# Patient Record
Sex: MALE | Race: WHITE | Employment: OTHER | URBAN - METROPOLITAN AREA
[De-identification: names, ages, dates, MRNs, and addresses within clinical notes are randomized per-mention and may not be internally consistent; named-entity substitution may affect disease eponyms.]

---

## 2021-12-24 ENCOUNTER — APPOINTMENT (OUTPATIENT)
Dept: CT IMAGING | Facility: CLINIC | Age: 71
DRG: 064 | End: 2021-12-24
Attending: PHYSICIAN ASSISTANT
Payer: COMMERCIAL

## 2021-12-24 ENCOUNTER — APPOINTMENT (OUTPATIENT)
Dept: CT IMAGING | Facility: CLINIC | Age: 71
DRG: 064 | End: 2021-12-24
Attending: EMERGENCY MEDICINE
Payer: COMMERCIAL

## 2021-12-24 ENCOUNTER — HOSPITAL ENCOUNTER (INPATIENT)
Facility: CLINIC | Age: 71
LOS: 24 days | Discharge: NURSING, REHAB OR LONG-TERM CARE | DRG: 064 | End: 2022-01-17
Attending: EMERGENCY MEDICINE | Admitting: HOSPITALIST
Payer: COMMERCIAL

## 2021-12-24 DIAGNOSIS — I61.9 RIGHT-SIDED NONTRAUMATIC INTRACEREBRAL HEMORRHAGE, UNSPECIFIED CEREBRAL LOCATION (H): ICD-10-CM

## 2021-12-24 LAB
ANION GAP SERPL CALCULATED.3IONS-SCNC: 3 MMOL/L (ref 3–14)
APTT PPP: 26 SECONDS (ref 22–38)
ATRIAL RATE - MUSE: 106 BPM
BASOPHILS # BLD AUTO: 0 10E3/UL (ref 0–0.2)
BASOPHILS NFR BLD AUTO: 0 %
BUN SERPL-MCNC: 14 MG/DL (ref 7–30)
CALCIUM SERPL-MCNC: 8.5 MG/DL (ref 8.5–10.1)
CHLORIDE BLD-SCNC: 109 MMOL/L (ref 94–109)
CK SERPL-CCNC: 153 U/L (ref 30–300)
CO2 SERPL-SCNC: 29 MMOL/L (ref 20–32)
CREAT SERPL-MCNC: 0.77 MG/DL (ref 0.66–1.25)
DIASTOLIC BLOOD PRESSURE - MUSE: NORMAL MMHG
EOSINOPHIL # BLD AUTO: 0 10E3/UL (ref 0–0.7)
EOSINOPHIL NFR BLD AUTO: 0 %
ERYTHROCYTE [DISTWIDTH] IN BLOOD BY AUTOMATED COUNT: 12.4 % (ref 10–15)
GFR SERPL CREATININE-BSD FRML MDRD: >90 ML/MIN/1.73M2
GLUCOSE BLD-MCNC: 112 MG/DL (ref 70–99)
HCT VFR BLD AUTO: 47.8 % (ref 40–53)
HGB BLD-MCNC: 15.6 G/DL (ref 13.3–17.7)
HOLD SPECIMEN: NORMAL
IMM GRANULOCYTES # BLD: 0 10E3/UL
IMM GRANULOCYTES NFR BLD: 0 %
INR PPP: 0.96 (ref 0.85–1.15)
INTERPRETATION ECG - MUSE: NORMAL
LYMPHOCYTES # BLD AUTO: 1.3 10E3/UL (ref 0.8–5.3)
LYMPHOCYTES NFR BLD AUTO: 13 %
MCH RBC QN AUTO: 29.2 PG (ref 26.5–33)
MCHC RBC AUTO-ENTMCNC: 32.6 G/DL (ref 31.5–36.5)
MCV RBC AUTO: 89 FL (ref 78–100)
MONOCYTES # BLD AUTO: 0.4 10E3/UL (ref 0–1.3)
MONOCYTES NFR BLD AUTO: 4 %
NEUTROPHILS # BLD AUTO: 8.6 10E3/UL (ref 1.6–8.3)
NEUTROPHILS NFR BLD AUTO: 83 %
NRBC # BLD AUTO: 0 10E3/UL
NRBC BLD AUTO-RTO: 0 /100
P AXIS - MUSE: 36 DEGREES
PLATELET # BLD AUTO: 258 10E3/UL (ref 150–450)
POTASSIUM BLD-SCNC: 3.5 MMOL/L (ref 3.4–5.3)
PR INTERVAL - MUSE: 146 MS
QRS DURATION - MUSE: 84 MS
QT - MUSE: 358 MS
QTC - MUSE: 475 MS
R AXIS - MUSE: 37 DEGREES
RADIOLOGIST FLAGS: ABNORMAL
RBC # BLD AUTO: 5.35 10E6/UL (ref 4.4–5.9)
SARS-COV-2 RNA RESP QL NAA+PROBE: NEGATIVE
SODIUM SERPL-SCNC: 141 MMOL/L (ref 133–144)
SYSTOLIC BLOOD PRESSURE - MUSE: NORMAL MMHG
T AXIS - MUSE: 28 DEGREES
TROPONIN I SERPL HS-MCNC: 10 NG/L
VENTRICULAR RATE- MUSE: 106 BPM
WBC # BLD AUTO: 10.4 10E3/UL (ref 4–11)

## 2021-12-24 PROCEDURE — 84484 ASSAY OF TROPONIN QUANT: CPT | Performed by: EMERGENCY MEDICINE

## 2021-12-24 PROCEDURE — 0042T CT HEAD PERFUSION WITH CONTRAST: CPT

## 2021-12-24 PROCEDURE — 93005 ELECTROCARDIOGRAM TRACING: CPT

## 2021-12-24 PROCEDURE — G0463 HOSPITAL OUTPT CLINIC VISIT: HCPCS | Performed by: PHYSICIAN ASSISTANT

## 2021-12-24 PROCEDURE — 93005 ELECTROCARDIOGRAM TRACING: CPT | Mod: 76

## 2021-12-24 PROCEDURE — 85610 PROTHROMBIN TIME: CPT | Performed by: EMERGENCY MEDICINE

## 2021-12-24 PROCEDURE — 96365 THER/PROPH/DIAG IV INF INIT: CPT | Mod: 59

## 2021-12-24 PROCEDURE — 70450 CT HEAD/BRAIN W/O DYE: CPT

## 2021-12-24 PROCEDURE — 87635 SARS-COV-2 COVID-19 AMP PRB: CPT | Performed by: EMERGENCY MEDICINE

## 2021-12-24 PROCEDURE — 80048 BASIC METABOLIC PNL TOTAL CA: CPT | Performed by: EMERGENCY MEDICINE

## 2021-12-24 PROCEDURE — 250N000011 HC RX IP 250 OP 636: Performed by: EMERGENCY MEDICINE

## 2021-12-24 PROCEDURE — 250N000009 HC RX 250: Performed by: EMERGENCY MEDICINE

## 2021-12-24 PROCEDURE — 85730 THROMBOPLASTIN TIME PARTIAL: CPT | Performed by: EMERGENCY MEDICINE

## 2021-12-24 PROCEDURE — 85025 COMPLETE CBC W/AUTO DIFF WBC: CPT | Performed by: EMERGENCY MEDICINE

## 2021-12-24 PROCEDURE — 99291 CRITICAL CARE FIRST HOUR: CPT | Mod: 25

## 2021-12-24 PROCEDURE — 70450 CT HEAD/BRAIN W/O DYE: CPT | Mod: 77

## 2021-12-24 PROCEDURE — 99292 CRITICAL CARE ADDL 30 MIN: CPT

## 2021-12-24 PROCEDURE — 70496 CT ANGIOGRAPHY HEAD: CPT

## 2021-12-24 PROCEDURE — 120N000001 HC R&B MED SURG/OB

## 2021-12-24 PROCEDURE — 72125 CT NECK SPINE W/O DYE: CPT

## 2021-12-24 PROCEDURE — 36415 COLL VENOUS BLD VENIPUNCTURE: CPT | Performed by: EMERGENCY MEDICINE

## 2021-12-24 PROCEDURE — 96366 THER/PROPH/DIAG IV INF ADDON: CPT

## 2021-12-24 PROCEDURE — 82550 ASSAY OF CK (CPK): CPT | Performed by: EMERGENCY MEDICINE

## 2021-12-24 PROCEDURE — C9803 HOPD COVID-19 SPEC COLLECT: HCPCS

## 2021-12-24 RX ORDER — DEXTROAMPHETAMINE SACCHARATE, AMPHETAMINE ASPARTATE, DEXTROAMPHETAMINE SULFATE AND AMPHETAMINE SULFATE 5; 5; 5; 5 MG/1; MG/1; MG/1; MG/1
20 TABLET ORAL EVERY MORNING
Status: ON HOLD | COMMUNITY
End: 2022-01-10

## 2021-12-24 RX ORDER — DEXTROAMPHETAMINE SACCHARATE, AMPHETAMINE ASPARTATE, DEXTROAMPHETAMINE SULFATE AND AMPHETAMINE SULFATE 5; 5; 5; 5 MG/1; MG/1; MG/1; MG/1
20 TABLET ORAL DAILY PRN
Status: ON HOLD | COMMUNITY
End: 2022-01-10

## 2021-12-24 RX ORDER — ASPIRIN 81 MG/1
81 TABLET ORAL DAILY
COMMUNITY
End: 2021-12-24

## 2021-12-24 RX ORDER — TRAZODONE HYDROCHLORIDE 50 MG/1
50 TABLET, FILM COATED ORAL
Status: ON HOLD | COMMUNITY
End: 2022-01-10

## 2021-12-24 RX ORDER — AMLODIPINE AND BENAZEPRIL HYDROCHLORIDE 5; 20 MG/1; MG/1
1 CAPSULE ORAL DAILY
COMMUNITY
End: 2021-12-25

## 2021-12-24 RX ORDER — TRAZODONE HYDROCHLORIDE 50 MG/1
50 TABLET, FILM COATED ORAL AT BEDTIME
COMMUNITY
End: 2021-12-24

## 2021-12-24 RX ORDER — IOPAMIDOL 755 MG/ML
70 INJECTION, SOLUTION INTRAVASCULAR ONCE
Status: COMPLETED | OUTPATIENT
Start: 2021-12-24 | End: 2021-12-24

## 2021-12-24 RX ORDER — NICARDIPINE HYDROCHLORIDE 0.2 MG/ML
.5-15 INJECTION INTRAVENOUS CONTINUOUS
Status: DISCONTINUED | OUTPATIENT
Start: 2021-12-24 | End: 2021-12-25

## 2021-12-24 RX ADMIN — NICARDIPINE HYDROCHLORIDE 15 MG/HR: 0.2 INJECTION INTRAVENOUS at 19:12

## 2021-12-24 RX ADMIN — NICARDIPINE HYDROCHLORIDE 5 MG/HR: 0.2 INJECTION INTRAVENOUS at 16:23

## 2021-12-24 RX ADMIN — NICARDIPINE HYDROCHLORIDE 15 MG/HR: 0.2 INJECTION INTRAVENOUS at 22:26

## 2021-12-24 RX ADMIN — SODIUM CHLORIDE 100 ML: 900 INJECTION INTRAVENOUS at 15:48

## 2021-12-24 RX ADMIN — IOPAMIDOL 120 ML: 755 INJECTION, SOLUTION INTRAVENOUS at 15:47

## 2021-12-24 ASSESSMENT — ACTIVITIES OF DAILY LIVING (ADL)
ADLS_ACUITY_SCORE: 20
ADLS_ACUITY_SCORE: 12
ADLS_ACUITY_SCORE: 24
ADLS_ACUITY_SCORE: 24
ADLS_ACUITY_SCORE: 12
ADLS_ACUITY_SCORE: 12
ADLS_ACUITY_SCORE: 22

## 2021-12-24 NOTE — CONSULTS
[facility]    Stroke Telephone Note    I was called by David Lundberg on 12/24/21 regarding patient Sukhi Johnson. The patient is a 71 year old male who was last known well at noon today found down at 1500, with L side plegia, not speaking but able to follow some commands. BP is 200/115. History of prior ICH .    Stroke Code Data (for stroke code without tele)  Stroke code activated 12/24/21   1537   Stroke provider first response  12/24/21   1537            Last known normal 12/24/21   1200        Time of discovery   (or onset of symptoms) 12/24/21   1500   Head CT read by Stroke Neuro Dr/Provider 12/24/21   4537   Was stroke code de-escalated? No              Imaging Findings   CT head shows a large R side frontoparietal parenchymal ICH, 6.1x4.4 cm    Thrombolytic Treatment   Not given due to stroke mimic: ICH.    Endovascular Treatment  Not initiated due to absence of proximal vessel occlusion    Impression  Non-traumatic intracerebral hemorrhage of the R frontoparietal region    Recommendations   Acute Hemorrhagic Stroke Recommendations  - Neurochecks and Vital Signs every 1 hour  - Admit to an ICU  - Systolic BP Goal: 130-150mmHg  - Stat neurosurgery consultation  - Head of bed elevated 30 degrees  - Echocardiogram  - MRI at some point during the admission to evaluate for any underlying cerebral amyloid angiopathy, can be ordered by us later  - Telemetry, EKG  - Imaging: repeat head CT in 6 hours (i.e. at 10pm tonight 12/24)  - Bedside Glucose Monitoring  - A1c, Troponin x 3  - PT/OT/SLP  - Euthermia (please treat any fever aggressively), Euglycemia (please keep glucose <180mg/dL)    D/w my attending Dr. Remigio Hui    My recommendations are based on the information provided over the phone by Sukhi Johnson's in-person providers. They are not intended to replace the clinical judgment of his in-person providers. I was not requested to personally see or examine the  "patient at this time.    Michelle Schneider PA-C  Neurology  12/24/2021 4:11 PM  To page me or covering stroke neurology team member, click here: AMCOM   Choose \"On Call\" tab at top, then search dropdown box for \"Neurology Adult\", select location, press Enter, then look for stroke/neuro ICU/telestroke.        "

## 2021-12-24 NOTE — ED TRIAGE NOTES
Pt lives at single dwelling home. Last known well at 1200. Landlord found pt at 1500 on floor, unable to speak or use left side. C collar applied in ED as pt had an unwitnessed fall.

## 2021-12-24 NOTE — CONSULTS
NEUROSURGERY CONSULT    Neurosurgery was asked by Dr. Schneider to consult for a parenchymal hemorrhage in right frontal parietal  region.    PLAN:    Mr. Johnson exhibits a  6.1 x 4.4 cm parenchymal hemorrhage in right frontal parietal region with some mild surrounding edema and localized mass effect but no midline shift on the most recent imaging. From a Neurosurgical standpoint, we do not appreciate any indication for immediate surgical intervention. We do agree with Stroke Neurology and feel that it would be in his best interest to be admitted to the [facility] Intensive Care Unit with neurochecks Q1H. The head of the bed should be at 30 degrees at all times and the systolic blood pressure should be maintained at 150 mmHg or less at all times. There will be a repeat a head CT in six hours from the initial scan to assess the stability of the bleed.     Please page our on-call service for any questions or concerns.     It has been a pleasure meeting Sukhi Johnson. Thank you for having us be involved in his care.    ______________________________________________________________________    HPI:    Sukhi Johnson is a pleasant 71 year old male who presented to the [facility] Emergency Department VIA EMS for consultation after being found down at 1500 with left sided plegia. Non verbal but will give the thumbs up and thumbs down when asked questions. No thrombolytic or endovascular treatment given.     No past medical history on file.    No past surgical history on file.    Not on File    Social History     Tobacco Use     Smoking status: Not on file     Smokeless tobacco: Not on file   Substance Use Topics     Alcohol use: Not on file       No family history on file.    Home Medications    ROS: 10 point ROS neg other than the symptoms noted above in the HPI.    Vitals:    BP (!) 200/115   Pulse 100   Temp 98.1  F (36.7  C) (Temporal)   Resp 20   Wt 184 lb 15.5 oz (83.9 kg)   SpO2  97%   There is no height or weight on file to calculate BMI.  No intake/output data recorded.    Exam:    Patient appears comfortable. Non verbal.   Head: Normocephalic, without obvious abnormality, abrasions, no facial asymmetry.   Eyes: conjunctivae/corneas clear. PERRL, EOM's intact.   Throat: lips, mucosa, and tongue normal; teeth and gums normal.   Neck: supple, symmetrical, trachea midline, no adenopathy and thyroid: not enlarged, symmetric, no tenderness/mass/nodules.   Lungs: clear to auscultation bilaterally.   Heart: regular rate and rhythm.   Abdomen: soft, non-tender; bowel sounds normal; no masses, no organomegaly.   Pulses: 2+ and symmetric.   Skin: Skin color, texture, turgor normal. No rashes or lesions.     Following most commands. Communicates with right thumb up or down.  Cervical spine is non tender to palpation.  Right upper sensation intact throughout.   Left plegia.   Lumbar spine is non tender to palpation   Right lower extremity sensation intact.  Left lower extremity plegia.   Calves are soft and non-tender bilaterally.   GCS 22    Imaging: CT SCAN OF THE HEAD WITHOUT CONTRAST   12/24/2021 3:56 PM   Impression per radiology read - I have personally reviewed the images with the patient.    1. 6.1 x 4.4 cm parenchymal hemorrhage in right frontal parietal  region with some mild surrounding edema and localized mass effect but  no midline shift.  2. Cerebral atrophy with some scattered white matter changes which are  most likely due to chronic small vessel ischemic disease given the  patient's age.    CT HEAD PERFUSION WITH CONTRAST 12/24/2021 4:19 PM     Abnormal perfusion scan with moderate to large parenchymal  hematoma in the right frontoparietal region surrounding by some  diminished cerebral blood flow. The latter could be due to some  compressive ischemia.    Available labs at time of consult:       Recent Labs   Lab 12/24/21  1542   WBC 10.4   HGB 15.6   HCT 47.8   MCV 89         Recent Labs   Lab 12/24/21  1542   WBC 10.4   HGB 15.6   HCT 47.8   MCV 89        Recent Labs   Lab 12/24/21  1542   HGB 15.6     Recent Labs   Lab 12/24/21  1542   INR 0.96     Recent Labs   Lab 12/24/21  1542        Recent Labs   Lab 12/24/21  1542   WBC 10.4         Respectfully,    BOUCHRA Cortés, PA-MARK  [facility]     Tel: [phone]      All imaging, physical findings, and the above plan have been reviewed with Dr. Matthews.

## 2021-12-24 NOTE — ED NOTES
Bed: ST  Expected date: 12/24/21  Expected time: 3:19 PM  Means of arrival: Ambulance  Comments:  854 54m aphasia ETA 1631

## 2021-12-25 ENCOUNTER — APPOINTMENT (OUTPATIENT)
Dept: MRI IMAGING | Facility: CLINIC | Age: 71
DRG: 064 | End: 2021-12-25
Attending: PSYCHIATRY & NEUROLOGY
Payer: COMMERCIAL

## 2021-12-25 ENCOUNTER — APPOINTMENT (OUTPATIENT)
Dept: CT IMAGING | Facility: CLINIC | Age: 71
DRG: 064 | End: 2021-12-25
Attending: PSYCHIATRY & NEUROLOGY
Payer: COMMERCIAL

## 2021-12-25 ENCOUNTER — APPOINTMENT (OUTPATIENT)
Dept: SPEECH THERAPY | Facility: CLINIC | Age: 71
DRG: 064 | End: 2021-12-25
Attending: HOSPITALIST
Payer: COMMERCIAL

## 2021-12-25 LAB
ALBUMIN SERPL-MCNC: 3.4 G/DL (ref 3.4–5)
ALP SERPL-CCNC: 65 U/L (ref 40–150)
ALT SERPL W P-5'-P-CCNC: 25 U/L (ref 0–70)
ANION GAP SERPL CALCULATED.3IONS-SCNC: 6 MMOL/L (ref 3–14)
AST SERPL W P-5'-P-CCNC: 17 U/L (ref 0–45)
ATRIAL RATE - MUSE: 104 BPM
BILIRUB DIRECT SERPL-MCNC: 0.3 MG/DL (ref 0–0.2)
BILIRUB SERPL-MCNC: 2.3 MG/DL (ref 0.2–1.3)
BUN SERPL-MCNC: 17 MG/DL (ref 7–30)
CALCIUM SERPL-MCNC: 8.6 MG/DL (ref 8.5–10.1)
CHLORIDE BLD-SCNC: 110 MMOL/L (ref 94–109)
CHOLEST SERPL-MCNC: 193 MG/DL
CO2 SERPL-SCNC: 26 MMOL/L (ref 20–32)
CREAT SERPL-MCNC: 0.65 MG/DL (ref 0.66–1.25)
DIASTOLIC BLOOD PRESSURE - MUSE: NORMAL MMHG
ERYTHROCYTE [DISTWIDTH] IN BLOOD BY AUTOMATED COUNT: 12.6 % (ref 10–15)
GFR SERPL CREATININE-BSD FRML MDRD: >90 ML/MIN/1.73M2
GLUCOSE BLD-MCNC: 140 MG/DL (ref 70–99)
GLUCOSE BLDC GLUCOMTR-MCNC: 122 MG/DL (ref 70–99)
HBA1C MFR BLD: 5.4 % (ref 0–5.6)
HCT VFR BLD AUTO: 45.7 % (ref 40–53)
HDLC SERPL-MCNC: 61 MG/DL
HGB BLD-MCNC: 15.4 G/DL (ref 13.3–17.7)
INTERPRETATION ECG - MUSE: NORMAL
LDLC SERPL CALC-MCNC: 109 MG/DL
MAGNESIUM SERPL-MCNC: 2.3 MG/DL (ref 1.6–2.3)
MCH RBC QN AUTO: 29.5 PG (ref 26.5–33)
MCHC RBC AUTO-ENTMCNC: 33.7 G/DL (ref 31.5–36.5)
MCV RBC AUTO: 88 FL (ref 78–100)
NONHDLC SERPL-MCNC: 132 MG/DL
P AXIS - MUSE: 32 DEGREES
PLATELET # BLD AUTO: 271 10E3/UL (ref 150–450)
POTASSIUM BLD-SCNC: 3.3 MMOL/L (ref 3.4–5.3)
PR INTERVAL - MUSE: 142 MS
PROT SERPL-MCNC: 6.8 G/DL (ref 6.8–8.8)
QRS DURATION - MUSE: 84 MS
QT - MUSE: 348 MS
QTC - MUSE: 457 MS
R AXIS - MUSE: 52 DEGREES
RBC # BLD AUTO: 5.22 10E6/UL (ref 4.4–5.9)
SODIUM SERPL-SCNC: 142 MMOL/L (ref 133–144)
SYSTOLIC BLOOD PRESSURE - MUSE: NORMAL MMHG
T AXIS - MUSE: 65 DEGREES
TRIGL SERPL-MCNC: 117 MG/DL
TROPONIN I SERPL HS-MCNC: 52 NG/L
VENTRICULAR RATE- MUSE: 104 BPM
WBC # BLD AUTO: 13.9 10E3/UL (ref 4–11)

## 2021-12-25 PROCEDURE — 80053 COMPREHEN METABOLIC PANEL: CPT | Performed by: PSYCHIATRY & NEUROLOGY

## 2021-12-25 PROCEDURE — 70553 MRI BRAIN STEM W/O & W/DYE: CPT

## 2021-12-25 PROCEDURE — 92610 EVALUATE SWALLOWING FUNCTION: CPT | Mod: GN | Performed by: SPEECH-LANGUAGE PATHOLOGIST

## 2021-12-25 PROCEDURE — 255N000002 HC RX 255 OP 636: Performed by: EMERGENCY MEDICINE

## 2021-12-25 PROCEDURE — 250N000009 HC RX 250: Performed by: EMERGENCY MEDICINE

## 2021-12-25 PROCEDURE — 99223 1ST HOSP IP/OBS HIGH 75: CPT | Mod: AI | Performed by: HOSPITALIST

## 2021-12-25 PROCEDURE — 84484 ASSAY OF TROPONIN QUANT: CPT | Performed by: HOSPITALIST

## 2021-12-25 PROCEDURE — 85027 COMPLETE CBC AUTOMATED: CPT | Performed by: PSYCHIATRY & NEUROLOGY

## 2021-12-25 PROCEDURE — 83036 HEMOGLOBIN GLYCOSYLATED A1C: CPT | Performed by: EMERGENCY MEDICINE

## 2021-12-25 PROCEDURE — 96374 THER/PROPH/DIAG INJ IV PUSH: CPT | Mod: 59

## 2021-12-25 PROCEDURE — 92523 SPEECH SOUND LANG COMPREHEN: CPT | Mod: GN | Performed by: SPEECH-LANGUAGE PATHOLOGIST

## 2021-12-25 PROCEDURE — 80061 LIPID PANEL: CPT | Performed by: EMERGENCY MEDICINE

## 2021-12-25 PROCEDURE — 250N000011 HC RX IP 250 OP 636: Performed by: STUDENT IN AN ORGANIZED HEALTH CARE EDUCATION/TRAINING PROGRAM

## 2021-12-25 PROCEDURE — 82248 BILIRUBIN DIRECT: CPT | Performed by: PSYCHIATRY & NEUROLOGY

## 2021-12-25 PROCEDURE — 83735 ASSAY OF MAGNESIUM: CPT | Performed by: PSYCHIATRY & NEUROLOGY

## 2021-12-25 PROCEDURE — 36415 COLL VENOUS BLD VENIPUNCTURE: CPT | Performed by: EMERGENCY MEDICINE

## 2021-12-25 PROCEDURE — A9585 GADOBUTROL INJECTION: HCPCS | Performed by: EMERGENCY MEDICINE

## 2021-12-25 PROCEDURE — 70450 CT HEAD/BRAIN W/O DYE: CPT

## 2021-12-25 PROCEDURE — 120N000001 HC R&B MED SURG/OB

## 2021-12-25 RX ORDER — HYDRALAZINE HYDROCHLORIDE 20 MG/ML
10 INJECTION INTRAMUSCULAR; INTRAVENOUS EVERY 4 HOURS PRN
Status: DISCONTINUED | OUTPATIENT
Start: 2021-12-25 | End: 2021-12-27

## 2021-12-25 RX ORDER — LABETALOL HYDROCHLORIDE 5 MG/ML
10 INJECTION, SOLUTION INTRAVENOUS EVERY 4 HOURS PRN
Status: DISCONTINUED | OUTPATIENT
Start: 2021-12-25 | End: 2021-12-27

## 2021-12-25 RX ORDER — LIDOCAINE 40 MG/G
CREAM TOPICAL
Status: DISCONTINUED | OUTPATIENT
Start: 2021-12-25 | End: 2022-01-17 | Stop reason: HOSPADM

## 2021-12-25 RX ORDER — GADOBUTROL 604.72 MG/ML
8 INJECTION INTRAVENOUS ONCE
Status: COMPLETED | OUTPATIENT
Start: 2021-12-25 | End: 2021-12-25

## 2021-12-25 RX ORDER — ONDANSETRON 4 MG/1
4 TABLET, ORALLY DISINTEGRATING ORAL EVERY 6 HOURS PRN
Status: DISCONTINUED | OUTPATIENT
Start: 2021-12-25 | End: 2022-01-17 | Stop reason: HOSPADM

## 2021-12-25 RX ORDER — ONDANSETRON 2 MG/ML
4 INJECTION INTRAMUSCULAR; INTRAVENOUS EVERY 6 HOURS PRN
Status: DISCONTINUED | OUTPATIENT
Start: 2021-12-25 | End: 2022-01-17 | Stop reason: HOSPADM

## 2021-12-25 RX ORDER — LIDOCAINE 40 MG/G
CREAM TOPICAL
Status: DISCONTINUED | OUTPATIENT
Start: 2021-12-25 | End: 2021-12-25

## 2021-12-25 RX ADMIN — NICARDIPINE HYDROCHLORIDE 15 MG/HR: 0.2 INJECTION INTRAVENOUS at 03:05

## 2021-12-25 RX ADMIN — NICARDIPINE HYDROCHLORIDE 10 MG/HR: 0.2 INJECTION INTRAVENOUS at 05:18

## 2021-12-25 RX ADMIN — NICARDIPINE HYDROCHLORIDE 15 MG/HR: 0.2 INJECTION INTRAVENOUS at 00:24

## 2021-12-25 RX ADMIN — GADOBUTROL 8 ML: 604.72 INJECTION INTRAVENOUS at 10:00

## 2021-12-25 RX ADMIN — LABETALOL HYDROCHLORIDE 10 MG: 5 INJECTION, SOLUTION INTRAVENOUS at 07:31

## 2021-12-25 ASSESSMENT — ACTIVITIES OF DAILY LIVING (ADL)
ADLS_ACUITY_SCORE: 21
ADLS_ACUITY_SCORE: 24
ADLS_ACUITY_SCORE: 21
ADLS_ACUITY_SCORE: 24
ADLS_ACUITY_SCORE: 21
ADLS_ACUITY_SCORE: 24
ADLS_ACUITY_SCORE: 22
ADLS_ACUITY_SCORE: 24
ADLS_ACUITY_SCORE: 21
ADLS_ACUITY_SCORE: 22
ADLS_ACUITY_SCORE: 21
ADLS_ACUITY_SCORE: 21
ADLS_ACUITY_SCORE: 22
ADLS_ACUITY_SCORE: 21
ADLS_ACUITY_SCORE: 22

## 2021-12-25 NOTE — PROGRESS NOTES
"   12/25/21 1313   General Information                             SLP Bedside Swallow Evaluation    Onset of Illness/Injury or Date of Surgery 12/24/21   Referring Physician Dr. Aguirre   Patient/Family Therapy Goal Statement (SLP) Pt motioned he wants something to drink.   Pertinent History of Current Problem See chart notes   General Observations Pt sleeping initially, but then did wake up and follow commands with his right hand.  Pt kept his eyes closed despite mod-max cues to open his eyes.   Pt was not able to complete any oral motor movements on command or open his mouth for po trials.     Oral Motor   Oral Musculature   (Pt unable to execute any lip or tongue movements; Yawn noted)   Dentition (Oral Motor)   Dentition (Oral Motor) adequate dentition   Cough/Swallow/Gag Reflex (Oral Motor)   Comment, Cough/Swallow/Gag Reflex (Oral Motor) Pt make a slight vocal sound when asked to cough and throat clear hard with mod cues  (Spontaneous swallows with decreased elevation noted x 4)   Vocal Quality/Secretion Management (Oral Motor)   Comment, Vocal Quality/Secretion Management (Oral Motor) Min sound produced on \"ah\" with mod-max cues   General Swallowing Observations   Current Diet/Method of Nutritional Intake (General Swallowing Observations, NIS) NPO   Clinical Swallow Evaluation   Feeding Assistance dependent   Clinical Swallow Eval: Thin Liquid Texture Trial   Mode of Presentation, Thin Liquids spoon   Volume of Liquid or Food Presented ice chip x 1   Diagnostic Statement ice chip sat on lips and melted without pt ability to open mouth, attempt chewing, or move tongue; oral cares provided outside of teeth   Swallowing Recommendations   Diet Consistency Recommendations NPO   SLP Therapy Assessment/Plan   Criteria for Skilled Therapeutic Interventions Met (SLP Eval) yes   SLP Diagnosis Severe oral dysphagia   Rehab Potential (SLP Eval) good, to achieve stated therapy goals   Therapy Frequency (SLP Eval) daily "   Predicted Duration of Therapy Intervention (SLP Eval) 1 week   Comment, Therapy Assessment/Plan (SLP) Pt presents with severe oral motor deficits and high silent aspiration risk for all po intake.  Recommend NPO status and oral cares as able.  SLP to re-assess 12/26 in swallow Tx to assess for improved oral function and ability to take po trials.     Therapy Plan Review/Discharge Plan (SLP)   Therapy Plan Review (SLP) evaluation/treatment results reviewed;care plan/treatment goals reviewed;risks/benefits reviewed;current/potential barriers reviewed;participants included;patient   SLP Discharge Planning    SLP Discharge Recommendation (DC Rec) [facility] patient will benefit from intensive, interdisciplinary therapy.  Anticipate will be able to tolerate 3 hours of therapy per day;home with assist;home with home care speech therapy   SLP Rationale for DC Rec Pt's oral motor function and verbal communication are severely impaired at this time, max assist and supervision for communication after discharge   SLP Brief overview of current status  Pt presents with severe oral motor deficits, high silent aspiration risk for all po intake, and severe verbal language deficits.  Encourage use of thumbs up and down for yes/no responses, use of gestures and pointing to increase expression.  Recommend NPO status and oral cares as able.  SLP to re-assess 12/26 in swallow Tx to asess for improved oral function and ability to take po trials.      Total Evaluation Time   Total Evaluation Time (Minutes) 13

## 2021-12-25 NOTE — ED PROVIDER NOTES
Visit Date: 2021    ADDENDUM    EMERGENCY DEPARTMENT COURSE:  Please note that when the patient arrived, since he had fallen on the ground C-spine precautions had not been done and a collar was placed.  I did include a CT of the cervical spine on his evaluation.  This is negative and his collar has been removed.  The patient is easily turns his neck and does not have any complaints of neck pain when asked, by thumbs up/thumbs down.    David Lundberg MD        D: 2021   T: 2021   MT: [facility]    Name:     MONE DORAN  MRN:      [id number]        Account:    [id number]   :      1950           Visit Date: 2021     Document: [id number]

## 2021-12-25 NOTE — PHARMACY-ADMISSION MEDICATION HISTORY
Pharmacy Medication History  Admission medication history interview status for the 12/24/2021  admission is complete. See EPIC admission navigator for prior to admission medications     Location of Interview: Patient room  Medication history sources: Patient's family/friend (wife, Andressa) and [employer]    Significant changes made to the medication list:  - Flagged amlodipine-benazepril for removal. Patient's wife says he hasn't used for >1 month due to side effects (significant dizziness). He does not want to take this but continues to be ordered. See below.  - Removed aspirin and trazodone (patient is trying to cut back on all medications).  - Added multiple supplements. Has been irregularly using Ziquin Life supplement products. Patient also uses 2 eye supplements (unsure of names) daily and an AZO product BID.  - Changed Adderall from 20 mg three times daily to 20 mg in the morning and 20 mg in the afternoon as needed.  - Changed trazodone to as needed (wife says patient only uses as needed for depressed mood, but he hasn't used for more than a month).    In the past week, patient estimated taking medication this percent of the time: greater than 90%    Additional medication history information:   - Patient does not like the way amlodipine-benazepril makes him feel. He had a recent fill for tadalafil, which his wife says has worked well for him in the past for hypertension (not used for ED as prescribed here). It was previously prescribed for the [place] in [place] but has not been ordered with recent providers.     Medication reconciliation completed by provider prior to medication history? No    Time spent in this activity: 45 minutes    Prior to Admission medications    Medication Sig Last Dose Taking? Auth Provider   amphetamine-dextroamphetamine (ADDERALL) 20 MG tablet Take 20 mg by mouth every morning Rx written for 20 mg three times daily, but patient takes 20 mg in the morning and 20 mg in the afternoon  as needed. 12/23/2021 at am Yes Unknown, Entered By History   amphetamine-dextroamphetamine (ADDERALL) 20 MG tablet Take 20 mg by mouth daily as needed Rx written for 20 mg three times daily, but patient takes 20 mg in the morning and 20 mg in the afternoon as needed. prn Yes Unknown, Entered By History   Cranberry-Vitamin C (AZO CRANBERRY URINARY TRACT PO) Take 1 tablet by mouth 2 times daily Past Week at Unknown time Yes Unknown, Entered By History   UNABLE TO FIND MEDICATION NAME: Systemic enzyme product capsule - uses as needed when feeling ill (up to 5 capsules every hour). prn Yes Unknown, Entered By History   UNABLE TO FIND MEDICATION NAME: Ziquin Life Mind Body Tonic; typically uses 1 ounce per day. Takes irregularly. Past Month at Unknown time Yes Unknown, Entered By History   UNABLE TO FIND MEDICATION NAME: Ziquin Life Sparkle Minerals. Mixes with Mind Body Tonic. Takes irregularly. Past Month at Unknown time Yes Unknown, Entered By History   UNABLE TO FIND MEDICATION NAME: Ziquin Life EFA Complete (Essential Fatty Acids Omega 3 + Omega 6). Takes as needed. prn Yes Unknown, Entered By History   UNABLE TO FIND MEDICATION NAME: Ziquin Life Revive Digestive Support. Takes as needed. 12/23/2021 at Unknown time Yes Unknown, Entered By History   UNKNOWN TO PATIENT Lutein eye supplements. Patient takes 2 different eye vitamins - takes 1 of each supplement with dinner. 12/23/2021 at pm Yes Unknown, Entered By History   UNKNOWN TO PATIENT Eye supplement. Patient takes 2 different eye vitamins - takes 1 of each supplement with dinner. 12/23/2021 at pm Yes Unknown, Entered By History   amLODIPine-benazepril (LOTREL) 5-20 MG capsule Take 1 capsule by mouth daily   Unknown, Entered By History       The information provided in this note is only as accurate as the sources available at the time of update(s)

## 2021-12-25 NOTE — PROGRESS NOTES
"Vascular neurology progress note     71M with right parietal ICH     S: patient has severe dysarthria and communication is limited but he gestured that he was hungry, thirsty, and uncomfortable.   O: /67   Pulse 87   Temp 98  F (36.7  C) (Temporal)   Resp 17   Wt 83.9 kg (184 lb 15.5 oz)   SpO2 94%      Neuro exam:   Sleepy. Wakes up to tactile stimulation. Able to follow commands consistently. Oriented to self and being at a hospital. Communicates with gestures, head movement, thumbs up or down. Left neglect.   Left homonymous hemianopia. Right gaze deviation but can cross midline. Left facial droop. Anarthria. Severely limited mouth opening and tongue movement.   Spastic left hemiplegia. Only movement of left LE in bed with noxious stimulation.   Left hemihypesthesia.   No ataxia or involuntary movement.     A&P:   71M with right parietal lobar ICH. Repeat head CT yesterday was stable. Mechanism: hypertensive versus cerebral amyloid angiopathy. Patient is on adderall at home which can increase BP. Initially hypertensive and was put on nicardipine drip until 6:30 am this morning when it was stopped. Patient is not coagulopathic at baseline and no reversal was needed on admission.     -Repeat CT now.   -Avoid antithrombotics.   -Check CBC, CMP.   -SBP goal for the first 24 hours 130-150. Starting this afternoon around 3 pm, we can ease SBP goal to < 160.   -MRI brain with contrast when able.   -Intermittent compression boots for DVT prophylaxis. Can start pharmacologic DVT prophylaxis tomorrow.   -Hospitalist consult.   -PT, OT, SLP.     Vascular neurology will continue to follow.       I spent 45 min providing critical care for this patient.       Remigio Hui MD, Msc, MALLORIE, FAAN   of Neurology  [facility]     12/25/2021 8:17 AM  To page me or covering stroke neurology team member, click here: AMCOM  Choose \"On Call\" tab at top, then search dropdown box for " "\"Neurology Adult\" & press Enter, look for Neuro ICU/Stroke    "

## 2021-12-25 NOTE — PLAN OF CARE
Pt lethargic. Answers questions with thumbs up/thumbs down. Incomprehensible sounds. Interacts. Follows commands inconsistently. PERRL. Pt follows objects to right, can't gaze completely to left - pupils stop midway. L sided droop, L sided weakness 0/5. On RA. Telemetry ST majority of shift usually ranging from 100-130. Pt had hard BM during beginning of shift. Voiding, incontinence. External catheter in place.     neuros q2hr starting at 0400. Nicardipine drip infusing. Labetalol drip available if nicardipine is not getting patient to goal SBP. SBP goal 130-150 per neurology note on 12/24.

## 2021-12-25 NOTE — PLAN OF CARE
BP (!) 146/76   Pulse 81   Temp 98  F (36.7  C)   Resp 16   Wt 83.9 kg (184 lb 15.5 oz)   SpO2 94%     Patient name: Sukhi Johnson    Nursing shift note  Summary: Patient in with Hemmoragic stroke  Alertness/orientation: Patient is lethargic, refuses to open eyes  Neuro: Mute but can communicate with right hand, follows commands, severe left hemiparesis NIH scored 26.  Cardiac: , will monitor closely  Resp: WDL  GI: WDL  : External cath in place  CMS: Intact  IV: Left PIV  Mobility: Lift total care  Pain: Reports minor headache, Ice pack given.  Diet: NPO  Skin: WDL, coccyx is clear  Plan: Continue stroke workup  Other Important Info: Family stopped in to see patient      Sameer Batista, RN  [facility] [address] Neuro Unit  [phone]

## 2021-12-25 NOTE — PROGRESS NOTES
[facility]    Neurosurgery Progress Note    Date of Service (when I saw the patient): 12/25/2021     Assessment & Plan   Sukhi Johnson is a 71 year old male who was admitted on 12/24/2021. He presented with a parenchymal hemorrhage in the right frontal parietal region with some mild surrounding edema and localized mass effect but no midline shift. His repeat imaging was stable.     Active Problems:    Right-sided nontraumatic intracerebral hemorrhage, unspecified cerebral location (H)    Assessment: stable right-sided parenchymal hemorrhage    Plan: No plan for surgical intervention. Recommended continued management per NCC. Neurosurgery will sign off at this time.    I have discussed the following assessment and plan Dr. Matthews who is in agreement with initial plan and will follow up with further consultation recommendations.    Glenny Sheikh CNP  [facility] Neurosurgery  [facility]  [address] [place] [address]  [address] [place] [id number]    Tel [phone]  Pager [phone]      Interval History   Stable.    Physical Exam   Temp: 98  F (36.7  C) Temp src: Temporal BP: (!) 146/76 Pulse: 81   Resp: 16 SpO2: 94 % O2 Device: Nasal cannula Oxygen Delivery: 2 LPM  Vitals:    12/24/21 1558   Weight: 184 lb 15.5 oz (83.9 kg)     Vital Signs with Ranges  Temp:  [98  F (36.7  C)-98.3  F (36.8  C)] 98  F (36.7  C)  Pulse:  [] 81  Resp:  [8-43] 16  BP: (119-200)/() 146/76  SpO2:  [93 %-100 %] 94 %  I/O last 3 completed shifts:  In: 981.87 [I.V.:981.87]  Out: 800 [Urine:800]     , Blood pressure (!) 146/76, pulse 81, temperature 98  F (36.7  C), resp. rate 16, weight 184 lb 15.5 oz (83.9 kg), SpO2 94 %.  184 lbs 15.46 oz    Medications     labetalol 1000 mg/200 mL Stopped (12/24/21 2018)       sodium chloride (PF)  3 mL Intracatheter Q8H       Data     CBC RESULTS:   Recent Labs   Lab Test 12/25/21  1116   WBC 13.9*   RBC 5.22   HGB 15.4   HCT 45.7    MCV 88   MCH 29.5   MCHC 33.7   RDW 12.6        Basic Metabolic Panel:  Lab Results   Component Value Date     12/25/2021      Lab Results   Component Value Date    POTASSIUM 3.3 12/25/2021     Lab Results   Component Value Date    CHLORIDE 110 12/25/2021     Lab Results   Component Value Date    MUSTAPHA 8.6 12/25/2021     Lab Results   Component Value Date    CO2 26 12/25/2021     Lab Results   Component Value Date    BUN 17 12/25/2021     Lab Results   Component Value Date    CR 0.65 12/25/2021     Lab Results   Component Value Date     12/25/2021     INR:  Lab Results   Component Value Date    INR 0.96 12/24/2021

## 2021-12-25 NOTE — ED PROVIDER NOTES
ECG:  Indication: Repeat  Completed at 1011.  Read at 1011.   Sinus tachycardia. T wave abnormality, consider inferior ischemia. Abnormal ECG.  Rate 104 bpm. VA interval 142. QRS duration 84. QT/QTc 348/457. P-R-T axes 32 52 65.      Scribe Disclosure:  I, Charles Mahajan, am serving as a scribe at 10:17 AM on 12/25/2021 to document services personally performed by Og Avelar MD based on my observations and the provider's statements to me.

## 2021-12-25 NOTE — PROGRESS NOTES
"Vascular neurology progress note     Follow up CT head is done and I personally reviewed.   Hematoma volume = 62 mL (60 mL on initial imaging using the same measurement technique).   Minimal subarachnoid extension.   No intraventricular extension.   No hydrocephalus.   No pineal shift.     Ok to transfer to the floor.   Neuro checks and vitals q4 is ok.     Will continue to follow.     Remigio Hui MD, Msc, FAHA, FAAN   of Neurology  [facility]     12/25/2021 12:00 PM  To page me or covering stroke neurology team member, click here: AMCOM  Choose \"On Call\" tab at top, then search dropdown box for \"Neurology Adult\" & press Enter, look for Neuro ICU/Stroke  "

## 2021-12-25 NOTE — PROGRESS NOTES
"   12/25/21 9435   General Information                                 SLP Communication Evaluation    Onset of Illness/Injury or Date of Surgery 12/24/21   Referring Physician Dr. Aguirre   Patient/Family Therapy Goal Statement (SLP) Pt motioned he wants something to drink.   Pertinent History of Current Problem See chart notes   General Observations Pt sleeping initially, but then did wake up and follow commands with his right hand.  Pt kept his eyes closed despite mod-max cues to open his eyes.   Pt unable to move oral structures for following commands or imitating vowels/automatics.     Auditory Comprehension   Comment, Assessment (Auditory Comprehension) Pt was able to follow 5/5 1 step commands with his right hand.  Pt was unable to move oral structures to complete oral motor commands.  Comprehension is intact for simple commands.  Will assess futher in Tx as indicated.   Verbal Expression   Comment, Assessment (Verbal Expression) Pt unable to move mouth to verbalize or imitate words.  Slight sound produced on \"ah\", humming, and cues to cough and throat clear as hard as possible given mod-max cues.  Pt independently using thumbs up and down and using gestures such as drink.   SLP Therapy Assessment/Plan   Criteria for Skilled Therapeutic Interventions Met (SLP Eval) yes   SLP Diagnosis Severe verbal language deficits   Rehab Potential (SLP Eval) good, to achieve stated therapy goals   Therapy Frequency (SLP Eval) daily   Predicted Duration of Therapy Intervention (SLP Eval) 1 week   Comment, Therapy Assessment/Plan (SLP) Pt presents with severe verbal language deficits due to severe oral motor deficits.  Encourage pt to use thumb up/down for yes/no responses, gestures, and pointing if pt keeps his eyes open.  Plan to provide communication Tx for voicing, speech, use of gestures, and AAC use as indicated.     Therapy Plan Review/Discharge Plan (SLP)   Therapy Plan Review (SLP) evaluation/treatment results " reviewed;care plan/treatment goals reviewed;risks/benefits reviewed;current/potential barriers reviewed;participants included;patient   SLP Discharge Planning    SLP Discharge Recommendation (DC Rec) [facility] will benefit from intensive, interdisciplinary therapy.  Anticipate will be able to tolerate 3 hours of therapy per day;home with assist;home with home care speech therapy   SLP Rationale for DC Rec Pt's oral motor function and verbal communication are severely impaired at this time, max assist and supervision for communication after discharge   SLP Brief overview of current status  Pt presents with severe oral motor deficits, high silent aspiration risk for all po intake, and severe verbal language deficits.  Encourage use of thumbs up and down for yes/no responses, use of gestures and pointing to increase expression.  Recommend NPO status and oral cares as able.  SLP to re-assess 12/26 in swallow Tx to asess for improved oral function and ability to take po trials.      Total Evaluation Time   Total Evaluation Time (Minutes) 12

## 2021-12-25 NOTE — ED PROVIDER NOTES
Visit Date: 12/24/2021    CHIEF COMPLAINT:  Left-sided weakness.    HISTORY OF PRESENT ILLNESS:  This is a 71-year-old male who presents to the Emergency Department with new onset of left sided weakness and speech problems.  The patient was last seen normal at 12:30 today.  At 3:30 he was found on the floor with difficulty speaking and complete weakness of his left side.  Ambulance was called, and he was transferred here.  Paramedics did note that he had a history of a hemorrhagic bleed in the past, and had recovered, and is on no known blood thinners.  The patient himself can give me no further history.  On review of his chart, he does appear to have some blood pressure issues for which he is on Lotrel and possibly ADHD since Adderall as listed as well.  He is on Desyrel.    ALLERGIES:  NONE.    FAMILY AND SOCIAL HISTORY:  He lives with his significant other who he calls his wife.    REVIEW OF SYSTEMS:  Unable to be obtained due to the situation and urgency.    PHYSICAL EXAMINATION:  GENERAL:  Reveals a white male, supine.  VITAL SIGNS:  Initial blood pressure 200/115, temperature 98, pulse 100, respirations 20, pulse ox 97% on room air.  HEAD AND NECK EXAM:  There is an abrasion over the mid left parietal scalp.  There is no bony step-off.  There is no obvious neck tenderness on palpation.  Pupils are equal at 2-3 mm; however, there is a right gaze preference.  When I hold my hands on the left side he does not see them.  There is mild droop and his oropharynx is clear without tongue biting.  Neck:  No carotid bruits.  CHEST:  Nontender.  LUNGS:  Clear.  HEART:  Regular.  No murmurs.  ABDOMEN:  Soft, without tenderness or masses.  MUSCULOSKELETAL:  No swelling.  SKIN:  No rash.  NEUROLOGIC:  The patient has a significant right gaze preference with a left sided vision loss.  I cannot tell if this is just upper or lower fields.  He is nonverbal, however, he does respond appropriately and he grabs my thumb when I  asked him to, when I place my hand in his visual field to the left.  He will respond with a thumb up or thumb down to questions appropriately.  Motor is gone on the left side.  He has complete drop, both arm and leg.  Toe is downgoing.  Sensation is absent as well.  He does not withdraw to pain.  LYMPHATICS:  No adenopathy is present.    EMERGENCY DEPARTMENT COURSE:  A tier 1 code stroke was called.  The patient went to CT and was found to have a large right parietal hemorrhagic bleed.  Neuro Stroke team was in attendance.  They contacted Neurosurgery who saw the patient.  Patient was started on IV nicardipine to bring his pressure down, initially with a level of 140; however, after seen by Neurosurgery their limit was 150, so the patient was kept under 150.    LABORATORIES:  White count 10.4, hemoglobin 15.6, platelets 258.  Chemistry is okay.  COVID negative.  Troponin negative.  CK negative.  INR negative.     We did obtain an EKG.  EKG showed sinus tachycardia at 106, and inferior Q's were noted.  , QRS 84, QTc 475.     As noted both Neurosurgery and Neurology consults were obtained.  The patient remains alert.  He is aphasic with left sided hemiplegia.  Blood pressure is controlled on a nicardipine drip.  Unfortunately, there are no ICU beds, so the patient remains in the ED with an ICU nurse on standby.  We will repeat the CT scan approximately 6 hours after the first and then decide on placement at that time.    IMPRESSION:  Acute intracerebral hemorrhage.    Critical care time, exclusive of procedure, is 45 minutes.      David Lundberg MD        D: 2021   T: 2021   MT: [facility]    Name:     MONE DORAN  MRN:      [id number]        Account:    [id number]   :      1950           Visit Date: 2021     Document: [id number]

## 2021-12-25 NOTE — PROVIDER NOTIFICATION
MD paged: [phone] Sukhi Johnson. FYI patient's NIH increased from 20-26 per my assessment and the flowsheet- However my assessment doesn't seem much different from report received from emergency department. Thanks, Sameer JAIN [phone]

## 2021-12-25 NOTE — H&P
[facility]    History and Physical - Hospitalist Service       Date of Admission:  12/24/2021    Assessment & Plan      Sukhi Johnson is a 71 year old male with PMH ADD admitted on 12/24/2021 with non traumatic ICH of R frontoparietal region. Not anticoagulated. He is dysarthric but follows commands. Has some L sided weakness. Repeat imaging has been stable. Patient was initially on nicardipine drip in ED and was boarding for nearly 18 hours waiting for ICU bed. Nicardipine drip was discontinued at 6:30am this morning and stroke service okayed [place] [facility]    Acute hemorrhagic stroke, hypertensive vs amyloid angiopathy  Previous history of hemorrhagic stroke in 2013  Untreated Hypertension  -Admit to inpatient, [facility] status, neurology floor  -Appreciate stroke neuro service assistance and recommendations  -Neurochecks and Vital Signs every 2 hours per stroke team this morning  -Avoid antithrombotics.   -SBP goal for the first 24 hours 130-150. Starting this afternoon around 3 pm, we can ease SBP goal to < 160.   -MRI brain with contrast completed - does show possible amyloid angiopathy. No change in size of hemorrhagic lesion.  -Neurosurgery consulted from ED on admission, has now signed off  -Head of bed elevated 30 degrees  -Echocardiogram pending  -Monitor on cardiac telemetry  -Intermittent compression boots for DVT prophylaxis. Can start pharmacologic DVT prophylaxis tomorrow.   -PT/OT/SLP - currently NPO. Will hold any fluids for now to avoid cerebral edema.  -Euthermia (please treat any fever aggressively), Euglycemia (please keep glucose <180mg/dL)    Chronic fatigue syndrome 2/2 lyme disease: hold adderall    COVID PCR screen negative. No vaccination records available.     Diet: NPO for Medical/Clinical Reasons Except for: No Exceptions    DVT Prophylaxis: Pneumatic Compression Devices  Tay Catheter: Not present  Central Lines: None  Code Status:   Full code until  "discussion possible    Clinically Significant Risk Factors Present on Admission                    Disposition Plan   Expected Discharge:  2-3 days   Anticipated discharge location:  Awaiting care coordination huddle ARU if recommended  Delays:         The patient's care was discussed with the ED physician Dr Avelar, patient, and patient's wife Andressa Ramakrishna Aguirre MD  [facility]  Securely message with the Vocera Web Console (learn more here)  Text page via [employer] Paging/Directory        ______________________________________________________________________    Chief Complaint   Hemorrhagic stroke    Unable to obtain a history from the patient due to dysarthria. History obtained from ED physician and stroke team.    History of Present Illness   Sukhi Johnson is a 71 year old male who presents with left hemiplegia and inability to speak or swallow due to hemorrhagic stroke. He presented to ED yesterday afternoon and boarded in ED overnight due to lack of ICU bed. Per ED notes, patient's last well was noon on day of admission and he was found down by wife around 3pm. BP on arrival was 200s/120s and he required nicardipine drip until early this morning. Patient was signed out to hospital service late this morning. Patient at this point is responsive to verbal and uses right arm/hand to gesture and answer questions. He doesn't open eyes. Spoke to wife by phone to obtain more history.    Apparently patient has both a \"lyme doctor\" and psychiatrist in [place] and he sees them by telehealth visits. He is on Adderall for CFS 2/2 lyme disease (prescribed by [place] doctor). He had taken trazodone for sleep but weaned himself off that some time ago. He had a previous hemorrhagic stroke in 2013 and was admitted at [facility] near [place] for about a week. Apparently symptoms all resolved by time of discharge and he walked out of hospital asymptomatic. Patient has high BP but " "medications prescribed by doctors here have had side effects he did not tolerate and so he has not taken anything for blood pressure for at least months. They have been trying to find a doctor to prescribe low dose cialis which worked to control his blood pressure in the past. They changed to a new PCP through new insurance - Eda Saucedo - though first appt was to be 2022. They have also been trying to get in to see a neurologist at [facility] named Dr Burnett and wife requested assistance with this. I let her know our in house neurologists would be treating patient for acute issues and follow up could be discussed later depending on pt's functional status on discharge.    Patient is not vaccinated for COVID. He had a mild case about a year ago, as did wife, so \"we have natural immunity and are fine now\" and she does not want patient vaccinated.    Review of Systems    The 10 point Review of Systems is negative other than noted in the HPI or here. Unable due to pt dysarthria.    Past Medical History    I have reviewed this patient's medical history and updated it with pertinent information if needed.   Lyme disease in childhood and again in adulthood with resulting chronic fatigue syndrome  HTN  Depression    Past Surgical History   I have reviewed this patient's surgical history and updated it with pertinent information if needed.  No past surgical history on file. - unknown    Social History   Lives with wife. They have been together since . Non smoker. No alcohol at all since , occasional prior to that.    Family History     Unable to obtain due to: pt unable to speak  Per wife, pt's father  in age 40s from MI. Brother  from reaction to vaccination while he was in the  (she thinks).    Prior to Admission Medications   Prior to Admission Medications   Prescriptions Last Dose Informant Patient Reported? Taking?   Cranberry-Vitamin C (AZO CRANBERRY URINARY TRACT PO) Past Week at " Unknown time Spouse/Significant Other Yes Yes   Sig: Take 1 tablet by mouth 2 times daily   UNABLE TO FIND prn Spouse/Significant Other Yes Yes   Sig: MEDICATION NAME: Systemic enzyme product capsule - uses as needed when feeling ill (up to 5 capsules every hour).   UNABLE TO FIND Past Month at Unknown time Spouse/Significant Other Yes Yes   Sig: MEDICATION NAME: [employer] Life Mind Body Tonic; typically uses 1 ounce per day. Takes irregularly.   UNABLE TO FIND Past Month at Unknown time Spouse/Significant Other Yes Yes   Sig: MEDICATION NAME: [employer] Life Sparkle Minerals. Mixes with Mind Body Tonic. Takes irregularly.   UNABLE TO FIND prn Spouse/Significant Other Yes Yes   Sig: MEDICATION NAME: Ziquin Life EFA Complete (Essential Fatty Acids Omega 3 + Omega 6). Takes as needed.   UNABLE TO FIND 12/23/2021 at Unknown time Spouse/Significant Other Yes Yes   Sig: MEDICATION NAME: [employer] Life Revive Digestive Support. Takes as needed.   UNKNOWN TO PATIENT 12/23/2021 at pm Spouse/Significant Other Yes Yes   Sig: Lutein eye supplements. Patient takes 2 different eye vitamins - takes 1 of each supplement with dinner.   UNKNOWN TO PATIENT 12/23/2021 at pm Spouse/Significant Other Yes Yes   Sig: Eye supplement. Patient takes 2 different eye vitamins - takes 1 of each supplement with dinner.   amphetamine-dextroamphetamine (ADDERALL) 20 MG tablet 12/23/2021 at am Spouse/Significant Other Yes Yes   Sig: Take 20 mg by mouth every morning Rx written for 20 mg three times daily, but patient takes 20 mg in the morning and 20 mg in the afternoon as needed.   amphetamine-dextroamphetamine (ADDERALL) 20 MG tablet prn Spouse/Significant Other Yes Yes   Sig: Take 20 mg by mouth daily as needed Rx written for 20 mg three times daily, but patient takes 20 mg in the morning and 20 mg in the afternoon as needed.   traZODone (DESYREL) 50 MG tablet More than a month at Unknown time  Yes Yes   Sig: Take 50 mg by mouth nightly as needed for  depression      Facility-Administered Medications: None     No longer taking trazodone. Took Wellbutrin prn in the past for depression but hasn't needed it recently.    Allergies   No Known Allergies    Physical Exam   Vital Signs: Temp: 98  F (36.7  C) Temp src: Temporal BP: 125/75 Pulse: 104   Resp: 20 SpO2: 96 % O2 Device: Nasal cannula Oxygen Delivery: 2 LPM  Weight: 184 lbs 15.46 oz    Constitutional: Lethargic, mute, but able to follow commands  HEENT: neck supple, no LAD noted, moist mucous membranes  Respiratory: Clear to auscultation bilaterally, no crackles or wheezing  Cardiovascular: Mild tachycardia, no m/r/g  GI: Normal bowel sounds, soft, non-distended, non-tender  Skin/Integumen: No rashes, no cyanosis, no edema  Ext: no edema, left hemiparesis    Data   Data reviewed today: I reviewed all medications, new labs and imaging results over the last 24 hours. I personally reviewed the EKG tracing showing sinus tachycardia, possible inferior infarct. No previous EKG for comparison.    Recent Labs   Lab 12/25/21  0448 12/24/21  1542   WBC  --  10.4   HGB  --  15.6   MCV  --  89   PLT  --  258   INR  --  0.96   NA  --  141   POTASSIUM  --  3.5   CHLORIDE  --  109   CO2  --  29   BUN  --  14   CR  --  0.77   ANIONGAP  --  3   MUSTAPHA  --  8.5   * 112*     Recent Results (from the past 24 hour(s))   CT Head w/o Contrast   Result Value    Radiologist flags Intracranial hemorrhage (AA)    Narrative    CT SCAN OF THE HEAD WITHOUT CONTRAST   12/24/2021 3:56 PM     HISTORY: Neuro deficit, acute, stroke suspected; Code stroke. Unable  to speak or use left side. Found down.    TECHNIQUE:  Axial images of the head and coronal reformations without  IV contrast material.  Radiation dose for this scan was reduced using  automated exposure control, adjustment of the mA and/or kV according  to patient size, or iterative reconstruction technique.    COMPARISON: None.    FINDINGS: There is a 6.1 x 4.4 cm parenchymal  hemorrhage in the right  posterior frontoparietal region with some mild edema. There is some  localized mass effect but no significant midline shift. Cerebral  atrophy and patchy white matter changes are present. There is no  evidence for any acute ischemic infarct or any skull fracture.  Visualized paranasal sinuses and mastoid air cells are clear.      Impression    IMPRESSION:  1. 6.1 x 4.4 cm parenchymal hemorrhage in right frontal parietal  region with some mild surrounding edema and localized mass effect but  no midline shift.  2. Cerebral atrophy with some scattered white matter changes which are  most likely due to chronic small vessel ischemic disease given the  patient's age.     [Critical Result: Intracranial hemorrhage]    Finding was identified on 12/24/2021 3:58 PM.     Dr. Lundberg was contacted by me on 12/24/2021 4:00 PM and verbalized  understanding of the critical result.     JOSUE SUAREZ MD         SYSTEM ID:  [email or web address]   CTA Head Neck with Contrast    Narrative    CTA HEAD AND NECK WITH CONTRAST 12/24/2021 3:58 PM     HISTORY: Neuro deficit, acute, stroke suspected; Code stroke.  Left-sided paralysis. Speech difficulty. Found down.    TECHNIQUE: Axial images were obtained through the head and neck with  intravenous contrast. 70 mL of Isovue-370 was given. Multiplanar  reconstructions were performed. 3-D reconstructions were also acquired  off a remote workstation for CT angiography. Carotid stenoses were  evaluated by comparing the caliber of the proximal internal carotid  artery to the caliber of the distal internal carotid artery. Radiation  dose for this scan was reduced using automated exposure control,  adjustment of the mA and/or kV according to patient size, or iterative  reconstruction technique.    FINDINGS:    Brachiocephalic vessels: Normal.    Right carotid system: Normal.    Left carotid system: Normal.    Right vertebral artery: Normal.    Left vertebral artery: Normal.    [place]: Normal. There is some mild fusiform dilatation of  the anterior communicating artery but there is no definite saccular  aneurysm.    Other findings: There is irregular marginated mass in the left lobe of  the thyroid gland measuring approximately 3.1 x 2.9 x 3.7 cm.  Parenchymal hemorrhage also noted in the right hemisphere described on  CT immediately prior to this study. There is no abnormal arterial  venous vessels associated with this parenchymal hemorrhage. There is  no evidence for any active bleeding within the hematoma.      Impression    IMPRESSION:  1. Negative CT angiography of the head and neck.  2. Irregular mass in the left lobe of the thyroid gland measuring 3.1  x 2.9 x 3.7 cm. This is an indeterminate mass. If clinically  indicated, thyroid ultrasound might be helpful in further evaluation.  3. Head CT report called to Dr. Lundberg at 1600. CTA report and  cervical spine report called at 1625 PM on 12/24/2021.  4. Parenchymal hemorrhage in right hemisphere. There is no evidence  for any abnormal arterial or venous structures associated with this  parenchymal hemorrhage.     JOSUE SUAREZ MD         SYSTEM ID:  [email or web address]   CT Head Perfusion w Contrast    Narrative    CT HEAD PERFUSION WITH CONTRAST 12/24/2021 4:19 PM     HISTORY: Neuro deficit, acute, stroke suspected; Code stroke. Speech  difficulties. Left-sided weakness.    TECHNIQUE: Axial images were obtained through the brain with  intravenous contrast for CT brain perfusion imaging. 50 mL of Isovue  370 was given. Radiation dose for this scan was reduced using  automated exposure control, adjustment of the mA and/or kV according  to patient size, or iterative reconstruction technique.    FINDINGS: There is abnormal perfusion in the right frontal parietal  region on all sequences with the perfusion abnormalities slightly  mismatched on the cerebral blood flow and Tmax images suggesting that  there is a ischemic penumbra. The central mass  portion of the  perfusion abnormality is due to a large parenchymal hematoma.      Impression    IMPRESSION: Abnormal perfusion scan with moderate to large parenchymal  hematoma in the right frontoparietal region surrounding by some  diminished cerebral blood flow. The latter could be due to some  compressive ischemia.    JOSUE SUAREZ MD         SYSTEM ID:  [email or web address]   Cervical spine CT w/o contrast    Narrative    CT CERVICAL SPINE WITHOUT CONTRAST   12/24/2021 4:20 PM     HISTORY: Trauma - C-spine injury. Found down, aphasic.     TECHNIQUE: Axial images of the cervical spine were obtained without  intravenous contrast. Multiplanar reformations were performed.  Radiation dose for this scan was reduced using automated exposure  control, adjustment of the mA and/or kV according to patient size, or  iterative reconstruction technique.     COMPARISON: None.    FINDINGS: Sagittal reconstructions demonstrate normal posterior  alignment. Vertebral body heights are maintained. There is no evidence  for any acute fracture. Mild to moderate multilevel degenerative disc  and facet disease is present. There is no evidence for a high-grade  central canal stenosis. Paraspinal soft tissues structures are  unremarkable for an indeterminate mass in the left lobe of the thyroid  gland measuring over 3 cm in maximum dimensions.      Impression    IMPRESSION: Degenerative changes. No evidence for fracture or any  posterior malalignment.    JOSUE SUAREZ MD         SYSTEM ID:  DLOES   CT Head w/o Contrast    Narrative    EXAM: CT HEAD W/O CONTRAST  LOCATION: [facility]  DATE/TIME: 12/24/2021 10:20 PM    INDICATION: Parenchymal hemorrhage, follow-up.    COMPARISON: 12/24/2021 at 1546 hours.    TECHNIQUE: Routine CT head without IV contrast. Multiplanar reformats. Dose reduction techniques were used.    FINDINGS:  INTRACRANIAL CONTENTS: The dominant acute parenchymal hemorrhage in the right frontoparietal region is  mostly unchanged apart from slight increase along its anterior inferior margin compared to the previous study. Surrounding edema, mass effect, and   slight right-to-left midline shift is unchanged. Small chronic lacunar infarct in the left basal ganglia again noted. Remainder stable.      Impression    IMPRESSION:  1.  Acute parenchymal hemorrhage in the right frontoparietal region is mostly unchanged apart from slight increase along its anterior inferior margin. Mass effect and edema are unchanged    2.  Remainder stable.   CT Head w/o Contrast    Narrative    EXAM: CT HEAD WITHOUT CONTRAST  LOCATION: [facility]  DATE/TIME: 12/25/2021, 9:06 AM    INDICATION: Stroke, follow-up  COMPARISON: Head CT 12/24/2021 performed at 2212 hours.  TECHNIQUE: Routine CT Head without IV contrast. Multiplanar reformats. Dose reduction techniques were used.    FINDINGS:  INTRACRANIAL CONTENTS: No significant change in approximately 5.2 x 4.2 x 4.3 cm acute hyperdense intraparenchymal hemorrhage centered in the right frontoparietal junction with surrounding vasogenic edema and regional mass effect resulting in sulcal   effacement, partial effacement of the right lateral ventricle and 2 mm leftward midline shift. No herniation or hydrocephalus. Stable mild to moderate chronic small vessel ischemic changes throughout the cerebral hemispheric white matter bilaterally.   Stable chronic left basal ganglia lacunar infarct. No evidence of acute ischemic infarction. No abnormal extra-axial fluid collection. Stable mild generalized brain parenchymal volume loss.    VISUALIZED ORBITS/SINUSES/MASTOIDS: No intraorbital abnormality. No paranasal sinus mucosal disease. No middle ear or mastoid effusion.    BONES/SOFT TISSUES: No acute abnormality.      Impression    IMPRESSION:  1.  No significant change since 12/24/2021. Stable acute right frontoparietal intraparenchymal hemorrhage with associated regional mass effect  resulting in 2 mm leftward midline shift. No new intracranial hemorrhage, herniation or hydrocephalus.  2.  Stable mild to moderate chronic small vessel ischemic disease and mild generalized brain parenchymal volume loss.

## 2021-12-25 NOTE — ED NOTES
Patient placed on 2L NC. Per orders to keep SPo2 above 94%. Patient appears to have short periods of apnea while sleeping where his Spo2 decreases to 89% while on room air.

## 2021-12-25 NOTE — PROVIDER NOTIFICATION
MD paged: [phone] Sukhi Johnson. Any concern over abraisons on forehead? Family thought he may have fallen and hit his head. Thanks, Sameer JAIN [phone]

## 2021-12-26 ENCOUNTER — APPOINTMENT (OUTPATIENT)
Dept: CT IMAGING | Facility: CLINIC | Age: 71
DRG: 064 | End: 2021-12-26
Attending: PHYSICIAN ASSISTANT
Payer: COMMERCIAL

## 2021-12-26 ENCOUNTER — APPOINTMENT (OUTPATIENT)
Dept: CARDIOLOGY | Facility: CLINIC | Age: 71
DRG: 064 | End: 2021-12-26
Attending: HOSPITALIST
Payer: COMMERCIAL

## 2021-12-26 ENCOUNTER — APPOINTMENT (OUTPATIENT)
Dept: SPEECH THERAPY | Facility: CLINIC | Age: 71
DRG: 064 | End: 2021-12-26
Payer: COMMERCIAL

## 2021-12-26 ENCOUNTER — APPOINTMENT (OUTPATIENT)
Dept: GENERAL RADIOLOGY | Facility: CLINIC | Age: 71
DRG: 064 | End: 2021-12-26
Payer: COMMERCIAL

## 2021-12-26 LAB
ANION GAP SERPL CALCULATED.3IONS-SCNC: 8 MMOL/L (ref 3–14)
BUN SERPL-MCNC: 22 MG/DL (ref 7–30)
CALCIUM SERPL-MCNC: 8.6 MG/DL (ref 8.5–10.1)
CHLORIDE BLD-SCNC: 111 MMOL/L (ref 94–109)
CO2 SERPL-SCNC: 23 MMOL/L (ref 20–32)
CREAT SERPL-MCNC: 0.68 MG/DL (ref 0.66–1.25)
ERYTHROCYTE [DISTWIDTH] IN BLOOD BY AUTOMATED COUNT: 12.8 % (ref 10–15)
GFR SERPL CREATININE-BSD FRML MDRD: >90 ML/MIN/1.73M2
GLUCOSE BLD-MCNC: 122 MG/DL (ref 70–99)
HCT VFR BLD AUTO: 48.9 % (ref 40–53)
HGB BLD-MCNC: 15.8 G/DL (ref 13.3–17.7)
LVEF ECHO: NORMAL
MCH RBC QN AUTO: 29.3 PG (ref 26.5–33)
MCHC RBC AUTO-ENTMCNC: 32.3 G/DL (ref 31.5–36.5)
MCV RBC AUTO: 91 FL (ref 78–100)
PLATELET # BLD AUTO: 252 10E3/UL (ref 150–450)
POTASSIUM BLD-SCNC: 3.5 MMOL/L (ref 3.4–5.3)
RBC # BLD AUTO: 5.39 10E6/UL (ref 4.4–5.9)
SODIUM SERPL-SCNC: 142 MMOL/L (ref 133–144)
WBC # BLD AUTO: 13.9 10E3/UL (ref 4–11)

## 2021-12-26 PROCEDURE — 250N000011 HC RX IP 250 OP 636: Performed by: HOSPITALIST

## 2021-12-26 PROCEDURE — 93306 TTE W/DOPPLER COMPLETE: CPT

## 2021-12-26 PROCEDURE — 36415 COLL VENOUS BLD VENIPUNCTURE: CPT | Performed by: HOSPITALIST

## 2021-12-26 PROCEDURE — 250N000011 HC RX IP 250 OP 636: Performed by: STUDENT IN AN ORGANIZED HEALTH CARE EDUCATION/TRAINING PROGRAM

## 2021-12-26 PROCEDURE — 85027 COMPLETE CBC AUTOMATED: CPT | Performed by: HOSPITALIST

## 2021-12-26 PROCEDURE — 70450 CT HEAD/BRAIN W/O DYE: CPT

## 2021-12-26 PROCEDURE — 999N000208 ECHOCARDIOGRAM COMPLETE

## 2021-12-26 PROCEDURE — 250N000009 HC RX 250: Performed by: STUDENT IN AN ORGANIZED HEALTH CARE EDUCATION/TRAINING PROGRAM

## 2021-12-26 PROCEDURE — 258N000003 HC RX IP 258 OP 636: Performed by: HOSPITALIST

## 2021-12-26 PROCEDURE — 250N000009 HC RX 250: Performed by: HOSPITALIST

## 2021-12-26 PROCEDURE — 99233 SBSQ HOSP IP/OBS HIGH 50: CPT | Performed by: HOSPITALIST

## 2021-12-26 PROCEDURE — 71045 X-RAY EXAM CHEST 1 VIEW: CPT

## 2021-12-26 PROCEDURE — 120N000001 HC R&B MED SURG/OB

## 2021-12-26 PROCEDURE — 99233 SBSQ HOSP IP/OBS HIGH 50: CPT | Mod: GC | Performed by: PSYCHIATRY & NEUROLOGY

## 2021-12-26 PROCEDURE — 92526 ORAL FUNCTION THERAPY: CPT | Mod: GN

## 2021-12-26 PROCEDURE — 93306 TTE W/DOPPLER COMPLETE: CPT | Mod: 26 | Performed by: INTERNAL MEDICINE

## 2021-12-26 PROCEDURE — 80048 BASIC METABOLIC PNL TOTAL CA: CPT | Performed by: HOSPITALIST

## 2021-12-26 RX ORDER — METOPROLOL TARTRATE 1 MG/ML
7.5 INJECTION, SOLUTION INTRAVENOUS EVERY 6 HOURS
Status: DISCONTINUED | OUTPATIENT
Start: 2021-12-26 | End: 2021-12-27

## 2021-12-26 RX ORDER — SODIUM CHLORIDE 9 MG/ML
INJECTION, SOLUTION INTRAVENOUS CONTINUOUS
Status: DISCONTINUED | OUTPATIENT
Start: 2021-12-26 | End: 2022-01-06

## 2021-12-26 RX ORDER — METOPROLOL TARTRATE 1 MG/ML
5 INJECTION, SOLUTION INTRAVENOUS EVERY 6 HOURS
Status: DISCONTINUED | OUTPATIENT
Start: 2021-12-26 | End: 2021-12-26

## 2021-12-26 RX ORDER — PIPERACILLIN SODIUM, TAZOBACTAM SODIUM 3; .375 G/15ML; G/15ML
3.38 INJECTION, POWDER, LYOPHILIZED, FOR SOLUTION INTRAVENOUS EVERY 8 HOURS
Status: DISCONTINUED | OUTPATIENT
Start: 2021-12-26 | End: 2021-12-27

## 2021-12-26 RX ADMIN — PIPERACILLIN SODIUM AND TAZOBACTAM SODIUM 3.38 G: 3; .375 INJECTION, POWDER, LYOPHILIZED, FOR SOLUTION INTRAVENOUS at 22:11

## 2021-12-26 RX ADMIN — LABETALOL HYDROCHLORIDE 10 MG: 5 INJECTION, SOLUTION INTRAVENOUS at 02:36

## 2021-12-26 RX ADMIN — LABETALOL HYDROCHLORIDE 10 MG: 5 INJECTION, SOLUTION INTRAVENOUS at 07:51

## 2021-12-26 RX ADMIN — SODIUM CHLORIDE: 9 INJECTION, SOLUTION INTRAVENOUS at 13:26

## 2021-12-26 RX ADMIN — HYDRALAZINE HYDROCHLORIDE 10 MG: 20 INJECTION INTRAMUSCULAR; INTRAVENOUS at 18:47

## 2021-12-26 RX ADMIN — LABETALOL HYDROCHLORIDE 10 MG: 5 INJECTION, SOLUTION INTRAVENOUS at 20:51

## 2021-12-26 RX ADMIN — PIPERACILLIN SODIUM AND TAZOBACTAM SODIUM 3.38 G: 3; .375 INJECTION, POWDER, LYOPHILIZED, FOR SOLUTION INTRAVENOUS at 13:51

## 2021-12-26 RX ADMIN — METOPROLOL TARTRATE 5 MG: 5 INJECTION INTRAVENOUS at 13:50

## 2021-12-26 RX ADMIN — LABETALOL HYDROCHLORIDE 10 MG: 5 INJECTION, SOLUTION INTRAVENOUS at 16:12

## 2021-12-26 RX ADMIN — METOPROLOL TARTRATE 7.5 MG: 5 INJECTION INTRAVENOUS at 19:46

## 2021-12-26 RX ADMIN — HYDRALAZINE HYDROCHLORIDE 10 MG: 20 INJECTION INTRAMUSCULAR; INTRAVENOUS at 23:00

## 2021-12-26 RX ADMIN — HYDRALAZINE HYDROCHLORIDE 10 MG: 20 INJECTION INTRAMUSCULAR; INTRAVENOUS at 08:59

## 2021-12-26 ASSESSMENT — ACTIVITIES OF DAILY LIVING (ADL)
ADLS_ACUITY_SCORE: 33
WHICH_OF_THE_ABOVE_FUNCTIONAL_RISKS_HAD_A_RECENT_ONSET_OR_CHANGE?: AMBULATION;TRANSFERRING;TOILETING;BATHING;DRESSING;EATING;SWALLOWING;COGNITION;COMMUNICATION/SPEECH;FALL HISTORY
ADLS_ACUITY_SCORE: 21
ADLS_ACUITY_SCORE: 21
DRESSING/BATHING: BATHING DIFFICULTY, DEPENDENT;DRESSING DIFFICULTY, DEPENDENT
ADLS_ACUITY_SCORE: 33
DRESSING/BATHING_MANAGEMENT: DEPENDENT
EATING/SWALLOWING_MANAGEMENT: NPO
EATING/SWALLOWING: SWALLOWING LIQUIDS;SWALLOWING SOLID FOOD
ADLS_ACUITY_SCORE: 33
ADLS_ACUITY_SCORE: 29
DIFFICULTY_EATING/SWALLOWING: YES
TOILETING_ISSUES: YES
ADLS_ACUITY_SCORE: 33
ADLS_ACUITY_SCORE: 21
ADLS_ACUITY_SCORE: 33
ADLS_ACUITY_SCORE: 33
ADLS_ACUITY_SCORE: 21
ADLS_ACUITY_SCORE: 29
TOILETING_ASSISTANCE: TOILETING DIFFICULTY, DEPENDENT
ADLS_ACUITY_SCORE: 33
ADLS_ACUITY_SCORE: 33
ADLS_ACUITY_SCORE: 29
DIFFICULTY_COMMUNICATING: YES
ADLS_ACUITY_SCORE: 21
COMMUNICATION: UNABLE TO SPEAK
ADLS_ACUITY_SCORE: 33
ADLS_ACUITY_SCORE: 21
DRESSING/BATHING_DIFFICULTY: YES
ADLS_ACUITY_SCORE: 33
ADLS_ACUITY_SCORE: 21
ADLS_ACUITY_SCORE: 33
ADLS_ACUITY_SCORE: 19
ADLS_ACUITY_SCORE: 21
ADLS_ACUITY_SCORE: 29

## 2021-12-26 NOTE — PROVIDER NOTIFICATION
"MD Notification    Notified Person: MD    Notified Person Name: Carmen Schneider    Notification Date/Time: 12/26/21 at 0831    Notification Interaction: amcom    Purpose of Notification: \"-1 RK - patient has increased lethargy, requiring sternal rub and increased O2 needs, now 5L. Please advise or come see patient. CRISTOBAL Diaz *[phone]/ [phone]\"    Orders Received: no new orders at time of call    Comments: Neuro[facility] came to see patient at bedside, q2h neuros/vitals ordered by Jacob Peterson and CT imaging with Chest XR ordered by neuro team    "

## 2021-12-26 NOTE — PROGRESS NOTES
[facility]  Hospitalist Progress Note        Guerrero Wetzel MD   12/26/2021        Interval History:        - Patient very lethargic and doesnot participate in communication; does follow simple commands as wiggling toes and squeezing hands, noted no movement on left  - repeat head CT 12/26 noted with slight interval decrease in size of the large parenchymal hematoma, no definite evidence for new or increasing hemorrhage; stable minimal leftward midline shift of the third ventricle  - ECHO 12/26 with LVEF >70%, negative bubble study  - CXR 12/26 with no definite consolidation, pleural effusions, or pneumothorax  - Neurosurgery rec no plan for surgical intervention, signed off  - neurology following, follow q 4 neurochecks  - evaluated by SLP, at high silent aspiration risk; keep NPO         Assessment and Plan:        Sukhi Johnson is a 71 year old male with PMH of HTN, ADD admitted on 12/24/2021 with non traumatic ICH of R frontoparietal region. Patient was initially on nicardipine drip in ED and was boarding for nearly 18 hours waiting for ICU bed. Nicardipine drip was discontinued 12/25 and stroke service okayed admission to [facility] [place]     Acute hemorrhagic stroke, hypertensive vs amyloid angiopathy  Acute metabolic encephalopathy secondary to above  Previous history of hemorrhagic stroke in 2013  Untreated Hypertension  - per family, he has been on antihypertensives with amlodipine-benazepril in the past but apparently does not tolerate them and family doesnot want them to be resumed  - CT and MRI on admission noted with Right frontoparietal acute intracranial hemorrhage with associated regional mass effect resulting in 2 mm leftward midline shift; MRI also noted with numerous chronic microhemorrhages throughout the cerebral hemispheres, deep gray nuclei and posterior fossa, possibly representing sequelae of hypertensive or amyloid angiopathy  -evaluated by neurosurgery and recommended  no surgical intervention, have signed off  - repeat head CT 12/26 noted with slight interval decrease in size of the large parenchymal hematoma, no definite evidence for new or increasing hemorrhage; stable minimal leftward midline shift of the third ventricle  - ECHO 12/26 with LVEF >70%, negative bubble study; A1c 5.4,   - neurology following, follow q 2 hrs neurochecks  - evaluated by SLP, at high silent aspiration risk; keep NPO  - initially on Nicardipine drip, now off; per neurology goal SBP<160; will start Metoprolol 5 mg IV q 6 hrs with hold parameters  - PT/OT eval when able with improved mentation    FEN  At risk for aspiration  Acute hypoxic respiratory failure  Hypokalemia  - as noted, at high risk for silent aspiration  - SLP following; will keep NPO  - will start NS at 75 ml/hr; K 3.3-- supplement per protocol  - if no significant improvement in mentation and unable to take PO, will discuss with family regarding tube feeds  - CXR 12/26 UR and afebrile, however, requiring 3 lts NC oxygen and wbc up to 13; will start empiric Zosyn and monitor clinically  - try to wean oxygen down     Chronic fatigue syndrome 2/2 lyme disease: hold PTA adderall, resume when taking PO     COVID PCR screen negative. No vaccination records available, tested negative on admission 12/24    Orders Placed This Encounter      NPO for Medical/Clinical Reasons Except for: No Exceptions    DVT Prophylaxis: Pneumatic Compression Devices    Code Status:   Full code     Clinically Significant Risk Factors Present on Admission           Disposition Plan     Expected Discharge:  unclear at this time; likely >3-5 days pending clinical improvement    Care plan discussed with nursing, neurology; neurology did update family      Clinically Significant Risk Factors Present on Admission                    Page Me (7 am to 6 pm)              Physical Exam:      Blood pressure 139/78, pulse 113, temperature 98.4  F (36.9  C), temperature  source Axillary, resp. rate 20, weight 83.9 kg (184 lb 15.5 oz), SpO2 94 %.  Vitals:    12/24/21 1558   Weight: 83.9 kg (184 lb 15.5 oz)     Vital Signs with Ranges  Temp:  [98  F (36.7  C)-99.4  F (37.4  C)] 98.4  F (36.9  C)  Pulse:  [] 113  Resp:  [13-23] 20  BP: (119-159)/(57-93) 139/78  SpO2:  [93 %-98 %] 94 %  I/O's Last 24 hours  I/O last 3 completed shifts:  In: -   Out: 1200 [Urine:1200]    Constitutional: very lethargic and doesnot participate in communication; does follow simple commands as wiggling toes and squeezing hands, noted no movement on left; in no apparent distress       Oral cavity: Dry oral mucosa   Cardiovascular: Normal s1 s2, slightly tachycardic , no murmur   Lungs: B/l clear to auscultation, no wheezes or crepitations   Abdomen: Soft, nt, nd, no guarding, rigidity or rebound; BS +   LE : No edema   Musculoskeletal/ Neuro:  difficult to assess at this time ; very lethargic and doesnot participate in communication; does follow simple commands as wiggling toes and squeezing hands, noted no movement on left       Psychiatry: lethargic                 Medications:          sodium chloride (PF)  3 mL Intracatheter Q8H     PRN Meds: hydrALAZINE, labetalol, lidocaine 4%, lidocaine (buffered or not buffered), melatonin, ondansetron **OR** ondansetron, sodium chloride (PF)         Data:      All new lab and imaging data was reviewed.   Recent Labs   Lab Test 12/25/21  1116 12/24/21  1542   WBC 13.9* 10.4   HGB 15.4 15.6   MCV 88 89    258   INR  --  0.96      Recent Labs   Lab Test 12/25/21  1116 12/25/21  0448 12/24/21  1542     --  141   POTASSIUM 3.3*  --  3.5   CHLORIDE 110*  --  109   CO2 26  --  29   BUN 17  --  14   CR 0.65*  --  0.77   ANIONGAP 6  --  3   MUSTAPHA 8.6  --  8.5   * 122* 112*     No lab results found.    Invalid input(s): TROP, TROPONINIES

## 2021-12-26 NOTE — PLAN OF CARE
SLP: Pt with ongoing lethargy and minimal arousal. Facial grimace with oral cares, unable to complete thorough cares due to holding mouth shut. Pt remains high risk for aspiration. Recommend NPO with oral cares. Consider alternative means of nutrition/hydration.

## 2021-12-26 NOTE — PLAN OF CARE
[id number]  Reason for Admission: R frontoparietal ICH w/ midline shift  BP (!) 150/99 (BP Location: Left arm)   Pulse 105   Temp 99.1  F (37.3  C) (Axillary)   Resp 20   Wt 83.9 kg (184 lb 15.5 oz)   SpO2 97%         Cognitive/Mentation: lethargic, ALLYSON orientation.  Mute.  Follows most commands.  Uses thumbs up/down to communicate.  Ataxic FTN RUE with eyes held open.  Occasionally able to open R eye independently.  L field cut.  LUE plegia.  LLE withdraws.  Arouses to voice and gentle stimulation  Neuros/CMS: L droop.  1/5 LUE, LLE.  4/5 RUE.  3/5 RLE.    VS: low grade temp  SBP consistently elevated over 160 parameter. PRNs given.  MD bond . Tele: SR HR 70s-110s.  GI: BS hypoactive.  No documented BM.  Started on NS 75ml/hr.    : external catheter adqeuate UO  Pulmonary: LS crackles.  Increased o2 demand this AM.  Reduced to 2LNC sats sustained at >95%.  WBC 13.9.  Started on zosyn  Pain: difficult to assess but pt denies pain with thumbs up/down    Drains: NA  Skin: wnl  Activity: turn repo q 2.  wnl  Diet: npo

## 2021-12-26 NOTE — PLAN OF CARE
Pt here with ICH. Unable to assess LOC d/t pt is lethargic and mute. Pt not opening his eyes but makes incomprehensive sounds with voice and touch. Neuros left sided weakness, left droop. . VSS. Tele SR. NPO. Up with A2/lift. External cath in placed. Labetalol IV given 1X at to maintained SBP<150. External cath in placed with adequate. Denies pain. Pt scoring green on the Aggression Stop Light Tool. Plan is to work with PT/OT. Discharge pending.

## 2021-12-26 NOTE — PROGRESS NOTES
"      [facility]    Stroke Progress Note    Interval EventsPt slightly more lethargic today. Repeat head CT today with no major change. Leukocytosis continues. BP mostly in 140-160 range.    HPI Summary  71M presented on 12/24 after being found down with abrasions and found to have a large R frontoparietal ICH, ~60cc. Etiology is hypertensive vs cerebral amyloid angiopathy. Pt on adderall at home which can increase blood pressure.    Impression   Non-traumatic intracerebral hemorrhage of the R frontoparietal region    Plan  - Neurochecks and Vital Signs every 2 hours  - Systolic BP Goal: <160mmHg  - Head of bed elevated 30 degrees  - Repeat head CT with any change in neuro exam especially if he stops following commands  - Bedside Glucose Monitoring  - PT/OT/SLP  - Euthermia (please treat any fever aggressively), Euglycemia (please keep glucose <180mg/dL)    Patient Follow-up    - final recommendation pending work-up    We will continue to follow.     Michelle Schneider PA-C  Neurology  12/26/2021 8:18 AM  To page me or covering stroke neurology team member, click here: AMCOM   Choose \"On Call\" tab at top, then search dropdown box for \"Neurology Adult\", select location, press Enter, then look for stroke/neuro ICU/telestroke.    ______________________________________________________    Clinically Significant Risk Factors Present on Admission                 Medications   Scheduled Meds    sodium chloride (PF)  3 mL Intracatheter Q8H       Infusion Meds    labetalol 1000 mg/200 mL Stopped (12/24/21 2018)       PRN Meds  hydrALAZINE, labetalol, lidocaine 4%, lidocaine (buffered or not buffered), melatonin, ondansetron **OR** ondansetron, sodium chloride (PF)       PHYSICAL EXAMINATION  Temp:  [98  F (36.7  C)-99.4  F (37.4  C)] 99  F (37.2  C)  Pulse:  [] 111  Resp:  [16-23] 20  BP: (125-160)/(57-98) 160/98  SpO2:  [93 %-98 %] 97 %      General Exam  General:  patient lying in bed without " any acute distress    HEENT:  normocephalic/atraumatic    Neuro Exam  Mental Status:  sleeping. does not alert to voice. only follows commands with sternal rub to awaken him. No speech. Does not nod yes/no  Cranial Nerves:  PERRL, L facial droop. anarthric  Motor: Plegic L arm and leg. Follows commands with R hand and R foot but not antigravity on R  Sensory:  Decreased sensation to noxious on L  Coordination:  too sleepy to eval  Station/Gait:  too sleepy to eval      Imaging  I personally reviewed all imaging; relevant findings per HPI.     Lab Results Data   CBC  Recent Labs   Lab 12/25/21  1116 12/24/21  1542   WBC 13.9* 10.4   RBC 5.22 5.35   HGB 15.4 15.6   HCT 45.7 47.8    258     Basic Metabolic Panel    Recent Labs   Lab 12/25/21  1116 12/25/21  0448 12/24/21  1542     --  141   POTASSIUM 3.3*  --  3.5   CHLORIDE 110*  --  109   CO2 26  --  29   BUN 17  --  14   CR 0.65*  --  0.77   * 122* 112*   MUSTAPHA 8.6  --  8.5     Liver Panel  Recent Labs   Lab 12/25/21  1116   PROTTOTAL 6.8   ALBUMIN 3.4   BILITOTAL 2.3*   ALKPHOS 65   AST 17   ALT 25     INR    Recent Labs   Lab Test 12/24/21  1542   INR 0.96      Lipid Profile    Recent Labs   Lab Test 12/25/21  1116   CHOL 193   HDL 61   *   TRIG 117     A1C    Recent Labs   Lab Test 12/25/21  1116   A1C 5.4     Troponin I  No results for input(s): TROPONIN, GHTROP in the last 168 hours.

## 2021-12-26 NOTE — PLAN OF CARE
Nursing Note    Cared for pt from [phone]    Patient Information  Name: Sukhi Johnson  Age: 71 year old    Assessment  Orientation/Neuro: Unable to assess pt mute  Cardiac/Tele: ST  Resp: Abdominal muscle use, crackles bilateral lungs     GI/: GI bowel sounds hypoactive, no BM today, NPO,  - external cath in place, low urine output - discussed fluids with provider, provider wanted to look at chest xray prior to starting fluids   Mobility: Dependent, A2 with lift  Pain: Thumbs up for chest and abdominal pain prior to CT and chest xray - provider present, thumbs down for pain when pt returned to unit   Diet: Orders Placed This Encounter      NPO for Medical/Clinical Reasons Except for: No Exceptions  Skin: abrasions on forehead, red blanchable heels resolved after pillows under heels  Procedures/Imaging: CT head today and chest xray    Vital Signs  B/P: 150/99, T: 99.1, P: 105, R: 20, O2: 94 on 3L NC    Plan  Concerns/abnormal assessment: Increased lethargy   If abnormal assessment, provider notified: Yes stroke neuro team Bard SON notified  Plan/Other: CT ordered - stable, chest xray ordered for possible aspiration PNA, low grade fever present, VS/Neuros q 2 hours    Emily Washington RN

## 2021-12-27 ENCOUNTER — APPOINTMENT (OUTPATIENT)
Dept: GENERAL RADIOLOGY | Facility: CLINIC | Age: 71
DRG: 064 | End: 2021-12-27
Attending: HOSPITALIST
Payer: COMMERCIAL

## 2021-12-27 ENCOUNTER — APPOINTMENT (OUTPATIENT)
Dept: PHYSICAL THERAPY | Facility: CLINIC | Age: 71
DRG: 064 | End: 2021-12-27
Attending: HOSPITALIST
Payer: COMMERCIAL

## 2021-12-27 LAB
ALBUMIN UR-MCNC: 20 MG/DL
ANION GAP SERPL CALCULATED.3IONS-SCNC: 5 MMOL/L (ref 3–14)
APPEARANCE UR: CLEAR
BASE EXCESS BLDA CALC-SCNC: 0.5 MMOL/L (ref -9–1.8)
BASOPHILS # BLD AUTO: 0.1 10E3/UL (ref 0–0.2)
BASOPHILS NFR BLD AUTO: 1 %
BILIRUB UR QL STRIP: NEGATIVE
BUN SERPL-MCNC: 32 MG/DL (ref 7–30)
CALCIUM SERPL-MCNC: 8.7 MG/DL (ref 8.5–10.1)
CHLORIDE BLD-SCNC: 115 MMOL/L (ref 94–109)
CO2 SERPL-SCNC: 25 MMOL/L (ref 20–32)
COLOR UR AUTO: YELLOW
CREAT SERPL-MCNC: 0.75 MG/DL (ref 0.66–1.25)
EOSINOPHIL # BLD AUTO: 0 10E3/UL (ref 0–0.7)
EOSINOPHIL NFR BLD AUTO: 0 %
ERYTHROCYTE [DISTWIDTH] IN BLOOD BY AUTOMATED COUNT: 13.2 % (ref 10–15)
GFR SERPL CREATININE-BSD FRML MDRD: >90 ML/MIN/1.73M2
GLUCOSE BLD-MCNC: 112 MG/DL (ref 70–99)
GLUCOSE UR STRIP-MCNC: NEGATIVE MG/DL
HCO3 BLD-SCNC: 24 MMOL/L (ref 21–28)
HCT VFR BLD AUTO: 45.8 % (ref 40–53)
HGB BLD-MCNC: 14.4 G/DL (ref 13.3–17.7)
HGB UR QL STRIP: ABNORMAL
IMM GRANULOCYTES # BLD: 0.1 10E3/UL
IMM GRANULOCYTES NFR BLD: 1 %
KETONES UR STRIP-MCNC: 60 MG/DL
LACTATE SERPL-SCNC: 1.2 MMOL/L (ref 0.7–2)
LEUKOCYTE ESTERASE UR QL STRIP: NEGATIVE
LYMPHOCYTES # BLD AUTO: 1.1 10E3/UL (ref 0.8–5.3)
LYMPHOCYTES NFR BLD AUTO: 8 %
MCH RBC QN AUTO: 28.9 PG (ref 26.5–33)
MCHC RBC AUTO-ENTMCNC: 31.4 G/DL (ref 31.5–36.5)
MCV RBC AUTO: 92 FL (ref 78–100)
MONOCYTES # BLD AUTO: 0.7 10E3/UL (ref 0–1.3)
MONOCYTES NFR BLD AUTO: 6 %
MUCOUS THREADS #/AREA URNS LPF: PRESENT /LPF
NEUTROPHILS # BLD AUTO: 11.1 10E3/UL (ref 1.6–8.3)
NEUTROPHILS NFR BLD AUTO: 84 %
NITRATE UR QL: NEGATIVE
NRBC # BLD AUTO: 0 10E3/UL
NRBC BLD AUTO-RTO: 0 /100
O2/TOTAL GAS SETTING VFR VENT: 40 %
PCO2 BLD: 34 MM HG (ref 35–45)
PH BLD: 7.46 [PH] (ref 7.35–7.45)
PH UR STRIP: 6 [PH] (ref 5–7)
PLATELET # BLD AUTO: 282 10E3/UL (ref 150–450)
PO2 BLD: 115 MM HG (ref 80–105)
POTASSIUM BLD-SCNC: 3.3 MMOL/L (ref 3.4–5.3)
POTASSIUM BLD-SCNC: 3.6 MMOL/L (ref 3.4–5.3)
RBC # BLD AUTO: 4.99 10E6/UL (ref 4.4–5.9)
RBC URINE: 128 /HPF
SODIUM SERPL-SCNC: 145 MMOL/L (ref 133–144)
SP GR UR STRIP: 1.03 (ref 1–1.03)
UROBILINOGEN UR STRIP-MCNC: NORMAL MG/DL
WBC # BLD AUTO: 13.1 10E3/UL (ref 4–11)
WBC URINE: 3 /HPF

## 2021-12-27 PROCEDURE — 36415 COLL VENOUS BLD VENIPUNCTURE: CPT | Performed by: HOSPITALIST

## 2021-12-27 PROCEDURE — 0DH93UZ INSERTION OF FEEDING DEVICE INTO DUODENUM, PERCUTANEOUS APPROACH: ICD-10-PCS | Performed by: RADIOLOGY

## 2021-12-27 PROCEDURE — 258N000003 HC RX IP 258 OP 636: Performed by: HOSPITALIST

## 2021-12-27 PROCEDURE — 97530 THERAPEUTIC ACTIVITIES: CPT | Mod: GP

## 2021-12-27 PROCEDURE — 250N000009 HC RX 250: Performed by: STUDENT IN AN ORGANIZED HEALTH CARE EDUCATION/TRAINING PROGRAM

## 2021-12-27 PROCEDURE — 82803 BLOOD GASES ANY COMBINATION: CPT | Performed by: HOSPITALIST

## 2021-12-27 PROCEDURE — 250N000011 HC RX IP 250 OP 636: Performed by: HOSPITALIST

## 2021-12-27 PROCEDURE — 36600 WITHDRAWAL OF ARTERIAL BLOOD: CPT

## 2021-12-27 PROCEDURE — 250N000011 HC RX IP 250 OP 636: Performed by: STUDENT IN AN ORGANIZED HEALTH CARE EDUCATION/TRAINING PROGRAM

## 2021-12-27 PROCEDURE — 97162 PT EVAL MOD COMPLEX 30 MIN: CPT | Mod: GP

## 2021-12-27 PROCEDURE — 99233 SBSQ HOSP IP/OBS HIGH 50: CPT | Mod: GC | Performed by: STUDENT IN AN ORGANIZED HEALTH CARE EDUCATION/TRAINING PROGRAM

## 2021-12-27 PROCEDURE — 85014 HEMATOCRIT: CPT | Performed by: HOSPITALIST

## 2021-12-27 PROCEDURE — 81001 URINALYSIS AUTO W/SCOPE: CPT | Performed by: HOSPITALIST

## 2021-12-27 PROCEDURE — 250N000009 HC RX 250: Performed by: HOSPITALIST

## 2021-12-27 PROCEDURE — 87040 BLOOD CULTURE FOR BACTERIA: CPT | Performed by: HOSPITALIST

## 2021-12-27 PROCEDURE — 80048 BASIC METABOLIC PNL TOTAL CA: CPT | Performed by: HOSPITALIST

## 2021-12-27 PROCEDURE — 71045 X-RAY EXAM CHEST 1 VIEW: CPT

## 2021-12-27 PROCEDURE — 250N000011 HC RX IP 250 OP 636: Performed by: PHYSICIAN ASSISTANT

## 2021-12-27 PROCEDURE — 83605 ASSAY OF LACTIC ACID: CPT | Performed by: HOSPITALIST

## 2021-12-27 PROCEDURE — 84132 ASSAY OF SERUM POTASSIUM: CPT | Performed by: HOSPITALIST

## 2021-12-27 PROCEDURE — 99233 SBSQ HOSP IP/OBS HIGH 50: CPT | Performed by: HOSPITALIST

## 2021-12-27 PROCEDURE — 120N000001 HC R&B MED SURG/OB

## 2021-12-27 RX ORDER — HEPARIN SODIUM 5000 [USP'U]/.5ML
5000 INJECTION, SOLUTION INTRAVENOUS; SUBCUTANEOUS EVERY 12 HOURS
Status: DISCONTINUED | OUTPATIENT
Start: 2021-12-27 | End: 2022-01-09

## 2021-12-27 RX ORDER — AMINO AC/PROTEIN HYDR/WHEY PRO 10G-100/30
1 LIQUID (ML) ORAL DAILY
Status: DISCONTINUED | OUTPATIENT
Start: 2021-12-27 | End: 2022-01-15 | Stop reason: CLARIF

## 2021-12-27 RX ORDER — HYDRALAZINE HYDROCHLORIDE 20 MG/ML
10-20 INJECTION INTRAMUSCULAR; INTRAVENOUS
Status: DISCONTINUED | OUTPATIENT
Start: 2021-12-27 | End: 2022-01-17 | Stop reason: HOSPADM

## 2021-12-27 RX ORDER — LIDOCAINE HYDROCHLORIDE 20 MG/ML
JELLY TOPICAL EVERY 4 HOURS PRN
Status: DISCONTINUED | OUTPATIENT
Start: 2021-12-27 | End: 2022-01-17 | Stop reason: HOSPADM

## 2021-12-27 RX ORDER — METOPROLOL TARTRATE 1 MG/ML
10 INJECTION, SOLUTION INTRAVENOUS EVERY 6 HOURS
Status: DISCONTINUED | OUTPATIENT
Start: 2021-12-27 | End: 2022-01-06

## 2021-12-27 RX ORDER — LABETALOL HYDROCHLORIDE 5 MG/ML
10 INJECTION, SOLUTION INTRAVENOUS EVERY 4 HOURS PRN
Status: DISCONTINUED | OUTPATIENT
Start: 2021-12-27 | End: 2021-12-27

## 2021-12-27 RX ORDER — ACETAMINOPHEN 650 MG/1
650 SUPPOSITORY RECTAL EVERY 4 HOURS PRN
Status: DISCONTINUED | OUTPATIENT
Start: 2021-12-27 | End: 2022-01-17 | Stop reason: HOSPADM

## 2021-12-27 RX ORDER — ENALAPRILAT 1.25 MG/ML
1.25 INJECTION INTRAVENOUS EVERY 6 HOURS
Status: DISCONTINUED | OUTPATIENT
Start: 2021-12-27 | End: 2021-12-29

## 2021-12-27 RX ORDER — PIPERACILLIN SODIUM, TAZOBACTAM SODIUM 3; .375 G/15ML; G/15ML
3.38 INJECTION, POWDER, LYOPHILIZED, FOR SOLUTION INTRAVENOUS EVERY 6 HOURS
Status: COMPLETED | OUTPATIENT
Start: 2021-12-27 | End: 2022-01-02

## 2021-12-27 RX ORDER — LABETALOL HYDROCHLORIDE 5 MG/ML
10-20 INJECTION, SOLUTION INTRAVENOUS EVERY 10 MIN PRN
Status: DISCONTINUED | OUTPATIENT
Start: 2021-12-27 | End: 2022-01-17 | Stop reason: HOSPADM

## 2021-12-27 RX ORDER — POTASSIUM CHLORIDE 7.45 MG/ML
10 INJECTION INTRAVENOUS
Status: COMPLETED | OUTPATIENT
Start: 2021-12-27 | End: 2021-12-27

## 2021-12-27 RX ORDER — HYDRALAZINE HYDROCHLORIDE 20 MG/ML
10 INJECTION INTRAMUSCULAR; INTRAVENOUS EVERY 4 HOURS PRN
Status: DISCONTINUED | OUTPATIENT
Start: 2021-12-27 | End: 2021-12-27

## 2021-12-27 RX ADMIN — ENALAPRILAT 1.25 MG: 1.25 INJECTION INTRAVENOUS at 09:33

## 2021-12-27 RX ADMIN — METOPROLOL TARTRATE 10 MG: 5 INJECTION INTRAVENOUS at 19:56

## 2021-12-27 RX ADMIN — METOPROLOL TARTRATE 7.5 MG: 5 INJECTION INTRAVENOUS at 01:43

## 2021-12-27 RX ADMIN — SODIUM CHLORIDE: 9 INJECTION, SOLUTION INTRAVENOUS at 18:09

## 2021-12-27 RX ADMIN — HEPARIN SODIUM 5000 UNITS: 5000 INJECTION INTRAVENOUS; SUBCUTANEOUS at 18:12

## 2021-12-27 RX ADMIN — PIPERACILLIN SODIUM AND TAZOBACTAM SODIUM 3.38 G: 3; .375 INJECTION, POWDER, LYOPHILIZED, FOR SOLUTION INTRAVENOUS at 14:10

## 2021-12-27 RX ADMIN — ENALAPRILAT 1.25 MG: 1.25 INJECTION INTRAVENOUS at 21:47

## 2021-12-27 RX ADMIN — SODIUM CHLORIDE: 9 INJECTION, SOLUTION INTRAVENOUS at 03:48

## 2021-12-27 RX ADMIN — PIPERACILLIN SODIUM AND TAZOBACTAM SODIUM 3.38 G: 3; .375 INJECTION, POWDER, LYOPHILIZED, FOR SOLUTION INTRAVENOUS at 06:03

## 2021-12-27 RX ADMIN — METOPROLOL TARTRATE 10 MG: 5 INJECTION INTRAVENOUS at 14:14

## 2021-12-27 RX ADMIN — LIDOCAINE HYDROCHLORIDE: 20 JELLY TOPICAL at 16:54

## 2021-12-27 RX ADMIN — PIPERACILLIN SODIUM AND TAZOBACTAM SODIUM 3.38 G: 3; .375 INJECTION, POWDER, LYOPHILIZED, FOR SOLUTION INTRAVENOUS at 18:30

## 2021-12-27 RX ADMIN — METOPROLOL TARTRATE 7.5 MG: 5 INJECTION INTRAVENOUS at 08:00

## 2021-12-27 RX ADMIN — POTASSIUM CHLORIDE 10 MEQ: 7.46 INJECTION, SOLUTION INTRAVENOUS at 09:52

## 2021-12-27 RX ADMIN — LABETALOL HYDROCHLORIDE 10 MG: 5 INJECTION, SOLUTION INTRAVENOUS at 06:06

## 2021-12-27 RX ADMIN — ENALAPRILAT 1.25 MG: 1.25 INJECTION INTRAVENOUS at 16:44

## 2021-12-27 RX ADMIN — POTASSIUM CHLORIDE 10 MEQ: 7.46 INJECTION, SOLUTION INTRAVENOUS at 06:51

## 2021-12-27 RX ADMIN — POTASSIUM CHLORIDE 10 MEQ: 7.46 INJECTION, SOLUTION INTRAVENOUS at 07:43

## 2021-12-27 RX ADMIN — POTASSIUM CHLORIDE 10 MEQ: 7.46 INJECTION, SOLUTION INTRAVENOUS at 09:01

## 2021-12-27 ASSESSMENT — MIFFLIN-ST. JEOR: SCORE: 1548.25

## 2021-12-27 ASSESSMENT — ACTIVITIES OF DAILY LIVING (ADL)
ADLS_ACUITY_SCORE: 33

## 2021-12-27 NOTE — PROVIDER NOTIFICATION
Provider paged.  Pt has low grade fever.  Tylenol suppository requested.  Pt also has short shallow respirations in mid to upper 20's,  Asked for ABG order as well.

## 2021-12-27 NOTE — PLAN OF CARE
Occupational Therapy: Orders received. Chart reviewed and discussed with care team, including IP PT. Per discussion with treatment team, pt unable to tolerate more than 1 therapy discipline at this time and currently requires a lift for functional transfers and is intermittently following commands. All IP therapy needs are being met with IP PT. Will complete OT orders at this time.

## 2021-12-27 NOTE — PROVIDER NOTIFICATION
MD Notification    Notified Person: MD    Notified Person Name:  Macie    Notification Date/Time: 6:55 PM      Notification Interaction:  vocera text    Purpose of Notification:This patient has parameters SBP < 160.  Has been > 160 SBP each of last two checks.  has required PRNs consistently.  He has metoprolol IV ordered 5mg q 6 scheduled.  SBP does not appear to be controlled.  Would he benefit from an increase of scheduled metoprolol?  please advise.     Orders Received:    Comments:

## 2021-12-27 NOTE — PROGRESS NOTES
"Paged for new fever 100.8 and worsening tachypnea. Oxygenation needs have not changed yet as per nursing staff report.    Case reviewed. Ronaldn continued. Will order Tylenol as well as STAT ABG, CXR, blood and urine cultures. We continue to monitor him very closely.    Update: ABG shows 7.46/34/115/24. CXR shows: \"IMPRESSION: No acute abnormality\". Lactate is 1.2. We continue the care plan as stated.  "

## 2021-12-27 NOTE — PROGRESS NOTES
12/27/21 1100   Quick Adds   Type of Visit Initial PT Evaluation   Living Environment   Living Environment Comments pt is unable to commuicate with PT 2/2 inability to open eyes, or verbalize.  Only able to squeeze R hand, raise RUE above head and wiggle R toes.     Self-Care   Usual Activity Tolerance   (unknown)   Current Activity Tolerance poor   Disability/Function   Change in Functional Status Since Onset of Current Illness/Injury yes  (unable to gather any PLOF during session)   General Information   Onset of Illness/Injury or Date of Surgery 12/24/21   Referring Physician Dilma Aguirre MD   Patient/Family Therapy Goals Statement (PT) none stated   Pertinent History of Current Problem (include personal factors and/or comorbidities that impact the POC) 71M presented on 12/24 after being found down with abrasions and found to have a large R frontoparietal ICH, ~60cc. Etiology is hypertensive vs cerebral amyloid angiopathy. Pt on adderall at home which can increase blood pressure.   Existing Precautions/Restrictions fall   Heart Disease Risk Factors Gender;Age   General Observations does not verbalize or open eyes during session.  ONly folllowing ~10% of verbal commands to move RUE and toes.  Is able to give inconsistent thumbs up   Cognition   Orientation Status (Cognition) unable/difficult to assess   Affect/Mental Status (Cognition) low arousal/lethargic   Follows Commands (Cognition) 0-24% accuracy;follows one-step commands   Safety Deficit (Cognition) ability to follow commands   Posture    Posture Kyphosis;Forward head position   Strength   Strength Comments flaccid LUE and LE.  Able to raise R hand to level of head, squeeze R hand and wiggle R toes only.  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     Bed Mobility   Comment (Bed Mobility) Supine to sit with Max Ax2   Transfers   Transfer Safety Comments unable to perform any transfers 2/2 weakness   Gait/Stairs  (Locomotion)   Comment (Gait/Stairs) unable   Balance   Balance Comments poor sitting balance, requiring max Ax1 throughout 10 min of sitting at EOB   Sensory Examination   Sensory Perception Comments unable to assess   Clinical Impression   Criteria for Skilled Therapeutic Intervention yes, treatment indicated   PT Diagnosis (PT) impaired functional mobility   Influenced by the following impairments Flaccid L side, weak R, poor command following, unable to verbalize or open eyes   Clinical Presentation Evolving/Changing   Clinical Presentation Rationale clinical judgment   Clinical Decision Making (Complexity) moderate complexity   Therapy Frequency (PT) 4x/week   Predicted Duration of Therapy Intervention (days/wks) 3 weeks   Planned Therapy Interventions (PT) balance training;bed mobility training;gait training;home exercise program;manual therapy techniques;motor coordination training;neuromuscular re-education;patient/family education;postural re-education;strengthening;stair training;transfer training;risk factor education;home program guidelines   Risk & Benefits of therapy have been explained evaluation/treatment results reviewed;care plan/treatment goals reviewed;participants voiced agreement with care plan;risks/benefits reviewed;current/potential barriers reviewed;participants included;patient   PT Discharge Planning    PT Discharge Recommendation (DC Rec) home with home care physical therapy;home with assist;Transitional Care Facility   PT Rationale for DC Rec pt unsafe to d/c home, will need TCU in order to increase functional mobility.  Is below baseline but unable to tolerate ARU at this time.  If unable to d/c to TCU would need 24 hr A x2 with lift at home-dependent care    Total Evaluation Time   Total Evaluation Time (Minutes) 10

## 2021-12-27 NOTE — PROGRESS NOTES
[facility]    Stroke Progress Note    Interval EventsPatient following commands, was able to give thumbs up with right hand and wiggle right toes. Patient is  still lethargic. BP within range 140s-160s. Blood glucose 110-120s overnight.      HPI Summary  Sukhi Johnson is 71 year old male who presented to [facility] ED on 12/24 after being found down with left sided weakness and difficulty speaking. He was found to have a large R frontoparietal ICH, ~60cc. Etiology is hypertensive vs cerebral amyloid angiopathy.      MRI/Head CT 12/24/21 CT Head: 6.1 x 4.4 cm parenchymal hemorrhage in right frontal parietal region with some mild surrounding edema and localized mass effect  12/25/21 MRI Brain:  Right frontoparietal acute intracranial hemorrhage with associated regional mass effect resulting in 2 mm leftward midline shift. Tiny chronic infarct within L PCA territory    Intracranial Vasculature Negative CTA of head   Cervical Vasculature No measurable stenosis or dissection in the vertebral arteries and carotid arteries      Echocardiogram EF >70%, normal atrium size, no atrial shunt seen, negative bubble study. Hyperdynamic left ventricular function    EKG/Telemetry Sinus tachycardia    Other Testing Not Applicable     LDL  12/25/2021: 109 mg/dL   A1C  12/25/2021: 5.4 %   Troponin No lab value available in past 48 hrs       Impression   Non-traumatic intracerebral hemorrhage of right frontoparietal region     Plan  - Neuro check and vital signs every 2 hours   - Systolic BP Goal: <160  - Goal sodium normonetrium 135 or greater  - Head of bed elevated to 30 degrees  - Repeat head CT with any changes in neuro exam  - Recommend PICC line to placed, for emergent use of hypertonic saline if needed  - DVT prophylaxis ordered   - PT/OT/SLP therapies as needed  - Blood glucose monitoring   - Euthermia, please treat any fever aggressively   - Euglycemia, blood glucose <180  - Repeat brain MRI  "with and without contrast out in 3 months outpatient     Patient Follow-up    - final recommendation pending work-up    We will continue to follow.     KALI Padgett, CNP  Vascular Neurology  To page me or covering stroke neurology team member, click here: AMCOM   Choose \"On Call\" tab at top, then search dropdown box for \"Neurology Adult\", select location, press Enter, then look for stroke/neuro ICU/telestroke.    ______________________________________________________    Clinically Significant Risk Factors Present on Admission                 Medications   Scheduled Meds    enalaprilat  1.25 mg Intravenous Q6H     metoprolol  10 mg Intravenous Q6H     piperacillin-tazobactam  3.375 g Intravenous Q8H     potassium chloride  10 mEq Intravenous Q1H     sodium chloride (PF)  3 mL Intracatheter Q8H       Infusion Meds    sodium chloride 75 mL/hr at 12/27/21 0348       PRN Meds  acetaminophen, hydrALAZINE, labetalol, lidocaine 4%, lidocaine, lidocaine (buffered or not buffered), melatonin, ondansetron **OR** ondansetron, sodium chloride (PF)       PHYSICAL EXAMINATION  Temp:  [98.4  F (36.9  C)-100.8  F (38.2  C)] 98.4  F (36.9  C)  Pulse:  [] 77  Resp:  [18-26] 20  BP: (137-174)/() 137/86  SpO2:  [91 %-98 %] 96 %      General Exam  General:  patient lying in bed with oxygen on    HEENT:  normocephalic/atraumatic  Pulmonary:  on oxymask    Neuro Exam  Mental Status:  follows commands, does not open eyes to noxious stimuli   Cranial Nerves:  PERRL, corneals intact  Motor:  right arm localizes to pain, follows commands with right leg and arm. Plegic left leg and arm  Sensory:  decreased sensation on left side of body  Coordination:  unable to perform  Station/Gait:  deferred      Imaging  I personally reviewed all imaging; relevant findings per HPI.     Lab Results Data   CBC  Recent Labs   Lab 12/27/21  0442 12/26/21  0903 12/25/21  1116   WBC 13.1* 13.9* 13.9*   RBC 4.99 5.39 5.22   HGB 14.4 15.8 15.4 "   HCT 45.8 48.9 45.7    252 271     Basic Metabolic Panel    Recent Labs   Lab 12/27/21  0442 12/26/21  0903 12/25/21  1116   * 142 142   POTASSIUM 3.3* 3.5 3.3*   CHLORIDE 115* 111* 110*   CO2 25 23 26   BUN 32* 22 17   CR 0.75 0.68 0.65*   * 122* 140*   MUSTAPHA 8.7 8.6 8.6     Liver Panel  Recent Labs   Lab 12/25/21  1116   PROTTOTAL 6.8   ALBUMIN 3.4   BILITOTAL 2.3*   ALKPHOS 65   AST 17   ALT 25     INR    Recent Labs   Lab Test 12/24/21  1542   INR 0.96      Lipid Profile    Recent Labs   Lab Test 12/25/21  1116   CHOL 193   HDL 61   *   TRIG 117     A1C    Recent Labs   Lab Test 12/25/21  1116   A1C 5.4     Troponin I  No results for input(s): TROPONIN, GHTROP in the last 168 hours.

## 2021-12-27 NOTE — PROVIDER NOTIFICATION
Writer paged Dr. Wetzel Please update PRN IV BP meds, parameters say SBP>150 and Neuro's note says SBP<160. Please advise or order update. Thanks Clifton

## 2021-12-27 NOTE — PROGRESS NOTES
Pt hospitalized with R frontoparietal hemorrhage with L midline shift.  Pt is non verbal, inconsistent with following commands,  Opens right eye to pain, L droop.  Is able to squeeze Right hand,  R foot plantar flexion,  Withdrawals from pain on both upper and lower left extremity.  Finger to nose on R hand ataxic.  Unable to assess any visual abnormalities at this time as this nurse needs to hold eyelids open,  Pupils round, equal and reactive.  Low grade temp, PRN tylenol available if needed. BP systolic's higher than set parameters,  Requiring PRN medications.  External catheter.  Fluids running and intermittent IV antibiotics.  Potassium protocol initiated.  Pt needs UA.  O2 via oxymask @ 2L.

## 2021-12-27 NOTE — CONSULTS
"CLINICAL NUTRITION SERVICES  -  ASSESSMENT NOTE      Recommendations Ordered by Registered Dietitian (RD):     Nutrition Support Enteral:  Type of Feeding Tube: to be placed  Enteral Frequency:  Continuous  Enteral Regimen: Jevity 1.5 @ 50 mL/hr  Total Enteral Provisions: 1800 cals, 77 gm pro, 25 gm fiber, 912 mL free water  Free Water Flush: 60 mL every 4 hrs  Prosource: 1 packet per day = 40 cals, 11 gm pro  TOTAL (TF + Prosource) = 1840 cals (26 cals/kg), 88 gm pro (1.2 gm/kg)    Begin TF at 20 mL/hr.  Increase by 15 mL every 24 hrs to goal rate of 50 mL/hr       Malnutrition:   % Weight Loss:  None noted on admission screen  % Intake:  Decreased intake does not meet criteria for malnutrition  - day 4 NPO  Subcutaneous Fat Loss:  None observed  Muscle Loss:  None observed  Fluid Retention:  None noted    Malnutrition Diagnosis: Patient does not meet two of the above criteria necessary for diagnosing malnutrition        REASON FOR ASSESSMENT  Sukhi Johnson is a 71 year old male assessed by Registered Dietitian for Provider Order - Registered Dietitian to Assess and Order TF per Medical Nutrition Therapy Protocol      NUTRITION HISTORY  Deferred - pt unable to provide      CURRENT NUTRITION ORDERS  Diet Order:     NPO    12/27: SLP: Attempted to see multiple times today but to lethargic to participate and/or PO trials. May need to consider short term TF for nutrition given lethargy.    Plan for FT placement for nutrition      NUTRITION FOCUSED PHYSICAL ASSESSMENT FOR DIAGNOSING MALNUTRITION)  Yes (brief visual - pt sleeping)              Observed:    No nutrition-related physical findings observed    Obtained from Chart/Interdisciplinary Team:  Non-traumatic intracerebral hemorrhage of the R frontoparietal region  Left hemiplegia and inability to speak or swallow due to hemorrhagic stroke  No edema    ANTHROPOMETRICS  Height: 5' 7\"  Weight:(12/27)83.5 kg /  184 lbs 0 oz  Body mass index is 28.82 " kg/m .  Weight Status:  Overweight BMI 25-29.9  IBW: 67.3 kg  % IBW: 124%  Weight History:   Wt Readings from Last 10 Encounters:   12/27/21 83.5 kg (184 lb)         LABS  12/27: Na 145 (H)             K 3.3 (L) --> 3.6    MEDICATIONS  IVF @ 75 mL/hr      ASSESSED NUTRITION NEEDS PER APPROVED PRACTICE GUIDELINES:    Dosing Weight 71 kg  Estimated Energy Needs: 6354-5361 kcals (25-30 Kcal/Kg)  Justification: overweight  Estimated Protein Needs:  grams protein (1.2-1.5 g pro/Kg)  Justification: preservation of lean body mass  Estimated Fluid Needs: per MD    MALNUTRITION:  % Weight Loss:  None noted on admission screen  % Intake:  Decreased intake does not meet criteria for malnutrition  - day 4 NPO  Subcutaneous Fat Loss:  None observed  Muscle Loss:  None observed  Fluid Retention:  None noted    Malnutrition Diagnosis: Patient does not meet two of the above criteria necessary for diagnosing malnutrition      NUTRITION DIAGNOSIS:  Inadequate protein-energy intake related to NPO status with dysphagia as evidenced by pt not meeting estimated needs      NUTRITION INTERVENTIONS  Recommendations / Nutrition Prescription  NPO (per SLP)    Nutrition Support Enteral:  Type of Feeding Tube: to be placed  Enteral Frequency:  Continuous  Enteral Regimen: Jevity 1.5 @ 50 mL/hr  Total Enteral Provisions: 1800 cals, 77 gm pro, 25 gm fiber, 912 mL free water  Free Water Flush: 60 mL every 4 hrs  Prosource: 1 packet per day = 40 cals, 11 gm pro  TOTAL (TF + Prosource) = 1840 cals (26 cals/kg), 88 gm pro (1.2 gm/kg)    Begin TF at 20 mL/hr.  Increase by 15 mL every 24 hrs to goal rate of 50 mL/hr    .      Implementation  Nutrition education: Per Provider order if indicated   EN Composition and EN Schedule: orders entered in EPIC  .      Nutrition Goals  EN to meet % est needs while NPO  .      MONITORING AND EVALUATION:  Progress towards goals will be monitored and evaluated per protocol and Practice  Guidelines

## 2021-12-27 NOTE — PROGRESS NOTES
SPIRITUAL HEALTH SERVICES  SPIRITUAL ASSESSMENT Progress Note  [facility] 73  ON-CALL VISIT    REFERRAL SOURCE: HCD consult     Chart reviewed -- patient not clinically appropriate for HCD at this time. Patient must be oriented and decisional. Please re-consult if clinical status improves.    PLAN: Spiritual Health Services remains available for patient, family, and staff support.     Please page the on call  by placing a STAT or ASAP Epic referral for Spiritual Health (which will roll to the on call pager).        Dayana Ruiz  Associate   Pager: [phone]

## 2021-12-27 NOTE — PLAN OF CARE
Pt here with R frontoparietal non-traumatic ICH w/ midline shift, etiology hypertensive vs amyloid angiopathy. Elevated BP, managed with scheduled BP meds, SMP<160, intermittent low grade temp., intermittent slight tachy HR, otherwise VSS, on 2L Oxymask, sating mid 90's. Pt intermittent alertness improving during shift, lethargic, nonverbal, inconsistent with following commands, unable to assess orientation and complete follow Neuro assessments. Neuro's exhibit: LUE hemiplegia, did not withdraw, LLE hemiplegia/hemiparesis, withdraws to pain, 1/5 strength, L facial droop, nonverbal, PERRL, RUE 4/5 strength with pronator drift and strong hand . T&R q2hr, A2, lift. NPO, freq oral care. Incont of B&B, external cath in place adequate urine output for day shift. UA/UC needs to be collected. Small incont BM this morning. Pt scoring green on Aggression Stop Light Tool. Vasc access consuult for PICC placement for hypertonic saline, plan for tomorrow. K+ 3.6 after replacement. Currently recommending TCU at time of discharge

## 2021-12-27 NOTE — PLAN OF CARE
SLP: Attempted to see multiple times today but to lethargic to participate and/or PO trials. May need to consider short term TF for nutrition given lethargy.

## 2021-12-27 NOTE — PROGRESS NOTES
[facility]  Hospitalist Progress Note        Guerrero Wetzel MD   12/27/2021        Interval History:        - remains non verbal and non-communicative; not following commands; moving right upper and lower extremities independently; per neurology later in their exam-- patient was following some commands  - had a temp of 100.8 overnight  - BP elevated in 150s---170s         Assessment and Plan:        Sukhi Johnson is a 71 year old male with PMH of HTN, ADD admitted on 12/24/2021 with non traumatic ICH of R frontoparietal region. Patient was initially on nicardipine drip in ED and was boarding for nearly 18 hours waiting for ICU bed. Nicardipine drip was discontinued 12/25 and stroke service okayed admission to [facility] [place].     Acute hemorrhagic stroke, hypertensive vs amyloid angiopathy  Acute metabolic encephalopathy secondary to above  Previous history of hemorrhagic stroke in 2013  Untreated Hypertension  - per family, he has been on amlodipine-benazepril in the past but apparently does not tolerate them and family doesnot want them to be resumed  - CT and MRI on admission noted with Right frontoparietal acute intracranial hemorrhage with associated regional mass effect resulting in 2 mm leftward midline shift; MRI also noted with numerous chronic microhemorrhages throughout the cerebral hemispheres, deep gray nuclei and posterior fossa, possibly representing sequelae of hypertensive or amyloid angiopathy  - evaluated by neurosurgery and recommended no surgical intervention, have signed off  - repeat head CT 12/26 noted with slight interval decrease in size of the large parenchymal hematoma, no definite evidence for new or increasing hemorrhage; stable minimal leftward midline shift of the third ventricle  - ECHO 12/26 with LVEF >70%, negative bubble study; A1c 5.4,   - neurology following, follow q 2 hrs neurochecks  - evaluated by SLP, at high silent aspiration risk; keep NPO  -  "initially on Nicardipine drip, now off; per neurology goal SBP<160; BP in 150s---170s; will increase Metoprolol to 10 mg IV q 6 hrs and also add Enalaprilat 1.25 mg q 6 hrs IV with hold parameters  - PT/OT eval when able with improved mentation    FEN  At risk for aspiration  Acute hypoxic respiratory failure  Hypokalemia  - as noted, at high risk for silent aspiration  - SLP following; will keep NPO  - continue  NS at 75 ml/hr; K 3.3-- supplement per protocol  - no significant improvement in mentation and unable to take PO,  - discussed with family regarding tube feeds and family okay; will order for NG tube placement and nutrition consult for initiation of tube feeds (12/27)  - CXR 12/26 UR and afebrile, however, requiring 2-3 lts NC oxygen and wbc up to 13; will continue empiric Zosyn and monitor clinically  - try to wean oxygen down     Chronic fatigue syndrome 2/2 lyme disease: hold PTA adderall, resume when taking PO     COVID PCR screen negative. No vaccination records available, tested negative on admission 12/24    Orders Placed This Encounter      NPO for Medical/Clinical Reasons Except for: No Exceptions    DVT Prophylaxis: started on Heparin s/c (12/27) per neurology    Code Status:   Full code; confirmed with family discussion 12/27      Clinically Significant Risk Factors Present on Admission           Disposition Plan     Expected Discharge:  unclear at this time; likely >3-5 days pending clinical improvement    Care plan discussed with nursing, neurology and patient's wife Andressa present in room      Clinically Significant Risk Factors Present on Admission             # Overweight: last Body mass index is 28.82 kg/m .         Page Me (7 am to 6 pm)              Physical Exam:      Blood pressure (!) 164/79, pulse 97, temperature 99  F (37.2  C), temperature source Axillary, resp. rate 20, height 1.702 m (5' 7\"), weight 83.5 kg (184 lb), SpO2 93 %.  Vitals:    12/24/21 1558 12/27/21 0445   Weight: " 83.9 kg (184 lb 15.5 oz) 83.5 kg (184 lb)     Vital Signs with Ranges  Temp:  [98.5  F (36.9  C)-100.8  F (38.2  C)] 99  F (37.2  C)  Pulse:  [] 97  Resp:  [18-26] 20  BP: (138-174)/() 164/79  SpO2:  [91 %-98 %] 93 %  I/O's Last 24 hours  I/O last 3 completed shifts:  In: 1017.5 [I.V.:1017.5]  Out: 350 [Urine:350]    Constitutional: very lethargic and doesnot participate in communication; mostly non-verbal; moving right upper and lower extremities independently; per neurology later in their exam-- patient was following some commands       Oral cavity: Dry oral mucosa   Cardiovascular: Normal s1 s2, regular rate and rhythm , no murmur   Lungs: B/l clear to auscultation, no wheezes or crepitations   Abdomen: Soft, nt, nd, no guarding, rigidity or rebound; BS +   LE : No edema   Musculoskeletal/ Neuro:  difficult to assess at this time ; mostly non-verbal; moving right upper and lower extremities independently       Psychiatry: lethargic                 Medications:          metoprolol  7.5 mg Intravenous Q6H     piperacillin-tazobactam  3.375 g Intravenous Q8H     potassium chloride  10 mEq Intravenous Q1H     sodium chloride (PF)  3 mL Intracatheter Q8H     PRN Meds: acetaminophen, hydrALAZINE, labetalol, lidocaine 4%, lidocaine (buffered or not buffered), melatonin, ondansetron **OR** ondansetron, sodium chloride (PF)         Data:      All new lab and imaging data was reviewed.   Recent Labs   Lab Test 12/27/21  0442 12/26/21  0903 12/25/21  1116 12/24/21  1542   WBC 13.1* 13.9* 13.9* 10.4   HGB 14.4 15.8 15.4 15.6   MCV 92 91 88 89    252 271 258   INR  --   --   --  0.96      Recent Labs   Lab Test 12/27/21  0442 12/26/21  0903 12/25/21  1116   * 142 142   POTASSIUM 3.3* 3.5 3.3*   CHLORIDE 115* 111* 110*   CO2 25 23 26   BUN 32* 22 17   CR 0.75 0.68 0.65*   ANIONGAP 5 8 6   MUSTAPHA 8.7 8.6 8.6   * 122* 140*     No lab results found.    Invalid input(s): TROP, TROPONINIES

## 2021-12-27 NOTE — PROVIDER NOTIFICATION
Writer paged Dr. Wetzel, Patient's spouse, Miladis, informed writer they will go and have NG tube feed placed, prefers left nostril if possible. She will keep Full code status on patient for now.

## 2021-12-28 ENCOUNTER — APPOINTMENT (OUTPATIENT)
Dept: GENERAL RADIOLOGY | Facility: CLINIC | Age: 71
DRG: 064 | End: 2021-12-28
Attending: PHYSICIAN ASSISTANT
Payer: COMMERCIAL

## 2021-12-28 ENCOUNTER — APPOINTMENT (OUTPATIENT)
Dept: SPEECH THERAPY | Facility: CLINIC | Age: 71
DRG: 064 | End: 2021-12-28
Payer: COMMERCIAL

## 2021-12-28 ENCOUNTER — APPOINTMENT (OUTPATIENT)
Dept: GENERAL RADIOLOGY | Facility: CLINIC | Age: 71
DRG: 064 | End: 2021-12-28
Attending: NURSE PRACTITIONER
Payer: COMMERCIAL

## 2021-12-28 ENCOUNTER — APPOINTMENT (OUTPATIENT)
Dept: GENERAL RADIOLOGY | Facility: CLINIC | Age: 71
DRG: 064 | End: 2021-12-28
Attending: HOSPITALIST
Payer: COMMERCIAL

## 2021-12-28 ENCOUNTER — APPOINTMENT (OUTPATIENT)
Dept: PHYSICAL THERAPY | Facility: CLINIC | Age: 71
DRG: 064 | End: 2021-12-28
Payer: COMMERCIAL

## 2021-12-28 LAB
ANION GAP SERPL CALCULATED.3IONS-SCNC: 7 MMOL/L (ref 3–14)
BASE EXCESS BLDV CALC-SCNC: -1.5 MMOL/L (ref -7.7–1.9)
BASOPHILS # BLD AUTO: 0 10E3/UL (ref 0–0.2)
BASOPHILS # BLD AUTO: 0.1 10E3/UL (ref 0–0.2)
BASOPHILS NFR BLD AUTO: 0 %
BASOPHILS NFR BLD AUTO: 1 %
BUN SERPL-MCNC: 29 MG/DL (ref 7–30)
CALCIUM SERPL-MCNC: 8.6 MG/DL (ref 8.5–10.1)
CHLORIDE BLD-SCNC: 118 MMOL/L (ref 94–109)
CO2 SERPL-SCNC: 22 MMOL/L (ref 20–32)
CREAT SERPL-MCNC: 0.64 MG/DL (ref 0.66–1.25)
EOSINOPHIL # BLD AUTO: 0 10E3/UL (ref 0–0.7)
EOSINOPHIL # BLD AUTO: 0 10E3/UL (ref 0–0.7)
EOSINOPHIL NFR BLD AUTO: 0 %
EOSINOPHIL NFR BLD AUTO: 0 %
ERYTHROCYTE [DISTWIDTH] IN BLOOD BY AUTOMATED COUNT: 13.1 % (ref 10–15)
ERYTHROCYTE [DISTWIDTH] IN BLOOD BY AUTOMATED COUNT: 13.2 % (ref 10–15)
GFR SERPL CREATININE-BSD FRML MDRD: >90 ML/MIN/1.73M2
GLUCOSE BLD-MCNC: 115 MG/DL (ref 70–99)
GLUCOSE BLDC GLUCOMTR-MCNC: 109 MG/DL (ref 70–99)
GLUCOSE BLDC GLUCOMTR-MCNC: 111 MG/DL (ref 70–99)
GLUCOSE BLDC GLUCOMTR-MCNC: 113 MG/DL (ref 70–99)
GLUCOSE BLDC GLUCOMTR-MCNC: 114 MG/DL (ref 70–99)
HCO3 BLDV-SCNC: 22 MMOL/L (ref 21–28)
HCT VFR BLD AUTO: 46.6 % (ref 40–53)
HCT VFR BLD AUTO: 48.3 % (ref 40–53)
HGB BLD-MCNC: 15.1 G/DL (ref 13.3–17.7)
HGB BLD-MCNC: 15.2 G/DL (ref 13.3–17.7)
HOLD SPECIMEN: NORMAL
IMM GRANULOCYTES # BLD: 0 10E3/UL
IMM GRANULOCYTES # BLD: 0 10E3/UL
IMM GRANULOCYTES NFR BLD: 0 %
IMM GRANULOCYTES NFR BLD: 0 %
LYMPHOCYTES # BLD AUTO: 1.1 10E3/UL (ref 0.8–5.3)
LYMPHOCYTES # BLD AUTO: 1.5 10E3/UL (ref 0.8–5.3)
LYMPHOCYTES NFR BLD AUTO: 14 %
LYMPHOCYTES NFR BLD AUTO: 9 %
MAGNESIUM SERPL-MCNC: 2.2 MG/DL (ref 1.6–2.3)
MCH RBC QN AUTO: 28.8 PG (ref 26.5–33)
MCH RBC QN AUTO: 29.4 PG (ref 26.5–33)
MCHC RBC AUTO-ENTMCNC: 31.3 G/DL (ref 31.5–36.5)
MCHC RBC AUTO-ENTMCNC: 32.6 G/DL (ref 31.5–36.5)
MCV RBC AUTO: 90 FL (ref 78–100)
MCV RBC AUTO: 92 FL (ref 78–100)
MONOCYTES # BLD AUTO: 0.7 10E3/UL (ref 0–1.3)
MONOCYTES # BLD AUTO: 0.7 10E3/UL (ref 0–1.3)
MONOCYTES NFR BLD AUTO: 6 %
MONOCYTES NFR BLD AUTO: 6 %
NEUTROPHILS # BLD AUTO: 8.4 10E3/UL (ref 1.6–8.3)
NEUTROPHILS # BLD AUTO: 9.8 10E3/UL (ref 1.6–8.3)
NEUTROPHILS NFR BLD AUTO: 80 %
NEUTROPHILS NFR BLD AUTO: 84 %
NRBC # BLD AUTO: 0 10E3/UL
NRBC # BLD AUTO: 0 10E3/UL
NRBC BLD AUTO-RTO: 0 /100
NRBC BLD AUTO-RTO: 0 /100
O2/TOTAL GAS SETTING VFR VENT: 5 %
PCO2 BLDV: 34 MM HG (ref 40–50)
PH BLDV: 7.42 [PH] (ref 7.32–7.43)
PLATELET # BLD AUTO: 262 10E3/UL (ref 150–450)
PLATELET # BLD AUTO: 288 10E3/UL (ref 150–450)
PO2 BLDV: 45 MM HG (ref 25–47)
POTASSIUM BLD-SCNC: 3.1 MMOL/L (ref 3.4–5.3)
POTASSIUM BLD-SCNC: 3.3 MMOL/L (ref 3.4–5.3)
RBC # BLD AUTO: 5.17 10E6/UL (ref 4.4–5.9)
RBC # BLD AUTO: 5.24 10E6/UL (ref 4.4–5.9)
SODIUM SERPL-SCNC: 147 MMOL/L (ref 133–144)
WBC # BLD AUTO: 10.7 10E3/UL (ref 4–11)
WBC # BLD AUTO: 11.7 10E3/UL (ref 4–11)

## 2021-12-28 PROCEDURE — 85025 COMPLETE CBC W/AUTO DIFF WBC: CPT | Performed by: HOSPITALIST

## 2021-12-28 PROCEDURE — 250N000009 HC RX 250: Performed by: RADIOLOGY

## 2021-12-28 PROCEDURE — 250N000011 HC RX IP 250 OP 636: Performed by: STUDENT IN AN ORGANIZED HEALTH CARE EDUCATION/TRAINING PROGRAM

## 2021-12-28 PROCEDURE — 97530 THERAPEUTIC ACTIVITIES: CPT | Mod: GP

## 2021-12-28 PROCEDURE — 84132 ASSAY OF SERUM POTASSIUM: CPT | Performed by: HOSPITALIST

## 2021-12-28 PROCEDURE — 83735 ASSAY OF MAGNESIUM: CPT | Performed by: HOSPITALIST

## 2021-12-28 PROCEDURE — 82803 BLOOD GASES ANY COMBINATION: CPT | Performed by: HOSPITALIST

## 2021-12-28 PROCEDURE — 999N000065 XR CHEST PORT 1 VIEW

## 2021-12-28 PROCEDURE — 82310 ASSAY OF CALCIUM: CPT | Performed by: HOSPITALIST

## 2021-12-28 PROCEDURE — 999N000040 HC STATISTIC CONSULT NO CHARGE VASC ACCESS

## 2021-12-28 PROCEDURE — 250N000013 HC RX MED GY IP 250 OP 250 PS 637: Performed by: HOSPITALIST

## 2021-12-28 PROCEDURE — 36592 COLLECT BLOOD FROM PICC: CPT | Performed by: HOSPITALIST

## 2021-12-28 PROCEDURE — 36569 INSJ PICC 5 YR+ W/O IMAGING: CPT

## 2021-12-28 PROCEDURE — 250N000009 HC RX 250: Performed by: HOSPITALIST

## 2021-12-28 PROCEDURE — 74340 X-RAY GUIDE FOR GI TUBE: CPT

## 2021-12-28 PROCEDURE — 272N000451 HC KIT SHRLOCK 5FR POWER PICC DOUBLE LUMEN

## 2021-12-28 PROCEDURE — 99291 CRITICAL CARE FIRST HOUR: CPT

## 2021-12-28 PROCEDURE — 92526 ORAL FUNCTION THERAPY: CPT | Mod: GN | Performed by: SPEECH-LANGUAGE PATHOLOGIST

## 2021-12-28 PROCEDURE — 250N000009 HC RX 250: Performed by: PHYSICIAN ASSISTANT

## 2021-12-28 PROCEDURE — 36415 COLL VENOUS BLD VENIPUNCTURE: CPT | Performed by: HOSPITALIST

## 2021-12-28 PROCEDURE — 97112 NEUROMUSCULAR REEDUCATION: CPT | Mod: GP

## 2021-12-28 PROCEDURE — 250N000011 HC RX IP 250 OP 636: Performed by: PHYSICIAN ASSISTANT

## 2021-12-28 PROCEDURE — 250N000011 HC RX IP 250 OP 636: Performed by: HOSPITALIST

## 2021-12-28 PROCEDURE — 99233 SBSQ HOSP IP/OBS HIGH 50: CPT | Performed by: HOSPITALIST

## 2021-12-28 PROCEDURE — 120N000001 HC R&B MED SURG/OB

## 2021-12-28 PROCEDURE — 258N000003 HC RX IP 258 OP 636: Performed by: HOSPITALIST

## 2021-12-28 PROCEDURE — 71045 X-RAY EXAM CHEST 1 VIEW: CPT

## 2021-12-28 PROCEDURE — 99233 SBSQ HOSP IP/OBS HIGH 50: CPT | Mod: GC | Performed by: STUDENT IN AN ORGANIZED HEALTH CARE EDUCATION/TRAINING PROGRAM

## 2021-12-28 RX ORDER — LIDOCAINE HYDROCHLORIDE 20 MG/ML
6 JELLY TOPICAL ONCE
Status: COMPLETED | OUTPATIENT
Start: 2021-12-28 | End: 2021-12-28

## 2021-12-28 RX ORDER — POTASSIUM CHLORIDE 1.5 G/1.58G
40 POWDER, FOR SOLUTION ORAL ONCE
Status: COMPLETED | OUTPATIENT
Start: 2021-12-28 | End: 2021-12-28

## 2021-12-28 RX ADMIN — HYDRALAZINE HYDROCHLORIDE 20 MG: 20 INJECTION INTRAMUSCULAR; INTRAVENOUS at 05:10

## 2021-12-28 RX ADMIN — LABETALOL HYDROCHLORIDE 20 MG: 5 INJECTION, SOLUTION INTRAVENOUS at 17:11

## 2021-12-28 RX ADMIN — METOPROLOL TARTRATE 10 MG: 5 INJECTION INTRAVENOUS at 01:30

## 2021-12-28 RX ADMIN — SODIUM CHLORIDE: 9 INJECTION, SOLUTION INTRAVENOUS at 11:22

## 2021-12-28 RX ADMIN — PIPERACILLIN SODIUM AND TAZOBACTAM SODIUM 3.38 G: 3; .375 INJECTION, POWDER, LYOPHILIZED, FOR SOLUTION INTRAVENOUS at 19:43

## 2021-12-28 RX ADMIN — ENALAPRILAT 1.25 MG: 1.25 INJECTION INTRAVENOUS at 09:22

## 2021-12-28 RX ADMIN — HYDRALAZINE HYDROCHLORIDE 10 MG: 20 INJECTION INTRAMUSCULAR; INTRAVENOUS at 16:40

## 2021-12-28 RX ADMIN — HEPARIN SODIUM 5000 UNITS: 5000 INJECTION INTRAVENOUS; SUBCUTANEOUS at 19:20

## 2021-12-28 RX ADMIN — LABETALOL HYDROCHLORIDE 20 MG: 5 INJECTION, SOLUTION INTRAVENOUS at 03:46

## 2021-12-28 RX ADMIN — PIPERACILLIN SODIUM AND TAZOBACTAM SODIUM 3.38 G: 3; .375 INJECTION, POWDER, LYOPHILIZED, FOR SOLUTION INTRAVENOUS at 08:29

## 2021-12-28 RX ADMIN — ENALAPRILAT 1.25 MG: 1.25 INJECTION INTRAVENOUS at 15:05

## 2021-12-28 RX ADMIN — METOPROLOL TARTRATE 10 MG: 5 INJECTION INTRAVENOUS at 13:10

## 2021-12-28 RX ADMIN — LIDOCAINE HYDROCHLORIDE 6 ML: 20 JELLY TOPICAL at 10:32

## 2021-12-28 RX ADMIN — HEPARIN SODIUM 5000 UNITS: 5000 INJECTION INTRAVENOUS; SUBCUTANEOUS at 06:00

## 2021-12-28 RX ADMIN — ENALAPRILAT 1.25 MG: 1.25 INJECTION INTRAVENOUS at 21:21

## 2021-12-28 RX ADMIN — HYDRALAZINE HYDROCHLORIDE 20 MG: 20 INJECTION INTRAMUSCULAR; INTRAVENOUS at 00:21

## 2021-12-28 RX ADMIN — LIDOCAINE HYDROCHLORIDE 0.5 ML: 10 INJECTION, SOLUTION INFILTRATION; PERINEURAL at 13:55

## 2021-12-28 RX ADMIN — POTASSIUM CHLORIDE 40 MEQ: 1.5 POWDER, FOR SOLUTION ORAL at 12:54

## 2021-12-28 RX ADMIN — METOPROLOL TARTRATE 10 MG: 5 INJECTION INTRAVENOUS at 08:28

## 2021-12-28 RX ADMIN — PIPERACILLIN SODIUM AND TAZOBACTAM SODIUM 3.38 G: 3; .375 INJECTION, POWDER, LYOPHILIZED, FOR SOLUTION INTRAVENOUS at 01:32

## 2021-12-28 RX ADMIN — METOPROLOL TARTRATE 10 MG: 5 INJECTION INTRAVENOUS at 19:20

## 2021-12-28 RX ADMIN — PIPERACILLIN SODIUM AND TAZOBACTAM SODIUM 3.38 G: 3; .375 INJECTION, POWDER, LYOPHILIZED, FOR SOLUTION INTRAVENOUS at 13:08

## 2021-12-28 RX ADMIN — ENALAPRILAT 1.25 MG: 1.25 INJECTION INTRAVENOUS at 02:58

## 2021-12-28 ASSESSMENT — ACTIVITIES OF DAILY LIVING (ADL)
ADLS_ACUITY_SCORE: 35
ADLS_ACUITY_SCORE: 29
ADLS_ACUITY_SCORE: 33
ADLS_ACUITY_SCORE: 35
ADLS_ACUITY_SCORE: 33
ADLS_ACUITY_SCORE: 35
ADLS_ACUITY_SCORE: 33
ADLS_ACUITY_SCORE: 29
ADLS_ACUITY_SCORE: 35
ADLS_ACUITY_SCORE: 33
ADLS_ACUITY_SCORE: 29
ADLS_ACUITY_SCORE: 33
ADLS_ACUITY_SCORE: 33
ADLS_ACUITY_SCORE: 35
ADLS_ACUITY_SCORE: 29
ADLS_ACUITY_SCORE: 29
ADLS_ACUITY_SCORE: 35
ADLS_ACUITY_SCORE: 33
ADLS_ACUITY_SCORE: 35
ADLS_ACUITY_SCORE: 33

## 2021-12-28 NOTE — PROGRESS NOTES
[facility]  Hospitalist Progress Note        Guerrero Wetzel MD   12/28/2021        Interval History:        - No significant change in mentation; essentially remains nonverbal at the time of my examination  - got feeding tube; nutrition consulted for initiation of tube feeds  - fever trending down, slightly tachycardic to 110s; BP better controlled         Assessment and Plan:        Sukhi Johnson is a 71 year old male with PMH of HTN, ADD admitted on 12/24/2021 with non traumatic ICH of R frontoparietal region. Patient was initially on nicardipine drip in ED and was boarding for nearly 18 hours waiting for ICU bed. Nicardipine drip was discontinued 12/25 and stroke service okayed admission to [facility] [place].     Acute hemorrhagic stroke, hypertensive vs amyloid angiopathy  Acute metabolic encephalopathy secondary to above  Previous history of hemorrhagic stroke in 2013  Untreated Hypertension  - per family, he has been on amlodipine-benazepril in the past but apparently does not tolerate them and family doesnot want them to be resumed  - CT and MRI on admission noted with Right frontoparietal acute intracranial hemorrhage with associated regional mass effect resulting in 2 mm leftward midline shift; MRI also noted with numerous chronic microhemorrhages throughout the cerebral hemispheres, deep gray nuclei and posterior fossa, possibly representing sequelae of hypertensive or amyloid angiopathy  - evaluated by neurosurgery and recommended no surgical intervention, have signed off  - repeat head CT 12/26 noted with slight interval decrease in size of the large parenchymal hematoma, no definite evidence for new or increasing hemorrhage; stable minimal leftward midline shift of the third ventricle  - ECHO 12/26 with LVEF >70%, negative bubble study; A1c 5.4,   - neurology following, follow q 2 hrs neurochecks  - evaluated by SLP, at high silent aspiration risk; keep NPO  - initially on  Nicardipine drip, now off; per neurology goal SBP<160  - BP better controlled; continue Metoprolol to 10 mg IV q 6 hrs and also Enalaprilat 1.25 mg q 6 hrs IV with hold parameters; will switch to PO per feeding tube if tolerating tube feeds  - PT/OT eval when able with improved mentation    FEN  At risk for aspiration  Acute hypoxic respiratory failure  Hypokalemia  Hypernatremia  - at high risk for silent aspiration; no significant improvement in mentation and unable to take PO  - SLP following; will keep NPO; Feeding tube placed 12/28; nutrition consulted for initiation of tube feeds  - continue  NS at 75 ml/hr; K 3.1 supplement per protocol; will check Magnesium  - Na slightly trending up 145---147; avoiding hypotonic IVF due to intracranial bleed; will start free water flushes 100 ml q 4 hrs with tube feeds  - monitor BMP  - CXR 12/26 UR and afebrile, however was requiring 2-3 lts NC oxygen and wbc up to 13; will continue empiric Zosyn and monitor clinically  - try to wean oxygen down     Chronic fatigue syndrome 2/2 lyme disease: hold PTA adderall, resume when taking PO     COVID PCR screen negative. No vaccination records available, tested negative on admission 12/24    Orders Placed This Encounter      NPO for Medical/Clinical Reasons Except for: No Exceptions    DVT Prophylaxis: started on Heparin s/c (12/27) per neurology    Code Status:   Full code; confirmed with family discussion 12/27      Clinically Significant Risk Factors Present on Admission           Disposition Plan     Expected Discharge:  unclear at this time; likely >3-5 days pending clinical improvement    Care plan discussed with nursing, neurology      Clinically Significant Risk Factors Present on Admission             # Overweight: last Body mass index is 28.82 kg/m .         Page Me (7 am to 6 pm)              Physical Exam:      Blood pressure 117/63, pulse 117, temperature 98.3  F (36.8  C), temperature source Axillary, resp. rate 24,  "height 1.702 m (5' 7\"), weight 83.5 kg (184 lb), SpO2 91 %.  Vitals:    12/24/21 1558 12/27/21 0445   Weight: 83.9 kg (184 lb 15.5 oz) 83.5 kg (184 lb)     Vital Signs with Ranges  Temp:  [97.8  F (36.6  C)-99.1  F (37.3  C)] 98.3  F (36.8  C)  Pulse:  [] 117  Resp:  [20-24] 24  BP: (117-191)/() 117/63  SpO2:  [91 %-96 %] 91 %  I/O's Last 24 hours  I/O last 3 completed shifts:  In: 400 [I.V.:400]  Out: 350 [Urine:350]    Constitutional: very lethargic and doesnot participate in communication; mostly non-verbal; moving right upper and lower extremities independently; no movement on left side     pupils equal and reactive to light and accommodation    Oral cavity: Dry oral mucosa   Cardiovascular: Normal s1 s2, regular rate and rhythm , no murmur   Lungs: B/l clear to auscultation, no wheezes or crepitations   Abdomen: Soft, nt, nd, no guarding, rigidity or rebound; BS +   LE : No edema   Musculoskeletal/ Neuro:  difficult to assess at this time ; mostly non-verbal; moving right upper and lower extremities independently; no movement on left side       Psychiatry: lethargic                 Medications:          enalaprilat  1.25 mg Intravenous Q6H     heparin ANTICOAGULANT  5,000 Units Subcutaneous Q12H     metoprolol  10 mg Intravenous Q6H     piperacillin-tazobactam  3.375 g Intravenous Q6H     protein modular  1 packet Per Feeding Tube Daily     sodium chloride (PF)  3 mL Intracatheter Q8H     PRN Meds: acetaminophen, hydrALAZINE, labetalol, lidocaine 4%, lidocaine, lidocaine (buffered or not buffered), melatonin, ondansetron **OR** ondansetron, sodium chloride (PF)         Data:      All new lab and imaging data was reviewed.   Recent Labs   Lab Test 12/27/21  0442 12/26/21  0903 12/25/21  1116 12/24/21  1542   WBC 13.1* 13.9* 13.9* 10.4   HGB 14.4 15.8 15.4 15.6   MCV 92 91 88 89    252 271 258   INR  --   --   --  0.96      Recent Labs   Lab Test 12/27/21  1255 12/27/21  0442 12/26/21  0903 " 12/25/21  1116   NA  --  145* 142 142   POTASSIUM 3.6 3.3* 3.5 3.3*   CHLORIDE  --  115* 111* 110*   CO2  --  25 23 26   BUN  --  32* 22 17   CR  --  0.75 0.68 0.65*   ANIONGAP  --  5 8 6   MUSTAPHA  --  8.7 8.6 8.6   GLC  --  112* 122* 140*     No lab results found.    Invalid input(s): TROP, TROPONINIES

## 2021-12-28 NOTE — PROGRESS NOTES
Pt hospitalized with R Frontoparietal non traumatic ICH with a midline shift.  PT is not alert and non verbal at this time.  PT intermittently follows commands, is able to squeeze right hand, and R foot planta flexion.  Withdrawals from pain on upper and lower left extremity. Left facial droop, PERRLA.  Pt on room air,  Hypertensive requiring PRN medications as listed in MAR, tele sinus tach.  Mitt on right hand as pt is pulling at lines. Plans for PICC placement and NG placement today.

## 2021-12-28 NOTE — PROGRESS NOTES
[id number]  Pt here with R frontoparietal non-traumatic ICH w/ midline shift, etiology hypertensive vs amyloid angiopathy. A&O difficult to assess due to pt being so lethargic and not following any commands. Pt is non verbal but was giving hand signals, thumbs up or down during day shift.  Neuros LUE hemiplegia, did not withdraw, LLE hemiplegia/hemiparesis, withdraws to pain, 1/5 strength, L facial droop, nonverbal, PERRL, RUE 4/5 strength with pronator drift and strong hand . VSS- 2L oxymask. NPO diet. Takes pills IV only. Up with Ax2 lift. Denies pain. Pt scoring green on the Aggression Stop Light Tool- was pulling at lines so RN placed soft mitt on right hand. Plan PICC placement and NG placement as well. Discharge to TCU is recomended.

## 2021-12-28 NOTE — PROVIDER NOTIFICATION
Date paged: 12/28    Time paged: [phone]    Person paged: Damari    Paging system used: [employer] Messaging    Message: I just check on the patient and he was sating 85-86 at 3 L oxymask. Upped him to 5 L and now 91-92%. Appears to have possibly thrown up part of tube feed, suctioned. Using abdominal muscles for breathing, lung sounds very diminished with snoring and wheezing. Stopped tube feed and clamped. Please advise. Thanks Lety JAIN *[phone]

## 2021-12-28 NOTE — PROGRESS NOTES
[facility]    Stroke Progress Note    Interval EventsPatient following commands, was able to give thumbs up with right hand and wiggle right toes. He was also able to squeeze my fingers with right hand. Patient is still lethargic, -190s overnight. Temperatures ranging from 98.4-99.1. Blood glucose 110-120s overnight.     HPI Summary  Sukhi Johnson is 71 year old male who presented to [facility] ED on 12/24 after being found down with left sided weakness and difficulty speaking. He was found to have a large R frontoparietal ICH, ~60cc. Etiology is hypertensive vs cerebral amyloid angiopathy.        MRI/Head CT 12/24/21 CT Head: 6.1 x 4.4 cm parenchymal hemorrhage in right frontal parietal region with some mild surrounding edema and localized mass effect  12/25/21 MRI Brain:  Right frontoparietal acute intracranial hemorrhage with associated regional mass effect resulting in 2 mm leftward midline shift. Tiny chronic infarct within L PCA territory    Intracranial Vasculature Negative CTA of head   Cervical Vasculature No measurable stenosis or dissection in the vertebral arteries and carotid arteries       Echocardiogram EF >70%, normal atrium size, no atrial shunt seen, negative bubble study. Hyperdynamic left ventricular function    EKG/Telemetry Sinus tachycardia    Other Testing Not Applicable      LDL  12/25/2021: 109 mg/dL   A1C  12/25/2021: 5.4 %   Troponin No lab value available in past 48 hrs         Impression   Non-traumatic intracerebral hemorrhage of right frontoparietal region       Plan   - Neuro check and vital signs every 4 hours   - Systolic BP Goal: <160, Use IV PRNs to maintain BP goal. Ok to titrate enteral meds to achieve   - Goal sodium normonatremia: 135 or greater  - Head of bed elevated to 30 degrees  - Repeat head CT with any changes in neuro exam  - Recommend PICC line to placed, for emergent use of hypertonic saline if needed  - DVT prophylaxis  -  "PT/OT/SLP therapies as needed  - Bedside blood glucose monitoring   - Euglycemia, blood glucose <180  - Euthermia, please treat any fever aggressively   - Repeat brain MRI with and without contrast in 3 months outpatient       Patient Follow-up    - final recommendation pending work-up    We will continue to follow.     KALI Padgett, CNP  Vascular Neurology  To page me or covering stroke neurology team member, click here: AMCOM   Choose \"On Call\" tab at top, then search dropdown box for \"Neurology Adult\", select location, press Enter, then look for stroke/neuro ICU/telestroke.    ______________________________________________________    Clinically Significant Risk Factors Present on Admission                 Medications   Scheduled Meds    enalaprilat  1.25 mg Intravenous Q6H     heparin ANTICOAGULANT  5,000 Units Subcutaneous Q12H     metoprolol  10 mg Intravenous Q6H     piperacillin-tazobactam  3.375 g Intravenous Q6H     protein modular  1 packet Per Feeding Tube Daily     sodium chloride (PF)  3 mL Intracatheter Q8H       Infusion Meds    sodium chloride 75 mL/hr at 12/27/21 1809       PRN Meds  acetaminophen, hydrALAZINE, labetalol, lidocaine 4%, lidocaine, lidocaine (buffered or not buffered), melatonin, ondansetron **OR** ondansetron, sodium chloride (PF)       PHYSICAL EXAMINATION  Temp:  [97.8  F (36.6  C)-99.1  F (37.3  C)] (P) 98.5  F (36.9  C)  Pulse:  [] (P) 104  Resp:  [20-24] (P) 22  BP: (117-191)/() (P) 149/84  SpO2:  [91 %-96 %] (P) 93 %      General Exam  General:  patient lying in bed without any acute distress    HEENT:  normocephalic/atraumatic  Pulmonary:  no respiratory distress, breathing on room air       Neuro Exam  Mental Status:  follows commands, does not open eyes to noxious stimuli, nonverbal  Cranial Nerves:  PERRL, corneals reflex present, left facial droop  Motor:  follows commands with right arm and right leg, able to squeeze fingers with right hand strength " 4/5, plegic left arm, triple flexion noted in left leg  Sensory:  decreased sensation on left side of body  Coordination:  unable to perform  Station/Gait:  deferred    Imaging  I personally reviewed all imaging; relevant findings per HPI.     Lab Results Data   CBC  Recent Labs   Lab 12/27/21  0442 12/26/21  0903 12/25/21  1116   WBC 13.1* 13.9* 13.9*   RBC 4.99 5.39 5.22   HGB 14.4 15.8 15.4   HCT 45.8 48.9 45.7    252 271     Basic Metabolic Panel    Recent Labs   Lab 12/28/21  0800 12/27/21  1255 12/27/21  0442 12/26/21  0903 12/25/21  1116   NA  --   --  145* 142 142   POTASSIUM  --  3.6 3.3* 3.5 3.3*   CHLORIDE  --   --  115* 111* 110*   CO2  --   --  25 23 26   BUN  --   --  32* 22 17   CR  --   --  0.75 0.68 0.65*   *  --  112* 122* 140*   MUSTAPHA  --   --  8.7 8.6 8.6     Liver Panel  Recent Labs   Lab 12/25/21  1116   PROTTOTAL 6.8   ALBUMIN 3.4   BILITOTAL 2.3*   ALKPHOS 65   AST 17   ALT 25     INR    Recent Labs   Lab Test 12/24/21  1542   INR 0.96      Lipid Profile    Recent Labs   Lab Test 12/25/21  1116   CHOL 193   HDL 61   *   TRIG 117     A1C    Recent Labs   Lab Test 12/25/21  1116   A1C 5.4     Troponin I  No results for input(s): TROPONIN, GHTROP in the last 168 hours.

## 2021-12-28 NOTE — PROVIDER NOTIFICATION
Date paged: 12/28    Time paged: [phone]    Person paged: Damari    Paging system used: [employer] Mess[employer]    Message: PICC xray back, okay to use now? Thanks Lety JAIN *[phone]    Update: PICC needs to moved back 3 cm. Patient care order being placed and IV team to be called.

## 2021-12-28 NOTE — PROCEDURES
Double Lumen PICC Placement    Date/Time: 12/28/2021 2:08 PM  Performed by: Angel Paul RN  Authorized by: Michelle Schneidre PA-C   Indications: vascular access      UNIVERSAL PROTOCOL   Site Marked: Yes  Prior Images Obtained and Reviewed:  Yes  Required items: Required blood products, implants, devices and special equipment available    Patient identity confirmed:  Arm band and hospital-assigned identification number  NA - No sedation, light sedation, or local anesthesia  Confirmation Checklist:  Patient's identity using two indicators, relevant allergies, procedure was appropriate and matched the consent or emergent situation and correct equipment/implants were available  Time out: Immediately prior to the procedure a time out was called    Universal Protocol: the Joint Commission Universal Protocol was followed    Preparation: Patient was prepped and draped in usual sterile fashion    ESBL (mL):  2     ANESTHESIA    Local Anesthetic: Lidocaine 1% without epinephrine  Anesthetic Total (mL):  0.5      SEDATION    Patient Sedated: No        Preparation: skin prepped with ChloraPrep  Skin prep agent: skin prep agent completely dried prior to procedure  Sterile barriers: maximum sterile barriers were used: cap, mask, sterile gown, sterile gloves, and large sterile sheet  Hand hygiene: hand hygiene performed prior to central venous catheter insertion  Type of line used: Power PICC  Catheter type: double lumen  Lumen type: valved  Catheter size: 5 Fr  Brand: Bard  Lot number: [id number]  Placement method: MST and ultrasound  Number of attempts: 1  Successful placement: yes  Orientation: right  Location: basilic vein  Arm circumference: adults 10 cm  Extremity circumference: 32  Visible catheter length: 5  Total catheter length: 47  Dressing and securement: securement device, chlorhexidine disc applied, site cleaned and sterile dressing applied  Post procedure assessment: blood  return through all ports  PROCEDURE   Patient Tolerance:  Patient tolerated the procedure well with no immediate complicationsDescribe Procedure: Successful placement of double lumen PICC RUE basilic vein. Awaiting CXR result to verify placement. Bedside RN notified not to use PICC until placement verified.

## 2021-12-28 NOTE — PROGRESS NOTES
RRT was called due to pt having an increased work of breathing. Upon arrival pt was on a 5L oxymask with SpO2 in the low to mid 90's. Nasal trumpet was placed in the left nare per NP order. Pt was NT suctioned and orally suctioned for a large amount of creamy thick secretions. SpO2 increased into the upper 90's. No other RT orders at this time.  12/28/2021  Glenny Badillo, RT

## 2021-12-28 NOTE — CODE/RAPID RESPONSE
[facility]    RRT Note  12/28/2021   Time Called: 1650    RRT called for: Hypoxia    Assessment & Plan     Acute Hypoxic Respiratory Failure  Aspiration/Mucous plugging of airway  -Patient is requiring 5L O2 via oxymask. Patient is using accessory muscles to breathe which the bedside RN states is new. Bedside RN states he had some tan secretions in his mouth and he appeared to be coughing, she suctioned this with a Yankauer and then stopped his continuous tube feeding in the event that he was aspirating. Chest XR was ordered at this time to check for aspiration, CBC and VBG ordered.  -RT was able to place nasal trumpet and suction a large amount of thick, tan secretions which improved patients breathing pattern, decreased his work of breathing and improved his vital signs.    INTERVENTIONS:   -Stop tube feedings  -VBG  -CBC  -CXR  -NT suctioning with nasal trumpet x2 with thick, tan secretions out      At the end of the RRT patient was stabilized on station 73 and will remain there for now.    Discussed with and defer further cares to primary team.    Interval History     Sukhi Johnson is a 71 year old male who was admitted on 12/24/2021 for atraumatic R frontoparietal ICH. Patient assessed by nurse to have increased O2 needs and increased work of breathing. RN suctioned thick, tan secretions from mouth and feared that he may have aspirated some of his tube feeding so RN paged the primary team who recommended an RRT be called at this time.    At the conclusion of the RRT, the patient's wife was updated at length and all of her questions were answered. Her main concern was that the patient's IVF was continued which was confirmed to be continued.    Medical history significant for:     Hypertension  Chronic fatigue syndrome 2/2 lyme disease      Code Status: Full Code    Allergies   No Known Allergies    Physical Exam   Vital Signs with Ranges:  Temp:  [98.3  F (36.8  C)-99.1  F  (37.3  C)] 98.7  F (37.1  C)  Pulse:  [] 107  Resp:  [20-24] 22  BP: (117-191)/() 161/92  SpO2:  [89 %-96 %] 89 %  I/O last 3 completed shifts:  In: -   Out: 350 [Urine:350]    Constitutional: Lethargic, appears in distress  ENT: Intact  Neck: Supple, no lymphadenopathy  Pulmonary: Rhonchi  Cardiovascular: RRR  GI: BSx4  Skin/Integumen: Intact  Neuro: Lethargic, nonverbal, RUE with purposeful movement, following commands, L facial droop, L sided hemiplegia  Psych:    Extremities: Moves RUE/RLE, 2+pulses x4 extremities    Data     ABG:  -  Recent Labs   Lab 12/27/21  0436   PH 7.46*   PCO2 34*   PO2 115*   HCO3 24   O2PER 40       Troponin:    No lab results found.    IMAGING: (X-ray/CT/MRI)   Recent Results (from the past 24 hour(s))   XR Feeding Tube Placement    Narrative    FLUOROSCOPY GUIDED FEEDING TUBE PLACEMENT  12/28/2021 10:36 AM     HISTORY: acute hemorrhagic stroke Feeding tube needed for nutrition.    COMPARISON: None.    TECHNIQUE: After injection of Xylocaine gel into the nose, a feeding  tube was advanced under fluoroscopic guidance.    FLUOROSCOPY TIME: .6 minutes.    SPOT FILMS: 1    FINDINGS: The feeding tube is advanced with the tip in the distal  duodenum. A small amount of barium was injected to define anatomy.      Impression    IMPRESSION: Uncomplicated feeding tube placement with tip in the  distal duodenum.    TIFFANY DANIEL MD         SYSTEM ID:  [id number]   XR Chest Port 1 View    Narrative    CHEST PORTABLE ONE VIEW   12/28/2021 2:57 PM     HISTORY: RN placed PICC - verify tip placement.    COMPARISON: Chest x-ray on 12/27/2021.      Impression    IMPRESSION: AP views of the chest were obtained. Right upper extremity  PICC tip projects over high right atrium. Enteric tube crosses the  diaphragm with the distal tip outside the field of view. The  visualized portions of the lungs are clear. No significant right  pneumothorax.    LEIGH ORTIZ MD         SYSTEM ID:  [id number]    XR Chest Port 1 View    Narrative    EXAM: XR CHEST PORT 1 VIEW  LOCATION: [facility]  DATE/TIME: 12/28/2021 5:24 PM    INDICATION: Shortness of breath.  COMPARISON: Chest x-ray on same day earlier.      Impression    IMPRESSION: Single AP view of the chest was obtained. Right upper extremity PICC tip projects over low SVC. Cardiomediastinal silhouette is within normal limits. No suspicious focal pulmonary opacities. No significant pleural effusion or pneumothorax.   XR Chest Port 1 View    Narrative    EXAM: XR CHEST PORT 1 VIEW  LOCATION: [facility]  DATE/TIME: 12/28/2021 6:45 PM    INDICATION: RN placed PICC - verify tip placement  COMPARISON: 12/28/2021.      Impression    IMPRESSION: Right PICC terminates near the cavoatrial junction. Enteric feeding tube with weighted tip near the ligament of Treitz.       CBC with Diff:  Recent Labs   Lab Test 12/28/21  0844 12/25/21  1116 12/24/21  1542   WBC 11.7*   < > 10.4   HGB 15.1   < > 15.6   MCV 92   < > 89      < > 258   INR  --   --  0.96    < > = values in this interval not displayed.        Lactic Acid:    Lab Results   Component Value Date    LACT 1.2 12/27/2021           Comprehensive Metabolic Panel:  Recent Labs   Lab 12/28/21  1646 12/28/21  1143 12/28/21  0844 12/26/21  0903 12/25/21  1116   NA  --   --  147*   < > 142   POTASSIUM  --   --  3.1*   < > 3.3*   CHLORIDE  --   --  118*   < > 110*   CO2  --   --  22   < > 26   ANIONGAP  --   --  7   < > 6   *   < > 115*   < > 140*   BUN  --   --  29   < > 17   CR  --   --  0.64*   < > 0.65*   GFRESTIMATED  --   --  >90   < > >90   MUSTAPHA  --   --  8.6   < > 8.6   MAG  --   --  2.2  --  2.3   PROTTOTAL  --   --   --   --  6.8   ALBUMIN  --   --   --   --  3.4   BILITOTAL  --   --   --   --  2.3*   ALKPHOS  --   --   --   --  65   AST  --   --   --   --  17   ALT  --   --   --   --  25    < > = values in this interval not displayed.       INR:     Recent Labs   Lab Test 12/24/21  1542   INR 0.96       UA:  Recent Labs   Lab 12/27/21  1716   COLOR Yellow   APPEARANCE Clear   URINEGLC Negative   URINEBILI Negative   URINEKETONE 60 *   SG 1.031   UBLD Moderate*   URINEPH 6.0   PROTEIN 20 *   NITRITE Negative   LEUKEST Negative   RBCU 128*   WBCU 3         Time Spent on this Encounter   I spent 30 minutes (5700 - 174) of critical care time on the unit/floor managing the care of Sukhi Johnson. Upon evaluation, this patient had a high probability of imminent or life-threatening deterioration due to hypoxic respiratory failure, which required my direct attention, intervention, and personal management. 100% of my time was spent at the bedside counseling the patient and/or coordinating care regarding services listed in this note.    KALI Vilchis CNP

## 2021-12-28 NOTE — PLAN OF CARE
Reason for Admission: R frontal iCH    Cognitive/Mentation: A/Ox ALLYSON, nonverbal  Neuros/CMS: Intact ex L hemiparesis, L faical droop, Withdraws on L side.  VS: bp within parameters. Oxygen saturation dropped- placed on 2l oxymask  Tele: Sinus tach.  GI: BS active x4, passing flatus, last BM 12/27/21. inContinent.  : Voiding adequate amounts. inContinent.  Pulmonary: LS diminished.  Pain: no nonverbal indicators of pain observed.     Drains: external cath, PICC placed today  Skin: intact  Activity: Assist x 2 with lift.  Diet: NPO. Tube feed started at 20ml/h.     Therapies recs: pending  Discharge: pending    End of shift summary: neuros unchanged during shift. NG placed today. PICC placed, x-ray pending.

## 2021-12-29 ENCOUNTER — APPOINTMENT (OUTPATIENT)
Dept: PHYSICAL THERAPY | Facility: CLINIC | Age: 71
DRG: 064 | End: 2021-12-29
Payer: COMMERCIAL

## 2021-12-29 ENCOUNTER — APPOINTMENT (OUTPATIENT)
Dept: SPEECH THERAPY | Facility: CLINIC | Age: 71
DRG: 064 | End: 2021-12-29
Payer: COMMERCIAL

## 2021-12-29 ENCOUNTER — APPOINTMENT (OUTPATIENT)
Dept: CT IMAGING | Facility: CLINIC | Age: 71
DRG: 064 | End: 2021-12-29
Attending: NURSE PRACTITIONER
Payer: COMMERCIAL

## 2021-12-29 LAB
ANION GAP SERPL CALCULATED.3IONS-SCNC: 4 MMOL/L (ref 3–14)
BASOPHILS # BLD AUTO: 0 10E3/UL (ref 0–0.2)
BASOPHILS NFR BLD AUTO: 1 %
BUN SERPL-MCNC: 27 MG/DL (ref 7–30)
CALCIUM SERPL-MCNC: 8.2 MG/DL (ref 8.5–10.1)
CHLORIDE BLD-SCNC: 124 MMOL/L (ref 94–109)
CO2 SERPL-SCNC: 24 MMOL/L (ref 20–32)
CREAT SERPL-MCNC: 0.68 MG/DL (ref 0.66–1.25)
EOSINOPHIL # BLD AUTO: 0 10E3/UL (ref 0–0.7)
EOSINOPHIL NFR BLD AUTO: 0 %
ERYTHROCYTE [DISTWIDTH] IN BLOOD BY AUTOMATED COUNT: 13.2 % (ref 10–15)
GFR SERPL CREATININE-BSD FRML MDRD: >90 ML/MIN/1.73M2
GLUCOSE BLD-MCNC: 107 MG/DL (ref 70–99)
HCT VFR BLD AUTO: 42.5 % (ref 40–53)
HGB BLD-MCNC: 13.4 G/DL (ref 13.3–17.7)
IMM GRANULOCYTES # BLD: 0.1 10E3/UL
IMM GRANULOCYTES NFR BLD: 1 %
LYMPHOCYTES # BLD AUTO: 1.2 10E3/UL (ref 0.8–5.3)
LYMPHOCYTES NFR BLD AUTO: 13 %
MAGNESIUM SERPL-MCNC: 2.2 MG/DL (ref 1.6–2.3)
MCH RBC QN AUTO: 29.4 PG (ref 26.5–33)
MCHC RBC AUTO-ENTMCNC: 31.5 G/DL (ref 31.5–36.5)
MCV RBC AUTO: 93 FL (ref 78–100)
MONOCYTES # BLD AUTO: 0.6 10E3/UL (ref 0–1.3)
MONOCYTES NFR BLD AUTO: 7 %
NEUTROPHILS # BLD AUTO: 7 10E3/UL (ref 1.6–8.3)
NEUTROPHILS NFR BLD AUTO: 78 %
NRBC # BLD AUTO: 0 10E3/UL
NRBC BLD AUTO-RTO: 0 /100
PLATELET # BLD AUTO: 256 10E3/UL (ref 150–450)
POTASSIUM BLD-SCNC: 3 MMOL/L (ref 3.4–5.3)
POTASSIUM BLD-SCNC: 3.5 MMOL/L (ref 3.4–5.3)
RBC # BLD AUTO: 4.56 10E6/UL (ref 4.4–5.9)
SODIUM SERPL-SCNC: 152 MMOL/L (ref 133–144)
WBC # BLD AUTO: 8.9 10E3/UL (ref 4–11)

## 2021-12-29 PROCEDURE — 250N000009 HC RX 250: Performed by: HOSPITALIST

## 2021-12-29 PROCEDURE — 250N000011 HC RX IP 250 OP 636: Performed by: HOSPITALIST

## 2021-12-29 PROCEDURE — 92526 ORAL FUNCTION THERAPY: CPT | Mod: GN | Performed by: SPEECH-LANGUAGE PATHOLOGIST

## 2021-12-29 PROCEDURE — 999N000157 HC STATISTIC RCP TIME EA 10 MIN

## 2021-12-29 PROCEDURE — 36592 COLLECT BLOOD FROM PICC: CPT | Performed by: HOSPITALIST

## 2021-12-29 PROCEDURE — 84132 ASSAY OF SERUM POTASSIUM: CPT | Performed by: HOSPITALIST

## 2021-12-29 PROCEDURE — 999N000190 HC STATISTIC VAT ROUNDS

## 2021-12-29 PROCEDURE — 70450 CT HEAD/BRAIN W/O DYE: CPT

## 2021-12-29 PROCEDURE — 85004 AUTOMATED DIFF WBC COUNT: CPT | Performed by: HOSPITALIST

## 2021-12-29 PROCEDURE — 250N000011 HC RX IP 250 OP 636: Performed by: PHYSICIAN ASSISTANT

## 2021-12-29 PROCEDURE — 120N000001 HC R&B MED SURG/OB

## 2021-12-29 PROCEDURE — 97530 THERAPEUTIC ACTIVITIES: CPT | Mod: GP

## 2021-12-29 PROCEDURE — 80048 BASIC METABOLIC PNL TOTAL CA: CPT | Performed by: HOSPITALIST

## 2021-12-29 PROCEDURE — 250N000011 HC RX IP 250 OP 636: Performed by: STUDENT IN AN ORGANIZED HEALTH CARE EDUCATION/TRAINING PROGRAM

## 2021-12-29 PROCEDURE — 83735 ASSAY OF MAGNESIUM: CPT | Performed by: HOSPITALIST

## 2021-12-29 PROCEDURE — 99233 SBSQ HOSP IP/OBS HIGH 50: CPT | Performed by: HOSPITALIST

## 2021-12-29 PROCEDURE — 99233 SBSQ HOSP IP/OBS HIGH 50: CPT | Mod: 25 | Performed by: STUDENT IN AN ORGANIZED HEALTH CARE EDUCATION/TRAINING PROGRAM

## 2021-12-29 PROCEDURE — 258N000003 HC RX IP 258 OP 636: Performed by: HOSPITALIST

## 2021-12-29 RX ORDER — POTASSIUM CHLORIDE 7.45 MG/ML
10 INJECTION INTRAVENOUS
Status: COMPLETED | OUTPATIENT
Start: 2021-12-29 | End: 2021-12-29

## 2021-12-29 RX ORDER — ENALAPRILAT 1.25 MG/ML
2.5 INJECTION INTRAVENOUS EVERY 6 HOURS
Status: DISCONTINUED | OUTPATIENT
Start: 2021-12-29 | End: 2022-01-06

## 2021-12-29 RX ADMIN — HEPARIN SODIUM 5000 UNITS: 5000 INJECTION INTRAVENOUS; SUBCUTANEOUS at 19:30

## 2021-12-29 RX ADMIN — METOPROLOL TARTRATE 10 MG: 5 INJECTION INTRAVENOUS at 19:30

## 2021-12-29 RX ADMIN — LABETALOL HYDROCHLORIDE 20 MG: 5 INJECTION, SOLUTION INTRAVENOUS at 15:55

## 2021-12-29 RX ADMIN — ENALAPRILAT 2.5 MG: 1.25 INJECTION INTRAVENOUS at 14:31

## 2021-12-29 RX ADMIN — POTASSIUM CHLORIDE 10 MEQ: 7.46 INJECTION, SOLUTION INTRAVENOUS at 10:05

## 2021-12-29 RX ADMIN — ENALAPRILAT 1.25 MG: 1.25 INJECTION INTRAVENOUS at 04:02

## 2021-12-29 RX ADMIN — PIPERACILLIN SODIUM AND TAZOBACTAM SODIUM 3.38 G: 3; .375 INJECTION, POWDER, LYOPHILIZED, FOR SOLUTION INTRAVENOUS at 14:23

## 2021-12-29 RX ADMIN — POTASSIUM CHLORIDE 10 MEQ: 7.46 INJECTION, SOLUTION INTRAVENOUS at 17:30

## 2021-12-29 RX ADMIN — POTASSIUM CHLORIDE 10 MEQ: 7.46 INJECTION, SOLUTION INTRAVENOUS at 14:06

## 2021-12-29 RX ADMIN — LABETALOL HYDROCHLORIDE 20 MG: 5 INJECTION, SOLUTION INTRAVENOUS at 14:51

## 2021-12-29 RX ADMIN — ENALAPRILAT 2.5 MG: 1.25 INJECTION INTRAVENOUS at 09:14

## 2021-12-29 RX ADMIN — METOPROLOL TARTRATE 10 MG: 5 INJECTION INTRAVENOUS at 14:20

## 2021-12-29 RX ADMIN — PIPERACILLIN SODIUM AND TAZOBACTAM SODIUM 3.38 G: 3; .375 INJECTION, POWDER, LYOPHILIZED, FOR SOLUTION INTRAVENOUS at 06:51

## 2021-12-29 RX ADMIN — LABETALOL HYDROCHLORIDE 20 MG: 5 INJECTION, SOLUTION INTRAVENOUS at 10:06

## 2021-12-29 RX ADMIN — HEPARIN SODIUM 5000 UNITS: 5000 INJECTION INTRAVENOUS; SUBCUTANEOUS at 06:31

## 2021-12-29 RX ADMIN — POTASSIUM CHLORIDE 10 MEQ: 7.46 INJECTION, SOLUTION INTRAVENOUS at 08:52

## 2021-12-29 RX ADMIN — PIPERACILLIN SODIUM AND TAZOBACTAM SODIUM 3.38 G: 3; .375 INJECTION, POWDER, LYOPHILIZED, FOR SOLUTION INTRAVENOUS at 00:39

## 2021-12-29 RX ADMIN — SODIUM CHLORIDE: 9 INJECTION, SOLUTION INTRAVENOUS at 22:42

## 2021-12-29 RX ADMIN — LABETALOL HYDROCHLORIDE 20 MG: 5 INJECTION, SOLUTION INTRAVENOUS at 00:27

## 2021-12-29 RX ADMIN — POTASSIUM CHLORIDE 10 MEQ: 7.46 INJECTION, SOLUTION INTRAVENOUS at 11:16

## 2021-12-29 RX ADMIN — ENALAPRILAT 2.5 MG: 1.25 INJECTION INTRAVENOUS at 21:03

## 2021-12-29 RX ADMIN — METOPROLOL TARTRATE 10 MG: 5 INJECTION INTRAVENOUS at 03:21

## 2021-12-29 RX ADMIN — METOPROLOL TARTRATE 10 MG: 5 INJECTION INTRAVENOUS at 09:15

## 2021-12-29 RX ADMIN — PIPERACILLIN SODIUM AND TAZOBACTAM SODIUM 3.38 G: 3; .375 INJECTION, POWDER, LYOPHILIZED, FOR SOLUTION INTRAVENOUS at 19:30

## 2021-12-29 RX ADMIN — POTASSIUM CHLORIDE 10 MEQ: 7.46 INJECTION, SOLUTION INTRAVENOUS at 15:11

## 2021-12-29 ASSESSMENT — ACTIVITIES OF DAILY LIVING (ADL)
ADLS_ACUITY_SCORE: 29

## 2021-12-29 NOTE — PROGRESS NOTES
[facility]    Stroke Progress Note    Interval EventsPatient following commands, was able to show two fingers with right hand and wiggle right toes. He was also able to squeeze my fingers with right hand. HE is able to open eyes spontaneously. -180s overnight. Temperatures ranging from 97.9-98.9. Blood glucose 110-120s overnight. Sodium now 152 today.       HPI Summary  Sukhi Johnson is 71 year old male who presented to [facility] ED on 12/24 after being found down with left sided weakness and difficulty speaking. He was found to have a large R frontoparietal ICH, ~60cc. Etiology is hypertensive vs cerebral amyloid angiopathy.        MRI/Head CT 12/24/21 CT Head: 6.1 x 4.4 cm parenchymal hemorrhage in right frontal parietal region with some mild surrounding edema and localized mass effect  12/25/21 MRI Brain:  Right frontoparietal acute intracranial hemorrhage with associated regional mass effect resulting in 2 mm leftward midline shift. Tiny chronic infarct within L PCA territory    Intracranial Vasculature Negative CTA of head   Cervical Vasculature No measurable stenosis or dissection in the vertebral arteries and carotid arteries       Echocardiogram EF >70%, normal atrium size, no atrial shunt seen, negative bubble study. Hyperdynamic left ventricular function    EKG/Telemetry Sinus tachycardia    Other Testing Not Applicable      LDL  12/25/2021: 109 mg/dL   A1C  12/25/2021: 5.4 %   Troponin No lab value available in past 48 hrs         Impression   Non-traumatic intracerebral hemorrhage of right frontoparietal region         Plan   - Neuro check and vital signs every 4 hours   - Systolic BP Goal: <160, Use IV PRNs to maintain BP goal. Ok to titrate enteral meds to achieve   - Goal sodium normonatremia: 135 or greater  - Head of bed elevated to 30 degrees  - Spot EEG to rule out seizure activity  - Repeat head CT today to monitor ICH  - PT/OT/SLP therapies as needed  -  "Bedside blood glucose monitoring   - Euglycemia, blood glucose <180  - Euthermia, please treat any fever aggressively   - Repeat brain MRI with and without contrast in 3 months outpatient   - Repeat head CT with any changes in neuro exam      Patient Follow-up    - final recommendation pending work-up    We will continue to follow.     KALI Padgett, CNP  Vascular Neurology  To page me or covering stroke neurology team member, click here: AMCOM   Choose \"On Call\" tab at top, then search dropdown box for \"Neurology Adult\", select location, press Enter, then look for stroke/neuro ICU/telestroke.    ______________________________________________________    Clinically Significant Risk Factors Present on Admission                  Medications   Scheduled Meds    enalaprilat  2.5 mg Intravenous Q6H     heparin ANTICOAGULANT  5,000 Units Subcutaneous Q12H     metoprolol  10 mg Intravenous Q6H     piperacillin-tazobactam  3.375 g Intravenous Q6H     potassium chloride  10 mEq Intravenous Q1H     protein modular  1 packet Per Feeding Tube Daily     sodium chloride (PF)  10-40 mL Intracatheter Q7 Days     sodium chloride (PF)  3 mL Intracatheter Q8H       Infusion Meds    sodium chloride 75 mL/hr at 12/28/21 1122       PRN Meds  acetaminophen, hydrALAZINE, labetalol, lidocaine 4%, lidocaine, lidocaine (buffered or not buffered), lidocaine (buffered or not buffered), melatonin, ondansetron **OR** ondansetron, sodium chloride (PF), sodium chloride (PF), sodium chloride (PF), sodium chloride (PF)       PHYSICAL EXAMINATION  Temp:  [97.9  F (36.6  C)-98.9  F (37.2  C)] 98.5  F (36.9  C)  Pulse:  [] 97  Resp:  [20-22] 20  BP: (143-198)/() 155/98  SpO2:  [89 %-97 %] 91 %      General Exam  General:  patient lying in bed without any acute distress    HEENT:  normocephalic/atraumatic  Pulmonary:  no respiratory distress       Neuro Exam  Mental Status:  follows commands, opens eyes spontaneously, " nonverbal  Cranial Nerves:  PERRL, corneals reflex present, left facial droop  Motor:  follows commands with right arm and right leg, able to squeeze fingers with right hand strength 4/5, left arm withdrawing to pain  Sensory:  decreased sensation on left side of body  Coordination:  unable to perform  Station/Gait:  deferred    Imaging  I personally reviewed all imaging; relevant findings per HPI.     Lab Results Data   CBC  Recent Labs   Lab 12/29/21  0624 12/28/21  1714 12/28/21  0844   WBC 8.9 10.7 11.7*   RBC 4.56 5.17 5.24   HGB 13.4 15.2 15.1   HCT 42.5 46.6 48.3    262 288     Basic Metabolic Panel    Recent Labs   Lab 12/29/21  0624 12/28/21  2219 12/28/21  1714 12/28/21  1646 12/28/21  1143 12/28/21  0844 12/27/21  1255 12/27/21  0442   *  --   --   --   --  147*  --  145*   POTASSIUM 3.0*  --  3.3*  --   --  3.1*   < > 3.3*   CHLORIDE 124*  --   --   --   --  118*  --  115*   CO2 24  --   --   --   --  22  --  25   BUN 27  --   --   --   --  29  --  32*   CR 0.68  --   --   --   --  0.64*  --  0.75   * 114*  --  111*   < > 115*   < > 112*   UMSTAPHA 8.2*  --   --   --   --  8.6  --  8.7    < > = values in this interval not displayed.     Liver Panel  Recent Labs   Lab 12/25/21  1116   PROTTOTAL 6.8   ALBUMIN 3.4   BILITOTAL 2.3*   ALKPHOS 65   AST 17   ALT 25     INR    Recent Labs   Lab Test 12/24/21  1542   INR 0.96      Lipid Profile    Recent Labs   Lab Test 12/25/21  1116   CHOL 193   HDL 61   *   TRIG 117     A1C    Recent Labs   Lab Test 12/25/21  1116   A1C 5.4     Troponin I  No results for input(s): TROPONIN, GHTROP in the last 168 hours.

## 2021-12-29 NOTE — PROGRESS NOTES
[facility]  Hospitalist Progress Note        Guerrero Wetzel MD   12/29/2021        Interval History:        - No significant change in mentation; essentially remains nonverbal at the time of my examination, some spontaneous eye opening and right sided movement  - NJ feeding tube placed on 12/28 and tube feeds started but overnight was in respiratory stress requiring 5 L OM; was evaluated by RRT, nasal trumpet placed and was deep suctioned  -sodium trending up---152; K 3.0; will resume Tube feeds at a low rate of 10 ml/hr and up titrate per nutrition as tolerated; will increase free water flushes to 150 ml every 4 hrs ; supplement potassium per protocol; magnesium normal  - blood pressure in 170s; will increase Enalaprilat to 2.5 mg q 6 hrs         Assessment and Plan:        Sukhi Johnson is a 71 year old male with PMH of HTN, ADD admitted on 12/24/2021 with non traumatic ICH of R frontoparietal region. Patient was initially on nicardipine drip in ED and was boarding for nearly 18 hours waiting for ICU bed. Nicardipine drip was discontinued 12/25 and stroke service okayed admission to [facility] [place].     Acute hemorrhagic stroke, hypertensive vs amyloid angiopathy  Acute metabolic encephalopathy secondary to above  Previous history of hemorrhagic stroke in 2013  Untreated Hypertension  - per family, he has been on amlodipine-benazepril in the past but apparently does not tolerate them and family doesnot want them to be resumed  - CT and MRI on admission noted with Right frontoparietal acute intracranial hemorrhage with associated regional mass effect resulting in 2 mm leftward midline shift; MRI also noted with numerous chronic microhemorrhages throughout the cerebral hemispheres, deep gray nuclei and posterior fossa, possibly representing sequelae of hypertensive or amyloid angiopathy  - evaluated by neurosurgery and recommended no surgical intervention, have signed off  - repeat head CT 12/26  noted with slight interval decrease in size of the large parenchymal hematoma, no definite evidence for new or increasing hemorrhage; stable minimal leftward midline shift of the third ventricle  - ECHO 12/26 with LVEF >70%, negative bubble study; A1c 5.4,   - neurology following, follow q 4 hrs neurochecks  - evaluated by SLP, at high silent aspiration risk; keep NPO  - initially on Nicardipine drip, now off; per neurology goal SBP<160; - blood pressure in 170s; will increase Enalaprilat to 2.5 mg q 6 hrs; continue Metoprolol to 10 mg IV q 6 hrs ; will switch to PO per feeding tube if tolerating tube feeds  -labetalol and hydralazine PRN  - PT/OT eval when able with improved mentation    FEN  At risk for aspiration  Acute hypoxic respiratory failure  Hypokalemia  Hypernatremia  - at high risk for silent aspiration; no significant improvement in mentation and unable to take PO  - SLP following; will keep NPO  - NJ feeding tube placed on 12/28 and tube feeds started but overnight was in respiratory stress requiring 5 L OM; was evaluated by RRT, nasal trumpet placed and was deep suctioned  -sodium trending up---152; K 3.0; will resume Tube feeds at a low rate of 10 ml/hr and up titrate per nutrition as tolerated; will increase free water flushes to 150 ml every 4 hrs ; supplement potassium per protocol; magnesium normal  - avoiding hypotonic IVF due to intracranial bleed  - monitor BMP  - CXR 12/26 UR and afebrile, however with hypoxia and leukocytosis an clinical concerns for aspiration; will continue empiric Zosyn and monitor clinically  - try to wean oxygen down  -fever and leukocytosis trended down; WBC 13.9--->8.9     Chronic fatigue syndrome 2/2 lyme disease: hold PTA adderall, resume when taking PO     COVID PCR screen negative. No vaccination records available, tested negative on admission 12/24    Orders Placed This Encounter      NPO for Medical/Clinical Reasons Except for: No Exceptions    DVT  "Prophylaxis: started on Heparin s/c (12/27) per neurology    Code Status:   Full code; confirmed with family discussion 12/27      Clinically Significant Risk Factors Present on Admission           Disposition Plan     Expected Discharge:  unclear at this time; likely >3-5 days pending clinical improvement    Care plan discussed with nursing, neurology and patient's wife Miladis over the phone      Clinically Significant Risk Factors Present on Admission                      Page Me (7 am to 6 pm)              Physical Exam:      Blood pressure (!) 177/90, pulse 94, temperature 98.9  F (37.2  C), temperature source Axillary, resp. rate 20, height 1.702 m (5' 7\"), weight 83.5 kg (184 lb), SpO2 92 %.  Vitals:    12/24/21 1558 12/27/21 0445   Weight: 83.9 kg (184 lb 15.5 oz) 83.5 kg (184 lb)     Vital Signs with Ranges  Temp:  [97.9  F (36.6  C)-98.9  F (37.2  C)] 98.9  F (37.2  C)  Pulse:  [] 94  Resp:  [20-22] 20  BP: (143-198)/() 177/90  SpO2:  [89 %-97 %] 92 %  I/O's Last 24 hours  No intake/output data recorded.    Constitutional: very lethargic and doesnot participate in communication; mostly non-verbal; moving right upper and lower extremities independently; no movement on left side   HEENT  pupils equal and reactive to light and accommodation ; some spontaneous eye opening ; nasal feeding tube in place    Oral cavity: Dry oral mucosa   Cardiovascular: Normal s1 s2, regular rate and rhythm , no murmur   Lungs: B/l clear to auscultation, no wheezes or crepitations   Abdomen: Soft, nt, nd, no guarding, rigidity or rebound; BS +   LE : No edema   Musculoskeletal/ Neuro:  difficult to assess at this time ; mostly non-verbal; moving right upper and lower extremities independently; no movement on left side       Psychiatry: lethargic                 Medications:          enalaprilat  1.25 mg Intravenous Q6H     heparin ANTICOAGULANT  5,000 Units Subcutaneous Q12H     metoprolol  10 mg Intravenous Q6H     " piperacillin-tazobactam  3.375 g Intravenous Q6H     potassium chloride  10 mEq Intravenous Q1H     protein modular  1 packet Per Feeding Tube Daily     sodium chloride (PF)  10-40 mL Intracatheter Q7 Days     sodium chloride (PF)  3 mL Intracatheter Q8H     PRN Meds: acetaminophen, hydrALAZINE, labetalol, lidocaine 4%, lidocaine, lidocaine (buffered or not buffered), lidocaine (buffered or not buffered), melatonin, ondansetron **OR** ondansetron, sodium chloride (PF), sodium chloride (PF), sodium chloride (PF), sodium chloride (PF)         Data:      All new lab and imaging data was reviewed.   Recent Labs   Lab Test 12/29/21  0624 12/28/21  1714 12/28/21  0844 12/25/21  1116 12/24/21  1542   WBC 8.9 10.7 11.7*   < > 10.4   HGB 13.4 15.2 15.1   < > 15.6   MCV 93 90 92   < > 89    262 288   < > 258   INR  --   --   --   --  0.96    < > = values in this interval not displayed.      Recent Labs   Lab Test 12/29/21  0624 12/28/21  2219 12/28/21  1714 12/28/21  1646 12/28/21  1143 12/28/21  0844 12/27/21  1255 12/27/21  0442   *  --   --   --   --  147*  --  145*   POTASSIUM 3.0*  --  3.3*  --   --  3.1*   < > 3.3*   CHLORIDE 124*  --   --   --   --  118*  --  115*   CO2 24  --   --   --   --  22  --  25   BUN 27  --   --   --   --  29  --  32*   CR 0.68  --   --   --   --  0.64*  --  0.75   ANIONGAP 4  --   --   --   --  7  --  5   MUSTAPHA 8.2*  --   --   --   --  8.6  --  8.7   * 114*  --  111*   < > 115*   < > 112*    < > = values in this interval not displayed.     No lab results found.    Invalid input(s): TROP, TROPONINIES

## 2021-12-29 NOTE — PROGRESS NOTES
Pt seen for NT suction. Pt is on 5L oxymask. NT suctioned for moderate yellow blood tinge thick secretions. BS coarse. Unable to suction orally.pt not opening mouth. Will continue to monitor.    Jaime Soler, RT

## 2021-12-29 NOTE — PLAN OF CARE
Reason for Admission: Right ICH    Cognitive/Mentation: A/Ox unable to assess, patient non-verbal  Neuros/CMS: PERRLA. Left sided hemiplegia, withdraws to pain. Moves right side freely, 3-4/5. Mute, inconsistent with commands.   VS: HTN earlier in shift, now controlled. On 5 L oxymask. Tele: SR bordering .  GI: BS +, + flatus, last BM 12/24. Incontinent.  : External catheter. Incontinent.  Pulmonary: LS diminished. Coarse bronchial lung sounds present prior to RRT, now resolved.  Pain: FLACC 0 after suctioning from RRT.     Drains: Nasal trumpet in R nare  Skin: Scattered bruising. PICC to right arm. NG in R nare  Activity: Assist x 2 with lift.  Diet: NPO for tube feeds and high aspiration risk.     Aggression Stoplight Score: Green  Therapies recs: Pending  Discharge: Pending    End of shift summary: AT initial assessment, patient was satting in mid 80s with tachypnea with very diminished lung sounds, patient also had tan colored secretion/possible emesis on side of mouth, clamped NG at that time. RRT called for hypoxia and possible aspiration, see RRT team notes. Continuing to hold tube feed, NG clamped due to possible aspiration. RT called once after RRT to deep suction again. Q2 turns and repos as tolerated.

## 2021-12-29 NOTE — PROGRESS NOTES
Nursing note  Pulled back 2 cm, initial VC was 5 cm and now it is 7 cm. 2nd CXR confirmed the tip of PICC is in the low SVC. The writer updated pt primary nurse.

## 2021-12-29 NOTE — PLAN OF CARE
Pt here with ICH. Unable to assess LOC d/t pt is lethargic and mute. Pt not opening his eyes but makes incomprehensive sounds with voice and touch. Neuros left sided weakness, left droop. VSS. Tele NSR. NG-tube in placed but clamped. NPO. R PICC in placed, patent and intact. Up with A2/lift. External cath in placed. Labetalol IV given 1X at to maintained SBP<150. External cath in placed with adequate. Denies pain. Pt scoring green on the Aggression Stop Light Tool. Discharge pending.

## 2021-12-29 NOTE — PLAN OF CARE
Reason for Admission: R frontal ICH    Cognitive/Mentation: A/Ox ALLYSON, Opens eyes at times  Neuros/CMS: Intact ex L hemiparesis, withdraws from pain LUE, LLE, L facial droop,   VS: BP elevated, need labetalol x2. On 5L oxymask.   Tele: NSR.  GI: BS active x4, passing flatus, last BM 12-29-21. inContinent.  : Voiding adequeate amounts. inContinent.  Pulmonary: LS diminished. Oral suction per nursing, minimal secretions. RT suctioned pt x1 with minimal secretions  Pain: no nonverbal indicators observed.     Drains: NG tube, external catheter, PICC   Skin: redness noted in R hand, mepilex placed  Activity: Assist x 2 with lift.  Diet: NPO. Tube feed started at 1230 at 10ml/h     Therapies recs: pending  Discharge: pending    End of shift summary: PT had repeat CT completed today. EEG ordered, not completed yet. Tube feed started at 12:30 at 10ml/h, increase by 15ml/h every 24 hour. RT present, removed nasal trumpet.

## 2021-12-30 ENCOUNTER — APPOINTMENT (OUTPATIENT)
Dept: PHYSICAL THERAPY | Facility: CLINIC | Age: 71
DRG: 064 | End: 2021-12-30
Payer: COMMERCIAL

## 2021-12-30 LAB
ABO/RH(D): NORMAL
ANION GAP SERPL CALCULATED.3IONS-SCNC: 5 MMOL/L (ref 3–14)
ANTIBODY SCREEN: NEGATIVE
APTT PPP: 26 SECONDS (ref 22–38)
BUN SERPL-MCNC: 23 MG/DL (ref 7–30)
CALCIUM SERPL-MCNC: 8.4 MG/DL (ref 8.5–10.1)
CHLORIDE BLD-SCNC: 120 MMOL/L (ref 94–109)
CO2 SERPL-SCNC: 25 MMOL/L (ref 20–32)
CREAT SERPL-MCNC: 0.67 MG/DL (ref 0.66–1.25)
FIBRINOGEN PPP-MCNC: 572 MG/DL (ref 170–490)
GFR SERPL CREATININE-BSD FRML MDRD: >90 ML/MIN/1.73M2
GLUCOSE BLD-MCNC: 119 MG/DL (ref 70–99)
GLUCOSE BLDC GLUCOMTR-MCNC: 117 MG/DL (ref 70–99)
HGB BLD-MCNC: 13.4 G/DL (ref 13.3–17.7)
HGB BLD-MCNC: 14 G/DL (ref 13.3–17.7)
HGB BLD-MCNC: 14.2 G/DL (ref 13.3–17.7)
INR PPP: 1.06 (ref 0.85–1.15)
PLATELET # BLD AUTO: 271 10E3/UL (ref 150–450)
POTASSIUM BLD-SCNC: 3.1 MMOL/L (ref 3.4–5.3)
POTASSIUM BLD-SCNC: 3.3 MMOL/L (ref 3.4–5.3)
POTASSIUM BLD-SCNC: 3.6 MMOL/L (ref 3.4–5.3)
SODIUM SERPL-SCNC: 150 MMOL/L (ref 133–144)
SPECIMEN EXPIRATION DATE: NORMAL

## 2021-12-30 PROCEDURE — 99233 SBSQ HOSP IP/OBS HIGH 50: CPT | Performed by: HOSPITALIST

## 2021-12-30 PROCEDURE — 258N000003 HC RX IP 258 OP 636: Performed by: HOSPITALIST

## 2021-12-30 PROCEDURE — 85018 HEMOGLOBIN: CPT | Performed by: STUDENT IN AN ORGANIZED HEALTH CARE EDUCATION/TRAINING PROGRAM

## 2021-12-30 PROCEDURE — 86901 BLOOD TYPING SEROLOGIC RH(D): CPT | Performed by: NURSE PRACTITIONER

## 2021-12-30 PROCEDURE — 84132 ASSAY OF SERUM POTASSIUM: CPT | Performed by: HOSPITALIST

## 2021-12-30 PROCEDURE — 86850 RBC ANTIBODY SCREEN: CPT | Performed by: NURSE PRACTITIONER

## 2021-12-30 PROCEDURE — 250N000011 HC RX IP 250 OP 636: Performed by: HOSPITALIST

## 2021-12-30 PROCEDURE — 250N000013 HC RX MED GY IP 250 OP 250 PS 637: Performed by: HOSPITALIST

## 2021-12-30 PROCEDURE — 85018 HEMOGLOBIN: CPT | Performed by: HOSPITALIST

## 2021-12-30 PROCEDURE — 85610 PROTHROMBIN TIME: CPT | Performed by: NURSE PRACTITIONER

## 2021-12-30 PROCEDURE — 85049 AUTOMATED PLATELET COUNT: CPT | Performed by: PHYSICIAN ASSISTANT

## 2021-12-30 PROCEDURE — 120N000001 HC R&B MED SURG/OB

## 2021-12-30 PROCEDURE — 250N000009 HC RX 250: Performed by: HOSPITALIST

## 2021-12-30 PROCEDURE — 85384 FIBRINOGEN ACTIVITY: CPT | Performed by: NURSE PRACTITIONER

## 2021-12-30 PROCEDURE — 85730 THROMBOPLASTIN TIME PARTIAL: CPT | Performed by: NURSE PRACTITIONER

## 2021-12-30 PROCEDURE — 999N000190 HC STATISTIC VAT ROUNDS

## 2021-12-30 PROCEDURE — C9113 INJ PANTOPRAZOLE SODIUM, VIA: HCPCS | Performed by: HOSPITALIST

## 2021-12-30 PROCEDURE — 97530 THERAPEUTIC ACTIVITIES: CPT | Mod: GP | Performed by: PHYSICAL THERAPIST

## 2021-12-30 PROCEDURE — 99233 SBSQ HOSP IP/OBS HIGH 50: CPT | Mod: GC | Performed by: STUDENT IN AN ORGANIZED HEALTH CARE EDUCATION/TRAINING PROGRAM

## 2021-12-30 PROCEDURE — 80048 BASIC METABOLIC PNL TOTAL CA: CPT | Performed by: HOSPITALIST

## 2021-12-30 PROCEDURE — 250N000011 HC RX IP 250 OP 636: Performed by: STUDENT IN AN ORGANIZED HEALTH CARE EDUCATION/TRAINING PROGRAM

## 2021-12-30 RX ORDER — POTASSIUM CHLORIDE 1.5 G/1.58G
40 POWDER, FOR SOLUTION ORAL ONCE
Status: COMPLETED | OUTPATIENT
Start: 2021-12-30 | End: 2021-12-30

## 2021-12-30 RX ORDER — ACETAMINOPHEN 325 MG/10.15ML
650 LIQUID ORAL EVERY 4 HOURS PRN
Status: DISCONTINUED | OUTPATIENT
Start: 2021-12-30 | End: 2022-01-06

## 2021-12-30 RX ORDER — HYDRALAZINE HYDROCHLORIDE 25 MG/1
25 TABLET, FILM COATED ORAL EVERY 8 HOURS SCHEDULED
Status: DISCONTINUED | OUTPATIENT
Start: 2021-12-30 | End: 2021-12-31

## 2021-12-30 RX ADMIN — PIPERACILLIN SODIUM AND TAZOBACTAM SODIUM 3.38 G: 3; .375 INJECTION, POWDER, LYOPHILIZED, FOR SOLUTION INTRAVENOUS at 01:35

## 2021-12-30 RX ADMIN — POTASSIUM CHLORIDE 40 MEQ: 1.5 POWDER, FOR SOLUTION ORAL at 06:01

## 2021-12-30 RX ADMIN — HYDRALAZINE HYDROCHLORIDE 20 MG: 20 INJECTION INTRAMUSCULAR; INTRAVENOUS at 03:56

## 2021-12-30 RX ADMIN — METOPROLOL TARTRATE 10 MG: 5 INJECTION INTRAVENOUS at 07:42

## 2021-12-30 RX ADMIN — HYDRALAZINE HYDROCHLORIDE 25 MG: 25 TABLET, FILM COATED ORAL at 14:17

## 2021-12-30 RX ADMIN — PIPERACILLIN SODIUM AND TAZOBACTAM SODIUM 3.38 G: 3; .375 INJECTION, POWDER, LYOPHILIZED, FOR SOLUTION INTRAVENOUS at 21:07

## 2021-12-30 RX ADMIN — Medication 1 PACKET: at 08:35

## 2021-12-30 RX ADMIN — METOPROLOL TARTRATE 10 MG: 5 INJECTION INTRAVENOUS at 14:16

## 2021-12-30 RX ADMIN — POTASSIUM CHLORIDE 40 MEQ: 1.5 POWDER, FOR SOLUTION ORAL at 12:15

## 2021-12-30 RX ADMIN — ENALAPRILAT 2.5 MG: 1.25 INJECTION INTRAVENOUS at 21:58

## 2021-12-30 RX ADMIN — ACETAMINOPHEN 650 MG: 325 SUSPENSION ORAL at 09:31

## 2021-12-30 RX ADMIN — ENALAPRILAT 2.5 MG: 1.25 INJECTION INTRAVENOUS at 14:18

## 2021-12-30 RX ADMIN — ENALAPRILAT 2.5 MG: 1.25 INJECTION INTRAVENOUS at 03:12

## 2021-12-30 RX ADMIN — PANTOPRAZOLE SODIUM 40 MG: 40 INJECTION, POWDER, FOR SOLUTION INTRAVENOUS at 20:52

## 2021-12-30 RX ADMIN — SODIUM CHLORIDE: 9 INJECTION, SOLUTION INTRAVENOUS at 16:31

## 2021-12-30 RX ADMIN — METOPROLOL TARTRATE 10 MG: 5 INJECTION INTRAVENOUS at 02:05

## 2021-12-30 RX ADMIN — ENALAPRILAT 2.5 MG: 1.25 INJECTION INTRAVENOUS at 08:18

## 2021-12-30 RX ADMIN — LABETALOL HYDROCHLORIDE 20 MG: 5 INJECTION, SOLUTION INTRAVENOUS at 10:45

## 2021-12-30 RX ADMIN — PANTOPRAZOLE SODIUM 40 MG: 40 INJECTION, POWDER, FOR SOLUTION INTRAVENOUS at 09:30

## 2021-12-30 RX ADMIN — PIPERACILLIN SODIUM AND TAZOBACTAM SODIUM 3.38 G: 3; .375 INJECTION, POWDER, LYOPHILIZED, FOR SOLUTION INTRAVENOUS at 14:16

## 2021-12-30 RX ADMIN — METOPROLOL TARTRATE 10 MG: 5 INJECTION INTRAVENOUS at 20:52

## 2021-12-30 RX ADMIN — PIPERACILLIN SODIUM AND TAZOBACTAM SODIUM 3.38 G: 3; .375 INJECTION, POWDER, LYOPHILIZED, FOR SOLUTION INTRAVENOUS at 07:42

## 2021-12-30 RX ADMIN — HYDRALAZINE HYDROCHLORIDE 25 MG: 25 TABLET, FILM COATED ORAL at 21:58

## 2021-12-30 RX ADMIN — LABETALOL HYDROCHLORIDE 20 MG: 5 INJECTION, SOLUTION INTRAVENOUS at 00:48

## 2021-12-30 ASSESSMENT — ACTIVITIES OF DAILY LIVING (ADL)
ADLS_ACUITY_SCORE: 31
ADLS_ACUITY_SCORE: 31
ADLS_ACUITY_SCORE: 33
ADLS_ACUITY_SCORE: 29
ADLS_ACUITY_SCORE: 31
ADLS_ACUITY_SCORE: 29
ADLS_ACUITY_SCORE: 31
ADLS_ACUITY_SCORE: 29
ADLS_ACUITY_SCORE: 29
ADLS_ACUITY_SCORE: 31
ADLS_ACUITY_SCORE: 29
ADLS_ACUITY_SCORE: 31
ADLS_ACUITY_SCORE: 29
ADLS_ACUITY_SCORE: 29
ADLS_ACUITY_SCORE: 31

## 2021-12-30 NOTE — PROGRESS NOTES
Pt here with ICH. Unable to assess neuros as pt is nonverbal and does not follow commands. Pt will open his eyes to touch. Neuros LUE withdraws, LLE no response to stimuli, L droop. Hypertensive, all other VSS. BP parameters <160, given labetalol x1 & hydralazine x1 with resolution. Tele NSR. NG-tube in place running @10mL/hr and 150mL flushes Q4. NPO. R PICC running 75mL/hr of NS. Up with A2/lift. External cath in place with adequate output. Denies pain. Pt scoring green on the Aggression Stop Light Tool. Discharge pending.     Pt had a moderate bloody stool. Paged MD and received a STAT hemoglobin draw. Hemoglobin came back 14.2, MD ordered to hold heparin injection.

## 2021-12-30 NOTE — PLAN OF CARE
Reason for Admission: Non-traumatic R frontal ICH    Cognitive/Mentation: A/O unable to assess due to patient being non-verbal  Neuros/CMS: L sided hemiplegia, L facial droop. Patient will open eyes occasionally but not commands. Inconsistent with commands. Moves R side freely, 4/5. Pupils reactive to light, equal.  VS: Stable ex HTN, scheduled meds and 1x labetalol given. Tele: NSR.  GI: BS +, + flatus, last BM yesterday. Incontinent.  : External catheter in place. Incontinent.  Pulmonary: LS diminished.  Pain: FLACC 0.     Drains: None  Skin: Scattered bruising. Small abrasion to forehead  Activity: Assist x 2 with lift.  Diet: NPO, tube feed running at 10 mL/hr with q4 150 mL flushes. Residuals drawn, 0 mL.     Aggression Stoplight Score: Green  Therapies recs: Pending  Discharge: Pending    End of shift summary: Patient tolerating tube feed well, NG still bridled. No nasal trumpet, respirations even and no longer snoring. On 5 L oxymask with saturations maintaining in 90s. Turned and repositioned every 2 hours. IV fluids running at 75 mL/hr. Potassium bump finished and recheck within range.

## 2021-12-30 NOTE — PROGRESS NOTES
[facility]    Stroke Progress Note    Interval EventsPatient continues to follow commands by showing two fingers and thumbs up, able to gesture yes/no/maybe with right hand. Spot EEG performed and no seizure activity noted. Stable CT head, no definite evidence for new or increasing intracranial hemorrhage. SBP overnight 150s-200s. Sodium 150 today.    HPI Summary  Sukhi Johnson is 71 year old male who presented [facility] ED on 12/24 after being found down with left sided weakness and difficulty speaking. He was found to have a large R frontoparietal ICH, ~60cc. Etiology is hypertensive vs cerebral amyloid angiopathy.        MRI/Head CT 12/24/21 CT Head: 6.1 x 4.4 cm parenchymal hemorrhage in right frontal parietal region with some mild surrounding edema and localized mass effect  12/25/21 MRI Brain:  Right frontoparietal acute intracranial hemorrhage with associated regional mass effect resulting in 2 mm leftward midline shift. Tiny chronic infarct within L PCA territory   12/29/21 CT Head: Stable, no definite evidence for new or increasing intracranial hemorrhage.   Intracranial Vasculature Negative CTA head   Cervical Vasculature No measurable stenosis or dissection in the vertebral arteries and carotid arteries       Echocardiogram EF >70%, normal atrium size, no atrial shunt seen, negative bubble study. Hyperdynamic left ventricular function    EKG/Telemetry Sinus tachycardia    Other Testing Not Applicable      LDL  12/25/2021: 109 mg/dL   A1C  12/25/2021: 5.4 %   Troponin No lab value available in past 48 hrs         Impression   Non-traumatic intracerebral hemorrhage of right frontoparietal region       Plan  - Neuro check and vital signs every 4 hours   - Systolic BP Goal: <160, Use IV PRNs to maintain BP goal. Ok to titrate enteral meds to achieve   - Goal sodium normonatremia: 135 or greater  - Head of bed elevated to 30 degrees  - PT/OT/SLP therapies as needed, if  "unable to tolerate speech evaluation, possible PEG placement needed for long term nutrition   - Bedside blood glucose monitoring   - Euglycemia, blood glucose <180  - Euthermia, please treat any fever aggressively   - Repeat brain MRI with and without contrast in 3 months outpatient   - Repeat head CT with any changes in neuro exam       Patient Follow-up    - final recommendation pending work-up    We will continue to follow.     KALI Padgett, CNP  Vascular Neurology  To page me or covering stroke neurology team member, click here: [facility]COM   Choose \"On Call\" tab at top, then search dropdown box for \"Neurology Adult\", select location, press Enter, then look for stroke/neuro ICU/telestroke.    ______________________________________________________    Clinically Significant Risk Factors Present on Admission                  Medications   Scheduled Meds    enalaprilat  2.5 mg Intravenous Q6H     [Held by provider] heparin ANTICOAGULANT  5,000 Units Subcutaneous Q12H     metoprolol  10 mg Intravenous Q6H     pantoprazole (PROTONIX) IV  40 mg Intravenous BID     piperacillin-tazobactam  3.375 g Intravenous Q6H     protein modular  1 packet Per Feeding Tube Daily     sodium chloride (PF)  10-40 mL Intracatheter Q7 Days     sodium chloride (PF)  3 mL Intracatheter Q8H       Infusion Meds    sodium chloride 50 mL/hr at 12/30/21 0834       PRN Meds  acetaminophen, acetaminophen, hydrALAZINE, labetalol, lidocaine 4%, lidocaine, lidocaine (buffered or not buffered), lidocaine (buffered or not buffered), melatonin, ondansetron **OR** ondansetron, sodium chloride (PF), sodium chloride (PF), sodium chloride (PF), sodium chloride (PF)       PHYSICAL EXAMINATION  Temp:  [98.5  F (36.9  C)-99.1  F (37.3  C)] 98.5  F (36.9  C)  Pulse:  [] 84  Resp:  [20] 20  BP: (144-202)/() 148/92  SpO2:  [95 %-98 %] 95 %      General Exam  General:  patient lying in bed without any acute distress    HEENT:  " normocephalic/atraumatic  Pulmonary:  no respiratory distress      Neuro Exam  Mental Status:  follows commands, nonverbal, able to gesture yes/no with right hand   Cranial Nerves:  PERRL, corneals reflex present  Motor:  follows commands with right arm and right leg, able to squeeze fingers with RUE strength 4/5  Sensory:  decreased sensation on left side of body  Coordination:  unable to perform  Station/Gait:  deferred      Imaging  I personally reviewed all imaging; relevant findings per HPI.     Lab Results Data   CBC  Recent Labs   Lab 12/30/21  0427 12/29/21  0624 12/28/21  1714 12/28/21  0844   WBC  --  8.9 10.7 11.7*   RBC  --  4.56 5.17 5.24   HGB 14.2 13.4 15.2 15.1   HCT  --  42.5 46.6 48.3    256 262 288     Basic Metabolic Panel    Recent Labs   Lab 12/30/21  1042 12/30/21  0428 12/29/21  1942 12/29/21  0624 12/28/21  2219 12/28/21  1143 12/28/21  0844   NA  --  150*  --  152*  --   --  147*   POTASSIUM 3.1* 3.3* 3.5 3.0*  --    < > 3.1*   CHLORIDE  --  120*  --  124*  --   --  118*   CO2  --  25  --  24  --   --  22   BUN  --  23  --  27  --   --  29   CR  --  0.67  --  0.68  --   --  0.64*   GLC  --  119*  --  107* 114*   < > 115*   MUSTAPHA  --  8.4*  --  8.2*  --   --  8.6    < > = values in this interval not displayed.     Liver Panel  Recent Labs   Lab 12/25/21  1116   PROTTOTAL 6.8   ALBUMIN 3.4   BILITOTAL 2.3*   ALKPHOS 65   AST 17   ALT 25     INR    Recent Labs   Lab Test 12/24/21  1542   INR 0.96      Lipid Profile    Recent Labs   Lab Test 12/25/21  1116   CHOL 193   HDL 61   *   TRIG 117     A1C    Recent Labs   Lab Test 12/25/21  1116   A1C 5.4     Troponin I  No results for input(s): TROPONIN, GHTROP in the last 168 hours.

## 2021-12-30 NOTE — PLAN OF CARE
Reason for Admission: Right-sided ICH non specified location    Cognitive/Mentation: A/Ox Unable to assess d/t pt being non-verbal  Neuros/CMS: Intact ex L sided hemiplegia, L facial droop. Patient will open eyes occasionally but not commands. Inconsistent with commands. Moves R side freely, 4/5. Pupils reactive to light, equal.  VS: Stable ex HTN, scheduled meds and 1x labetalol given. New scheduled BP medication started. Tele: NSR. Tele: NS  GI: BS Audible,  last BM 12/20/21 [place] Blood in stool, Protonix given. Incontinent.  : External Catheter. Incontinent.  Pulmonary: LS Diminished throughout.  Pain: Yes. Acetaminophen given.     Drains: NG tube running at 25ml / hr. Goal rate 50ml/ hour. With every 4 hour 150 ml flush.   Skin: Intact X bruisng abrasion on scalp  Activity: Assist x 2 with lift.  Diet: NPO Takes pills crushed via NG tube..     Therapies recs: SLP  Discharge: Pending.     End of shift summary: Potassium Replaced at 1215. Redraw at 415PM. Q8 Hgb checks. Current HGB 13.4

## 2021-12-30 NOTE — PROGRESS NOTES
[facility]  Hospitalist Progress Note        Guerrero Wetzel MD   12/30/2021        Interval History:        - had large bloody stool per nursing, stat Hb at 14, Heparin (DVT Prophylaxis) held ; will monitor clinically, check Hb q 8 hrs  -seems slightly more alert and awake this morning  - BP elevated in 170s; will add Hydralazine 25 mg TID per feeding tube  - sodium trending down---150; K 3.3; tolerating tube feedings at lower rate; will decrease NS to 50 ml/hr; continue free water flushes 150 ml q 4 hrs  - repeat head CT 12/29 noted stable; EEG 12/29 with no electrographic seizures         Assessment and Plan:        Sukhi Johnson is a 71 year old male with PMH of HTN, ADD admitted on 12/24/2021 with non traumatic ICH of R frontoparietal region. Patient was initially on nicardipine drip in ED and was boarding for nearly 18 hours waiting for ICU bed. Nicardipine drip was discontinued 12/25 and stroke service okayed admission to [facility].     Acute hemorrhagic stroke with likely cerebral edema, hypertensive vs amyloid angiopathy  Acute metabolic encephalopathy secondary to above  Previous history of hemorrhagic stroke in 2013  Untreated Essential Hypertension  - per family, he has been on amlodipine-benazepril in the past but apparently does not tolerate them and family doesnot want them to be resumed  - CT and MRI on admission noted with Right frontoparietal acute intracranial hemorrhage with associated regional mass effect resulting in 2 mm leftward midline shift; MRI also noted with numerous chronic microhemorrhages throughout the cerebral hemispheres, deep gray nuclei and posterior fossa, possibly representing sequelae of hypertensive or amyloid angiopathy  - evaluated by neurosurgery and recommended no surgical intervention, have signed off  - repeat head CT 12/26 noted with slight interval decrease in size of the large parenchymal hematoma, no definite evidence for new or increasing  hemorrhage; stable minimal leftward midline shift of the third ventricle  - repeat head CT 12/29 noted stable; EEG 12/29 with no electrographic seizures  - ECHO 12/26 with LVEF >70%, negative bubble study; A1c 5.4,   - neurology following, follow q 4 hrs neurochecks  - evaluated by SLP, at high silent aspiration risk; keep NPO  - initially on Nicardipine drip, now off; per neurology goal SBP<160  - blood pressure still in 170s; will add hydralazine 25 mg PO TID; continue enalaprilat 2.5 mg IV q 6 hrs; continue Metoprolol 10 mg IV q 6 hrs ; will switch to PO per feeding tube if tolerating tube feeds  - labetalol and hydralazine PRN for SBP>160  - PT/OT eval when able with improved mentation    FEN  At risk for aspiration  Acute hypoxic respiratory failure  Hypokalemia  Hypernatremia  - at high risk for silent aspiration; no significant improvement in mentation and unable to take PO; SLP following; will keep NPO  - NJ feeding tube placed on 12/28 and tube feeds started but overnight was in respiratory stress requiring 5 L OM; was evaluated by RRT, nasal trumpet placed and was deep suctioned  - tube feeds resumed 12/29 at lower rate; nutrition following, up titrate to goal as tolerated  - sodium up to 152, slowly trending down---150; K 3.3  - continue free water flushes 150 ml every 4 hrs ; supplement potassium per protocol; magnesium normal  - avoiding hypotonic IVF due to intracranial bleed  - monitor BMP  - CXR 12/26 UR and afebrile, however with hypoxia and leukocytosis an clinical concerns for aspiration; will continue empiric Zosyn and monitor clinically  - try to wean oxygen down  - fever and leukocytosis trended down; WBC 13.9--->8.9    Likely GI Bleed  - 12/29, per nursing had large bloody stool , stat Hb at 14, Heparin (DVT Prophylaxis) held ; will monitor clinically, check Hb q 8 hrs   - will start Protonix 40 mg IV BID  - will get GI evaluation if suggestion of active bleed     Chronic fatigue  "syndrome 2/2 lyme disease: hold PTA adderall, resume when taking PO     COVID PCR screen negative. No vaccination records available, tested negative on admission 12/24    Orders Placed This Encounter      NPO for Medical/Clinical Reasons Except for: No Exceptions    DVT Prophylaxis: started on Heparin s/c (12/27) per neurology-- held 12/29 due to concern for GI bleed; will have PCDs    Code Status:   Full code; confirmed with family discussion 12/27      Clinically Significant Risk Factors Present on Admission           Disposition Plan     Expected Discharge:  unclear at this time; likely >3-5 days pending clinical improvement    Care plan discussed with nursing and patient's wife Miladis over the phone      Clinically Significant Risk Factors Present on Admission                      Page Me (7 am to 6 pm)              Physical Exam:      Blood pressure (!) 158/81, pulse 99, temperature 99  F (37.2  C), temperature source Axillary, resp. rate 20, height 1.702 m (5' 7\"), weight 83.5 kg (184 lb), SpO2 95 %.  Vitals:    12/24/21 1558 12/27/21 0445   Weight: 83.9 kg (184 lb 15.5 oz) 83.5 kg (184 lb)     Vital Signs with Ranges  Temp:  [98.5  F (36.9  C)-99.1  F (37.3  C)] 99  F (37.2  C)  Pulse:  [] 99  Resp:  [20] 20  BP: (152-202)/() 158/81  SpO2:  [89 %-98 %] 95 %  I/O's Last 24 hours  I/O last 3 completed shifts:  In: 610 [NG/GT:600]  Out: -     Constitutional:  seems slightly more alert and awake but not engaging in communication; mostly non-verbal; moving right upper and lower extremities independently; no movement on left side   HEENT  pupils equal and reactive to light and accommodation ; some spontaneous eye opening ; nasal feeding tube in place    Oral cavity: Dry oral mucosa   Cardiovascular: Normal s1 s2, regular rate and rhythm , no murmur   Lungs: B/l clear to auscultation, no wheezes or crepitations   Abdomen: Soft, nt, nd, no guarding, rigidity or rebound; BS +   LE : No edema "   Musculoskeletal/ Neuro:  difficult to assess at this time; moving right upper and lower extremities independently; no movement on left side       Psychiatry: lethargic                 Medications:          enalaprilat  2.5 mg Intravenous Q6H     [Held by provider] heparin ANTICOAGULANT  5,000 Units Subcutaneous Q12H     metoprolol  10 mg Intravenous Q6H     piperacillin-tazobactam  3.375 g Intravenous Q6H     protein modular  1 packet Per Feeding Tube Daily     sodium chloride (PF)  10-40 mL Intracatheter Q7 Days     sodium chloride (PF)  3 mL Intracatheter Q8H     PRN Meds: acetaminophen, hydrALAZINE, labetalol, lidocaine 4%, lidocaine, lidocaine (buffered or not buffered), lidocaine (buffered or not buffered), melatonin, ondansetron **OR** ondansetron, sodium chloride (PF), sodium chloride (PF), sodium chloride (PF), sodium chloride (PF)         Data:      All new lab and imaging data was reviewed.   Recent Labs   Lab Test 12/30/21  0427 12/29/21  0624 12/28/21  1714 12/28/21  0844 12/25/21  1116 12/24/21  1542   WBC  --  8.9 10.7 11.7*   < > 10.4   HGB 14.2 13.4 15.2 15.1   < > 15.6   MCV  --  93 90 92   < > 89    256 262 288   < > 258   INR  --   --   --   --   --  0.96    < > = values in this interval not displayed.      Recent Labs   Lab Test 12/30/21  0428 12/29/21  1942 12/29/21  0624 12/28/21  2219 12/28/21  1143 12/28/21  0844   *  --  152*  --   --  147*   POTASSIUM 3.3* 3.5 3.0*  --    < > 3.1*   CHLORIDE 120*  --  124*  --   --  118*   CO2 25  --  24  --   --  22   BUN 23  --  27  --   --  29   CR 0.67  --  0.68  --   --  0.64*   ANIONGAP 5  --  4  --   --  7   MUSTAPHA 8.4*  --  8.2*  --   --  8.6   *  --  107* 114*   < > 115*    < > = values in this interval not displayed.     No lab results found.    Invalid input(s): TROP, TROPONINIES

## 2021-12-31 ENCOUNTER — APPOINTMENT (OUTPATIENT)
Dept: SPEECH THERAPY | Facility: CLINIC | Age: 71
DRG: 064 | End: 2021-12-31
Payer: COMMERCIAL

## 2021-12-31 LAB
ANION GAP SERPL CALCULATED.3IONS-SCNC: 4 MMOL/L (ref 3–14)
BUN SERPL-MCNC: 17 MG/DL (ref 7–30)
CALCIUM SERPL-MCNC: 6.9 MG/DL (ref 8.5–10.1)
CHLORIDE BLD-SCNC: 120 MMOL/L (ref 94–109)
CO2 SERPL-SCNC: 23 MMOL/L (ref 20–32)
CREAT SERPL-MCNC: 0.53 MG/DL (ref 0.66–1.25)
GFR SERPL CREATININE-BSD FRML MDRD: >90 ML/MIN/1.73M2
GLUCOSE BLD-MCNC: 105 MG/DL (ref 70–99)
GLUCOSE BLDC GLUCOMTR-MCNC: 117 MG/DL (ref 70–99)
GLUCOSE BLDC GLUCOMTR-MCNC: 92 MG/DL (ref 70–99)
HGB BLD-MCNC: 12.7 G/DL (ref 13.3–17.7)
HGB BLD-MCNC: 12.9 G/DL (ref 13.3–17.7)
HGB BLD-MCNC: 13.7 G/DL (ref 13.3–17.7)
POTASSIUM BLD-SCNC: 3 MMOL/L (ref 3.4–5.3)
POTASSIUM BLD-SCNC: 3.5 MMOL/L (ref 3.4–5.3)
SODIUM SERPL-SCNC: 147 MMOL/L (ref 133–144)

## 2021-12-31 PROCEDURE — 250N000013 HC RX MED GY IP 250 OP 250 PS 637: Performed by: HOSPITALIST

## 2021-12-31 PROCEDURE — 99233 SBSQ HOSP IP/OBS HIGH 50: CPT | Performed by: HOSPITALIST

## 2021-12-31 PROCEDURE — C9113 INJ PANTOPRAZOLE SODIUM, VIA: HCPCS | Performed by: HOSPITALIST

## 2021-12-31 PROCEDURE — 92507 TX SP LANG VOICE COMM INDIV: CPT | Mod: GN | Performed by: SPEECH-LANGUAGE PATHOLOGIST

## 2021-12-31 PROCEDURE — 120N000001 HC R&B MED SURG/OB

## 2021-12-31 PROCEDURE — 999N000190 HC STATISTIC VAT ROUNDS

## 2021-12-31 PROCEDURE — 80048 BASIC METABOLIC PNL TOTAL CA: CPT | Performed by: HOSPITALIST

## 2021-12-31 PROCEDURE — 250N000011 HC RX IP 250 OP 636: Performed by: HOSPITALIST

## 2021-12-31 PROCEDURE — 99233 SBSQ HOSP IP/OBS HIGH 50: CPT | Mod: GC | Performed by: STUDENT IN AN ORGANIZED HEALTH CARE EDUCATION/TRAINING PROGRAM

## 2021-12-31 PROCEDURE — 258N000003 HC RX IP 258 OP 636: Performed by: HOSPITALIST

## 2021-12-31 PROCEDURE — 85018 HEMOGLOBIN: CPT | Performed by: HOSPITALIST

## 2021-12-31 PROCEDURE — 250N000009 HC RX 250: Performed by: HOSPITALIST

## 2021-12-31 PROCEDURE — 92526 ORAL FUNCTION THERAPY: CPT | Mod: GN | Performed by: SPEECH-LANGUAGE PATHOLOGIST

## 2021-12-31 PROCEDURE — 84132 ASSAY OF SERUM POTASSIUM: CPT | Performed by: HOSPITALIST

## 2021-12-31 RX ORDER — POTASSIUM CHLORIDE 1.5 G/1.58G
20 POWDER, FOR SOLUTION ORAL ONCE
Status: COMPLETED | OUTPATIENT
Start: 2021-12-31 | End: 2021-12-31

## 2021-12-31 RX ORDER — HYDRALAZINE HYDROCHLORIDE 50 MG/1
50 TABLET, FILM COATED ORAL EVERY 8 HOURS SCHEDULED
Status: DISCONTINUED | OUTPATIENT
Start: 2021-12-31 | End: 2022-01-07

## 2021-12-31 RX ORDER — POTASSIUM CHLORIDE 1.5 G/1.58G
40 POWDER, FOR SOLUTION ORAL ONCE
Status: COMPLETED | OUTPATIENT
Start: 2021-12-31 | End: 2021-12-31

## 2021-12-31 RX ADMIN — ENALAPRILAT 2.5 MG: 1.25 INJECTION INTRAVENOUS at 20:13

## 2021-12-31 RX ADMIN — PIPERACILLIN SODIUM AND TAZOBACTAM SODIUM 3.38 G: 3; .375 INJECTION, POWDER, LYOPHILIZED, FOR SOLUTION INTRAVENOUS at 14:19

## 2021-12-31 RX ADMIN — ACETAMINOPHEN 650 MG: 325 SUSPENSION ORAL at 13:16

## 2021-12-31 RX ADMIN — ENALAPRILAT 2.5 MG: 1.25 INJECTION INTRAVENOUS at 02:34

## 2021-12-31 RX ADMIN — SODIUM CHLORIDE: 9 INJECTION, SOLUTION INTRAVENOUS at 16:16

## 2021-12-31 RX ADMIN — PIPERACILLIN SODIUM AND TAZOBACTAM SODIUM 3.38 G: 3; .375 INJECTION, POWDER, LYOPHILIZED, FOR SOLUTION INTRAVENOUS at 20:37

## 2021-12-31 RX ADMIN — ENALAPRILAT 2.5 MG: 1.25 INJECTION INTRAVENOUS at 14:21

## 2021-12-31 RX ADMIN — PIPERACILLIN SODIUM AND TAZOBACTAM SODIUM 3.38 G: 3; .375 INJECTION, POWDER, LYOPHILIZED, FOR SOLUTION INTRAVENOUS at 01:48

## 2021-12-31 RX ADMIN — PIPERACILLIN SODIUM AND TAZOBACTAM SODIUM 3.38 G: 3; .375 INJECTION, POWDER, LYOPHILIZED, FOR SOLUTION INTRAVENOUS at 08:47

## 2021-12-31 RX ADMIN — HYDRALAZINE HYDROCHLORIDE 50 MG: 50 TABLET, FILM COATED ORAL at 14:22

## 2021-12-31 RX ADMIN — POTASSIUM CHLORIDE 20 MEQ: 1.5 POWDER, FOR SOLUTION ORAL at 10:12

## 2021-12-31 RX ADMIN — POTASSIUM CHLORIDE 40 MEQ: 1.5 POWDER, FOR SOLUTION ORAL at 08:38

## 2021-12-31 RX ADMIN — PANTOPRAZOLE SODIUM 40 MG: 40 INJECTION, POWDER, FOR SOLUTION INTRAVENOUS at 08:55

## 2021-12-31 RX ADMIN — ENALAPRILAT 2.5 MG: 1.25 INJECTION INTRAVENOUS at 08:44

## 2021-12-31 RX ADMIN — METOPROLOL TARTRATE 10 MG: 5 INJECTION INTRAVENOUS at 14:21

## 2021-12-31 RX ADMIN — METOPROLOL TARTRATE 10 MG: 5 INJECTION INTRAVENOUS at 08:34

## 2021-12-31 RX ADMIN — METOPROLOL TARTRATE 10 MG: 5 INJECTION INTRAVENOUS at 01:39

## 2021-12-31 RX ADMIN — METOPROLOL TARTRATE 10 MG: 5 INJECTION INTRAVENOUS at 20:30

## 2021-12-31 RX ADMIN — HYDRALAZINE HYDROCHLORIDE 25 MG: 25 TABLET, FILM COATED ORAL at 05:54

## 2021-12-31 RX ADMIN — PANTOPRAZOLE SODIUM 40 MG: 40 INJECTION, POWDER, FOR SOLUTION INTRAVENOUS at 20:15

## 2021-12-31 RX ADMIN — HYDRALAZINE HYDROCHLORIDE 50 MG: 50 TABLET, FILM COATED ORAL at 22:58

## 2021-12-31 ASSESSMENT — ACTIVITIES OF DAILY LIVING (ADL)
ADLS_ACUITY_SCORE: 31
ADLS_ACUITY_SCORE: 33
ADLS_ACUITY_SCORE: 37
ADLS_ACUITY_SCORE: 33
ADLS_ACUITY_SCORE: 33
ADLS_ACUITY_SCORE: 37
ADLS_ACUITY_SCORE: 33
ADLS_ACUITY_SCORE: 37
ADLS_ACUITY_SCORE: 33
ADLS_ACUITY_SCORE: 31
ADLS_ACUITY_SCORE: 33
ADLS_ACUITY_SCORE: 37
ADLS_ACUITY_SCORE: 33
ADLS_ACUITY_SCORE: 31
ADLS_ACUITY_SCORE: 33
ADLS_ACUITY_SCORE: 31
ADLS_ACUITY_SCORE: 33

## 2021-12-31 NOTE — CONSULTS
[facility]    Gastroenterology Consultation    Date of Admission:  12/24/2021    History of Present Illness   Sukhi Johnson is a 71 year old male who presents with confusion found to have intracranial hemorrhage of right frontal parietal lobe with midline shift.  Patient was admitted on December 24 due to confusion and found to intracranial hemorrhage and during the hospital stay he developed episode rectal bleeding overnight hence GI consult.    When seen, patient is complete lethargic and only responding to painful stimuli with facial expression changes.  Discussed with nursing aide at the bedside which patient still has small amount of rectal bleeding but only smear amount.  On chart review with Care Everywhere, I was not able to find any prior endoscopy or colonoscopy.    Patient's midline shift seems to be doing better and sodium slowly corrected to 147 right now.  Hemoglobin dropped from 14-12.7 at this time.    Assessment & Plan   Sukhi Johnson is a 71 year old male who was admitted on 12/24/2021. I was asked to see the patient for hematochezia.  -Currently vital stable, hemoglobin dropped from 14-12.7, at this time will recommend neurology clearance due to intracranial hemorrhage with midline shift that is improving in case of colonoscopy  -Bleeding likely in lower GI tract given stable vitals with bright red blood, timing of colonoscopy depends on overall patient status and neurology clearance  -Can still start PPI without loading  -Serial hemoglobin and transfuse if hemoglobin less than 7  -Can resume tube feeding/clear liquid diet given no plan for procedure today  -Can start MiraLAX daily in tube feeding  -Correct hypernatremia, and need to monitor very closely if bowel prep started given most of the bowel prep is hypotonic fluid  -GI will continue to follow    [address] level 2 consult      Active Problems:    Right-sided nontraumatic intracerebral hemorrhage,  unspecified cerebral location (H)      Caridad Zaidi MD  (Chris)  [facility]  Office: [phone]  Cell: [phone], please feel free to call the cell    Code Status    Full Code      Primary Care Physician   Physician No Ref-Primary      Caridad Zaidi MD  (Chris)  [facility]  Office: [phone]  Cell: [phone], please feel free to call the cell      Past Medical History   I have reviewed this patient's medical history and updated it with pertinent information if needed.   No past medical history on file.    Past Surgical History   I have reviewed this patient's surgical history and updated it with pertinent information if needed.  No past surgical history on file.    Prior to Admission Medications   Prior to Admission Medications   Prescriptions Last Dose Informant Patient Reported? Taking?   Cranberry-Vitamin C (AZO CRANBERRY URINARY TRACT PO) Past Week at Unknown time Spouse/Significant Other Yes Yes   Sig: Take 1 tablet by mouth 2 times daily   UNABLE TO FIND prn Spouse/Significant Other Yes Yes   Sig: MEDICATION NAME: Systemic enzyme product capsule - uses as needed when feeling ill (up to 5 capsules every hour).   UNABLE TO FIND Past Month at Unknown time Spouse/Significant Other Yes Yes   Sig: MEDICATION NAME: Ziquin Life Mind Body Tonic; typically uses 1 ounce per day. Takes irregularly.   UNABLE TO FIND Past Month at Unknown time Spouse/Significant Other Yes Yes   Sig: MEDICATION NAME: Ziquin Life Sparkle Minerals. Mixes with Mind Body Tonic. Takes irregularly.   UNABLE TO FIND prn Spouse/Significant Other Yes Yes   Sig: MEDICATION NAME: Ziquin Life EFA Complete (Essential Fatty Acids Omega 3 + Omega 6). Takes as needed.   UNABLE TO FIND 12/23/2021 at Unknown time Spouse/Significant Other Yes Yes   Sig: MEDICATION NAME: Ziquin Life Revive Digestive Support. Takes as needed.   UNKNOWN TO PATIENT 12/23/2021 at pm Spouse/Significant Other Yes  Yes   Sig: Lutein eye supplements. Patient takes 2 different eye vitamins - takes 1 of each supplement with dinner.   UNKNOWN TO PATIENT 12/23/2021 at pm Spouse/Significant Other Yes Yes   Sig: Eye supplement. Patient takes 2 different eye vitamins - takes 1 of each supplement with dinner.   amphetamine-dextroamphetamine (ADDERALL) 20 MG tablet 12/23/2021 at am Spouse/Significant Other Yes Yes   Sig: Take 20 mg by mouth every morning Rx written for 20 mg three times daily, but patient takes 20 mg in the morning and 20 mg in the afternoon as needed.   amphetamine-dextroamphetamine (ADDERALL) 20 MG tablet prn Spouse/Significant Other Yes Yes   Sig: Take 20 mg by mouth daily as needed Rx written for 20 mg three times daily, but patient takes 20 mg in the morning and 20 mg in the afternoon as needed.   traZODone (DESYREL) 50 MG tablet More than a month at Unknown time  Yes Yes   Sig: Take 50 mg by mouth nightly as needed for depression      Facility-Administered Medications: None     Allergies   No Known Allergies    Social History   I have reviewed this patient's social history and updated it with pertinent information if needed. Sukhi Johnson      Family History   I have reviewed this patient's family history and updated it with pertinent information if needed.   No family history on file.    Review of Systems   The 10 point Review of Systems is negative other than noted in the HPI or here.     Physical Exam   Temp: 98.5  F (36.9  C) Temp src: Axillary BP: (!) 172/95 Pulse: (!) 126   Resp: 20 SpO2: 93 % O2 Device: None (Room air) Oxygen Delivery: 3 LPM  Vital Signs with Ranges  Temp:  [98.4  F (36.9  C)-98.9  F (37.2  C)] 98.5  F (36.9  C)  Pulse:  [] 126  Resp:  [20-24] 20  BP: (147-174)/() 172/95  SpO2:  [93 %-98 %] 93 %  184 lbs 0 oz    Exam:  Constitutional: mild distress and uncooperative  Head: Normocephalic. No masses, lesions, tenderness or abnormalities  Neck: Neck supple. No adenopathy.  Thyroid symmetric, normal size,, Carotids without bruits.  ENT: ENT exam normal, no neck nodes or sinus tenderness  Cardiovascular: negative, PMI normal. No lifts, heaves, or thrills. RRR. No murmurs, clicks gallops or rub  Respiratory: negative, Percussion normal. Good diaphragmatic excursion. Lungs clear  Gastrointestinal: Abdomen soft, non-tender. BS normal. No masses, organomegaly  : Deferred  Musculoskeletal: extremities normal- no gross deformities noted, gait normal and normal muscle tone  Skin: no suspicious lesions or rashes  Neurologic: Obtunded, barely respond to painful stimuli  Psychiatric: No mentation and not alert  Hematologic/Lymphatic/Immunologic: Normal cervical lymph nodes     Data   Results for orders placed or performed during the hospital encounter of 12/24/21 (from the past 24 hour(s))   Hemoglobin   Result Value Ref Range    Hemoglobin 13.4 13.3 - 17.7 g/dL   Potassium   Result Value Ref Range    Potassium 3.6 3.4 - 5.3 mmol/L   Glucose by meter   Result Value Ref Range    GLUCOSE BY METER POCT 117 (H) 70 - 99 mg/dL   Gastroenterology IP Consult: hematochezia; Consultant may enter orders: Yes; Patient to be seen: Routine - within 24 hours; Requested Clinic/Group: Dr. Rivera; Requesting provider? Hospitalist (if different from attending physician); Name: HIMA Santos    Narrative    Redd Rivera MD     12/30/2021  9:27 PM  Lower GI bleed.  Recent ICH, elevated Na  HTN  Hb Stable , check Hb q6 hr  Repeat labs in AM.    Redd Rivera MD [facility]  [facility]       Hemoglobin   Result Value Ref Range    Hemoglobin 14.0 13.3 - 17.7 g/dL   ABO/Rh type and screen    Narrative    The following orders were created for panel order ABO/Rh type and screen.  Procedure                               Abnormality         Status                     ---------                               -----------         ------                     Adult Type and Screen[[id number]]                            Final  result                 Please view results for these tests on the individual orders.   INR   Result Value Ref Range    INR 1.06 0.85 - 1.15   Partial thromboplastin time   Result Value Ref Range    aPTT 26 22 - 38 Seconds   Fibrinogen activity   Result Value Ref Range    Fibrinogen Activity 572 (H) 170 - 490 mg/dL   Adult Type and Screen   Result Value Ref Range    ABO/RH(D) B POS     Antibody Screen Negative Negative    SPECIMEN EXPIRATION DATE [id number]    Hemoglobin   Result Value Ref Range    Hemoglobin 12.7 (L) 13.3 - 17.7 g/dL   Basic metabolic panel   Result Value Ref Range    Sodium 147 (H) 133 - 144 mmol/L    Potassium 3.0 (L) 3.4 - 5.3 mmol/L    Chloride 120 (H) 94 - 109 mmol/L    Carbon Dioxide (CO2) 23 20 - 32 mmol/L    Anion Gap 4 3 - 14 mmol/L    Urea Nitrogen 17 7 - 30 mg/dL    Creatinine 0.53 (L) 0.66 - 1.25 mg/dL    Calcium 6.9 (L) 8.5 - 10.1 mg/dL    Glucose 105 (H) 70 - 99 mg/dL    GFR Estimate >90 >60 mL/min/1.73m2

## 2021-12-31 NOTE — PLAN OF CARE
Pt here with R frontal ICH. Unable to fully assess neuro and LOC, pt is lethargic and mute. Responsivie to voice and gentle touch. Neuros are left sided weakness but left extremities withdraws, left droop. VSS. Tele sinus tach. NG-tube in placed but clamped. NPO. R PICC in placed, patent and intact. Up with A2/lift and T&Repo q2h.. External cath in placed with adequate output. External cath in placed with adequate. Denies pain. Pt scoring green on the Aggression Stop Light Tool. Plan is for GI scope this AM. Discharge pending.

## 2021-12-31 NOTE — PLAN OF CARE
Reason for Admission: R frontal ICH    Cognitive/Mentation: A/Ox ALLYSON  Neuros/CMS: Intact ex L hemiparesis, L facial droop, inconsistently following commands  VS: BP within parameters.   Tele: Sinus Tach.  GI: BS active x4, passing flatus, last BM 12/31/21. InContinent. Pt had small bloody smear x1  : Voiding adequeate amounts. inContinent.  Pulmonary: LS diminished throughout.  Pain: given tylenol x1 for restlessness, wife stated pt reported pain.     Drains: NG, TF restarted at 25ml/h at 1445 and q 4h 150ml flushes  Skin: abrasion to left knee  Activity: Assist x 2 with lift.  Diet: NPO.     Therapies recs: pending  Discharge: pending further workup    End of shift summary: 1 bloody stool noted this shift. K replaced, waiting results of recheck.

## 2021-12-31 NOTE — PLAN OF CARE
Reason for Admission: R frontal ICH    Cognitive/Mentation: A/Ox unable to assess due to non-verbal status  Neuros/CMS: Opening eyes more this shift but not tracking or following commands. Moves right sided extremities freely 4/5. Left sided hemiplegia, withdraws. Pupils brisk and reactive. Left facial droop. Inconsistent with commands  VS: Stable ex HTN, treating with scheduled and PRNs. Tele: NSR.  GI: BS +, + flatus, last BM today. Incontinent.  : External cath. Incontinent.  Pulmonary: LS diminished.  Pain: rFLACC 0     Drains: None  Skin: Abrasion to forehead, PICC on right arm  Activity: Assist x 2 with lift.  Diet: NPO, tube feeds running. Stop tube feed at midnight for GI scope tomorrow.     Aggression Stoplight Score: Green  Therapies recs: Pending  Discharge: Pending    End of shift summary: Patient had another moderate sizes stool with ynes blood present, overnight hospitalist made aware. GI consult added along with type and screen and other labs. Hgb back at 14.0 at 2200 recheck. GI anticipating on scope tomorrow, order placed to hold tube feed at midnight. Turned and repositioned every 2 hours.

## 2021-12-31 NOTE — CONSULTS
Lower GI bleed.  Recent ICH, elevated Na  HTN  Hb Stable , check Hb q6 hr  Repeat labs in AM.    Redd Rivera MD FACP  [facility]

## 2021-12-31 NOTE — PROVIDER NOTIFICATION
Date paged: 12/30    Time paged: 2007    Person paged: Ron YAN    Paging system used: [employer]    Message: [phone] RK: FYI: patient had a moderate sized stool with ynes blood present. Last Hgb 13.4, previous Hgb before that was  14.2. Recheck at 2200 and protonix due at 2100. Any changes to orders? Thanks, Lety JAIN *[phone]    Update: INR, PTT, fibrinogen, and type-screen ordered by Ron. GI consult added. MD paged with lab results.

## 2021-12-31 NOTE — PROGRESS NOTES
CLINICAL NUTRITION SERVICES - REASSESSMENT NOTE      Future/Additional Recommendations:  Continue advancing TF towards goal per orders    Malnutrition:   % Weight Loss:  None noted  % Intake:  </= 50% for >/= 5 days (severe malnutrition)  Subcutaneous Fat Loss:  None observed  Muscle Loss:  None observed  Fluid Retention:  None noted    Malnutrition Diagnosis: Patient does not meet two of the above criteria necessary for diagnosing malnutrition, but is at risk for malnutrition        EVALUATION OF PROGRESS TOWARD GOALS   Diet:  NPO (per SLP recommendations)    Nutrition Support:    - Pt had FT placed (NDT) and started TF 12/27. Orders written to begin TF at 20 ml/hr and advance by 15 ml q 24 hours to goal rate of 50 ml/hr.   - TF was stopped on 12/28 afternoon due to concern for vomiting part of TF, RRT was called.  - FT remained clamped until 12/29. TF restarted at trickle TF rate. Advanced to 25 ml/hr on 12/30.   - TF off at midnight last night due to plan for scope per GI today. Scope deferred until neurology clearance due to intracranial hemorrhage. OK to resume TF today per GI.       Nutrition Support Enteral:  Type of Feeding Tube: NDT (12/27)  Enteral Frequency:  Continuous  Enteral Regimen: Jevity 1.5 at goal rate 50 ml/hr   Total Enteral Provisions: 1800 kcal, 77 gm pro, 25 gm fiber, 912 mL free water   Free Water Flush: 150 ml q 4 hours   Total Fluid (TF + FWF) = 1812 ml/day (26 kcal/kg)    +1 packet Prosource/day = 40 kcal, 11 g protein  TOTAL (TF + Prosource) = 1840 kcal (26 kcal/kg) and 88 g protein (1.2 g/kg)      Intake/Tolerance:  TF at 25 ml/hr + 1 packet Prosource/day currently providing 50% of assessed nutrition needs.       ASSESSED NUTRITION NEEDS:  Dosing Weight 71 kg  Estimated Energy Needs: 9836-4974 kcals (25-30 Kcal/Kg)  Justification: overweight  Estimated Protein Needs:  grams protein (1.2-1.5 g pro/Kg)  Justification: preservation of lean body mass  Estimated Fluid Needs: per  MD      NEW FINDINGS:   - Labs: Reviewed. Na 147 (H), K+ 3 (L), BGM   - Meds: Reviewed   - GI: BM x 2 yesterday, x 1 so far today. Bloody stools noted. Colonoscopy deferred until neurology clearance.   - Weight: No new wt since 12/27. Stable at that time.     Previous Goals:   EN to meet % est needs while NPO  Evaluation: Not met    Previous Nutrition Diagnosis:   Inadequate protein-energy intake related to NPO status with dysphagia as evidenced by pt not meeting estimated needs  Evaluation: No change, updated below       CURRENT NUTRITION DIAGNOSIS  Inadequate enteral nutrition infusion related to TF held with emesis and for potential procedure, still advancing towards goal as evidenced by currently meeting 50% nutrition needs     INTERVENTIONS  Recommendations / Nutrition Prescription  Continue advancing TF towards goal per orders     Implementation  No new nutrition interventions warranted at this time     Goals  EN to meet % estimated needs in the next 24-48 hours      MONITORING AND EVALUATION:  Progress towards goals will be monitored and evaluated per protocol and Practice Guidelines      Yuni Arreaga RD, LD

## 2021-12-31 NOTE — PROGRESS NOTES
[facility]    Stroke Progress Note    Interval EventsPatient is more lethargic on exam today, intermittently following commands. SBPs 140s-170s overnight.  Sodium today = 147. Dr. Hein updated wife at bedside.     HPI Summary  Sukhi Johnson is 71 year old male who presented to [facility] ED on 12/24 after being found down with left sided weakness and difficulty speaking. He was found to have a large R frontoparietal ICH, ~60cc. Etiology is hypertensive vs cerebral amyloid angiopathy.        MRI/Head CT 12/24/21 CT Head: 6.1 x 4.4 cm parenchymal hemorrhage in right frontal parietal region with some mild surrounding edema and localized mass effect  12/25/21 MRI Brain:  Right frontoparietal acute intracranial hemorrhage with associated regional mass effect resulting in 2 mm leftward midline shift. Tiny chronic infarct within L PCA territory   12/29/21 CT Head: Stable, no definite evidence for new or increasing intracranial hemorrhage.   Intracranial Vasculature Negative CTA head   Cervical Vasculature No measurable stenosis or dissection in the vertebral arteries and carotid arteries       Echocardiogram EF >70%, normal atrium size, no atrial shunt seen, negative bubble study. Hyperdynamic left ventricular function    EKG/Telemetry Sinus tachycardia    Other Testing Not Applicable      LDL  12/25/2021: 109 mg/dL   A1C  12/25/2021: 5.4 %   Troponin No lab value available in past 48 hrs         Impression   Non-traumatic intracerebral hemorrhage of right frontoparietal region         Plan  - Neuro check and vital signs every 4 hours   - Systolic BP Goal: <160, Use IV PRNs to maintain BP goal. Ok to titrate enteral meds to achieve   - Goal sodium normonatremia: 135 or greater  - Head of bed elevated to 30 degrees  - PT/OT/SLP therapies as needed, if unable to tolerate speech evaluation, possible PEG placement needed for long term nutrition   - Bedside blood glucose monitoring   - Euglycemia,  "blood glucose <180  - Euthermia, please treat any fever aggressively   - Repeat brain MRI with and without contrast in 3 months outpatient   - Repeat head CT with any changes in neuro exam        Patient Follow-up    - final recommendation pending work-up    We will continue to follow.     KALI Padgett, CNP  Vascular Neurology  To page me or covering stroke neurology team member, click here: AMCOM   Choose \"On Call\" tab at top, then search dropdown box for \"Neurology Adult\", select location, press Enter, then look for stroke/neuro ICU/telestroke.    ______________________________________________________    Clinically Significant Risk Factors Present on Admission                  Medications   Scheduled Meds    enalaprilat  2.5 mg Intravenous Q6H     [Held by provider] heparin ANTICOAGULANT  5,000 Units Subcutaneous Q12H     hydrALAZINE  50 mg Per Feeding Tube Q8H TONY     metoprolol  10 mg Intravenous Q6H     pantoprazole (PROTONIX) IV  40 mg Intravenous BID     piperacillin-tazobactam  3.375 g Intravenous Q6H     potassium chloride  20 mEq Oral or Feeding Tube Once     potassium chloride  40 mEq Oral or Feeding Tube Once     protein modular  1 packet Per Feeding Tube Daily     sodium chloride (PF)  10-40 mL Intracatheter Q7 Days     sodium chloride (PF)  3 mL Intracatheter Q8H       Infusion Meds    sodium chloride 50 mL/hr at 12/30/21 1631       PRN Meds  acetaminophen, acetaminophen, hydrALAZINE, labetalol, lidocaine 4%, lidocaine, lidocaine (buffered or not buffered), lidocaine (buffered or not buffered), melatonin, ondansetron **OR** ondansetron, sodium chloride (PF), sodium chloride (PF), sodium chloride (PF), sodium chloride (PF)       PHYSICAL EXAMINATION  Temp:  [98.4  F (36.9  C)-98.9  F (37.2  C)] 98.5  F (36.9  C)  Pulse:  [] 126  Resp:  [20-24] 20  BP: (144-174)/() 172/95  SpO2:  [93 %-98 %] 93 %      General Exam  General:  patient lying in bed without any acute distress    HEENT:  " normocephalic/atraumatic  Pulmonary:  no respiratory distress    Neuro Exam  Mental Status:  follows commands, nonverbal  Cranial Nerves:  PERRL, corneals reflex present  Motor:  follows commands with right arm and right leg, able to squeeze fingers with RUE strength 4/5  Sensory:  decreased sensation on left side of body  Coordination:  unable to perform  Station/Gait:  deferred    Imaging  I personally reviewed all imaging; relevant findings per HPI.     Lab Results Data   CBC  Recent Labs   Lab 12/31/21  0609 12/30/21  2022 12/30/21  1414 12/30/21  0427 12/29/21  0624 12/28/21  1714 12/28/21  0844   WBC  --   --   --   --  8.9 10.7 11.7*   RBC  --   --   --   --  4.56 5.17 5.24   HGB 12.7* 14.0 13.4 14.2 13.4 15.2 15.1   HCT  --   --   --   --  42.5 46.6 48.3   PLT  --   --   --  271 256 262 288     Basic Metabolic Panel    Recent Labs   Lab 12/31/21  0609 12/30/21  1707 12/30/21  1625 12/30/21  1042 12/30/21  0428 12/29/21  1942 12/29/21  0624   *  --   --   --  150*  --  152*   POTASSIUM 3.0*  --  3.6 3.1* 3.3*   < > 3.0*   CHLORIDE 120*  --   --   --  120*  --  124*   CO2 23  --   --   --  25  --  24   BUN 17  --   --   --  23  --  27   CR 0.53*  --   --   --  0.67  --  0.68   * 117*  --   --  119*  --  107*   MUSTAPHA 6.9*  --   --   --  8.4*  --  8.2*    < > = values in this interval not displayed.     Liver Panel  Recent Labs   Lab 12/25/21  1116   PROTTOTAL 6.8   ALBUMIN 3.4   BILITOTAL 2.3*   ALKPHOS 65   AST 17   ALT 25     INR    Recent Labs   Lab Test 12/30/21  2036 12/24/21  1542   INR 1.06 0.96      Lipid Profile    Recent Labs   Lab Test 12/25/21  1116   CHOL 193   HDL 61   *   TRIG 117     A1C    Recent Labs   Lab Test 12/25/21  1116   A1C 5.4     Troponin I  No results for input(s): TROPONIN, GHTROP in the last 168 hours.

## 2021-12-31 NOTE — PROGRESS NOTES
[facility]  Hospitalist Progress Note        Guerrero Wetzel MD   12/31/2021        Interval History:        - had another episode of bloody stool ; Hb 14--->12.7; evaluated by GI and planned for endoscopic evaluation; tube feeds held; will monitor Hb q 8 hrs and transfuse prn for Hb <7  - BP still in high 150s; will increase Hydralazine to 50 mg PO TID  - K 3.0, Na improving--- 147         Assessment and Plan:        Sukhi Johnson is a 71 year old male with PMH of HTN, ADD admitted on 12/24/2021 with non traumatic ICH of R frontoparietal region. Patient was initially on nicardipine drip in ED and was boarding for nearly 18 hours waiting for ICU bed. Nicardipine drip was discontinued 12/25 and stroke service okayed admission to [facility].     Acute hemorrhagic stroke with likely cerebral edema, hypertensive vs amyloid angiopathy  Acute metabolic encephalopathy secondary to above  Previous history of hemorrhagic stroke in 2013  Untreated Essential Hypertension  - per family, he has been on amlodipine-benazepril in the past but apparently does not tolerate them and family doesnot want them to be resumed (was not on any BP meds PTA  - CT and MRI on admission noted with Right frontoparietal acute intracranial hemorrhage with associated regional mass effect resulting in 2 mm leftward midline shift; MRI also noted with numerous chronic microhemorrhages throughout the cerebral hemispheres, deep gray nuclei and posterior fossa, possibly representing sequelae of hypertensive or amyloid angiopathy  - evaluated by neurosurgery and recommended no surgical intervention, have signed off  - repeat head CT 12/26 noted with slight interval decrease in size of the large parenchymal hematoma, no definite evidence for new or increasing hemorrhage; stable minimal leftward midline shift of the third ventricle  - repeat head CT 12/29 noted stable; EEG 12/29 with no electrographic seizures  - ECHO 12/26 with LVEF  >70%, negative bubble study; A1c 5.4,   - neurology following, follow q 4 hrs neurochecks  - evaluated by SLP, at high silent aspiration risk; keep NPO  - initially on Nicardipine drip, now off; per neurology goal SBP<160  - blood pressure still in in high 150s; will increase Hydralazine to 50 mg PO TID; continue enalaprilat 2.5 mg IV q 6 hrs; continue Metoprolol 10 mg IV q 6 hrs ; will switch to PO per feeding tube if tolerating tube feeds  - labetalol and hydralazine PRN for SBP>160  - PT/OT eval when able with improved mentation    FEN  At risk for aspiration  Acute hypoxic respiratory failure  Hypokalemia  Hypernatremia  - at high risk for silent aspiration; no significant improvement in mentation and unable to take PO; SLP following; will keep NPO  - NJ feeding tube placed on 12/28 and tube feeds started but overnight was in respiratory stress requiring 5 L OM; was evaluated by RRT, nasal trumpet placed and was deep suctioned  - tube feeds resumed 12/29 at lower rate; nutrition following, up titrate to goal as tolerated; might need a PEG tube placement  - sodium up to 152, slowly trending down---150---147; K 3.3---3.0  - continue free water flushes 150 ml every 4 hrs ; supplement potassium per protocol; magnesium normal  - avoiding hypotonic IVF due to intracranial bleed  - monitor BMP  - CXR 12/26 UR and afebrile, however with hypoxia and leukocytosis an clinical concerns for aspiration; will continue empiric Zosyn and monitor clinically  - try to wean oxygen down, currently on 3 lts  - fever and leukocytosis trended down; WBC 13.9--->8.9    Likely GI Bleed  - 12/29, per nursing had large bloody stool; serial Hb trended down 14---12.7  - Heparin (DVT Prophylaxis) held  - evaluated by GI and planned for endoscopic evaluation; tube feeds held; will monitor Hb q 8 hrs and transfuse prn for Hb <7  - continue Protonix 40 mg IV BID    Addendum  - GI holding off on colonoscopy for now given stable vitals, Hb;  "cleared by neurology for the procedure if indicated     Chronic fatigue syndrome 2/2 lyme disease: hold PTA adderall, resume when taking PO     COVID PCR screen negative. No vaccination records available, tested negative on admission 12/24    Orders Placed This Encounter      NPO for Medical/Clinical Reasons Except for: No Exceptions    DVT Prophylaxis: started on Heparin s/c (12/27) per neurology-- held 12/29 due to concern for GI bleed; continue PCDs    Code Status:   Full code; confirmed with family discussion 12/27      Clinically Significant Risk Factors Present on Admission           Disposition Plan     Expected Discharge:  unclear at this time; likely >3-5 days pending clinical improvement    Care plan discussed with nursing, neurology, GI and patient's wife Andressa     Clinically Significant Risk Factors Present on Admission                      Page Me (7 am to 6 pm)              Physical Exam:      Blood pressure (!) 159/95, pulse 90, temperature 98.5  F (36.9  C), temperature source Axillary, resp. rate 20, height 1.702 m (5' 7\"), weight 83.5 kg (184 lb), SpO2 95 %.  Vitals:    12/24/21 1558 12/27/21 0445   Weight: 83.9 kg (184 lb 15.5 oz) 83.5 kg (184 lb)     Vital Signs with Ranges  Temp:  [98.4  F (36.9  C)-98.9  F (37.2  C)] 98.5  F (36.9  C)  Pulse:  [] 90  Resp:  [20-24] 20  BP: (144-174)/() 159/95  SpO2:  [94 %-98 %] 95 %  I/O's Last 24 hours  I/O last 3 completed shifts:  In: 600 [NG/GT:600]  Out: 950 [Urine:950]    Constitutional:  somnolent; not engaging in communication; mostly non-verbal; moving right upper and lower extremities independently; no movement on left side; appears comfortable   HEENT  pupils equal and reactive to light and accommodation ; some spontaneous eye opening ; nasal feeding tube in place    Oral cavity: Dry oral mucosa   Cardiovascular: Normal s1 s2, regular rate and rhythm , no murmur   Lungs: B/l clear to auscultation, no wheezes or crepitations   Abdomen: " Soft, nt, nd, no guarding, rigidity or rebound; BS +   LE : No edema   Musculoskeletal/ Neuro:  difficult to assess at this time; moving right upper and lower extremities independently; no movement on left side       Psychiatry: lethargic                 Medications:          enalaprilat  2.5 mg Intravenous Q6H     [Held by provider] heparin ANTICOAGULANT  5,000 Units Subcutaneous Q12H     hydrALAZINE  50 mg Per Feeding Tube Q8H TONY     metoprolol  10 mg Intravenous Q6H     pantoprazole (PROTONIX) IV  40 mg Intravenous BID     piperacillin-tazobactam  3.375 g Intravenous Q6H     potassium chloride  20 mEq Oral or Feeding Tube Once     potassium chloride  40 mEq Oral or Feeding Tube Once     protein modular  1 packet Per Feeding Tube Daily     sodium chloride (PF)  10-40 mL Intracatheter Q7 Days     sodium chloride (PF)  3 mL Intracatheter Q8H     PRN Meds: acetaminophen, acetaminophen, hydrALAZINE, labetalol, lidocaine 4%, lidocaine, lidocaine (buffered or not buffered), lidocaine (buffered or not buffered), melatonin, ondansetron **OR** ondansetron, sodium chloride (PF), sodium chloride (PF), sodium chloride (PF), sodium chloride (PF)         Data:      All new lab and imaging data was reviewed.   Recent Labs   Lab Test 12/31/21  0609 12/30/21  2036 12/30/21  2022 12/30/21  1414 12/30/21  0427 12/29/21  0624 12/28/21  1714 12/28/21  0844 12/25/21  1116 12/24/21  1542   WBC  --   --   --   --   --  8.9 10.7 11.7*   < > 10.4   HGB 12.7*  --  14.0 13.4 14.2 13.4 15.2 15.1   < > 15.6   MCV  --   --   --   --   --  93 90 92   < > 89   PLT  --   --   --   --  271 256 262 288   < > 258   INR  --  1.06  --   --   --   --   --   --   --  0.96    < > = values in this interval not displayed.      Recent Labs   Lab Test 12/31/21  0609 12/30/21  1707 12/30/21  1625 12/30/21  1042 12/30/21  0428 12/29/21  1942 12/29/21  0624   *  --   --   --  150*  --  152*   POTASSIUM 3.0*  --  3.6 3.1* 3.3*   < > 3.0*   CHLORIDE 120*   --   --   --  120*  --  124*   CO2 23  --   --   --  25  --  24   BUN 17  --   --   --  23  --  27   CR 0.53*  --   --   --  0.67  --  0.68   ANIONGAP 4  --   --   --  5  --  4   MUSTAPHA 6.9*  --   --   --  8.4*  --  8.2*   * 117*  --   --  119*  --  107*    < > = values in this interval not displayed.     No lab results found.    Invalid input(s): TROP, TROPONINIES

## 2021-12-31 NOTE — PROVIDER NOTIFICATION
Hospitalist service cross cover:    Paged by nursing staff regarding bloody stool. Patient is hemodynamically stable.     PLAN:  -- added [facility] consult  -- INR, PTT, fibrinogen, type and screen  -- continue with current medical plan outlined by Dr. Wetzel (trend hgbs, PPI)  -- call RRT if hemodynamically unstable    ADDENDUM 2025: Spoke with Dr. Rivera, plan to prep patient tonight for scope tomorrow if no other contraindications are identified.      Please page with additional concerns,     KALI Elias, CNP  Hospitalist  Text Page  [phone])

## 2021-12-31 NOTE — PROVIDER NOTIFICATION
Date paged: 12/30    Time paged: [phone]    Person paged: YapTimeing system used: SPHARES    Message: [phone] RK: Labs are back. INR 1.06, PTT 26, and fibrinogen (elevated) at 572. Will get Hgb at ordered time of 2200. Thanks Lety JAIN *[phone]

## 2021-12-31 NOTE — PLAN OF CARE
Swallow update: Pt appeared alert, though eyes remained close (moving R side and responding to tactile stim). Oral cares provided, however, limited d/t pt tightly biting. Trialed ice chips x2 for thermal stimulation. Pt did respond to stim on L. Side, attempted to move bolus and spit out ice chip. No attempt at ice chip manipulation on second trialed and required removal by SLP. No attempt at swallow initiation observed. Based on severity of deficits today, would anticipate need for long term nutrition.     Continue NPO with oral cares and suction as needed.

## 2022-01-01 LAB
ANION GAP SERPL CALCULATED.3IONS-SCNC: 1 MMOL/L (ref 3–14)
BACTERIA BLD CULT: NO GROWTH
BACTERIA BLD CULT: NO GROWTH
BUN SERPL-MCNC: 21 MG/DL (ref 7–30)
CALCIUM SERPL-MCNC: 8.5 MG/DL (ref 8.5–10.1)
CHLORIDE BLD-SCNC: 114 MMOL/L (ref 94–109)
CO2 SERPL-SCNC: 27 MMOL/L (ref 20–32)
CREAT SERPL-MCNC: 0.67 MG/DL (ref 0.66–1.25)
GFR SERPL CREATININE-BSD FRML MDRD: >90 ML/MIN/1.73M2
GLUCOSE BLD-MCNC: 134 MG/DL (ref 70–99)
GLUCOSE BLDC GLUCOMTR-MCNC: 122 MG/DL (ref 70–99)
HGB BLD-MCNC: 13.7 G/DL (ref 13.3–17.7)
HGB BLD-MCNC: 13.9 G/DL (ref 13.3–17.7)
POTASSIUM BLD-SCNC: 3.5 MMOL/L (ref 3.4–5.3)
SARS-COV-2 RNA RESP QL NAA+PROBE: NEGATIVE
SODIUM SERPL-SCNC: 142 MMOL/L (ref 133–144)

## 2022-01-01 PROCEDURE — 250N000013 HC RX MED GY IP 250 OP 250 PS 637: Performed by: HOSPITALIST

## 2022-01-01 PROCEDURE — 99232 SBSQ HOSP IP/OBS MODERATE 35: CPT | Performed by: HOSPITALIST

## 2022-01-01 PROCEDURE — C9113 INJ PANTOPRAZOLE SODIUM, VIA: HCPCS | Performed by: HOSPITALIST

## 2022-01-01 PROCEDURE — 36415 COLL VENOUS BLD VENIPUNCTURE: CPT | Performed by: HOSPITALIST

## 2022-01-01 PROCEDURE — 120N000001 HC R&B MED SURG/OB

## 2022-01-01 PROCEDURE — 258N000003 HC RX IP 258 OP 636: Performed by: HOSPITALIST

## 2022-01-01 PROCEDURE — 999N000190 HC STATISTIC VAT ROUNDS

## 2022-01-01 PROCEDURE — 250N000009 HC RX 250: Performed by: HOSPITALIST

## 2022-01-01 PROCEDURE — 80048 BASIC METABOLIC PNL TOTAL CA: CPT | Performed by: HOSPITALIST

## 2022-01-01 PROCEDURE — 99233 SBSQ HOSP IP/OBS HIGH 50: CPT | Mod: GC | Performed by: STUDENT IN AN ORGANIZED HEALTH CARE EDUCATION/TRAINING PROGRAM

## 2022-01-01 PROCEDURE — 250N000011 HC RX IP 250 OP 636: Performed by: HOSPITALIST

## 2022-01-01 PROCEDURE — 87635 SARS-COV-2 COVID-19 AMP PRB: CPT | Performed by: HOSPITALIST

## 2022-01-01 PROCEDURE — 85018 HEMOGLOBIN: CPT | Performed by: HOSPITALIST

## 2022-01-01 RX ADMIN — ENALAPRILAT 2.5 MG: 1.25 INJECTION INTRAVENOUS at 03:11

## 2022-01-01 RX ADMIN — PANTOPRAZOLE SODIUM 40 MG: 40 INJECTION, POWDER, FOR SOLUTION INTRAVENOUS at 20:27

## 2022-01-01 RX ADMIN — METOPROLOL TARTRATE 10 MG: 5 INJECTION INTRAVENOUS at 09:53

## 2022-01-01 RX ADMIN — ENALAPRILAT 2.5 MG: 1.25 INJECTION INTRAVENOUS at 20:26

## 2022-01-01 RX ADMIN — PIPERACILLIN SODIUM AND TAZOBACTAM SODIUM 3.38 G: 3; .375 INJECTION, POWDER, LYOPHILIZED, FOR SOLUTION INTRAVENOUS at 20:35

## 2022-01-01 RX ADMIN — PIPERACILLIN SODIUM AND TAZOBACTAM SODIUM 3.38 G: 3; .375 INJECTION, POWDER, LYOPHILIZED, FOR SOLUTION INTRAVENOUS at 09:38

## 2022-01-01 RX ADMIN — HYDRALAZINE HYDROCHLORIDE 50 MG: 50 TABLET, FILM COATED ORAL at 14:15

## 2022-01-01 RX ADMIN — HYDRALAZINE HYDROCHLORIDE 50 MG: 50 TABLET, FILM COATED ORAL at 22:11

## 2022-01-01 RX ADMIN — METOPROLOL TARTRATE 10 MG: 5 INJECTION INTRAVENOUS at 20:27

## 2022-01-01 RX ADMIN — Medication 1 PACKET: at 09:37

## 2022-01-01 RX ADMIN — ACETAMINOPHEN 650 MG: 325 SUSPENSION ORAL at 01:10

## 2022-01-01 RX ADMIN — ENALAPRILAT 2.5 MG: 1.25 INJECTION INTRAVENOUS at 14:16

## 2022-01-01 RX ADMIN — ENALAPRILAT 2.5 MG: 1.25 INJECTION INTRAVENOUS at 09:54

## 2022-01-01 RX ADMIN — METOPROLOL TARTRATE 10 MG: 5 INJECTION INTRAVENOUS at 02:25

## 2022-01-01 RX ADMIN — PANTOPRAZOLE SODIUM 40 MG: 40 INJECTION, POWDER, FOR SOLUTION INTRAVENOUS at 09:53

## 2022-01-01 RX ADMIN — METOPROLOL TARTRATE 10 MG: 5 INJECTION INTRAVENOUS at 14:15

## 2022-01-01 RX ADMIN — SODIUM CHLORIDE: 9 INJECTION, SOLUTION INTRAVENOUS at 10:45

## 2022-01-01 RX ADMIN — HYDRALAZINE HYDROCHLORIDE 50 MG: 50 TABLET, FILM COATED ORAL at 06:29

## 2022-01-01 RX ADMIN — PIPERACILLIN SODIUM AND TAZOBACTAM SODIUM 3.38 G: 3; .375 INJECTION, POWDER, LYOPHILIZED, FOR SOLUTION INTRAVENOUS at 14:15

## 2022-01-01 RX ADMIN — PIPERACILLIN SODIUM AND TAZOBACTAM SODIUM 3.38 G: 3; .375 INJECTION, POWDER, LYOPHILIZED, FOR SOLUTION INTRAVENOUS at 02:24

## 2022-01-01 ASSESSMENT — ACTIVITIES OF DAILY LIVING (ADL)
ADLS_ACUITY_SCORE: 39
ADLS_ACUITY_SCORE: 33
ADLS_ACUITY_SCORE: 39
ADLS_ACUITY_SCORE: 33
ADLS_ACUITY_SCORE: 33
ADLS_ACUITY_SCORE: 39
ADLS_ACUITY_SCORE: 37
ADLS_ACUITY_SCORE: 33
ADLS_ACUITY_SCORE: 39
ADLS_ACUITY_SCORE: 33
ADLS_ACUITY_SCORE: 39
ADLS_ACUITY_SCORE: 33

## 2022-01-01 NOTE — PROGRESS NOTES
Chart reviewed.  Hb stable.  2 BM with blood.  Continue supportive care and repeat hb q12.  GI will continue to follow along  Diet per neurology    Redd Rivera MD FACP

## 2022-01-01 NOTE — PLAN OF CARE
Pt here with R frontal ICH. Sitter at bedside.ALLYSON neuro and orientation. Lethargic and nonverbal. Neuros L facial droop/ L hemiplegic.VSS. Tele NSR. NG-tube feeding resumed at 1445 today. TF running at 25ml/hr and FW flush 150 ml q4h. TF should be increased by 15 ml q24h.NPO. R PICC in placed, patent and intact. Up with A2/lift and T&Repo q2h. External cath in placed with adequate output. Small bloody BM. No s/s of pain.Pt scoring yellow on the Aggression Stop Light Tool. Plan for speech evaluation and if not tolerated then possible PEG placement.Discharge pending.

## 2022-01-01 NOTE — PROGRESS NOTES
Pt here with R frontal ICH. Sitter at bedside. ALLYSON neuro and orientation. Lethargic and nonverbal. Neuros L facial droop, L hemiplegic. VSS on 2L oxymask, hypertensive but within parameters. Tele NSR. NG TF running at 25ml/hr and FW flush 150 ml q4h. TF should be increased by 15 ml @1230. NPO. R PICC in place & infusing @50mL/hr. Up with A2/lift and T&R q2. External cath in place with adequate output. 2 bloody BM. C/O pain, given Tylenol x1. Pt scoring yellow on the Aggression Stop Light Tool. Plan for speech evaluation and if not tolerated then possible PEG placement. Discharge pending.

## 2022-01-01 NOTE — PROGRESS NOTES
[facility]    Stroke Progress Note    Interval EventsNo acute events overnight. Patient is mildly lethargic but arousable with verbal stimulation, following commands on the right. Left demond neglect persistent. SBPs 140s-170s overnight. Plan for PEG placement next week. Afebrile overnight, WBC not measured. Hb Stable. Remains on Pip-Tazo, cultures no growth TD.     HPI Summary  Sukhi Johnson is 71 year old male who presented to [facility] ED on 12/24 after being found down with left sided weakness and difficulty speaking. He was found to have a large R frontoparietal ICH, ~60cc. Etiology is hypertensive vs cerebral amyloid angiopathy.        MRI/Head CT 12/24/21 CT Head: 6.1 x 4.4 cm parenchymal hemorrhage in right frontal parietal region with some mild surrounding edema and localized mass effect  12/25/21 MRI Brain:  Right frontoparietal acute intracranial hemorrhage with associated regional mass effect resulting in 2 mm leftward midline shift. Tiny chronic infarct within L PCA territory   12/29/21 CT Head: Stable, no definite evidence for new or increasing intracranial hemorrhage.   Intracranial Vasculature Negative CTA head   Cervical Vasculature No measurable stenosis or dissection in the vertebral arteries and carotid arteries       Echocardiogram EF >70%, normal atrium size, no atrial shunt seen, negative bubble study. Hyperdynamic left ventricular function    EKG/Telemetry Sinus tachycardia    Other Testing Not Applicable      LDL  12/25/2021: 109 mg/dL   A1C  12/25/2021: 5.4 %   Troponin No lab value available in past 48 hrs         Impression   Non-traumatic intracerebral hemorrhage of right frontoparietal region      Plan  - Neuro check and vital signs every 4 hours   - Systolic BP Goal: <160, Use IV PRNs to maintain BP goal. Ok to titrate enteral meds to achieve   - Goal sodium normonatremia: 135 or greater  - Head of bed elevated to 30 degrees  - PT/OT/SLP therapies as  "needed, if unable to tolerate speech evaluation, possible PEG placement needed for long term nutrition   - Bedside blood glucose monitoring   - Euglycemia, blood glucose <180  - Euthermia, please treat any fever aggressively   - Repeat brain MRI with and without contrast in 3 months outpatient   - Repeat head CT with any changes in neuro exam      Patient Follow-up    - final recommendation pending work-up    We will continue to follow.     Vito Clement MD, MPH  Vascular Neurology Fellow  Department of Neurology  Pager: [phone]    To page me or covering stroke neurology team member, click here: AMCOM   Choose \"On Call\" tab at top, then search dropdown box for \"Neurology Adult\", select location, press Enter, then look for stroke/neuro ICU/telestroke.    ______________________________________________________    Clinically Significant Risk Factors Present on Admission                  Medications   Scheduled Meds    enalaprilat  2.5 mg Intravenous Q6H     [Held by provider] heparin ANTICOAGULANT  5,000 Units Subcutaneous Q12H     hydrALAZINE  50 mg Per Feeding Tube Q8H TONY     metoprolol  10 mg Intravenous Q6H     pantoprazole (PROTONIX) IV  40 mg Intravenous BID     piperacillin-tazobactam  3.375 g Intravenous Q6H     protein modular  1 packet Per Feeding Tube Daily     sodium chloride (PF)  10-40 mL Intracatheter Q7 Days     sodium chloride (PF)  3 mL Intracatheter Q8H       Infusion Meds    sodium chloride 50 mL/hr at 12/31/21 1616       PRN Meds  acetaminophen, acetaminophen, hydrALAZINE, labetalol, lidocaine 4%, lidocaine, lidocaine (buffered or not buffered), melatonin, ondansetron **OR** ondansetron, sodium chloride (PF), sodium chloride (PF), sodium chloride (PF)       PHYSICAL EXAMINATION  Temp:  [97.4  F (36.3  C)-98.7  F (37.1  C)] 98.5  F (36.9  C)  Pulse:  [] 104  Resp:  [16-20] 18  BP: (139-172)/(69-95) 139/70  SpO2:  [90 %-96 %] 96 %      General Exam  General:  patient lying in bed without " any acute distress    HEENT:  normocephalic/atraumatic  Pulmonary:  no respiratory distress    Neuro Exam  Mental Status:  follows commands, nonverbal  Cranial Nerves:  PERRL, corneals reflex present  Motor:  follows commands with right arm and right leg, able to squeeze fingers with RUE strength 4/5  Sensory:  decreased sensation on left side of body  Coordination:  unable to perform  Station/Gait:  deferred    Imaging  I personally reviewed all imaging; relevant findings per HPI.     Lab Results Data   CBC  Recent Labs   Lab 01/01/22  0633 12/31/21  2238 12/31/21  1417 12/30/21  1414 12/30/21  0427 12/29/21  0624 12/28/21  1714 12/28/21  0844   WBC  --   --   --   --   --  8.9 10.7 11.7*   RBC  --   --   --   --   --  4.56 5.17 5.24   HGB 13.9 13.7 12.9*   < > 14.2 13.4 15.2 15.1   HCT  --   --   --   --   --  42.5 46.6 48.3   PLT  --   --   --   --  271 256 262 288    < > = values in this interval not displayed.     Basic Metabolic Panel    Recent Labs   Lab 01/01/22  0633 12/31/21  1602 12/31/21  1417 12/31/21  1155 12/31/21  0609 12/30/21  1042 12/30/21  0428     --   --   --  147*  --  150*   POTASSIUM 3.5  --  3.5  --  3.0*   < > 3.3*   CHLORIDE 114*  --   --   --  120*  --  120*   CO2 27  --   --   --  23  --  25   BUN 21  --   --   --  17  --  23   CR 0.67  --   --   --  0.53*  --  0.67   * 117*  --  92 105*   < > 119*   MUSTAPHA 8.5  --   --   --  6.9*  --  8.4*    < > = values in this interval not displayed.     Liver Panel  Recent Labs   Lab 12/25/21  1116   PROTTOTAL 6.8   ALBUMIN 3.4   BILITOTAL 2.3*   ALKPHOS 65   AST 17   ALT 25     INR    Recent Labs   Lab Test 12/30/21 2036 12/24/21  1542   INR 1.06 0.96      Lipid Profile    Recent Labs   Lab Test 12/25/21  1116   CHOL 193   HDL 61   *   TRIG 117     A1C    Recent Labs   Lab Test 12/25/21  1116   A1C 5.4     Troponin I  No results for input(s): TROPONIN, GHTROP in the last 168 hours.

## 2022-01-01 NOTE — PLAN OF CARE
"/70 (BP Location: Left arm)   Pulse 104   Temp 98.5  F (36.9  C) (Axillary)   Resp 18   Ht 1.702 m (5' 7\")   Wt 83.5 kg (184 lb)   SpO2 96%   BMI 28.82 kg/m      Patient is currently here with right-sided nontraumatic intracerebral hemorrhage. Unable to assess patient's level of consciousness and neurological status due to nonverbal status combined with a flat affect, however a left-sided facial droop as well as left-sided hemiplegia is apparent. VSS on 2L/min via Oxy-Mask aside from intermittent hypertension. Telemetry reveals a normal sinus rhythm. Patient is currently NPO with nasogastric tube feedings running at 40 mL/hr and 150 mL flushes every four hours. Patient has a peripherally inserted central catheter on the right-upper arm that is both patent and in proper position, currently infusing 0.9% Normal Saline at a rate of 50 mL/hr. Patient transfers and ambulates with an assist of two plus the utilization of a lift device. Patient is incontinent of bladder and bowel, external catheter was replaced and is in position with adequate urinary output. Patient is scoring yellow on the Aggression Stop Light Tool related to confusion. Patient currently has a sitter at bedside and has soft mitts placed on both upper extremities to prevent the patient from pulling on life-sustaining tubes, lines and other medical devices. Discharge plans pending.  "

## 2022-01-01 NOTE — PROGRESS NOTES
[facility]  Hospitalist Progress Note        Guerrero Wetzel MD   01/01/2022        Interval History:        - BP better controlled but no significant change in mentation; did squeeze right hand on verbal stimulation but no meaningful communication  - had another episode of bloody stool this morning; Hb stable around 12-13; will recheck q 12 hrs; GI following-- rec continue supportive cares for now  - Na improving---142; will decrease free water flushes to 60 ml q 4 hrs; continue with NS at 50 ml/hr for now; monitor volume status closely         Assessment and Plan:        Sukhi Johnson is a 71 year old male with PMH of HTN, ADD admitted on 12/24/2021 with non traumatic ICH of R frontoparietal region. Patient was initially on nicardipine drip in ED and was boarding for nearly 18 hours waiting for ICU bed. Nicardipine drip was discontinued 12/25 and stroke service okayed admission to [facility] [place].     Acute hemorrhagic stroke with likely cerebral edema, hypertensive vs amyloid angiopathy  Acute metabolic encephalopathy secondary to above  Previous history of hemorrhagic stroke in 2013  Untreated Essential Hypertension  - per family, he has been on amlodipine-benazepril in the past but apparently does not tolerate them and family doesnot want them to be resumed (was not on any BP meds PTA  - CT and MRI on admission noted with Right frontoparietal acute intracranial hemorrhage with associated regional mass effect resulting in 2 mm leftward midline shift; MRI also noted with numerous chronic microhemorrhages throughout the cerebral hemispheres, deep gray nuclei and posterior fossa, possibly representing sequelae of hypertensive or amyloid angiopathy  - evaluated by neurosurgery and recommended no surgical intervention, have signed off  - repeat head CT 12/26 noted with slight interval decrease in size of the large parenchymal hematoma, no definite evidence for new or increasing hemorrhage; stable  minimal leftward midline shift of the third ventricle  - repeat head CT 12/29 noted stable; EEG 12/29 with no electrographic seizures  - ECHO 12/26 with LVEF >70%, negative bubble study; A1c 5.4,   - neurology following, follow q 4 hrs neurochecks  - evaluated by SLP, at high silent aspiration risk; keep NPO  - initially on Nicardipine drip, now off; per neurology goal SBP<160  - blood pressure better with meds adjustment on 12/31; continue Hydralazine 50 mg PO TID; enalaprilat 2.5 mg IV q 6 hrs and Metoprolol 10 mg IV q 6 hrs  - labetalol and hydralazine PRN for SBP>160  - PT/OT eval when able with improved mentation    FEN  At risk for aspiration  Acute hypoxic respiratory failure  Hypokalemia  Hypernatremia  - at high risk for silent aspiration; no significant improvement in mentation and unable to take PO; SLP following; will keep NPO  - NJ feeding tube placed on 12/28 and tube feeds started but overnight was in respiratory stress requiring 5 L OM; was evaluated by RRT, nasal trumpet placed and was deep suctioned  - tube feeds resumed 12/29 at lower rate; nutrition following, up titrate to goal as tolerated; might need a PEG tube placement  - sodium trended up to 152, slowly trending down---150---147---142; K 3.3---3.0---3.5  - will decrease free water flushes to 60 ml q 4 hrs; continue with NS at 50 ml/hr for now; monitor volume status closely ; supplement potassium per protocol; magnesium normal  - avoiding hypotonic IVF due to intracranial bleed  - monitor BMP  - CXR 12/26 UR and afebrile, however with hypoxia and leukocytosis an clinical concerns for aspiration; will continue empiric Zosyn and monitor clinically  - try to wean oxygen down, currently on 2 lts  - fever and leukocytosis trended down, afebrile since 12/28; WBC 13.9--->8.9    Likely GI Bleed  - started noticing bloody BM since 12/29 with Hb down to 12.7  - Heparin (DVT Prophylaxis) held  - Hb stable around 12-13; will recheck q 12 hrs,  "transfuse prn for Hb <7; GI following-- rec continue supportive cares for now  - continue Protonix 40 mg IV BID  -cleared by neurology for the procedure if indicated     Chronic fatigue syndrome 2/2 lyme disease: hold PTA adderall, resume when taking PO     COVID PCR screen negative. No vaccination records available, tested negative on admission 12/24    Orders Placed This Encounter      NPO for Medical/Clinical Reasons Except for: No Exceptions    DVT Prophylaxis: started on Heparin s/c (12/27) per neurology-- held since 12/29 due to concern for GI bleed; continue PCDs    Code Status:   Full code; confirmed with family discussion 12/27      Clinically Significant Risk Factors Present on Admission           Disposition Plan     Expected Discharge:  unclear at this time; likely >3-5 days pending clinical improvement    Clinically Significant Risk Factors Present on Admission                      Page Me (7 am to 6 pm)    Discussed with nursing              Physical Exam:      Blood pressure 139/70, pulse 104, temperature 98.5  F (36.9  C), temperature source Axillary, resp. rate 18, height 1.702 m (5' 7\"), weight 83.5 kg (184 lb), SpO2 96 %.  Vitals:    12/24/21 1558 12/27/21 0445   Weight: 83.9 kg (184 lb 15.5 oz) 83.5 kg (184 lb)     Vital Signs with Ranges  Temp:  [97.4  F (36.3  C)-98.7  F (37.1  C)] 98.5  F (36.9  C)  Pulse:  [] 104  Resp:  [16-20] 18  BP: (139-172)/(69-95) 139/70  SpO2:  [90 %-96 %] 96 %  I/O's Last 24 hours  I/O last 3 completed shifts:  In: 6787 [I.V.:7477; NG/GT:510]  Out: 950 [Urine:950]    Constitutional:  somnolent; mostly non-verbal; no significant change in mentation; did squeeze right hand on verbal stimulation but no meaningful communication; moving right upper and lower extremities independently; no movement on left side; appears comfortable   HEENT  pupils equal and reactive to light and accommodation ; some spontaneous eye opening ; nasal feeding tube in place    Oral cavity: " Dry oral mucosa   Cardiovascular: Normal s1 s2, regular rate and rhythm , no murmur   Lungs: B/l clear to auscultation, no wheezes or crepitations   Abdomen: Soft, nt, nd, no guarding, rigidity or rebound; BS +   LE : No edema   Musculoskeletal/ Neuro:  difficult to assess at this time; moving right upper and lower extremities independently; no movement on left side       Psychiatry: lethargic                 Medications:          enalaprilat  2.5 mg Intravenous Q6H     [Held by provider] heparin ANTICOAGULANT  5,000 Units Subcutaneous Q12H     hydrALAZINE  50 mg Per Feeding Tube Q8H TONY     metoprolol  10 mg Intravenous Q6H     pantoprazole (PROTONIX) IV  40 mg Intravenous BID     piperacillin-tazobactam  3.375 g Intravenous Q6H     protein modular  1 packet Per Feeding Tube Daily     sodium chloride (PF)  10-40 mL Intracatheter Q7 Days     sodium chloride (PF)  3 mL Intracatheter Q8H     PRN Meds: acetaminophen, acetaminophen, hydrALAZINE, labetalol, lidocaine 4%, lidocaine, lidocaine (buffered or not buffered), melatonin, ondansetron **OR** ondansetron, sodium chloride (PF), sodium chloride (PF), sodium chloride (PF)         Data:      All new lab and imaging data was reviewed.   Recent Labs   Lab Test 01/01/22  0633 12/31/21  2238 12/31/21  1417 12/31/21  0609 12/30/21  2036 12/30/21  1414 12/30/21  0427 12/29/21  0624 12/28/21  1714 12/28/21  0844 12/25/21  1116 12/24/21  1542   WBC  --   --   --   --   --   --   --  8.9 10.7 11.7*   < > 10.4   HGB 13.9 13.7 12.9*   < >  --    < > 14.2 13.4 15.2 15.1   < > 15.6   MCV  --   --   --   --   --   --   --  93 90 92   < > 89   PLT  --   --   --   --   --   --  271 256 262 288   < > 258   INR  --   --   --   --  1.06  --   --   --   --   --   --  0.96    < > = values in this interval not displayed.      Recent Labs   Lab Test 01/01/22  0633 12/31/21  1602 12/31/21  1417 12/31/21  1155 12/31/21  0609 12/30/21  1042 12/30/21  0428     --   --   --  147*  --  150*    POTASSIUM 3.5  --  3.5  --  3.0*   < > 3.3*   CHLORIDE 114*  --   --   --  120*  --  120*   CO2 27  --   --   --  23  --  25   BUN 21  --   --   --  17  --  23   CR 0.67  --   --   --  0.53*  --  0.67   ANIONGAP 1*  --   --   --  4  --  5   MUSTAPHA 8.5  --   --   --  6.9*  --  8.4*   * 117*  --  92 105*   < > 119*    < > = values in this interval not displayed.     No lab results found.    Invalid input(s): TROP, TROPONINIES

## 2022-01-02 LAB
ANION GAP SERPL CALCULATED.3IONS-SCNC: 5 MMOL/L (ref 3–14)
BUN SERPL-MCNC: 22 MG/DL (ref 7–30)
CALCIUM SERPL-MCNC: 8.3 MG/DL (ref 8.5–10.1)
CHLORIDE BLD-SCNC: 113 MMOL/L (ref 94–109)
CO2 SERPL-SCNC: 25 MMOL/L (ref 20–32)
CREAT SERPL-MCNC: 0.67 MG/DL (ref 0.66–1.25)
GFR SERPL CREATININE-BSD FRML MDRD: >90 ML/MIN/1.73M2
GLUCOSE BLD-MCNC: 132 MG/DL (ref 70–99)
HGB BLD-MCNC: 13 G/DL (ref 13.3–17.7)
PLATELET # BLD AUTO: 280 10E3/UL (ref 150–450)
POTASSIUM BLD-SCNC: 3.7 MMOL/L (ref 3.4–5.3)
SODIUM SERPL-SCNC: 143 MMOL/L (ref 133–144)

## 2022-01-02 PROCEDURE — 99233 SBSQ HOSP IP/OBS HIGH 50: CPT | Performed by: STUDENT IN AN ORGANIZED HEALTH CARE EDUCATION/TRAINING PROGRAM

## 2022-01-02 PROCEDURE — 250N000009 HC RX 250: Performed by: HOSPITALIST

## 2022-01-02 PROCEDURE — 258N000003 HC RX IP 258 OP 636: Performed by: HOSPITALIST

## 2022-01-02 PROCEDURE — 120N000001 HC R&B MED SURG/OB

## 2022-01-02 PROCEDURE — C9113 INJ PANTOPRAZOLE SODIUM, VIA: HCPCS | Performed by: HOSPITALIST

## 2022-01-02 PROCEDURE — 80048 BASIC METABOLIC PNL TOTAL CA: CPT | Performed by: HOSPITALIST

## 2022-01-02 PROCEDURE — 99232 SBSQ HOSP IP/OBS MODERATE 35: CPT | Performed by: HOSPITALIST

## 2022-01-02 PROCEDURE — 36415 COLL VENOUS BLD VENIPUNCTURE: CPT | Performed by: HOSPITALIST

## 2022-01-02 PROCEDURE — 85049 AUTOMATED PLATELET COUNT: CPT | Performed by: PHYSICIAN ASSISTANT

## 2022-01-02 PROCEDURE — 250N000011 HC RX IP 250 OP 636: Performed by: HOSPITALIST

## 2022-01-02 PROCEDURE — 999N000190 HC STATISTIC VAT ROUNDS

## 2022-01-02 PROCEDURE — 250N000013 HC RX MED GY IP 250 OP 250 PS 637: Performed by: HOSPITALIST

## 2022-01-02 PROCEDURE — 250N000013 HC RX MED GY IP 250 OP 250 PS 637: Performed by: NURSE PRACTITIONER

## 2022-01-02 PROCEDURE — 85018 HEMOGLOBIN: CPT | Performed by: HOSPITALIST

## 2022-01-02 RX ORDER — MODAFINIL 100 MG/1
100 TABLET ORAL ONCE
Status: COMPLETED | OUTPATIENT
Start: 2022-01-02 | End: 2022-01-02

## 2022-01-02 RX ADMIN — METOPROLOL TARTRATE 10 MG: 5 INJECTION INTRAVENOUS at 09:13

## 2022-01-02 RX ADMIN — SODIUM CHLORIDE: 9 INJECTION, SOLUTION INTRAVENOUS at 05:39

## 2022-01-02 RX ADMIN — HYDRALAZINE HYDROCHLORIDE 50 MG: 50 TABLET, FILM COATED ORAL at 05:32

## 2022-01-02 RX ADMIN — ENALAPRILAT 2.5 MG: 1.25 INJECTION INTRAVENOUS at 09:13

## 2022-01-02 RX ADMIN — PIPERACILLIN SODIUM AND TAZOBACTAM SODIUM 3.38 G: 3; .375 INJECTION, POWDER, LYOPHILIZED, FOR SOLUTION INTRAVENOUS at 09:12

## 2022-01-02 RX ADMIN — PIPERACILLIN SODIUM AND TAZOBACTAM SODIUM 3.38 G: 3; .375 INJECTION, POWDER, LYOPHILIZED, FOR SOLUTION INTRAVENOUS at 02:29

## 2022-01-02 RX ADMIN — MODAFINIL 100 MG: 100 TABLET ORAL at 12:39

## 2022-01-02 RX ADMIN — HYDRALAZINE HYDROCHLORIDE 50 MG: 50 TABLET, FILM COATED ORAL at 14:24

## 2022-01-02 RX ADMIN — PANTOPRAZOLE SODIUM 40 MG: 40 INJECTION, POWDER, FOR SOLUTION INTRAVENOUS at 09:13

## 2022-01-02 RX ADMIN — METOPROLOL TARTRATE 10 MG: 5 INJECTION INTRAVENOUS at 14:25

## 2022-01-02 RX ADMIN — Medication 1 PACKET: at 09:10

## 2022-01-02 RX ADMIN — ACETAMINOPHEN 650 MG: 325 SUSPENSION ORAL at 21:57

## 2022-01-02 RX ADMIN — METOPROLOL TARTRATE 10 MG: 5 INJECTION INTRAVENOUS at 19:21

## 2022-01-02 RX ADMIN — PANTOPRAZOLE SODIUM 40 MG: 40 INJECTION, POWDER, FOR SOLUTION INTRAVENOUS at 20:54

## 2022-01-02 RX ADMIN — ENALAPRILAT 2.5 MG: 1.25 INJECTION INTRAVENOUS at 14:24

## 2022-01-02 RX ADMIN — ENALAPRILAT 2.5 MG: 1.25 INJECTION INTRAVENOUS at 02:10

## 2022-01-02 RX ADMIN — ENALAPRILAT 2.5 MG: 1.25 INJECTION INTRAVENOUS at 20:54

## 2022-01-02 RX ADMIN — HYDRALAZINE HYDROCHLORIDE 50 MG: 50 TABLET, FILM COATED ORAL at 22:03

## 2022-01-02 RX ADMIN — METOPROLOL TARTRATE 10 MG: 5 INJECTION INTRAVENOUS at 02:10

## 2022-01-02 ASSESSMENT — ACTIVITIES OF DAILY LIVING (ADL)
ADLS_ACUITY_SCORE: 31
ADLS_ACUITY_SCORE: 35
ADLS_ACUITY_SCORE: 35
ADLS_ACUITY_SCORE: 39
ADLS_ACUITY_SCORE: 35
ADLS_ACUITY_SCORE: 39
ADLS_ACUITY_SCORE: 31
ADLS_ACUITY_SCORE: 35
ADLS_ACUITY_SCORE: 31
ADLS_ACUITY_SCORE: 35
ADLS_ACUITY_SCORE: 31
ADLS_ACUITY_SCORE: 37
ADLS_ACUITY_SCORE: 35
ADLS_ACUITY_SCORE: 39
ADLS_ACUITY_SCORE: 31
ADLS_ACUITY_SCORE: 39
ADLS_ACUITY_SCORE: 35
ADLS_ACUITY_SCORE: 31
ADLS_ACUITY_SCORE: 33
ADLS_ACUITY_SCORE: 35
ADLS_ACUITY_SCORE: 39

## 2022-01-02 NOTE — PROGRESS NOTES
SP[facility]UAL [facility]  SPIRITUAL ASSESSMENT Progress Note  [facility]     REFERRAL SOURCE: Lengthy Stay    I visited pt Sukhi today. He is not able to engage in conversation with me and has a sitter at bedside. His significant other was not present. I shared words of comfort and peace.     PLAN: I will attempt to connect with pt's s.o. at bedside in the coming days.     Neva Ceballos  Associate    Phone: [phone]  Pager: [phone]

## 2022-01-02 NOTE — PROGRESS NOTES
[facility]    Stroke Progress Note    Interval EventsNo acute events overnight. Lethargic this AM. Trial modafenil today.     HPI Summary  Sukhi Johnson is 71 year old male who [facility] ED on 12/24 after being found down with left sided weakness and difficulty speaking. He was found to have a large R frontoparietal ICH, ~60cc. Etiology is hypertensive vs cerebral amyloid angiopathy.        MRI/Head CT 12/24/21 CT Head: 6.1 x 4.4 cm parenchymal hemorrhage in right frontal parietal region with some mild surrounding edema and localized mass effect  12/25/21 MRI Brain:  Right frontoparietal acute intracranial hemorrhage with associated regional mass effect resulting in 2 mm leftward midline shift. Tiny chronic infarct within L PCA territory   12/29/21 CT Head: Stable, no definite evidence for new or increasing intracranial hemorrhage.   Intracranial Vasculature Negative CTA head   Cervical Vasculature No measurable stenosis or dissection in the vertebral arteries and carotid arteries       Echocardiogram EF >70%, normal atrium size, no atrial shunt seen, negative bubble study. Hyperdynamic left ventricular function    EKG/Telemetry Sinus tachycardia    Other Testing Not Applicable      LDL  12/25/2021: 109 mg/dL   A1C  12/25/2021: 5.4 %   Troponin No lab value available in past 48 hrs         Impression   Non-traumatic intracerebral hemorrhage of right frontoparietal region, likely due to cerebral amyloid angiopathy, with contribution from untreated HTN     Plan  - Neuro check and vital signs every 4 hours   - Trial modafinil 100 mg today. If tolerated, will continue tomorrow.   - Systolic BP Goal: <160, Use IV PRNs to maintain BP goal. Ok to titrate enteral meds to achieve   - Goal sodium normonatremia: 135 or greater  - Head of bed elevated to 30 degrees  - PT/OT/SLP therapies as needed, if unable to tolerate speech evaluation, possible PEG placement needed for long term  "nutrition   - Bedside blood glucose monitoring   - Euglycemia, blood glucose <180  - Euthermia, please treat any fever aggressively   - Repeat brain MRI with and without contrast in 3 months outpatient to evaluate for underlying lesion    Patient Follow-up    - final recommendation pending work-up    We will continue to follow.     KALI Carter, CNP  Neurology  01/02/2022 7:47 AM  To page stroke neurology after hours or on a subsequent day, click here: [facility]  Choose \"On Call\" tab at top, then search dropdown box for \"Neurology Adult\" & press Enter, look for Neuro ICU/Stroke  ______________________________________________________    Clinically Significant Risk Factors Present on Admission                  Medications   Scheduled Meds    enalaprilat  2.5 mg Intravenous Q6H     [Held by provider] heparin ANTICOAGULANT  5,000 Units Subcutaneous Q12H     hydrALAZINE  50 mg Per Feeding Tube Q8H TONY     metoprolol  10 mg Intravenous Q6H     pantoprazole (PROTONIX) IV  40 mg Intravenous BID     piperacillin-tazobactam  3.375 g Intravenous Q6H     protein modular  1 packet Per Feeding Tube Daily     sodium chloride (PF)  10-40 mL Intracatheter Q7 Days     sodium chloride (PF)  3 mL Intracatheter Q8H       Infusion Meds    sodium chloride 50 mL/hr at 01/02/22 0539       PRN Meds  acetaminophen, acetaminophen, hydrALAZINE, labetalol, lidocaine 4%, lidocaine, lidocaine (buffered or not buffered), melatonin, ondansetron **OR** ondansetron, sodium chloride (PF), sodium chloride (PF), sodium chloride (PF)       PHYSICAL EXAMINATION  Temp:  [98.1  F (36.7  C)-99.2  F (37.3  C)] 98.4  F (36.9  C)  Pulse:  [] 107  Resp:  [16-18] 18  BP: (130-154)/(67-91) 137/76  FiO2 (%):  [100 %] 100 %  SpO2:  [93 %-96 %] 93 %     Neuro Exam  Mental Status:  gestures with right hand when asked how he is, largely non-verbal but does state single word answer, does not follow commands today  Cranial Nerves:  PERRL, L facial " droop  Motor:  RUE antigravity, moves RLE spontaneously in bed  Sensory:  decreased sensation on left side of body  Coordination:  unable to perform  Station/Gait:  deferred    Imaging  I personally reviewed all imaging; relevant findings per HPI.     Lab Results Data   CBC  Recent Labs   Lab 01/01/22  2253 01/01/22  0633 12/31/21  2238 12/30/21  1414 12/30/21  0427 12/29/21  0624 12/28/21  1714 12/28/21  0844   WBC  --   --   --   --   --  8.9 10.7 11.7*   RBC  --   --   --   --   --  4.56 5.17 5.24   HGB 13.7 13.9 13.7   < > 14.2 13.4 15.2 15.1   HCT  --   --   --   --   --  42.5 46.6 48.3   PLT  --   --   --   --  271 256 262 288    < > = values in this interval not displayed.     Basic Metabolic Panel    Recent Labs   Lab 01/01/22  0820 01/01/22  0633 12/31/21  1602 12/31/21  1417 12/31/21  1155 12/31/21  0609 12/30/21  1042 12/30/21  0428   NA  --  142  --   --   --  147*  --  150*   POTASSIUM  --  3.5  --  3.5  --  3.0*   < > 3.3*   CHLORIDE  --  114*  --   --   --  120*  --  120*   CO2  --  27  --   --   --  23  --  25   BUN  --  21  --   --   --  17  --  23   CR  --  0.67  --   --   --  0.53*  --  0.67   * 134* 117*  --    < > 105*   < > 119*   MUSTAPHA  --  8.5  --   --   --  6.9*  --  8.4*    < > = values in this interval not displayed.     Liver Panel  No results for input(s): PROTTOTAL, ALBUMIN, BILITOTAL, ALKPHOS, AST, ALT, BILIDIRECT in the last 168 hours.  INR    Recent Labs   Lab Test 12/30/21  2036 12/24/21  1542   INR 1.06 0.96      Lipid Profile    Recent Labs   Lab Test 12/25/21  1116   CHOL 193   HDL 61   *   TRIG 117     A1C    Recent Labs   Lab Test 12/25/21  1116   A1C 5.4     Troponin I  No results for input(s): TROPONIN, GHTROP in the last 168 hours.

## 2022-01-02 NOTE — PLAN OF CARE
"/73 (BP Location: Left arm)   Pulse 75   Temp 97.5  F (36.4  C) (Axillary)   Resp 18   Ht 1.702 m (5' 7\")   Wt 83.5 kg (184 lb)   SpO2 96%   BMI 28.82 kg/m      Patient is currently here with right-sided nontraumatic intracerebral hemorrhage. Unable to assess patient's level of consciousness and neurological status due to nonverbal status combined with a flat affect, however a left-sided facial droop, unequal pupils and left-sided hemiplegia is apparent. VSS on 2L/min via Oxy-Mask aside from intermittent hypertension. Telemetry reveals a normal sinus rhythm. Patient is currently NPO with nasogastric tube feedings running at 50 mL/hr and 150 mL flushes every four hours. Patient has a peripherally inserted central catheter on the right-upper arm that is both patent and in proper position, currently infusing 0.9% Normal Saline at a rate of 50 mL/hr. Patient transfers and ambulates with an assist of two plus the utilization of a lift device. Patient is incontinent of bladder and bowel, external catheter was replaced and is in position with adequate urinary output. Patient is scoring yellow on the Aggression Stop Light Tool related to confusion. Patient currently has a sitter at bedside and has soft mitts placed on both upper extremities to prevent the patient from pulling on life-sustaining tubes, lines and other medical devices. Discharge plans pending.  "

## 2022-01-02 NOTE — PROGRESS NOTES
[facility]  Hospitalist Progress Note        Guerrero Wetzel MD   01/02/2022        Interval History:        - No significant change in mentation  - continues with some bloody bowel movement but hemodynamically stable with stable hemoglobin around 12-13         Assessment and Plan:        Sukhi Johnson is a 71 year old male with PMH of HTN, ADD admitted on 12/24/2021 with non traumatic ICH of R frontoparietal region. Patient was initially on nicardipine drip in ED and was boarding for nearly 18 hours waiting for ICU bed. Nicardipine drip was discontinued 12/25 and stroke service okayed admission to [facility] [place].     Acute hemorrhagic stroke with likely cerebral edema, hypertensive vs amyloid angiopathy  Acute metabolic encephalopathy secondary to above  Previous history of hemorrhagic stroke in 2013  Untreated Essential Hypertension  - per family, he has been on amlodipine-benazepril in the past but apparently does not tolerate them and family doesnot want them to be resumed (was not on any BP meds PTA  - CT and MRI on admission noted with Right frontoparietal acute intracranial hemorrhage with associated regional mass effect resulting in 2 mm leftward midline shift; MRI also noted with numerous chronic microhemorrhages throughout the cerebral hemispheres, deep gray nuclei and posterior fossa, possibly representing sequelae of hypertensive or amyloid angiopathy  - evaluated by neurosurgery and recommended no surgical intervention, have signed off  - repeat head CT 12/26 noted with slight interval decrease in size of the large parenchymal hematoma, no definite evidence for new or increasing hemorrhage; stable minimal leftward midline shift of the third ventricle  - repeat head CT 12/29 noted stable; EEG 12/29 with no electrographic seizures  - ECHO 12/26 with LVEF >70%, negative bubble study; A1c 5.4,   - neurology following, follow q 4 hrs neurochecks  - evaluated by SLP, at high silent  aspiration risk; keep NPO  - initially on Nicardipine drip, now off; per neurology goal SBP<160  - blood pressure better controlled at goal; continue Hydralazine 50 mg PO TID; enalaprilat 2.5 mg IV q 6 hrs and Metoprolol 10 mg IV q 6 hrs  - labetalol and hydralazine PRN for SBP>160  - PT/OT eval when able with improved mentation    FEN  At risk for aspiration  Acute hypoxic respiratory failure  Hypokalemia  Hypernatremia  - at high risk for silent aspiration; no significant improvement in mentation and unable to take PO; SLP following; will keep NPO  - NJ feeding tube placed on 12/28 and tube feeds started but overnight was in respiratory stress requiring 5 L OM; was evaluated by RRT, nasal trumpet placed and was deep suctioned  - tube feeds resumed 12/29 at lower rate; nutrition following, up titrate to goal as tolerated; might need a PEG tube placement  - sodium trended up to 152, slowly trending down---150---147---143; K 3.3---3.0---3.5  - decreased free water flushes to 60 ml q 4 hrs (1/1); continue with NS at 50 ml/hr for now; monitor volume status closely ; supplement potassium per protocol; magnesium normal  - avoiding hypotonic IVF due to intracranial bleed  - monitor BMP  - CXR 12/26 UR and afebrile, however with hypoxia and leukocytosis an clinical concerns for aspiration; will continue empiric Zosyn and monitor clinically  - try to wean oxygen down, currently on 2 lts  - fever and leukocytosis trended down, afebrile since 12/28; WBC 13.9--->8.9    Likely GI Bleed  - started noticing bloody BM since 12/29 with Hb down to 12.7  - Heparin (DVT Prophylaxis) held  - continues with some bloody bowel movement but hemodynamically stable with stable hemoglobin around 12-13  - monitor Hb and transfuse prn for Hb <8; GI following-- rec continue supportive cares for now  - continue Protonix 40 mg IV BID  - cleared by neurology for the procedure if indicated     Chronic fatigue syndrome 2/2 lyme disease: hold PTA  "adderall, resume when taking PO     COVID PCR screen negative. No vaccination records available, tested negative on admission 12/24    Orders Placed This Encounter      NPO for Medical/Clinical Reasons Except for: No Exceptions    DVT Prophylaxis: started on Heparin s/c (12/27) per neurology-- held since 12/29 due to concern for GI bleed; continue PCDs    Code Status:   Full code; confirmed with family discussion 12/27      Clinically Significant Risk Factors Present on Admission           Disposition Plan     Expected Discharge:  unclear at this time; likely >3-5 days pending clinical improvement    Clinically Significant Risk Factors Present on Admission                      Page Me (7 am to 6 pm)    Discussed with nursing              Physical Exam:      Blood pressure 137/70, pulse 91, temperature 98.3  F (36.8  C), temperature source Axillary, resp. rate 18, height 1.702 m (5' 7\"), weight 83.5 kg (184 lb), SpO2 94 %.  Vitals:    12/24/21 1558 12/27/21 0445   Weight: 83.9 kg (184 lb 15.5 oz) 83.5 kg (184 lb)     Vital Signs with Ranges  Temp:  [98.1  F (36.7  C)-99.2  F (37.3  C)] 98.3  F (36.8  C)  Pulse:  [] 91  Resp:  [16-18] 18  BP: (130-154)/(67-91) 137/70  FiO2 (%):  [100 %] 100 %  SpO2:  [94 %-96 %] 94 %  I/O's Last 24 hours  I/O last 3 completed shifts:  In: 603 [I.V.:3; NG/GT:600]  Out: 900 [Urine:900]    Constitutional:  somnolent; mostly non-verbal; no significant change in mentation;no meaningful communication; moving right upper and lower extremities independently; no movement on left side; appears comfortable   HEENT  pupils equal and reactive to light and accommodation ; some spontaneous eye opening ; nasal feeding tube in place    Oral cavity: Dry oral mucosa   Cardiovascular: Normal s1 s2, regular rate and rhythm , no murmur   Lungs: B/l clear to auscultation, no wheezes or crepitations   Abdomen: Soft, nt, nd, no guarding, rigidity or rebound; BS +   LE : No edema   Musculoskeletal/ " Neuro:  difficult to assess at this time; moving right upper and lower extremities independently; no movement on left side       Psychiatry: lethargic                 Medications:          enalaprilat  2.5 mg Intravenous Q6H     [Held by provider] heparin ANTICOAGULANT  5,000 Units Subcutaneous Q12H     hydrALAZINE  50 mg Per Feeding Tube Q8H TONY     metoprolol  10 mg Intravenous Q6H     pantoprazole (PROTONIX) IV  40 mg Intravenous BID     piperacillin-tazobactam  3.375 g Intravenous Q6H     protein modular  1 packet Per Feeding Tube Daily     sodium chloride (PF)  10-40 mL Intracatheter Q7 Days     sodium chloride (PF)  3 mL Intracatheter Q8H     PRN Meds: acetaminophen, acetaminophen, hydrALAZINE, labetalol, lidocaine 4%, lidocaine, lidocaine (buffered or not buffered), melatonin, ondansetron **OR** ondansetron, sodium chloride (PF), sodium chloride (PF), sodium chloride (PF)         Data:      All new lab and imaging data was reviewed.   Recent Labs   Lab Test 01/01/22 2253 01/01/22  0633 12/31/21  2238 12/31/21  0609 12/30/21  2036 12/30/21  1414 12/30/21  0427 12/29/21  0624 12/28/21  1714 12/28/21  0844 12/25/21  1116 12/24/21  1542   WBC  --   --   --   --   --   --   --  8.9 10.7 11.7*   < > 10.4   HGB 13.7 13.9 13.7   < >  --    < > 14.2 13.4 15.2 15.1   < > 15.6   MCV  --   --   --   --   --   --   --  93 90 92   < > 89   PLT  --   --   --   --   --   --  271 256 262 288   < > 258   INR  --   --   --   --  1.06  --   --   --   --   --   --  0.96    < > = values in this interval not displayed.      Recent Labs   Lab Test 01/01/22  0820 01/01/22  0633 12/31/21  1602 12/31/21  1417 12/31/21  1155 12/31/21  0609 12/30/21  1042 12/30/21  0428   NA  --  142  --   --   --  147*  --  150*   POTASSIUM  --  3.5  --  3.5  --  3.0*   < > 3.3*   CHLORIDE  --  114*  --   --   --  120*  --  120*   CO2  --  27  --   --   --  23  --  25   BUN  --  21  --   --   --  17  --  23   CR  --  0.67  --   --   --  0.53*  --  0.67    ANIONGAP  --  1*  --   --   --  4  --  5   MUSTAPHA  --  8.5  --   --   --  6.9*  --  8.4*   * 134* 117*  --    < > 105*   < > 119*    < > = values in this interval not displayed.     No lab results found.    Invalid input(s): TROP, TROPONINIES

## 2022-01-02 NOTE — PLAN OF CARE
Pt here with R frontal ICH.Sitter at the bedside. Soft mitts on the RUE.Unable to assess orientation/ most of the neuro's. Neuro's- non verbal, L hemiplegic, R pupil size greater than L, L facial droop.VSS except for hypertensive,tachycardia and 2l oxy mask. Tele ST. NPO. Pills crushed through NG tube. Up with lift, T/R q2h. Bloody BM x3 and ext cath for voiding.No s/s of pain.  TF running at 40 ml/hr,  ml q4h.Pt scoring yellow on the Aggression Stop Light Tool.  Discharge pending.

## 2022-01-03 ENCOUNTER — APPOINTMENT (OUTPATIENT)
Dept: PHYSICAL THERAPY | Facility: CLINIC | Age: 72
DRG: 064 | End: 2022-01-03
Payer: COMMERCIAL

## 2022-01-03 ENCOUNTER — APPOINTMENT (OUTPATIENT)
Dept: SPEECH THERAPY | Facility: CLINIC | Age: 72
DRG: 064 | End: 2022-01-03
Payer: COMMERCIAL

## 2022-01-03 LAB
ANION GAP SERPL CALCULATED.3IONS-SCNC: 6 MMOL/L (ref 3–14)
BASOPHILS # BLD AUTO: 0.1 10E3/UL (ref 0–0.2)
BASOPHILS NFR BLD AUTO: 1 %
BUN SERPL-MCNC: 21 MG/DL (ref 7–30)
CALCIUM SERPL-MCNC: 8.6 MG/DL (ref 8.5–10.1)
CHLORIDE BLD-SCNC: 110 MMOL/L (ref 94–109)
CO2 SERPL-SCNC: 23 MMOL/L (ref 20–32)
CREAT SERPL-MCNC: 0.52 MG/DL (ref 0.66–1.25)
EOSINOPHIL # BLD AUTO: 0.1 10E3/UL (ref 0–0.7)
EOSINOPHIL NFR BLD AUTO: 1 %
ERYTHROCYTE [DISTWIDTH] IN BLOOD BY AUTOMATED COUNT: 13 % (ref 10–15)
GFR SERPL CREATININE-BSD FRML MDRD: >90 ML/MIN/1.73M2
GLUCOSE BLD-MCNC: 135 MG/DL (ref 70–99)
HCT VFR BLD AUTO: 42.5 % (ref 40–53)
HGB BLD-MCNC: 13.7 G/DL (ref 13.3–17.7)
IMM GRANULOCYTES # BLD: 0.1 10E3/UL
IMM GRANULOCYTES NFR BLD: 1 %
LYMPHOCYTES # BLD AUTO: 1.4 10E3/UL (ref 0.8–5.3)
LYMPHOCYTES NFR BLD AUTO: 15 %
MAGNESIUM SERPL-MCNC: 2.3 MG/DL (ref 1.6–2.3)
MCH RBC QN AUTO: 29.4 PG (ref 26.5–33)
MCHC RBC AUTO-ENTMCNC: 32.2 G/DL (ref 31.5–36.5)
MCV RBC AUTO: 91 FL (ref 78–100)
MONOCYTES # BLD AUTO: 0.5 10E3/UL (ref 0–1.3)
MONOCYTES NFR BLD AUTO: 5 %
NEUTROPHILS # BLD AUTO: 7.3 10E3/UL (ref 1.6–8.3)
NEUTROPHILS NFR BLD AUTO: 77 %
NRBC # BLD AUTO: 0 10E3/UL
NRBC BLD AUTO-RTO: 0 /100
PHOSPHATE SERPL-MCNC: 2.8 MG/DL (ref 2.5–4.5)
PLATELET # BLD AUTO: 307 10E3/UL (ref 150–450)
POTASSIUM BLD-SCNC: 3.8 MMOL/L (ref 3.4–5.3)
RBC # BLD AUTO: 4.66 10E6/UL (ref 4.4–5.9)
SODIUM SERPL-SCNC: 139 MMOL/L (ref 133–144)
WBC # BLD AUTO: 9.5 10E3/UL (ref 4–11)

## 2022-01-03 PROCEDURE — 250N000009 HC RX 250: Performed by: HOSPITALIST

## 2022-01-03 PROCEDURE — 92526 ORAL FUNCTION THERAPY: CPT | Mod: GN | Performed by: SPEECH-LANGUAGE PATHOLOGIST

## 2022-01-03 PROCEDURE — 36415 COLL VENOUS BLD VENIPUNCTURE: CPT | Performed by: HOSPITALIST

## 2022-01-03 PROCEDURE — 97112 NEUROMUSCULAR REEDUCATION: CPT | Mod: GP

## 2022-01-03 PROCEDURE — 99233 SBSQ HOSP IP/OBS HIGH 50: CPT | Mod: GC | Performed by: PSYCHIATRY & NEUROLOGY

## 2022-01-03 PROCEDURE — 97530 THERAPEUTIC ACTIVITIES: CPT | Mod: GP

## 2022-01-03 PROCEDURE — 83735 ASSAY OF MAGNESIUM: CPT | Performed by: HOSPITALIST

## 2022-01-03 PROCEDURE — 999N000190 HC STATISTIC VAT ROUNDS

## 2022-01-03 PROCEDURE — 84100 ASSAY OF PHOSPHORUS: CPT | Performed by: HOSPITALIST

## 2022-01-03 PROCEDURE — 85025 COMPLETE CBC W/AUTO DIFF WBC: CPT | Performed by: HOSPITALIST

## 2022-01-03 PROCEDURE — 120N000001 HC R&B MED SURG/OB

## 2022-01-03 PROCEDURE — C9113 INJ PANTOPRAZOLE SODIUM, VIA: HCPCS | Performed by: HOSPITALIST

## 2022-01-03 PROCEDURE — 258N000003 HC RX IP 258 OP 636: Performed by: HOSPITALIST

## 2022-01-03 PROCEDURE — 99232 SBSQ HOSP IP/OBS MODERATE 35: CPT | Performed by: INTERNAL MEDICINE

## 2022-01-03 PROCEDURE — 250N000013 HC RX MED GY IP 250 OP 250 PS 637: Performed by: HOSPITALIST

## 2022-01-03 PROCEDURE — 250N000011 HC RX IP 250 OP 636: Performed by: HOSPITALIST

## 2022-01-03 PROCEDURE — 80048 BASIC METABOLIC PNL TOTAL CA: CPT | Performed by: HOSPITALIST

## 2022-01-03 RX ADMIN — ACETAMINOPHEN 650 MG: 325 SUSPENSION ORAL at 17:45

## 2022-01-03 RX ADMIN — HYDRALAZINE HYDROCHLORIDE 50 MG: 50 TABLET, FILM COATED ORAL at 21:28

## 2022-01-03 RX ADMIN — ENALAPRILAT 2.5 MG: 1.25 INJECTION INTRAVENOUS at 09:30

## 2022-01-03 RX ADMIN — METOPROLOL TARTRATE 10 MG: 5 INJECTION INTRAVENOUS at 01:52

## 2022-01-03 RX ADMIN — ENALAPRILAT 2.5 MG: 1.25 INJECTION INTRAVENOUS at 20:31

## 2022-01-03 RX ADMIN — Medication 1 PACKET: at 09:29

## 2022-01-03 RX ADMIN — METOPROLOL TARTRATE 10 MG: 5 INJECTION INTRAVENOUS at 14:12

## 2022-01-03 RX ADMIN — PANTOPRAZOLE SODIUM 40 MG: 40 INJECTION, POWDER, FOR SOLUTION INTRAVENOUS at 20:26

## 2022-01-03 RX ADMIN — PANTOPRAZOLE SODIUM 40 MG: 40 INJECTION, POWDER, FOR SOLUTION INTRAVENOUS at 09:29

## 2022-01-03 RX ADMIN — METOPROLOL TARTRATE 10 MG: 5 INJECTION INTRAVENOUS at 09:30

## 2022-01-03 RX ADMIN — SODIUM CHLORIDE: 9 INJECTION, SOLUTION INTRAVENOUS at 02:13

## 2022-01-03 RX ADMIN — ACETAMINOPHEN 650 MG: 325 SUSPENSION ORAL at 13:48

## 2022-01-03 RX ADMIN — HYDRALAZINE HYDROCHLORIDE 50 MG: 50 TABLET, FILM COATED ORAL at 05:24

## 2022-01-03 RX ADMIN — HYDRALAZINE HYDROCHLORIDE 50 MG: 50 TABLET, FILM COATED ORAL at 14:12

## 2022-01-03 RX ADMIN — ACETAMINOPHEN 650 MG: 325 SUSPENSION ORAL at 02:14

## 2022-01-03 RX ADMIN — METOPROLOL TARTRATE 10 MG: 5 INJECTION INTRAVENOUS at 20:33

## 2022-01-03 RX ADMIN — SODIUM CHLORIDE: 9 INJECTION, SOLUTION INTRAVENOUS at 21:38

## 2022-01-03 RX ADMIN — ENALAPRILAT 2.5 MG: 1.25 INJECTION INTRAVENOUS at 14:12

## 2022-01-03 RX ADMIN — ENALAPRILAT 2.5 MG: 1.25 INJECTION INTRAVENOUS at 02:05

## 2022-01-03 ASSESSMENT — ACTIVITIES OF DAILY LIVING (ADL)
ADLS_ACUITY_SCORE: 31
ADLS_ACUITY_SCORE: 37
ADLS_ACUITY_SCORE: 31
ADLS_ACUITY_SCORE: 37
ADLS_ACUITY_SCORE: 31
ADLS_ACUITY_SCORE: 33
ADLS_ACUITY_SCORE: 37
ADLS_ACUITY_SCORE: 31
ADLS_ACUITY_SCORE: 31
ADLS_ACUITY_SCORE: 33
ADLS_ACUITY_SCORE: 39
ADLS_ACUITY_SCORE: 33
ADLS_ACUITY_SCORE: 31
ADLS_ACUITY_SCORE: 33
ADLS_ACUITY_SCORE: 31
ADLS_ACUITY_SCORE: 39

## 2022-01-03 ASSESSMENT — MIFFLIN-ST. JEOR: SCORE: 1466.5

## 2022-01-03 NOTE — PLAN OF CARE
Pt here with R frontal ICH.Sitter at the bedside.ALLYSON orientation/most of neuros d/t not following commands. Neuros - L side hemiplegic/ L facial droop/L pupil size greater than R pupil. VSS except for hypertensive and 1L NC. Tele NSR. NPO. Pills crushed through NG tube.TF running at goal rate 50ml/hr and 60 ml FW q4h manually. Up with lift, T/R q2h.Small bloody stool and ext cath w/ adequate urine output. Restless and given PRN tylenol. Hgb at 1000 (1/2) was 13.Pt scoring yellow on the Aggression Stop Light Tool. Plan for possible PEG placement  today. Discharge pending.

## 2022-01-03 NOTE — PROGRESS NOTES
[facility]    Stroke Progress Note    Interval EventsNo acute neuro changes reported overnight. Patient remains quite drowsy as he was sleeping upon examiners arrival requiring repetitive stimuli before awakening. Once awake patient does follow most commands. He is still demonstrating L sided left sided weakness with likely apraxia secondary to the R ICH. His neck appears stiff with preferred rotation to the right. His LUE is also quite stiff, patient endorses some pain with passive movement of the L arm, less so with passive movement in his left leg.     HPI Summary  Sukhi Johnson is 71 year old male who presented to [facility] ED on 12/24 after being found down with left sided weakness and difficulty speaking. He was found to have a large R frontoparietal ICH, ~60cc. Etiology is hypertensive vs cerebral amyloid angiopathy.     MRI/Head CT 12/24/21 CT Head: 6.1 x 4.4 cm parenchymal hemorrhage in right frontal parietal region with some mild surrounding edema and localized mass effect  12/25/21 MRI Brain:  Right frontoparietal acute intracranial hemorrhage with associated regional mass effect resulting in 2 mm leftward midline shift. Tiny chronic infarct within L PCA territory   12/29/21 CT Head: Stable, no definite evidence for new or increasing intracranial hemorrhage.   Intracranial Vasculature Negative CTA head   Cervical Vasculature No measurable stenosis or dissection in the vertebral arteries and carotid arteries       Echocardiogram EF >70%, normal atrium size, no atrial shunt seen, negative bubble study. Hyperdynamic left ventricular function    EKG/Telemetry Sinus tachycardia    Other Testing Not Applicable     LDL  12/25/2021: 109 mg/dL   A1C  12/25/2021: 5.4 %   Troponin No lab value available in past 48 hrs      Impression   Non-traumatic intracerebral hemorrhage of right frontoparietal region, likely due to cerebral amyloid angiopathy, with contribution from untreated  "HTN    Plan  -There are multiple confounding factors to patient's level of alertness, prior to initiating scheduled medications such as stimulants to promote wakefulness and medications such as baclofen for ongoing spacticity, would recommend full treatment of possible underlying infection, monitor for resolution of possible GI bleed, sleep/wake cycle. Once patient admitted to rehab facility, can discuss adding medications  -Systolic BP Goal: <160 with long term goal of normotension   --IV PRNs to maintain BP goal. Ok to titrate enteral meds to achieve   -Goal sodium normonatremia: 135 or greateread of bed elevated to 30 degrees  -PT/OT/SLP therapies as needed   --possible PEG placement needed for long term nutrition   -Bedside blood glucose monitoring  --Euglycemia, blood glucose <180  -Euthermia, please treat any fever aggressively   -Q4 hour neuro checks   -Repeat brain MRI with and without contrast in 3 months outpatient to evaluate for underlying lesion [order placed]    Patient Follow-up    -Follow up with PCP post discharge  -Follow up with Stroke Neurology as outpatient in 6-10 weeks [next available appointment, referral placed]     No further stroke evaluation is recommended, so we will sign off. Please contact us with any additional questions.    Donna Lorenzo PA-C   Neurology  To page me or covering stroke neurology team member, click here: [facility]COM   Choose \"On Call\" tab at top, then search dropdown box for \"Neurology Adult\", select location, press Enter, then look for stroke/neuro ICU/telestroke.  ______________________________________________________    Clinically Significant Risk Factors Present on Admission                  Medications   Scheduled Meds    enalaprilat  2.5 mg Intravenous Q6H     [Held by provider] heparin ANTICOAGULANT  5,000 Units Subcutaneous Q12H     hydrALAZINE  50 mg Per Feeding Tube Q8H TONY     metoprolol  10 mg Intravenous Q6H     pantoprazole (PROTONIX) IV  40 mg Intravenous BID "     protein modular  1 packet Per Feeding Tube Daily     sodium chloride (PF)  10-40 mL Intracatheter Q7 Days     sodium chloride (PF)  3 mL Intracatheter Q8H       Infusion Meds    sodium chloride 50 mL/hr at 01/03/22 1317       PRN Meds  acetaminophen, acetaminophen, hydrALAZINE, labetalol, lidocaine 4%, lidocaine, lidocaine (buffered or not buffered), melatonin, ondansetron **OR** ondansetron, sodium chloride (PF), sodium chloride (PF), sodium chloride (PF)       PHYSICAL EXAMINATION  Temp:  [98  F (36.7  C)-98.6  F (37  C)] 98.6  F (37  C)  Pulse:  [] 85  Resp:  [16-19] 16  BP: (121-158)/(58-92) 141/75  SpO2:  [93 %-96 %] 93 %      Neuro Exam  Mental Status: initially asleep but wakes to repetitive stimuli, follow some simple commands  Cranial Nerves: R gaze preference, L facial droop, hearing intact to voice [formal testing not completed]  Motor: spontaneous movement in RUE/RLE, no movement in LUE [appears contracted into a flexed position], very minimal movement if any in LLE    Sensory: pain with passive movement of LUE   Coordination:  unable to perform  Station/Gait:  deferred     Imaging  I personally reviewed all imaging; relevant findings per HPI.     Lab Results Data   CBC  Recent Labs   Lab 01/02/22  1004 01/01/22  2253 01/01/22  0633 12/30/21  1414 12/30/21  0427 12/29/21  0624 12/28/21  1714 12/28/21  0844   WBC  --   --   --   --   --  8.9 10.7 11.7*   RBC  --   --   --   --   --  4.56 5.17 5.24   HGB 13.0* 13.7 13.9   < > 14.2 13.4 15.2 15.1   HCT  --   --   --   --   --  42.5 46.6 48.3     --   --   --  271 256 262 288    < > = values in this interval not displayed.     Basic Metabolic Panel    Recent Labs   Lab 01/02/22  1004 01/01/22  0820 01/01/22  0633 12/31/21  1602 12/31/21  1417 12/31/21  1155 12/31/21  0609     --  142  --   --   --  147*   POTASSIUM 3.7  --  3.5  --  3.5  --  3.0*   CHLORIDE 113*  --  114*  --   --   --  120*   CO2 25  --  27  --   --   --  23   BUN 22   --  21  --   --   --  17   CR 0.67  --  0.67  --   --   --  0.53*   * 122* 134*   < >  --    < > 105*   MUSTAPHA 8.3*  --  8.5  --   --   --  6.9*    < > = values in this interval not displayed.     Liver Panel  No results for input(s): PROTTOTAL, ALBUMIN, BILITOTAL, ALKPHOS, AST, ALT, BILIDIRECT in the last 168 hours.  INR    Recent Labs   Lab Test 12/30/21  2036 12/24/21  1542   INR 1.06 0.96      Lipid Profile    Recent Labs   Lab Test 12/25/21  1116   CHOL 193   HDL 61   *   TRIG 117     A1C    Recent Labs   Lab Test 12/25/21  1116   A1C 5.4     Troponin I  No results for input(s): TROPONIN, GHTROP in the last 168 hours.

## 2022-01-03 NOTE — PROGRESS NOTES
[facility]    Medicine Progress Note - Hospitalist Service       Date of Admission:  12/24/2021  Assessment & Plan   Sukhi Johnson is a 71 year-old male with past medical history of hypertension, chronic fatigue who presents with left-sided weakness and speech difficulties. Admitted 12/24/2021.      Acute hemorrhagic stroke with vasogenic edema  Acute metabolic encephalopathy secondary to above  History of hemorrhagic stroke in 2013  Hypertension, untreated   *Presents with left-sided weakness and speech difficulty  *CT and MRI on admission with acute right frontoparietal intracranial hemorrhage with associated regional mass effect resulting in 2 mm leftward midline shift; MRI also noted with numerous chronic microhemorrhages throughout the cerebral hemispheres, deep gray nuclei and posterior fossa, possibly representing sequelae of hypertensive or amyloid angiopathy  *Neurosurgery evaluated and non-surgical management recommended  *Repeat head CT 12/26 with slight interval decrease in size of the large parenchymal hematoma, no definite evidence for new or increasing hemorrhage; stable minimal leftward midline shift of the third ventricle  *Repeat head CT 12/29 noted stable  *EEG 12/29 with no electrographic seizures  *ECHO 12/26 with LVEF >70%, negative bubble study; A1c 5.4,   - Neurology (stroke) consulted, following  - Goal SBP <160, has been at goal   - Continue hydralazine 50 mg PO TID, enalaprilate 2.5 mg q6H, metoprolol 10 mg q6H  - PT consulted, recommending TCU     Acute hypoxic respiratory failure  Suspected aspiration event  *Episode of respiratory distress 12/27. Tmax 100.8F, WBC elevated  *CXR without clear infiltrate, though episode concerning for aspiration event  *Completed 7 day course of piperacillin-tazobactam IV  - SLP consulted, diet per SLP    At risk for malnutrition   - Nutrition following   - Continue tube feeds per protocol       Hypernatremia  Sodium peak of 152. Secondary to lack of free water intake.   - Sodium normal 1/3  - Continue free water flushes    Hypokalemia  - Monitor and replace per protocol     Suspected GI Bleed  *Bloody BM noted since 12/29 with Hgb down to 12.7 g/dl  - Currently improving   - GI following, no immediate plans for endoscopy  - Continue IV PPI     Chronic fatigue syndrome 2/2 Lyme disease  PTA on Adderall.   - Non-amphetamine stimulants preferred given hemorrhagic stroke. Continue modafinil      COVID PCR screen negative  No vaccination records available, tested negative on admission 12/24    Clinically Significant Risk Factors Present on Admission                       Diet: NPO for Medical/Clinical Reasons Except for: No Exceptions  Adult Formula Drip Feeding: Continuous Jevity 1.5; Other - Specify in Comment; Goal Rate: 50; mL/hr; Medication - Feeding Tube Flush Frequency: At least 15-30 mL water before and after medication administration and with tube clogging; Amount to Se...    DVT Prophylaxis: Pneumatic Compression Devices  Tay Catheter: Not present  Code Status: Full Code      Disposition Plan   Expected Discharge: 01/05/2022     [facility]  [facility]ays:     Complex Care         Entered: Juan Francisco Albarran MD 01/03/2022, 3:25 PM       The patient's care was discussed with the patient, bedside RN, patient's wife    Juan Francisco Albarran MD  Hospitalist Service  [facility]    ______________________________________________________________________    Interval History   No acute events overnight. Unable to obtain any meaningful history from patient. Per RN, no new concerns.     Data reviewed today: I reviewed all medications, new labs and imaging results over the last 24 hours. I personally reviewed no images or EKG's today.    Physical Exam   Vital Signs: Temp: 98.1  F (36.7  C) Temp src: Oral BP: 129/60 Pulse: 87   Resp: 18 SpO2:  95 % O2 Device: None (Room air) Oxygen Delivery: 1 LPM  Weight: 184 lbs 0 oz    Constitutional: NAD  Respiratory: Clear to auscultation bilaterally, good air movement bilaterally  Cardiovascular: RRR. No peripheral edema.  GI: Soft, non-tender, non-distended.   Skin/Integumen: Warm, dry  Neuro: Initially sleeping, subsequently alert but non verbal and not consistently following commands. Spontaneously moving right arm and leg. No movement on the left.      Data   Recent Labs   Lab 01/03/22  1329 01/02/22  1004 01/01/22  2253 01/01/22  0820 01/01/22  0633 12/31/21  0609 12/30/21  2036 12/30/21  0428 12/30/21  0427 12/29/21  1942 12/29/21  0624 12/28/21  2219 12/28/21  1714   WBC 9.5  --   --   --   --   --   --   --   --   --  8.9  --  10.7   HGB 13.7 13.0* 13.7  --  13.9   < >  --    < > 14.2  --  13.4  --  15.2   MCV 91  --   --   --   --   --   --   --   --   --  93  --  90    280  --   --   --   --   --   --  271  --  256  --  262   INR  --   --   --   --   --   --  1.06  --   --   --   --   --   --     143  --   --  142   < >  --    < >  --   --  152*  --   --    POTASSIUM 3.8 3.7  --   --  3.5   < >  --    < >  --    < > 3.0*  --  3.3*   CHLORIDE 110* 113*  --   --  114*   < >  --    < >  --   --  124*  --   --    CO2 23 25  --   --  27   < >  --    < >  --   --  24  --   --    BUN 21 22  --   --  21   < >  --    < >  --   --  27  --   --    CR 0.52* 0.67  --   --  0.67   < >  --    < >  --   --  0.68  --   --    ANIONGAP 6 5  --   --  1*   < >  --    < >  --   --  4  --   --    MUSTAPHA 8.6 8.3*  --   --  8.5   < >  --    < >  --   --  8.2*  --   --    * 132*  --  122* 134*   < >  --    < >  --   --  107*   < >  --     < > = values in this interval not displayed.       No results found for this or any previous visit (from the past 24 hour(s)).  Medications     sodium chloride 50 mL/hr at 01/03/22 1317       enalaprilat  2.5 mg Intravenous Q6H     [Held by provider] heparin ANTICOAGULANT  5,000  Units Subcutaneous Q12H     hydrALAZINE  50 mg Per Feeding Tube Q8H TONY     metoprolol  10 mg Intravenous Q6H     pantoprazole (PROTONIX) IV  40 mg Intravenous BID     protein modular  1 packet Per Feeding Tube Daily     sodium chloride (PF)  10-40 mL Intracatheter Q7 Days     sodium chloride (PF)  3 mL Intracatheter Q8H

## 2022-01-03 NOTE — PLAN OF CARE
"BP (!) 141/75 (BP Location: Left arm)   Pulse 85   Temp 98.6  F (37  C) (Axillary)   Resp 16   Ht 1.702 m (5' 7\")   Wt 83.5 kg (184 lb)   SpO2 93%   BMI 28.82 kg/m      Patient is currently here with right-sided nontraumatic intracerebral hemorrhage. Unable to assess patient's level of consciousness and neurological status due to nonverbal status combined with a flat affect, however a left-sided facial droop, unequal pupils and left-sided hemiplegia is apparent. VSS on 1L/min via nasal cannula aside from intermittent hypertension. Telemetry reveals a normal sinus rhythm. Patient is currently NPO with nasogastric tube feedings running at 50 mL/hr and 60 mL flushes every four hours. Patient has a peripherally inserted central catheter on the right-upper arm that is both patent and in proper position, currently infusing 0.9% Normal Saline at a rate of 50 mL/hr. Patient transfers and ambulates with an assist of two plus the utilization of a lift device. Patient is incontinent of bladder and bowel, external catheter was replaced and is in position with adequate urinary output. Patient is scoring yellow on the Aggression Stop Light Tool related to confusion. Patient currently has a sitter at bedside and a soft mitt placed on the right-upper extremity to prevent the patient from pulling on life-sustaining tubes, lines and other medical devices. Discharge plans pending.  "

## 2022-01-03 NOTE — PROGRESS NOTES
[facility]  Gastroenterology Progress Note     Sukhi Johnson MRN# [id number]   YOB: 1950 Age: 71 year old          Assessment and Plan:   Sukhi Johnson is a 71 year old male with PMH of HTN, ADD admitted on 12/24/2021 with non traumatic ICH of R frontoparietal region. Had bright red bleeding per rectum starting on 12/29. GI consulted    Acute hemorrhagic stroke with likely cerebral edema, hypertensive vs amyloid angiopathy  Acute metabolic encephalopathy secondary to above  Bright red bleeding per rectum  Has had recurrent small amount of bright red blood per rectum  Hemoglobin in 12.5-13.5 range and stable  Has significant neurologic impairment.  Bleeding may be due to hemorrhoids vs rectal ulcer vs anal fissure vs less likely neoplastic or upper GI bleed.   Neurology has cleared form colonoscopy.  Given stable hemoglobin and underlying diagnoses, further discussion with wife, Miladis, I do not recommend further evaluation with a colonoscopy/endoscopy at this time. Miladis agrees.    -- daily hemoglobin  -- Avoid constipation  -- Monitor stools  -- pantoprazole 40 mg BID            Interval History:   no new complaints and doing well; no cp, sob, n/v/d, or abd pain.              Review of Systems:   C: NEGATIVE for fever, chills, change in weight  E/M: NEGATIVE for ear, mouth and throat problems  R: NEGATIVE for significant cough or SOB  CV: NEGATIVE for chest pain, palpitations or peripheral edema             Medications:   I have reviewed this patient's current medications    enalaprilat  2.5 mg Intravenous Q6H     [Held by provider] heparin ANTICOAGULANT  5,000 Units Subcutaneous Q12H     hydrALAZINE  50 mg Per Feeding Tube Q8H TONY     metoprolol  10 mg Intravenous Q6H     pantoprazole (PROTONIX) IV  40 mg Intravenous BID     protein modular  1 packet Per Feeding Tube Daily     sodium chloride (PF)  10-40 mL Intracatheter Q7 Days     sodium chloride (PF)   3 mL Intracatheter Q8H                  Physical Exam:   Vitals were reviewed  Vital Signs with Ranges  Temp:  [97.5  F (36.4  C)-98.5  F (36.9  C)] 98.2  F (36.8  C)  Pulse:  [] 88  Resp:  [16-19] 16  BP: (121-158)/(58-92) 126/58  FiO2 (%):  [100 %] 100 %  SpO2:  [92 %-96 %] 93 %  I/O last 3 completed shifts:  In: 430 [NG/GT:390]  Out: 1780 [Urine:1780]  Constitutional: no distress   Cardiovascular: negative, PMI normal. No lifts, heaves, or thrills. RRR. No murmurs, clicks gallops or rub  Respiratory: negative, Percussion normal. Good diaphragmatic excursion. Lungs clear  Abdomen: Abdomen soft, non-tender. BS normal. No masses, organomegaly             Data:   I reviewed the patient's new clinical lab test results.   Recent Labs   Lab Test 01/02/22  1004 01/01/22  2253 01/01/22  0633 12/31/21  0609 12/30/21  2036 12/30/21  1414 12/30/21  0427 12/29/21  0624 12/28/21  1714 12/28/21  0844 12/25/21  1116 12/24/21  1542   WBC  --   --   --   --   --   --   --  8.9 10.7 11.7*   < > 10.4   HGB 13.0* 13.7 13.9   < >  --    < > 14.2 13.4 15.2 15.1   < > 15.6   MCV  --   --   --   --   --   --   --  93 90 92   < > 89     --   --   --   --   --  271 256 262 288   < > 258   INR  --   --   --   --  1.06  --   --   --   --   --   --  0.96    < > = values in this interval not displayed.     Recent Labs   Lab Test 01/02/22  1004 01/01/22  0633 12/31/21  1417 12/31/21  0609   POTASSIUM 3.7 3.5 3.5 3.0*   CHLORIDE 113* 114*  --  120*   CO2 25 27  --  23   BUN 22 21  --  17   ANIONGAP 5 1*  --  4     Recent Labs   Lab Test 12/27/21  1716 12/25/21  1116   ALBUMIN  --  3.4   BILITOTAL  --  2.3*   ALT  --  25   AST  --  17   PROTEIN 20 *  --        I reviewed the patient's new imaging results.    All laboratory data reviewed  All imaging studies reviewed by me.    Lety Nava PA-C,  1/3/2022  [facility]  Office : [phone]  Cell: [phone] (Dr. Rivera)  Cell: [phone] (Lety Nava  LUZ)

## 2022-01-03 NOTE — PROVIDER NOTIFICATION
Neuro checks and vital signs ordered q2h. Messaged Donna Bj with Neurology to clarify. Orders received to change neuros and vitals to q4h.

## 2022-01-04 ENCOUNTER — APPOINTMENT (OUTPATIENT)
Dept: PHYSICAL THERAPY | Facility: CLINIC | Age: 72
DRG: 064 | End: 2022-01-04
Payer: COMMERCIAL

## 2022-01-04 ENCOUNTER — APPOINTMENT (OUTPATIENT)
Dept: SPEECH THERAPY | Facility: CLINIC | Age: 72
DRG: 064 | End: 2022-01-04
Payer: COMMERCIAL

## 2022-01-04 LAB
GLUCOSE BLDC GLUCOMTR-MCNC: 115 MG/DL (ref 70–99)
GLUCOSE BLDC GLUCOMTR-MCNC: 93 MG/DL (ref 70–99)
HGB BLD-MCNC: 13.8 G/DL (ref 13.3–17.7)
POTASSIUM BLD-SCNC: 3.6 MMOL/L (ref 3.4–5.3)

## 2022-01-04 PROCEDURE — 97530 THERAPEUTIC ACTIVITIES: CPT | Mod: GP

## 2022-01-04 PROCEDURE — 97112 NEUROMUSCULAR REEDUCATION: CPT | Mod: GP

## 2022-01-04 PROCEDURE — 84132 ASSAY OF SERUM POTASSIUM: CPT | Performed by: HOSPITALIST

## 2022-01-04 PROCEDURE — 250N000011 HC RX IP 250 OP 636: Performed by: HOSPITALIST

## 2022-01-04 PROCEDURE — 120N000001 HC R&B MED SURG/OB

## 2022-01-04 PROCEDURE — 258N000003 HC RX IP 258 OP 636: Performed by: HOSPITALIST

## 2022-01-04 PROCEDURE — 85018 HEMOGLOBIN: CPT | Performed by: STUDENT IN AN ORGANIZED HEALTH CARE EDUCATION/TRAINING PROGRAM

## 2022-01-04 PROCEDURE — 250N000009 HC RX 250

## 2022-01-04 PROCEDURE — 99232 SBSQ HOSP IP/OBS MODERATE 35: CPT | Performed by: INTERNAL MEDICINE

## 2022-01-04 PROCEDURE — C9113 INJ PANTOPRAZOLE SODIUM, VIA: HCPCS | Performed by: HOSPITALIST

## 2022-01-04 PROCEDURE — 0DHA3UZ INSERTION OF FEEDING DEVICE INTO JEJUNUM, PERCUTANEOUS APPROACH: ICD-10-PCS | Performed by: RADIOLOGY

## 2022-01-04 PROCEDURE — 999N000190 HC STATISTIC VAT ROUNDS

## 2022-01-04 PROCEDURE — 250N000009 HC RX 250: Performed by: HOSPITALIST

## 2022-01-04 PROCEDURE — 250N000013 HC RX MED GY IP 250 OP 250 PS 637: Performed by: HOSPITALIST

## 2022-01-04 PROCEDURE — 92507 TX SP LANG VOICE COMM INDIV: CPT | Mod: GN | Performed by: SPEECH-LANGUAGE PATHOLOGIST

## 2022-01-04 PROCEDURE — 999N000040 HC STATISTIC CONSULT NO CHARGE VASC ACCESS

## 2022-01-04 RX ORDER — WATER 10 ML/10ML
INJECTION INTRAMUSCULAR; INTRAVENOUS; SUBCUTANEOUS
Status: COMPLETED
Start: 2022-01-04 | End: 2022-01-04

## 2022-01-04 RX ADMIN — ENALAPRILAT 2.5 MG: 1.25 INJECTION INTRAVENOUS at 03:07

## 2022-01-04 RX ADMIN — PANTOPRAZOLE SODIUM 40 MG: 40 INJECTION, POWDER, FOR SOLUTION INTRAVENOUS at 09:06

## 2022-01-04 RX ADMIN — METOPROLOL TARTRATE 10 MG: 5 INJECTION INTRAVENOUS at 09:02

## 2022-01-04 RX ADMIN — ACETAMINOPHEN 650 MG: 650 SUPPOSITORY RECTAL at 18:39

## 2022-01-04 RX ADMIN — METOPROLOL TARTRATE 10 MG: 5 INJECTION INTRAVENOUS at 01:58

## 2022-01-04 RX ADMIN — ENALAPRILAT 2.5 MG: 1.25 INJECTION INTRAVENOUS at 20:30

## 2022-01-04 RX ADMIN — WATER 10 ML: 1 INJECTION INTRAMUSCULAR; INTRAVENOUS; SUBCUTANEOUS at 20:31

## 2022-01-04 RX ADMIN — SODIUM CHLORIDE: 9 INJECTION, SOLUTION INTRAVENOUS at 17:59

## 2022-01-04 RX ADMIN — METOPROLOL TARTRATE 10 MG: 5 INJECTION INTRAVENOUS at 20:31

## 2022-01-04 RX ADMIN — ENALAPRILAT 2.5 MG: 1.25 INJECTION INTRAVENOUS at 09:06

## 2022-01-04 RX ADMIN — METOPROLOL TARTRATE 10 MG: 5 INJECTION INTRAVENOUS at 14:46

## 2022-01-04 RX ADMIN — ENALAPRILAT 2.5 MG: 1.25 INJECTION INTRAVENOUS at 14:47

## 2022-01-04 RX ADMIN — PANTOPRAZOLE SODIUM 40 MG: 40 INJECTION, POWDER, FOR SOLUTION INTRAVENOUS at 20:31

## 2022-01-04 ASSESSMENT — ACTIVITIES OF DAILY LIVING (ADL)
ADLS_ACUITY_SCORE: 33

## 2022-01-04 NOTE — PROGRESS NOTES
[facility]    Medicine Progress Note - Hospitalist Service       Date of Admission:  12/24/2021  Assessment & Plan   Sukhi Johnson is a 71 year-old male with past medical history of hypertension, chronic fatigue who presents with left-sided weakness and speech difficulties. Admitted 12/24/2021.      Acute hemorrhagic stroke with vasogenic edema  Acute metabolic encephalopathy secondary to above  History of hemorrhagic stroke in 2013  Hypertension, untreated   *Presents with left-sided weakness and speech difficulty  *CT and MRI on admission with acute right frontoparietal intracranial hemorrhage with associated regional mass effect resulting in 2 mm leftward midline shift; MRI also noted with numerous chronic microhemorrhages throughout the cerebral hemispheres, deep gray nuclei and posterior fossa, possibly representing sequelae of hypertensive or amyloid angiopathy  *Neurosurgery evaluated and non-surgical management recommended  *Repeat head CT 12/26 with slight interval decrease in size of the large parenchymal hematoma, no definite evidence for new or increasing hemorrhage; stable minimal leftward midline shift of the third ventricle  *Repeat head CT 12/29 noted stable  *EEG 12/29 with no electrographic seizures  *ECHO 12/26 with LVEF >70%, negative bubble study; A1c 5.4,   - Neurology (stroke) consulted, now signed off  - Goal SBP <160, has been at goal   - Continue hydralazine 50 mg PO TID (when enteral route re-established), enalaprilat 2.5 mg q6H, metoprolol 10 mg q6H  - Will transition to full oral regimen once enteral route re-established   - PT consulted, recommending TCU. Discussed with SW who will obtain choices.      Acute hypoxic respiratory failure  Suspected aspiration event  *Episode of respiratory distress 12/27. Tmax 100.8F, WBC elevated  *CXR without clear infiltrate, though episode concerning for aspiration event  *Completed 7 day course of  piperacillin-tazobactam IV  - SLP consulted, diet per SLP  - Slow recovery, if at all, anticipated, will plan for PEG. Discussed with wife, order placed.     At risk for malnutrition   - Nutrition following   - Continue tube feeds per protocol once PEG placed     Hypernatremia  Sodium peak of 152. Secondary to lack of free water intake. Sodium normal 1/3  - Continue free water flushes    Hypokalemia  - Monitor and replace per protocol     Suspected GI Bleed  *Bloody BM noted since 12/29 with Hgb down to 12.7 g/dl  *GI consulted, no immediate plans for endoscopy  - Currently improving   - Continue IV PPI     Chronic fatigue syndrome 2/2 Lyme disease  PTA on Adderall.   - Neurology recommending against stimulants at this time     COVID PCR screen negative  No vaccination records available, tested negative on admission 12/24    Clinically Significant Risk Factors Present on Admission                       Diet: NPO for Medical/Clinical Reasons Except for: No Exceptions  Adult Formula Drip Feeding: Continuous Jevity 1.5; Other - Specify in Comment; Goal Rate: 50; mL/hr; Medication - Feeding Tube Flush Frequency: At least 15-30 mL water before and after medication administration and with tube clogging; Amount to Se...  NPO per Anesthesia Guidelines for Procedure/Surgery Except for: Meds    DVT Prophylaxis: Pneumatic Compression Devices  Tay Catheter: Not present  Code Status: Full Code      Disposition Plan   Expected Discharge: 01/07/2022     Anticipated discharge location:  Awaiting care coordination huddle  Delays:     Complex Care         Entered: Juan Francisco Albarran MD 01/04/2022, 4:44 PM       The patient's care was discussed with the patient, bedside RN, patient's wife    Juan Francisco Albarran MD  Hospitalist Service  [facility]    ______________________________________________________________________    Interval History   Pulled NG tube overnight. Unable to obtain any meaningful history from  patient due to his CVA/aphasia. Per RN, no new concerns.     Data reviewed today: I reviewed all medications, new labs and imaging results over the last 24 hours. I personally reviewed no images or EKG's today.    Physical Exam   Vital Signs: Temp: 97.8  F (36.6  C) Temp src: Axillary BP: (!) 143/86 Pulse: 78   Resp: 16 SpO2: 94 % O2 Device: None (Room air)    Weight: 162 lbs 7.66 oz    Constitutional: NAD  Respiratory: Clear to auscultation bilaterally, good air movement bilaterally  Cardiovascular: RRR. No peripheral edema.  GI: Soft, non-tender, non-distended.   Skin/Integumen: Warm, dry  Neuro: Alert. Non-verbal. Spontaneously moving right arm and leg, some difficulty with multi-step commands on the right. No movement on the left.      Data   Recent Labs   Lab 01/04/22  0607 01/04/22  0156 01/03/22  1329 01/02/22  1004 01/01/22  2253 01/01/22  0820 01/01/22  0633 12/31/21  0609 12/30/21  2036 12/30/21  0428 12/30/21  0427 12/29/21  1942 12/29/21  0624 12/28/21  2219 12/28/21  1714   WBC  --   --  9.5  --   --   --   --   --   --   --   --   --  8.9  --  10.7   HGB  --   --  13.7 13.0* 13.7  --  13.9   < >  --    < > 14.2  --  13.4  --  15.2   MCV  --   --  91  --   --   --   --   --   --   --   --   --  93  --  90   PLT  --   --  307 280  --   --   --   --   --   --  271  --  256  --  262   INR  --   --   --   --   --   --   --   --  1.06  --   --   --   --   --   --    NA  --   --  139 143  --   --  142   < >  --    < >  --   --  152*  --   --    POTASSIUM 3.6  --  3.8 3.7  --   --  3.5   < >  --    < >  --    < > 3.0*  --  3.3*   CHLORIDE  --   --  110* 113*  --   --  114*   < >  --    < >  --   --  124*  --   --    CO2  --   --  23 25  --   --  27   < >  --    < >  --   --  24  --   --    BUN  --   --  21 22  --   --  21   < >  --    < >  --   --  27  --   --    CR  --   --  0.52* 0.67  --   --  0.67   < >  --    < >  --   --  0.68  --   --    ANIONGAP  --   --  6 5  --   --  1*   < >  --    < >  --   --  4   --   --    MUSTAPHA  --   --  8.6 8.3*  --   --  8.5   < >  --    < >  --   --  8.2*  --   --    GLC  --  115* 135* 132*  --    < > 134*   < >  --    < >  --   --  107*   < >  --     < > = values in this interval not displayed.       No results found for this or any previous visit (from the past 24 hour(s)).  Medications     sodium chloride 50 mL/hr at 01/03/22 2138       enalaprilat  2.5 mg Intravenous Q6H     [Held by provider] heparin ANTICOAGULANT  5,000 Units Subcutaneous Q12H     hydrALAZINE  50 mg Per Feeding Tube Q8H TONY     metoprolol  10 mg Intravenous Q6H     pantoprazole (PROTONIX) IV  40 mg Intravenous BID     protein modular  1 packet Per Feeding Tube Daily     sodium chloride (PF)  10-40 mL Intracatheter Q7 Days     sodium chloride (PF)  3 mL Intracatheter Q8H

## 2022-01-04 NOTE — PLAN OF CARE
Reason for Admission: Patient is currently here with right-sided nontraumatic intracerebral hemorrhage.       Seizure or isolation precaution: no  Cognitive/Mentation: unable to assess patient's level of consciousness and neurological status due to nonverbal status combined with a flat affect  Use of CPAP: no  Neuros/CMS: unable to assess patient's level of consciousness and neurological status due to nonverbal status combined with a flat affect  VS: stable  Tele: NSR  GI: BS active x4  passing flatus, inontinent.  : incontinent. Using external catheter   Pulmonary: LS clear  Pain: his wife state that pt has headache, PRN tylenol was given, it was effective   Replacement Protocol: K+ protocol, recheck in AM     BG checks: No  Drains: NS @50ml/hr on the right arm PICC line, it was CDI. Jevity 1.5 riya running via NG tube at 50ml/hr and water flush with 60ml Q4H   Skin: intact  Edema: +2 edema on the left foot  Surgical site: no  Activity: Assist x 2 with lift  Diet: NPO, crush meds and give via NG tube     Consults: speech, PT  Discharge: pending.

## 2022-01-04 NOTE — PROGRESS NOTES
CLINICAL NUTRITION SERVICES - REASSESSMENT NOTE      RECOMMENDATIONS FOR MD/PROVIDER TO ORDER:   Consider more permanent G-tube if appropriate    Recommendations Ordered by Registered Dietitian (RD):   Please weigh patient daily for accuracy   Once TF access achieved, resume TF at 1/2 rate and advance to goal after 6 hrs    Malnutrition: (1/4)  % Weight Loss:  > 2% in 1 week (severe malnutrition) -- more weights needed to confirm  % Intake:  </= 50% for >/= 5 days (severe malnutrition)   Subcutaneous Fat Loss:  None previously observed  Muscle Loss:  None previously observed  Fluid Retention:  Trace-Moderate    Malnutrition Diagnosis: Severe malnutrition (suspected)  In Context of:  Acute illness or injury       EVALUATION OF PROGRESS TOWARD GOALS   Diet:  NPO    Nutrition Support:  TF pulled by patient overnight - now off. Previously achieved goal rate on 1/2 as outlined below~    Type of Feeding Tube: NDT (12/27) - now removed   Enteral Frequency:  Continuous  Enteral Regimen: Jevity 1.5 at goal rate 50 ml/hr   Total Enteral Provisions: 1800 kcal, 77 gm pro, 25 gm fiber, 912 mL free water   Free Water Flush: 150 ml q 4 hours   Total Fluid (TF + FWF) = 1812 ml/day (26 kcal/kg)     +1 packet Prosource/day = 40 kcal, 11 g protein  TOTAL (TF + Prosource) = 1840 kcal (26 kcal/kg) and 88 g protein (1.2 g/kg)    Intake/Tolerance:    Has received sub-optimal nutrition for 11 days this hospitalization, given frequent interruptions in TF with NPO status    Labs reviewed: K/Mg/Phos WNL   Recent Labs   Lab 01/04/22  0156 01/03/22  1329 01/02/22  1004 01/01/22  0820 01/01/22  0633 12/31/21  1602   * 135* 132* 122* 134* 117*     Medications reviewed: NaCl at 50 mL/hr  Stooling:  - 12/31: BM x3  - 1/1: BM x7  - 1/2: BM x7  - 1/3: BM x4  Wt: 162 lbs 7.66 oz - down 22 lbs since admission (12% in <2 weeks)?   Vitals:    12/24/21 1558 12/27/21 0445 01/03/22 2300   Weight: 83.9 kg (184 lb 15.5 oz) 83.5 kg (184 lb) 73.7 kg (162  lb 7.7 oz)       ASSESSED NUTRITION NEEDS:  Dosing Weight 73.7 kg (?current wt 1/3)  Estimated Energy Needs: 8566-6952 kcals (25-30 Kcal/Kg)  Justification: maintenance   Estimated Protein Needs:  grams protein (1.2-1.5 g pro/Kg)  Justification: preservation of lean body mass  Estimated Fluid Needs: per MD      NEW FINDINGS:   No TF access this morning, MD aware  Edema charted on 1/3 ~ Moderate 3+ L foot & trace 1+ R foot     Previous Goals:   EN to meet % estimated needs in the next 24-48 hours  Evaluation: Not met    Previous Nutrition Diagnosis:   Inadequate enteral nutrition infusion related to TF held with emesis and for potential procedure, still advancing towards goal as evidenced by currently meeting 50% nutrition needs   Evaluation: No change      MALNUTRITION (updated)  % Weight Loss:  > 2% in 1 week (severe malnutrition) -- more weights needed to confirm  % Intake:  </= 50% for >/= 5 days (severe malnutrition)   Subcutaneous Fat Loss:  None previously observed  Muscle Loss:  None previously observed  Fluid Retention:  Trace-Moderate    Malnutrition Diagnosis: Severe malnutrition (suspected)  In Context of:  Acute illness or injury      CURRENT NUTRITION DIAGNOSIS  Inadequate protein-energy intake related to NGT out and NPO as evidenced by receiving 0% estimated needs currently, plus sub-optimal nutrition overall x11 days     INTERVENTIONS  Recommendations / Nutrition Prescription  Resume TF when access achieved   Consider G-tube     Implementation  EN Schedule: pending TF replacement     Goals  EN to resume and meet % estimated needs in <48 hrs       MONITORING AND EVALUATION:  Progress towards goals will be monitored and evaluated per protocol and Practice Guidelines      Laura Frey RD, LD  Clinical Dietitian

## 2022-01-04 NOTE — PLAN OF CARE
"Reason for Admission: non traumatic R ICH          Cognitive/Mentation: A/O ALLYSON, alert.  Opens eyes spontaneously  Neuros/CMS: L hemiplegia.  Non-verbal.  Inconsistent with following commands using thumbs up/down.    VS: BP (!) 143/86 (BP Location: Left arm)   Pulse 78   Temp 97.8  F (36.6  C) (Axillary)   Resp 16   Ht 1.727 m (5' 8\")   Wt 73.7 kg (162 lb 7.7 oz)   SpO2 94%   BMI 24.70 kg/m    . Tele: NSR, intermittently tachycardic  GI: BS audible.  No BM this shift.  Incontinent.  Pt pulled out NG overnight.  Plan for PEG placement tomorrow.  Pt is strict NPO.  Small amount of bloody stool.  GI bleed is noted in chart.  Pt has PPI ordered    : incontinent of urine.  External catheter has worked well in the past but are currently out of stock per supply  Pulmonary: LS clear.  On RA.  >94%  Pain: no non verbal indicators of pain.    Drains: NA  Skin: WNL  Activity: A2, turn and repo q 2 h.  Up to recliner x 1 hour with lift  Diet: npo    Aggression Stoplight Score: yellow  Therapies recs: pending      End of shift summary: plan for G tube placement tomorrow    "

## 2022-01-04 NOTE — PLAN OF CARE
SLP: Patient with very limited progress with both swallow and communication. Barrier impacting progress is lethargy. Patient pulled out NG tube and would recommend long term TF given slow limited progress.

## 2022-01-04 NOTE — PROGRESS NOTES
PT has right side nontraumatic ICH.  At this time unable to assess current neuro status as patient is nonverbal pt has strength in right extremities, but hemiplegia in Left upper and lower extremity. Vitals stable on room air,  BP within parameters no prn medications needed, tele NSR to Sinus tach.  Pt is NPO and has pulled out his NG tube.  PICC line placed in the R upper arm, NS infusing at 50mL/hr.  Incontinent of bowel and bladder.

## 2022-01-04 NOTE — PROVIDER NOTIFICATION
MD Notification    Notified Person: MD    Notified Person Name:antonio  Notification Date/Time: 0745    Notification Interaction:vocera    Purpose of Notification:   This patient pulled out his NG tube overnight. Can you place orders to replace it?         Orders Received: MD ordered new NG placement.  But MD is going to speak with patient's spouse prior to placing new NG in case patient needs PEG placement    Comments:

## 2022-01-04 NOTE — PROGRESS NOTES
Interventional Radiology - Progress Note  Inpatient - [facility]  9/22/2021    IR Brief Note    Consult received for G-J tube placement. Chart reviewed. Will schedule for tomorrow. Time TBD. NPO after midnight ordered. Full consult to follow in AM.    Jonathan Blackwell PA-C  Interventional Radiology  [phone] (IR)  [phone] ([facility] Office)

## 2022-01-05 ENCOUNTER — APPOINTMENT (OUTPATIENT)
Dept: INTERVENTIONAL RADIOLOGY/VASCULAR | Facility: CLINIC | Age: 72
DRG: 064 | End: 2022-01-05
Attending: INTERNAL MEDICINE
Payer: COMMERCIAL

## 2022-01-05 LAB
GLUCOSE BLDC GLUCOMTR-MCNC: 99 MG/DL (ref 70–99)
HGB BLD-MCNC: 13.5 G/DL (ref 13.3–17.7)
PLATELET # BLD AUTO: 311 10E3/UL (ref 150–450)
POTASSIUM BLD-SCNC: 3.6 MMOL/L (ref 3.4–5.3)

## 2022-01-05 PROCEDURE — 49440 PLACE GASTROSTOMY TUBE PERC: CPT

## 2022-01-05 PROCEDURE — 250N000011 HC RX IP 250 OP 636: Performed by: HOSPITALIST

## 2022-01-05 PROCEDURE — 120N000001 HC R&B MED SURG/OB

## 2022-01-05 PROCEDURE — C1769 GUIDE WIRE: HCPCS

## 2022-01-05 PROCEDURE — 250N000009 HC RX 250: Performed by: PHYSICIAN ASSISTANT

## 2022-01-05 PROCEDURE — C9113 INJ PANTOPRAZOLE SODIUM, VIA: HCPCS | Performed by: HOSPITALIST

## 2022-01-05 PROCEDURE — 250N000013 HC RX MED GY IP 250 OP 250 PS 637: Performed by: HOSPITALIST

## 2022-01-05 PROCEDURE — 49446 CHANGE G-TUBE TO G-J PERC: CPT

## 2022-01-05 PROCEDURE — 250N000011 HC RX IP 250 OP 636: Performed by: STUDENT IN AN ORGANIZED HEALTH CARE EDUCATION/TRAINING PROGRAM

## 2022-01-05 PROCEDURE — 250N000009 HC RX 250: Performed by: HOSPITALIST

## 2022-01-05 PROCEDURE — 272N000583 ZZ HC TUBE GASTRO CR12

## 2022-01-05 PROCEDURE — 999N000190 HC STATISTIC VAT ROUNDS

## 2022-01-05 PROCEDURE — 84132 ASSAY OF SERUM POTASSIUM: CPT | Performed by: INTERNAL MEDICINE

## 2022-01-05 PROCEDURE — 85049 AUTOMATED PLATELET COUNT: CPT | Performed by: PHYSICIAN ASSISTANT

## 2022-01-05 PROCEDURE — 85018 HEMOGLOBIN: CPT | Performed by: STUDENT IN AN ORGANIZED HEALTH CARE EDUCATION/TRAINING PROGRAM

## 2022-01-05 PROCEDURE — 272N000116 HC CATH CR1

## 2022-01-05 PROCEDURE — 99232 SBSQ HOSP IP/OBS MODERATE 35: CPT | Performed by: INTERNAL MEDICINE

## 2022-01-05 PROCEDURE — 272N000187 HC ACCESSORY CR11

## 2022-01-05 PROCEDURE — 272N000192 HC ACCESSORY CR2

## 2022-01-05 PROCEDURE — 258N000003 HC RX IP 258 OP 636: Performed by: HOSPITALIST

## 2022-01-05 PROCEDURE — 272N000571 HC SHEATH CR8

## 2022-01-05 PROCEDURE — 250N000011 HC RX IP 250 OP 636: Performed by: PHYSICIAN ASSISTANT

## 2022-01-05 RX ORDER — NALOXONE HYDROCHLORIDE 0.4 MG/ML
0.2 INJECTION, SOLUTION INTRAMUSCULAR; INTRAVENOUS; SUBCUTANEOUS
Status: DISCONTINUED | OUTPATIENT
Start: 2022-01-05 | End: 2022-01-05

## 2022-01-05 RX ORDER — FLUMAZENIL 0.1 MG/ML
0.2 INJECTION, SOLUTION INTRAVENOUS
Status: DISCONTINUED | OUTPATIENT
Start: 2022-01-05 | End: 2022-01-05

## 2022-01-05 RX ORDER — NALOXONE HYDROCHLORIDE 0.4 MG/ML
0.4 INJECTION, SOLUTION INTRAMUSCULAR; INTRAVENOUS; SUBCUTANEOUS
Status: DISCONTINUED | OUTPATIENT
Start: 2022-01-05 | End: 2022-01-05

## 2022-01-05 RX ORDER — FENTANYL CITRATE 50 UG/ML
25-50 INJECTION, SOLUTION INTRAMUSCULAR; INTRAVENOUS EVERY 5 MIN PRN
Status: DISCONTINUED | OUTPATIENT
Start: 2022-01-05 | End: 2022-01-05

## 2022-01-05 RX ADMIN — LABETALOL HYDROCHLORIDE 10 MG: 5 INJECTION, SOLUTION INTRAVENOUS at 10:14

## 2022-01-05 RX ADMIN — PANTOPRAZOLE SODIUM 40 MG: 40 INJECTION, POWDER, FOR SOLUTION INTRAVENOUS at 08:44

## 2022-01-05 RX ADMIN — METOPROLOL TARTRATE 10 MG: 5 INJECTION INTRAVENOUS at 02:12

## 2022-01-05 RX ADMIN — ENALAPRILAT 2.5 MG: 1.25 INJECTION INTRAVENOUS at 17:05

## 2022-01-05 RX ADMIN — FENTANYL CITRATE 25 MCG: 50 INJECTION INTRAMUSCULAR; INTRAVENOUS at 15:55

## 2022-01-05 RX ADMIN — MIDAZOLAM HYDROCHLORIDE 0.5 MG: 1 INJECTION, SOLUTION INTRAMUSCULAR; INTRAVENOUS at 15:54

## 2022-01-05 RX ADMIN — ENALAPRILAT 2.5 MG: 1.25 INJECTION INTRAVENOUS at 03:11

## 2022-01-05 RX ADMIN — HYDRALAZINE HYDROCHLORIDE 50 MG: 50 TABLET, FILM COATED ORAL at 22:30

## 2022-01-05 RX ADMIN — PANTOPRAZOLE SODIUM 40 MG: 40 INJECTION, POWDER, FOR SOLUTION INTRAVENOUS at 21:03

## 2022-01-05 RX ADMIN — ENALAPRILAT 2.5 MG: 1.25 INJECTION INTRAVENOUS at 21:03

## 2022-01-05 RX ADMIN — METOPROLOL TARTRATE 10 MG: 5 INJECTION INTRAVENOUS at 20:27

## 2022-01-05 RX ADMIN — SODIUM CHLORIDE: 9 INJECTION, SOLUTION INTRAVENOUS at 14:24

## 2022-01-05 RX ADMIN — METOPROLOL TARTRATE 10 MG: 5 INJECTION INTRAVENOUS at 17:06

## 2022-01-05 RX ADMIN — ACETAMINOPHEN 650 MG: 650 SUPPOSITORY RECTAL at 14:24

## 2022-01-05 RX ADMIN — ENALAPRILAT 2.5 MG: 1.25 INJECTION INTRAVENOUS at 08:44

## 2022-01-05 RX ADMIN — METOPROLOL TARTRATE 10 MG: 5 INJECTION INTRAVENOUS at 08:44

## 2022-01-05 RX ADMIN — LIDOCAINE HYDROCHLORIDE 10 ML: 10 INJECTION, SOLUTION INFILTRATION; PERINEURAL at 16:00

## 2022-01-05 ASSESSMENT — ACTIVITIES OF DAILY LIVING (ADL)
ADLS_ACUITY_SCORE: 33

## 2022-01-05 NOTE — IR NOTE
Interventional Radiology Intra-procedural Nursing Note    Patient Name: Sukhi Johnson  Medical Record Number: [id number]  Today's Date: January 5, 2022    Procedure Gastrojejuneostomy tube placement with moderate sedation  Start time:1554  End time: 1627  Report provided to: st Ho RN  Patient depart time and location: 1640 to 718 with NA    Note: Patient entered Interventional Radiology Suite number 2via cart. Patient awake, alert and orientated. Assisted onto procedural table in supine position. Prepped and draped.  Dr. Barroso in room. Time out and procedure started. Vital signs stable. Telemetry reading ST.    Procedure well tolerated by patient without complications. Procedure end with debrief by Dr. Barroso    Administered medication totals:  10 mg lidocane  Versed 0.5 mg IVP  Fentanyl 25 mcg IVP    Last dose of sedation administered at 1555.

## 2022-01-05 NOTE — PLAN OF CARE
Reason for Admission: R Frontal ICH, spontaneous    Cognitive/Mentation: A/Ox ALLYSON  Neuros/CMS: Intact ex BLE hemiplegia. RUE 4 to 5/5, RLE 3/5  VS: VS on RA x HTN. Labetalol given X 1 for > 160 SBP after scheduled HTN medications.   Tele: NSR  GI: BS active X 4, last BM 1/5/2022. Incontinent.  : voiding. Incontinent.  Pulmonary: LS diminished and  BUL course.  Pain: ALLYSON, Tylenol given per SO request.     Drains: None  Skin: Intact x small facial abraion  Activity: Assist x 2 with lift.  Diet: NPO with no liquids. Maintenance IV until PEG placed.     Discharge: Pending     End of shift summary: HTN medications not given prior to IR transport for PEG placement. IR team informed and directed to give in IR. PEG placement in process.

## 2022-01-05 NOTE — PROGRESS NOTES
[facility]    Medicine Progress Note - Hospitalist Service       Date of Admission:  12/24/2021  Assessment & Plan   Sukhi Johnson is a 71 year-old male with past medical history of hypertension, chronic fatigue who presents with left-sided weakness and speech difficulties. Admitted 12/24/2021.      Acute hemorrhagic stroke with vasogenic edema  Acute metabolic encephalopathy secondary to above  History of hemorrhagic stroke in 2013  Hypertension, untreated  *Presents with left-sided weakness and speech difficulty  *CT and MRI on admission with acute right frontoparietal intracranial hemorrhage with associated regional mass effect resulting in 2 mm leftward midline shift; MRI also noted with numerous chronic microhemorrhages throughout the cerebral hemispheres, deep gray nuclei and posterior fossa, possibly representing sequelae of hypertensive or amyloid angiopathy  *Neurosurgery evaluated and non-surgical management recommended  *Repeat head CT 12/26 with slight interval decrease in size of the large parenchymal hematoma, no definite evidence for new or increasing hemorrhage; stable minimal leftward midline shift of the third ventricle  *Repeat head CT 12/29 noted stable  *EEG 12/29 with no electrographic seizures  *ECHO 12/26 with LVEF >70%, negative bubble study; A1c 5.4,   - Neurology (stroke) consulted, now signed off  - Goal SBP <160, has generally been at goal however now blood pressure is more elevated since loss of enteral route for medications  - Continue hydralazine 50 mg PO TID (when enteral route re-established), enalaprilat 2.5 mg q6H, metoprolol 10 mg q6H  - Will transition to full oral regimen once enteral route re-established and ready to use   - PT consulted, recommending TCU. SW following for choices.      Acute hypoxic respiratory failure  Suspected aspiration event  *Episode of respiratory distress 12/27. Tmax 100.8F, WBC elevated  *CXR without clear  infiltrate, though episode concerning for aspiration event  *Completed 7 day course of piperacillin-tazobactam IV  - SLP consulted, diet per SLP. Slow recovery, if at all, anticipated, plan for PEG.      At risk for malnutrition   - Nutrition following   - Continue tube feeds per protocol once PEG placed     Hypernatremia  Sodium peak of 152. Secondary to lack of free water intake. Sodium normal 1/3  - Continue free water flushes    Hypokalemia  - Monitor and replace per protocol     Suspected GI bleed  *Bloody BM noted since 12/29 with hgb down to 12.7 g/dl  *GI consulted, no immediate plans for endoscopy  - Small amount of current bleeding noted 1/4/22, resolved 1/5/22  - Serial hemoglobin stable, can discontinue serial monitoring   - Continue IV PPI     Chronic fatigue syndrome 2/2 Lyme disease  PTA on Adderall.   - Neurology recommending against stimulants at this time     COVID PCR screen negative  No vaccination records available, tested negative on admission 12/24    Clinically Significant Risk Factors Present on Admission                       Diet: Adult Formula Drip Feeding: Continuous Jevity 1.5; Other - Specify in Comment; Goal Rate: 50; mL/hr; Medication - Feeding Tube Flush Frequency: At least 15-30 mL water before and after medication administration and with tube clogging; Amount to Se...  NPO per Anesthesia Guidelines for Procedure/Surgery Except for: Meds    DVT Prophylaxis: Pneumatic Compression Devices  Tay Catheter: Not present  Code Status: Full Code      Disposition Plan   Expected Discharge: 01/07/2022 - to TCU once placement found      Anticipated discharge location:  Awaiting care coordination huddle  Delays:     Procedure  Placement - TCU         Entered: Juan Francisco Albarran MD 01/05/2022, 4:09 PM       The patient's care was discussed with the patient, bedside RN, patient's wife    Juan Francisco Albarran MD  Hospitalist Service  [facility]  Hospital    ______________________________________________________________________    Interval History   Had small amount of blood noted with bowel movement yesterday evening.  Resolved today.  No other new symptoms or changes per RN.  History remains unobtainable from patient due to his aphasia .    Data reviewed today: I reviewed all medications, new labs and imaging results over the last 24 hours. I personally reviewed no images or EKG's today.    Physical Exam   Vital Signs: Temp: 97.3  F (36.3  C) Temp src: Axillary BP: (!) 129/99 Pulse: 101   Resp: 16 SpO2: 94 % O2 Device: Nasal cannula Oxygen Delivery: 2 LPM  Weight: 162 lbs 7.66 oz    Constitutional: NAD  Respiratory: Clear to auscultation bilaterally, good air movement bilaterally  Cardiovascular: RRR. No peripheral edema.  GI: Soft, non-tender, non-distended.   Skin/Integumen: Warm, dry  Neuro: Alert. Non-verbal. Spontaneously moving right arm and leg. No movement on the left.      Data   Recent Labs   Lab 01/05/22  0612 01/05/22  0203 01/04/22  2232 01/04/22  1649 01/04/22  0607 01/04/22  0156 01/03/22  1329 01/02/22  1004 01/01/22  0820 01/01/22  0633 12/31/21  0609 12/30/21  2036   WBC  --   --   --   --   --   --  9.5  --   --   --   --   --    HGB 13.5  --  13.8  --   --   --  13.7 13.0*   < > 13.9   < >  --    MCV  --   --   --   --   --   --  91  --   --   --   --   --      --   --   --   --   --  307 280  --   --   --   --    INR  --   --   --   --   --   --   --   --   --   --   --  1.06   NA  --   --   --   --   --   --  139 143  --  142   < >  --    POTASSIUM 3.6  --   --   --  3.6  --  3.8 3.7  --  3.5   < >  --    CHLORIDE  --   --   --   --   --   --  110* 113*  --  114*   < >  --    CO2  --   --   --   --   --   --  23 25  --  27   < >  --    BUN  --   --   --   --   --   --  21 22  --  21   < >  --    CR  --   --   --   --   --   --  0.52* 0.67  --  0.67   < >  --    ANIONGAP  --   --   --   --   --   --  6 5  --  1*   < >  --    MUSTAPHA   --   --   --   --   --   --  8.6 8.3*  --  8.5   < >  --    GLC  --  99  --  93  --  115* 135* 132*   < > 134*   < >  --     < > = values in this interval not displayed.       No results found for this or any previous visit (from the past 24 hour(s)).  Medications     sodium chloride 50 mL/hr at 01/05/22 1424       enalaprilat  2.5 mg Intravenous Q6H     [Held by provider] heparin ANTICOAGULANT  5,000 Units Subcutaneous Q12H     hydrALAZINE  50 mg Per Feeding Tube Q8H TONY     iohexol  50 mL Per G Tube Once     metoprolol  10 mg Intravenous Q6H     pantoprazole (PROTONIX) IV  40 mg Intravenous BID     protein modular  1 packet Per Feeding Tube Daily     sodium chloride (PF)  10-40 mL Intracatheter Q7 Days     sodium chloride (PF)  3 mL Intracatheter Q8H

## 2022-01-05 NOTE — PRE-PROCEDURE
GENERAL PRE-PROCEDURE:   Procedure:  Gastrojejunostomy tube placement  Date/Time:  1/5/2022 9:43 AM    Written consent obtained?: Yes    Risks and benefits: Risks, benefits and alternatives were discussed    Consent given by:  Patient  Patient states understanding of procedure being performed: Yes    Patient's understanding of procedure matches consent: Yes    Procedure consent matches procedure scheduled: Yes    Expected level of sedation:  Moderate  Appropriately NPO:  Yes  ASA Class:  3  Mallampati  :  Grade 3- soft palate visible, posterior pharyngeal wall not visible  Lungs:  Lungs clear with good breath sounds bilaterally  Heart:  Normal heart sounds and rate  History & Physical reviewed:  History and physical reviewed and no updates needed  Statement of review:  I have reviewed the lab findings, diagnostic data, medications, and the plan for sedation

## 2022-01-05 NOTE — PROGRESS NOTES
Pt here with R frontal ICH. Alert, ALLYSON neuro and orientation, nonverbal. Neuros L facial droop, L hemiplegia. VSS on RA, hypertensive but within parameters & scheduled meds given, SBP <160. Tele NSR. NPO. R PICC in place & infusing @50mL/hr. Up with A2/lift and T&R q2. Denies pain. Pt scoring green on the Aggression Stop Light Tool. Plan for PEG placement today. Discharge pending.    @0600 pt bleeding from rectum, moderate amount, small clots passed as well.

## 2022-01-05 NOTE — CONSULTS
"  Interventional Radiology - Pre-Procedure Note:  1/5/2022    Procedure Requested: G-J tube placement  Requested by: Dr Albarran    History and Physical Reviewed: H&P documented within 30 days (by Dr Aguirre on 12/25/21).  I have personally reviewed the patient's medical history and have updated the medical record as necessary.    Brief HPI: Sukhi Johnson is a 71 year old male admitted with acute ICH 10 days ago. Patient demonstrating aphasia and SLP examination yesterday reports no significant improvement with his swallowing. Long term TF was recommended. IR consulted for G-J tube placement.  Unfortunately patient pulled out his NG tube yesterday. Patient not able to provide history, and so history obtained from chart and family.      IMAGING:  NG tube placement 12/28 (tube has since been removed):  \"FINDINGS: The feeding tube is advanced with the tip in the distal  duodenum. A small amount of barium was injected to define anatomy.                                                                      IMPRESSION: Uncomplicated feeding tube placement with tip in the  distal duodenum.\"    NPO: YES since MN  ANTICOAGULANTS: NONE  ANTIBIOTICS: NONE    ALLERGIES  No Known Allergies      LABS:  INR   Date Value Ref Range Status   12/30/2021 1.06 0.85 - 1.15 Final      Hemoglobin   Date Value Ref Range Status   01/05/2022 13.5 13.3 - 17.7 g/dL Final   ]  Platelet Count   Date Value Ref Range Status   01/05/2022 311 150 - 450 10e3/uL Final     Creatinine   Date Value Ref Range Status   01/03/2022 0.52 (L) 0.66 - 1.25 mg/dL Final     Potassium   Date Value Ref Range Status   01/05/2022 3.6 3.4 - 5.3 mmol/L Final         EXAM:  BP (!) 165/96 (BP Location: Left arm)   Pulse 98   Temp 97.8  F (36.6  C) (Axillary)   Resp 16   Ht 1.727 m (5' 8\")   Wt 73.7 kg (162 lb 7.7 oz)   SpO2 95%   BMI 24.70 kg/m    General:  Stable.  In no acute distress.    Neuro: Left sided weakness, nonverbal  Heart: RRR  Lungs: No " increased work of breathing  Abd: NT/ND, no scarring    Pre-Sedation Code Status Assessment:  Code Status: Full Code intra procedure, per discussion with patient and significant other.         ASSESSMENT/PLAN:   Acute ICH  Dysphagia    -G-J tube placement today with moderate sedation  -D/W Dr Albarran who is in agreement with plan    Procedure, risks/benefits, details, alternatives, and sedation reviewed with patient's significant other Miladis and she verbalized understanding. All questions answered. OK to proceed with above radiology procedure.     Jonathan Blackwell PA-C  Interventional Radiology  [phone] (IR)  [phone] ([facility] Office)      Total time spent on the date of the encounter is 30 minutes, including time spent counseling the patient, performing a medically appropriate evaluation, reviewing prior medical history, ordering medications and tests, documenting clinical information in the medical record, and communication of results.

## 2022-01-05 NOTE — PROVIDER NOTIFICATION
MD Notification    Notified Person: MD    Notified Person Name: ubaldo      Notification Date/Time:6:29 PM      Notification Interaction: vocera    Purpose of Notification:pt has had a moderate amount of blood in his stool this evening.  GI bleed is noted in chart.  on PPI.  vitals stable.  no recent CBC.  any additional orders, or continue to monitor?  please advise    Orders Received:  md instructed to continue to monitor and ordered hgb checks    Comments:

## 2022-01-05 NOTE — PLAN OF CARE
Pt here with R. ICH with midline shift. Alert, ALLYSON orientation as pt is nonverbal. Neuro assessment is limited as pt inconsistently follows commands, L. Hemiplegia which withdraws, R. Side moves spontaneously scoring a 4/5, PERRLA. VSS on RA ex: HTN, scheduled meds administered, BP <160. Tele NSR. NPO. Up with A2, lift. No S/S of pain. Pt scoring yellow on the Aggression Stop Light Tool d/t concern for line pulling. Plan for PEG placement tomorrow. Discharge pending.

## 2022-01-06 LAB — POTASSIUM BLD-SCNC: 3.5 MMOL/L (ref 3.4–5.3)

## 2022-01-06 PROCEDURE — 250N000013 HC RX MED GY IP 250 OP 250 PS 637: Performed by: INTERNAL MEDICINE

## 2022-01-06 PROCEDURE — 84132 ASSAY OF SERUM POTASSIUM: CPT | Performed by: INTERNAL MEDICINE

## 2022-01-06 PROCEDURE — 250N000009 HC RX 250: Performed by: HOSPITALIST

## 2022-01-06 PROCEDURE — C9113 INJ PANTOPRAZOLE SODIUM, VIA: HCPCS | Performed by: HOSPITALIST

## 2022-01-06 PROCEDURE — 258N000003 HC RX IP 258 OP 636: Performed by: HOSPITALIST

## 2022-01-06 PROCEDURE — 120N000001 HC R&B MED SURG/OB

## 2022-01-06 PROCEDURE — 999N000190 HC STATISTIC VAT ROUNDS

## 2022-01-06 PROCEDURE — 99232 SBSQ HOSP IP/OBS MODERATE 35: CPT | Performed by: INTERNAL MEDICINE

## 2022-01-06 PROCEDURE — 250N000011 HC RX IP 250 OP 636: Performed by: HOSPITALIST

## 2022-01-06 PROCEDURE — 250N000013 HC RX MED GY IP 250 OP 250 PS 637: Performed by: HOSPITALIST

## 2022-01-06 RX ORDER — METOPROLOL TARTRATE 1 MG/ML
2.5 INJECTION, SOLUTION INTRAVENOUS EVERY 4 HOURS PRN
Status: DISCONTINUED | OUTPATIENT
Start: 2022-01-06 | End: 2022-01-17 | Stop reason: HOSPADM

## 2022-01-06 RX ORDER — SENNOSIDES 8.6 MG
8.6 TABLET ORAL 2 TIMES DAILY PRN
Status: DISCONTINUED | OUTPATIENT
Start: 2022-01-06 | End: 2022-01-06

## 2022-01-06 RX ORDER — POLYETHYLENE GLYCOL 3350 17 G/17G
17 POWDER, FOR SOLUTION ORAL 2 TIMES DAILY PRN
Status: DISCONTINUED | OUTPATIENT
Start: 2022-01-06 | End: 2022-01-06

## 2022-01-06 RX ORDER — ACETAMINOPHEN 325 MG/10.15ML
650 LIQUID ORAL EVERY 4 HOURS PRN
Status: DISCONTINUED | OUTPATIENT
Start: 2022-01-06 | End: 2022-01-17 | Stop reason: HOSPADM

## 2022-01-06 RX ORDER — METOPROLOL TARTRATE 50 MG
50 TABLET ORAL 2 TIMES DAILY
Status: DISCONTINUED | OUTPATIENT
Start: 2022-01-06 | End: 2022-01-17 | Stop reason: HOSPADM

## 2022-01-06 RX ORDER — ENALAPRIL MALEATE 5 MG/1
5 TABLET ORAL AT BEDTIME
Status: DISCONTINUED | OUTPATIENT
Start: 2022-01-06 | End: 2022-01-06

## 2022-01-06 RX ORDER — METOPROLOL TARTRATE 50 MG
50 TABLET ORAL 2 TIMES DAILY
Status: DISCONTINUED | OUTPATIENT
Start: 2022-01-06 | End: 2022-01-06

## 2022-01-06 RX ORDER — DOCUSATE SODIUM 100 MG/1
100 CAPSULE, LIQUID FILLED ORAL 2 TIMES DAILY PRN
Status: DISCONTINUED | OUTPATIENT
Start: 2022-01-06 | End: 2022-01-06

## 2022-01-06 RX ORDER — ENALAPRIL MALEATE 5 MG/1
5 TABLET ORAL AT BEDTIME
Status: DISCONTINUED | OUTPATIENT
Start: 2022-01-06 | End: 2022-01-17 | Stop reason: HOSPADM

## 2022-01-06 RX ORDER — POLYETHYLENE GLYCOL 3350 17 G/17G
17 POWDER, FOR SOLUTION ORAL 2 TIMES DAILY PRN
Status: DISCONTINUED | OUTPATIENT
Start: 2022-01-06 | End: 2022-01-17 | Stop reason: HOSPADM

## 2022-01-06 RX ADMIN — HYDRALAZINE HYDROCHLORIDE 50 MG: 50 TABLET, FILM COATED ORAL at 05:53

## 2022-01-06 RX ADMIN — HYDRALAZINE HYDROCHLORIDE 50 MG: 50 TABLET, FILM COATED ORAL at 14:29

## 2022-01-06 RX ADMIN — ACETAMINOPHEN 650 MG: 325 SUSPENSION ORAL at 09:50

## 2022-01-06 RX ADMIN — SODIUM CHLORIDE: 9 INJECTION, SOLUTION INTRAVENOUS at 11:42

## 2022-01-06 RX ADMIN — ENALAPRIL MALEATE 5 MG: 5 TABLET ORAL at 22:11

## 2022-01-06 RX ADMIN — METOPROLOL TARTRATE 10 MG: 5 INJECTION INTRAVENOUS at 14:30

## 2022-01-06 RX ADMIN — HYDRALAZINE HYDROCHLORIDE 50 MG: 50 TABLET, FILM COATED ORAL at 22:11

## 2022-01-06 RX ADMIN — POLYETHYLENE GLYCOL 3350 17 G: 17 POWDER, FOR SOLUTION ORAL at 11:53

## 2022-01-06 RX ADMIN — ENALAPRILAT 2.5 MG: 1.25 INJECTION INTRAVENOUS at 03:05

## 2022-01-06 RX ADMIN — SENNOSIDES 8.6 MG: 8.6 TABLET, FILM COATED ORAL at 03:07

## 2022-01-06 RX ADMIN — Medication 1 PACKET: at 09:50

## 2022-01-06 RX ADMIN — Medication 40 MG: at 22:17

## 2022-01-06 RX ADMIN — METOPROLOL TARTRATE 10 MG: 5 INJECTION INTRAVENOUS at 09:01

## 2022-01-06 RX ADMIN — PANTOPRAZOLE SODIUM 40 MG: 40 INJECTION, POWDER, FOR SOLUTION INTRAVENOUS at 09:01

## 2022-01-06 RX ADMIN — ENALAPRILAT 2.5 MG: 1.25 INJECTION INTRAVENOUS at 14:31

## 2022-01-06 RX ADMIN — METOPROLOL TARTRATE 10 MG: 5 INJECTION INTRAVENOUS at 02:03

## 2022-01-06 RX ADMIN — ENALAPRILAT 2.5 MG: 1.25 INJECTION INTRAVENOUS at 09:03

## 2022-01-06 RX ADMIN — DOCUSATE SODIUM 100 MG: 50 LIQUID ORAL at 11:57

## 2022-01-06 RX ADMIN — METOPROLOL TARTRATE 50 MG: 50 TABLET, FILM COATED ORAL at 22:11

## 2022-01-06 ASSESSMENT — ACTIVITIES OF DAILY LIVING (ADL)
ADLS_ACUITY_SCORE: 35
ADLS_ACUITY_SCORE: 35
ADLS_ACUITY_SCORE: 33
ADLS_ACUITY_SCORE: 35
ADLS_ACUITY_SCORE: 35
ADLS_ACUITY_SCORE: 33
ADLS_ACUITY_SCORE: 35
ADLS_ACUITY_SCORE: 33
ADLS_ACUITY_SCORE: 35
ADLS_ACUITY_SCORE: 33
ADLS_ACUITY_SCORE: 35
ADLS_ACUITY_SCORE: 35
ADLS_ACUITY_SCORE: 33

## 2022-01-06 NOTE — PROGRESS NOTES
Pt here with R frontoparietal ICH. Lethargic, nonverbal, ALLYSON orientation, inconsistent with following commands. Neuros present with L hemiplegia, withdraws to pain, L facial droop. VSS ex HTN, scheduled BP meds. Tele NSR. GJ tube placed yesterday, resumed TF @ 50 mL/hr with 150 FWF Q4h. Takes pills through GJ tube. Up with Ax2 and lift. Incontinent of urine. No BM for a few days, senna given. Denies pain. Pt scoring green on the Aggression Stop Light Tool. Discharge to TCU pending.

## 2022-01-06 NOTE — PLAN OF CARE
Date & Time: 1/5 [id number]  Diagnosis: R ICH   Procedures: 1/5 - GJ placed in IR  Orientation/Cognitive: AOx0/ALLYSON  VS/O2: VSS ex HTN, RA  Mobility: A2 + lift   Diet: NPO, can start TF @ 2225 1/5  Pain Management: Appears comfortable   Bowel & Bladder: Incontinent   Skin: Sae, GJ site   Abnormal Labs: Cr 0.52  Tele: ST  IV Access/Drips/Fluids: R double lumen PICC - NS @ 50ml/hr   Drains: NA  Tests: CT - R front ICH   Consults: Stroke neurology, hospitalist   Discharge Plan: Pending   Other: Neuros - ALLYSON, not following commands, L droop, L hemiplegia

## 2022-01-06 NOTE — PROGRESS NOTES
[facility]    Medicine Progress Note - Hospitalist Service       Date of Admission:  12/24/2021  Assessment & Plan   Sukhi Johnson is a 71 year-old male with past medical history of hypertension, chronic fatigue who presents with left-sided weakness and speech difficulties. Admitted 12/24/2021.      Acute hemorrhagic stroke with vasogenic edema  Acute metabolic encephalopathy secondary to above  History of hemorrhagic stroke in 2013  Hypertension, untreated  *Presents with left-sided weakness and speech difficulty  *CT and MRI on admission with acute right frontoparietal intracranial hemorrhage with associated regional mass effect resulting in 2 mm leftward midline shift; MRI also noted with numerous chronic microhemorrhages throughout the cerebral hemispheres, deep gray nuclei and posterior fossa, possibly representing sequelae of hypertensive or amyloid angiopathy  *Neurosurgery evaluated and non-surgical management recommended  *Repeat head CT 12/26 with slight interval decrease in size of the large parenchymal hematoma, no definite evidence for new or increasing hemorrhage; stable minimal leftward midline shift of the third ventricle  *Repeat head CT 12/29 noted stable   *EEG 12/29 with no electrographic seizures  *ECHO 12/26 with LVEF >70%, negative bubble study; A1c 5.4%,   - Neurology (stroke) consulted, now signed off  - Goal SBP <160, more elevated when hydralazine held with loss of enteral route, control much improved 1/6/22  - Continue hydralazine 50 mg PO TID. Transition scheduled enalaprilat IV to enalapril 5 mg per feeding tube, scheduled metoprolol IV to metoprolol PO 50 mg BID.  - PT/OT consulted, recommending TCU. SW following for choices.  - Pulling at lines, will discontinue telemetry    Acute hypoxic respiratory failure  Suspected aspiration event  *Episode of respiratory distress 12/27. Tmax 100.8F, WBC elevated  *CXR without clear infiltrate, though  episode concerning for aspiration event  *Completed 7 day course of piperacillin-tazobactam IV  - SLP consulted, diet per SLP. Slow recovery, if at all, anticipated, PEG placed    At risk for malnutrition   - Nutrition following  - PEG placed 1/5/22, continue tube feeds     Hypernatremia  Sodium peak of 152. Secondary to lack of free water intake. Sodium normal 1/3  - Continue free water flushes    Hypokalemia  - Monitor and replace per protocol     Suspected GI bleed  *Bloody BM noted since 12/29 with hgb down to 12.7 g/dl  *GI consulted, no immediate plans for endoscopy  - Has had small amounts of recurrent bleeding noted, with no change in hemoglobin   - Continue PPI     Chronic fatigue syndrome 2/2 Lyme disease  PTA on Adderall.   - Neurology recommending against stimulants at this time     COVID PCR screen negative  No vaccination records available, tested negative on admission 12/24    Clinically Significant Risk Factors Present on Admission                       Diet: Adult Formula Drip Feeding: Continuous Jevity 1.5; Other - Specify in Comment; Goal Rate: 50; mL/hr; Medication - Feeding Tube Flush Frequency: At least 15-30 mL water before and after medication administration and with tube clogging; Amount to Se...  NPO per Anesthesia Guidelines for Procedure/Surgery Except for: Meds    DVT Prophylaxis: Pneumatic Compression Devices  Tay Catheter: Not present  Code Status: Full Code      Disposition Plan   Expected Discharge: Ready to discharge to TCU once placement found      Anticipated discharge location:  Awaiting care coordination huddle  Delays:     Placement - TCU  1:1 Sitter         Entered: Juan Francisco Albarran MD 01/06/2022, 3:28 PM       The patient's care was discussed with the patient, bedside RN     Juan Francisco Albarran MD  Hospitalist Service  [facility]    ______________________________________________________________________    Interval History   Had uneventful placement of  PEG tube. Pulling at lines intermittently per sitter. No movement observed on left side. Further history unable to be obtained from patient due to encephalopathy / CVA.    Data reviewed today: I reviewed all medications, new labs and imaging results over the last 24 hours. I personally reviewed no images or EKG's today.    Physical Exam   Vital Signs: Temp: 98.2  F (36.8  C) Temp src: Axillary BP: 112/59 (cuff repositioned) Pulse: 85   Resp: 18 SpO2: 95 % O2 Device: None (Room air) Oxygen Delivery: 2 LPM  Weight: 162 lbs 7.66 oz    Constitutional: NAD  Respiratory: Clear to auscultation bilaterally, good air movement bilaterally  Cardiovascular: RRR. No peripheral edema.  GI: Soft, non-tender, non-distended.   Skin/Integumen: Warm, dry  Neuro: Sleeping. Non-verbal. Spontaneously moving right arm and leg. No movement on the left.      Data   Recent Labs   Lab 01/06/22  0611 01/05/22  0612 01/05/22  0203 01/04/22  2232 01/04/22  1649 01/04/22  0607 01/04/22  0156 01/03/22  1329 01/02/22  1004 01/01/22  0820 01/01/22  0633 12/31/21  0609 12/30/21  2036   WBC  --   --   --   --   --   --   --  9.5  --   --   --   --   --    HGB  --  13.5  --  13.8  --   --   --  13.7 13.0*   < > 13.9   < >  --    MCV  --   --   --   --   --   --   --  91  --   --   --   --   --    PLT  --  311  --   --   --   --   --  307 280  --   --   --   --    INR  --   --   --   --   --   --   --   --   --   --   --   --  1.06   NA  --   --   --   --   --   --   --  139 143  --  142   < >  --    POTASSIUM 3.5 3.6  --   --   --  3.6  --  3.8 3.7  --  3.5   < >  --    CHLORIDE  --   --   --   --   --   --   --  110* 113*  --  114*   < >  --    CO2  --   --   --   --   --   --   --  23 25  --  27   < >  --    BUN  --   --   --   --   --   --   --  21 22  --  21   < >  --    CR  --   --   --   --   --   --   --  0.52* 0.67  --  0.67   < >  --    ANIONGAP  --   --   --   --   --   --   --  6 5  --  1*   < >  --    MUSTAPHA  --   --   --   --   --   --   --   8.6 8.3*  --  8.5   < >  --    GLC  --   --  99  --  93  --  115* 135* 132*   < > 134*   < >  --     < > = values in this interval not displayed.       Recent Results (from the past 24 hour(s))   IR Gastro Jejunostomy Tube Placement    Narrative    DATE: 1/5/2022    PROCEDURE: PERCUTANEOUS GASTROJEJUNOSTOMY PLACEMENT    INTERVENTIONAL RADIOLOGIST: Leila Barroso D.O.    INDICATION: Stroke, malnutrition. Aspiration. Feeding tube required  for long-term nutrition.    CONSENT: The risks, benefits and alternatives of the procedure were  discussed with the patient's family in detail. All questions were  answered. Informed consent was given to proceed with the procedure.    MODERATE SEDATION: Versed 0.5 mg IV; Fentanyl 25 mcg IV. During the  time out, immediately prior to the administration of medications, the  patient was reassessed for adequacy to receive conscious sedation.   Under physician supervision, Versed and fentanyl were administered for  moderate sedation. Pulse oximetry, heart rate and blood pressure were  continuously monitored by an independent trained observer. The  physician spent 30 minutes of face-to-face sedation time with the  patient.    CONTRAST: 30 mL Omnipaque intraenteric.  ANTIBIOTICS: None.  ADDITIONAL MEDICATIONS: None.    FLUOROSCOPIC TIME: 4.3 minutes.  RADIATION DOSE: Air Kerma: 15 mGy.    COMPLICATIONS: No immediate complications.    UNIVERSAL PROTOCOL: The operative site was marked and any prior  imaging was reviewed. Required items including blood products,  implants, devices and special equipment was made available. Patient  identity was confirmed either verbally, with demographic information,  hospital assigned identification or other identification markers. A  timeout was performed immediately prior to the procedure.    STERILE BARRIER TECHNIQUE: Maximum sterile barrier technique was used.  Cutaneous antisepsis was performed at the operative site with  application of 2%  chlorhexidine and large sterile drape. Prior to the  procedure, the  and assistant performed hand hygiene and wore  hat, mask, sterile gown, and sterile gloves during the entire  procedure.    PROCEDURE/TECHNIQUE:    A nasogastric tube was advanced until the tip was in the stomach.  Adequate position was confirmed with fluoroscopy.     Ultrasound examination of the epigastric region was performed to  localize the left hepatic edge, which was marked on the patient's  skin. The stomach was then insufflated with air. Under fluoroscopic  guidance, Two point gastropexy was performed with intragastric  positioning confirmed with a small amount of contrast. Using an  additional puncture between the gastropexy sites, a guidewire was  placed into the stomach. A catheter and wire were manipulated through  the gastric pylorus and duodenum and positioned in the proximal  jejunum. Following tract dilatation, an 16 Green[place]ic, 45 cm,  gastrojejunostomy tube was advanced until the tip was in the proximal  jejunum. The retention balloon was inflated. A post placement  injection of both the gastric and jejunal ports was performed.    FINDINGS:    The procedural images demonstrate that following placement the gastric  port is within the stomach and distal jejunal port is near the  ligament of Treitz.      Impression    IMPRESSION:  Successful percutaneous gastrojejunostomy placement.    CEZAR MATOS DO         SYSTEM ID:  [id number]     Medications       enalapril  5 mg Per Feeding Tube At Bedtime     [Held by provider] heparin ANTICOAGULANT  5,000 Units Subcutaneous Q12H     hydrALAZINE  50 mg Per Feeding Tube Q8H TONY     metoprolol tartrate  50 mg Per Feeding Tube BID     pantoprazole  40 mg Per Feeding Tube BID     protein modular  1 packet Per Feeding Tube Daily     sodium chloride (PF)  10-40 mL Intracatheter Q7 Days     sodium chloride (PF)  3 mL Intracatheter Q8H

## 2022-01-06 NOTE — PLAN OF CARE
Pt here with right sided nontraumatic intracerebral hemorrhage. Lethargic most of the day. neuros unchanged with L facial droop, L hemiplegia, non-verbal, minimally responsive to verbal commands.  NPO tube feed running at goal rate. Takes pills crushed through PEG tube. Up with mara lift. Incontinent of bladder. Denies pain. Pt scoring green on the Aggression Stop Light Tool.  Discharge to TCU pending.

## 2022-01-06 NOTE — PROGRESS NOTES
Care Management Follow Up    Length of Stay (days): 13    Expected Discharge Date: 01/08/2022     Concerns to be Addressed:       Patient plan of care discussed at interdisciplinary rounds: Yes    Anticipated Discharge Disposition:       Anticipated Discharge Services:    Anticipated Discharge DME:      Patient/family educated on Medicare website which has current facility and service quality ratings:    Education Provided on the Discharge Plan:    Patient/Family in Agreement with the Plan:      Referrals Placed by CM/SW:    Private pay costs discussed: Not applicable    Additional Information:  SW called and spoke to pt spouse Miladis.  She states that she would prefer to speak to staff in person, and is leaving for [facility] now and will be here by 1600.      Rossy Lazaro, LICSW

## 2022-01-06 NOTE — CONSULTS
Care Management Initial Consult    General Information  Assessment completed with: Spouse or significant other, Miladis Durbin  Type of CM/SW Visit: Initial Assessment  Spoke with pt significant other Miladis at length.  Miladis explains that she has been in a relationship with pt for 14 years but has known him longer.  They are not .  Pt siblings are is [place], but they have not seen pt in many years.  Miladis would prefer that pt can discharge home, and she is seeking information on whether she will be able to remodel her house to be ADA compliant in case pt needs wheelchair accessibility.  Miladis states she has been talking to pt [place]  There is a possibility that they will want to provide care for pt at discharge, but that has not yet been decided by pt siblings.  Miladis states that pt was independent at baseline and that he worked full-time.  Miladis provided updated insurance card for pt.  Miladis states that she cannot find pt social security card.  Miladis prefers that pt go to TCU in [place] and then be able to return to their home.  Miladis is concerned that pt might not be able to recover from his stroke but is hopeful that he can.  Miladis states that she wants to hear back from pt siblings before pursing TCU in [place], as she is not sure that it is her decision to make.    Primary Care Provider verified and updated as needed:     Readmission within the last 30 days:        Reason for Consult: discharge planning  Advance Care Planning:            Communication Assessment  Patient's communication style: spoken language (English or Bilingual)    Hearing Difficulty or Deaf: no   Wear Glasses or Blind: no    Cognitive  Cognitive/Neuro/Behavioral: .WDL except  Level of Consciousness: lethargic  Arousal Level: opens eyes spontaneously  Orientation:  (ALLYSON)  Mood/Behavior: agitated,cooperative  Best Language: 3 - Mute  Speech: unable to speak    Living Environment:   People in  home: significant other     Current living Arrangements: house      Able to return to prior arrangements:  TBD       Family/Social Support:  Care provided by:    Provides care for:    Marital Status: Domestic Partnership             Description of Support System: Supportive,Involved         Current Resources:   Patient receiving home care services:       Community Resources:    Equipment currently used at home: none  Supplies currently used at home:      Employment/Financial:  Employment Status: employed full-time        Financial Concerns:  Unsure whether pt will be able to work again, and whether he will be able to live at home or whether he will need to be in a SNF.            Lifestyle & Psychosocial Needs:  Social Determinants of Health     Tobacco Use: Not on file   Alcohol Use: Not on file   Financial Resource Strain: Not on file   Food Insecurity: Not on file   Transportation Needs: Not on file   Physical Activity: Not on file   Stress: Not on file   Social Connections: Not on file   Intimate Partner Violence: Not on file   Depression: Not on file   Housing Stability: Not on file       Functional Status:  Prior to admission patient needed assistance: Was independent and worked full-time.              Mental Health Status: N/A          Chemical Dependency Status: N/A                Values/Beliefs:  Spiritual, Cultural Beliefs, Scientology Practices, Values that affect care:                 Additional Information:      Rossy Lazaro, LICSW

## 2022-01-07 ENCOUNTER — APPOINTMENT (OUTPATIENT)
Dept: SPEECH THERAPY | Facility: CLINIC | Age: 72
DRG: 064 | End: 2022-01-07
Payer: COMMERCIAL

## 2022-01-07 ENCOUNTER — APPOINTMENT (OUTPATIENT)
Dept: PHYSICAL THERAPY | Facility: CLINIC | Age: 72
DRG: 064 | End: 2022-01-07
Payer: COMMERCIAL

## 2022-01-07 LAB
ANION GAP SERPL CALCULATED.3IONS-SCNC: 4 MMOL/L (ref 3–14)
BUN SERPL-MCNC: 18 MG/DL (ref 7–30)
CALCIUM SERPL-MCNC: 6.8 MG/DL (ref 8.5–10.1)
CHLORIDE BLD-SCNC: 115 MMOL/L (ref 94–109)
CO2 SERPL-SCNC: 23 MMOL/L (ref 20–32)
CREAT SERPL-MCNC: 0.54 MG/DL (ref 0.66–1.25)
GFR SERPL CREATININE-BSD FRML MDRD: >90 ML/MIN/1.73M2
GLUCOSE BLD-MCNC: 103 MG/DL (ref 70–99)
GLUCOSE BLDC GLUCOMTR-MCNC: 137 MG/DL (ref 70–99)
POTASSIUM BLD-SCNC: 3 MMOL/L (ref 3.4–5.3)
POTASSIUM BLD-SCNC: 4 MMOL/L (ref 3.4–5.3)
SODIUM SERPL-SCNC: 142 MMOL/L (ref 133–144)

## 2022-01-07 PROCEDURE — 250N000013 HC RX MED GY IP 250 OP 250 PS 637: Performed by: HOSPITALIST

## 2022-01-07 PROCEDURE — 92507 TX SP LANG VOICE COMM INDIV: CPT | Mod: GN | Performed by: SPEECH-LANGUAGE PATHOLOGIST

## 2022-01-07 PROCEDURE — 92526 ORAL FUNCTION THERAPY: CPT | Mod: GN | Performed by: SPEECH-LANGUAGE PATHOLOGIST

## 2022-01-07 PROCEDURE — 99232 SBSQ HOSP IP/OBS MODERATE 35: CPT | Performed by: INTERNAL MEDICINE

## 2022-01-07 PROCEDURE — 97530 THERAPEUTIC ACTIVITIES: CPT | Mod: GP | Performed by: PHYSICAL THERAPIST

## 2022-01-07 PROCEDURE — 250N000013 HC RX MED GY IP 250 OP 250 PS 637: Performed by: INTERNAL MEDICINE

## 2022-01-07 PROCEDURE — 84132 ASSAY OF SERUM POTASSIUM: CPT | Performed by: INTERNAL MEDICINE

## 2022-01-07 PROCEDURE — 999N000190 HC STATISTIC VAT ROUNDS

## 2022-01-07 PROCEDURE — 97112 NEUROMUSCULAR REEDUCATION: CPT | Mod: GP | Performed by: PHYSICAL THERAPIST

## 2022-01-07 PROCEDURE — 82310 ASSAY OF CALCIUM: CPT | Performed by: INTERNAL MEDICINE

## 2022-01-07 PROCEDURE — 120N000001 HC R&B MED SURG/OB

## 2022-01-07 RX ORDER — POTASSIUM CHLORIDE 20MEQ/15ML
40 LIQUID (ML) ORAL ONCE
Status: COMPLETED | OUTPATIENT
Start: 2022-01-07 | End: 2022-01-07

## 2022-01-07 RX ORDER — POTASSIUM CHLORIDE 20MEQ/15ML
20 LIQUID (ML) ORAL ONCE
Status: COMPLETED | OUTPATIENT
Start: 2022-01-07 | End: 2022-01-07

## 2022-01-07 RX ORDER — HYDRALAZINE HYDROCHLORIDE 25 MG/1
25 TABLET, FILM COATED ORAL EVERY 8 HOURS SCHEDULED
Status: DISCONTINUED | OUTPATIENT
Start: 2022-01-07 | End: 2022-01-08

## 2022-01-07 RX ADMIN — METOPROLOL TARTRATE 50 MG: 50 TABLET, FILM COATED ORAL at 21:52

## 2022-01-07 RX ADMIN — HYDRALAZINE HYDROCHLORIDE 50 MG: 50 TABLET, FILM COATED ORAL at 06:17

## 2022-01-07 RX ADMIN — Medication 40 MG: at 21:54

## 2022-01-07 RX ADMIN — Medication 1 PACKET: at 11:25

## 2022-01-07 RX ADMIN — Medication 40 MG: at 09:02

## 2022-01-07 RX ADMIN — HYDRALAZINE HYDROCHLORIDE 25 MG: 25 TABLET, FILM COATED ORAL at 21:51

## 2022-01-07 RX ADMIN — ENALAPRIL MALEATE 5 MG: 5 TABLET ORAL at 21:52

## 2022-01-07 RX ADMIN — METOPROLOL TARTRATE 50 MG: 50 TABLET, FILM COATED ORAL at 09:02

## 2022-01-07 RX ADMIN — HYDRALAZINE HYDROCHLORIDE 25 MG: 25 TABLET, FILM COATED ORAL at 14:10

## 2022-01-07 RX ADMIN — POTASSIUM CHLORIDE 20 MEQ: 20 SOLUTION ORAL at 11:26

## 2022-01-07 RX ADMIN — POTASSIUM CHLORIDE 40 MEQ: 20 SOLUTION ORAL at 09:02

## 2022-01-07 RX ADMIN — ACETAMINOPHEN 650 MG: 325 SUSPENSION ORAL at 21:52

## 2022-01-07 ASSESSMENT — ACTIVITIES OF DAILY LIVING (ADL)
ADLS_ACUITY_SCORE: 35
ADLS_ACUITY_SCORE: 29
ADLS_ACUITY_SCORE: 31
ADLS_ACUITY_SCORE: 35
ADLS_ACUITY_SCORE: 29
ADLS_ACUITY_SCORE: 35
ADLS_ACUITY_SCORE: 33
ADLS_ACUITY_SCORE: 29
ADLS_ACUITY_SCORE: 35
ADLS_ACUITY_SCORE: 33
ADLS_ACUITY_SCORE: 29
ADLS_ACUITY_SCORE: 29
ADLS_ACUITY_SCORE: 35
ADLS_ACUITY_SCORE: 35
ADLS_ACUITY_SCORE: 29
ADLS_ACUITY_SCORE: 31
ADLS_ACUITY_SCORE: 29
ADLS_ACUITY_SCORE: 35

## 2022-01-07 NOTE — PROGRESS NOTES
Care Management Follow Up    Length of Stay (days): 14    Expected Discharge Date: 01/08/2022     Concerns to be Addressed:       Patient plan of care discussed at interdisciplinary rounds: Yes    Anticipated Discharge Disposition:       Anticipated Discharge Services:    Anticipated Discharge DME:      Patient/family educated on Medicare website which has current facility and service quality ratings:    Education Provided on the Discharge Plan:    Patient/Family in Agreement with the Plan:      Referrals Placed by CM/SW:    Private pay costs discussed: Not applicable    Additional Information:  JORGE left a voicemail for s/o Miladis.  JORGE would like to get update about who will be making health care decisions for pt.  Miladis stated yesterday that she planned to discuss this with pt siblings.  JORGE needs to get verbal confirmation from siblings that Miladis can be decision-maker, or whether sibling(s) intend to fulfill this.   UPDATE: JORGE has not heard back from pt significant other Miladis.  JORGE was able to locate pt NOK via public records and online search engines.  JORGE called and spoke with pt sister Simran Nash [phone].  Simran is aware that pt is hospitalized and VERBALLY consents that Miladis (s/o) can make all health care decisions.  Simran also states that her brother Forest Johnson also agrees with this, that they have spoken at length about his and are both in agreement.  MD updated.   Siblings are:  Simran Nash [phone]  Forest Johnson [phone].        Rossy Lazaro, [facility] Hospital[facility]

## 2022-01-07 NOTE — PROGRESS NOTES
Writer informing that Face Sheet regarding patient's Emergency Contact Relationship has been incorrect and now updated. In huddle this morning, it was noted by , Rossy that patient's SPOUSE as entered on Face Sheet is actually patient's SIGNIFICANT OTHER. Patient is NOT . Face sheet was changed to SIGNIFICANT OTHER after verifying with Andressa Durbin. She said she has known patient for over 30 years, but they are NOT . She gave writer patient's sibling names (Jasmin and Lavon. She said patient has no children) and their phone numbers. Everything is now updated on Face Sheet. MD Notified.

## 2022-01-07 NOTE — PROGRESS NOTES
CLINICAL NUTRITION SERVICES - REASSESSMENT NOTE      Future/Additional Recommendations:     Continue current TF regimen     Malnutrition: (1/4)  % Weight Loss:  > 2% in 1 week (severe malnutrition) -- more weights needed to confirm  % Intake:  </= 50% for >/= 5 days (severe malnutrition)   Subcutaneous Fat Loss:  None previously observed  Muscle Loss:  None previously observed  Fluid Retention:  Trace-Moderate     Malnutrition Diagnosis: Severe malnutrition (suspected)  In Context of:  Acute illness or injury       EVALUATION OF PROGRESS TOWARD GOALS   Diet:    NPO    1/4: SLP ---> Severe dysphagia and aphasia      Nutrition Support:    Type of Feeding Tube:  GJ (feeding into J-port)  Enteral Frequency:  Continuous  Enteral Regimen: Jevity 1.5 at goal rate 50 ml/hr   Total Enteral Provisions: 1800 kcal, 77 gm pro, 25 gm fiber, 912 mL free water   Free Water Flush: 150 ml q 4 hours   Total Fluid (TF + FWF) = 1812 ml/day (26 kcal/kg)     +1 packet Prosource/day = 40 kcal, 11 g protein  TOTAL (TF + Prosource) = 1840 kcal (26 kcal/kg) and 88 g protein (1.2 g/kg)    Intake/Tolerance:    Chart reviewed  ++ BM yesterday (received dose colace yest morning)    1/7: Na 142         K 3.0 (L)      ASSESSED NUTRITION NEEDS:  Dosing Weight 73.7 kg (1/3 - last wt)  Estimated Energy Needs: 9079-1763 kcals (25-30 Kcal/Kg)  Justification: maintenance   Estimated Protein Needs:  grams protein (1.2-1.5 g pro/Kg)  Justification: preservation of lean body mass      NEW FINDINGS:   1/5: GJ - 16 [place], gastrojejunostomy tube was advanced until the tip was in the proximal jejunum.     Previous Goals (1/4):   EN to resume and meet % estimated needs in <48 hrs   Evaluation: Met    Previous Nutrition Diagnosis (1/4):   Inadequate protein-energy intake related to NGT out and NPO as evidenced by receiving 0% estimated needs currently, plus sub-optimal nutrition overall x11 days   Evaluation: Improving        CURRENT NUTRITION  DIAGNOSIS  No nutrition diagnosis identified at this time       INTERVENTIONS  Recommendations / Nutrition Prescription  NPO    TF as above    Implementation  No intervention at this time    Goals  EN to meet % est needs      MONITORING AND EVALUATION:  Progress towards goals will be monitored and evaluated per protocol and Practice Guidelines

## 2022-01-07 NOTE — PROGRESS NOTES
[facility]    Medicine Progress Note - Hospitalist Service       Date of Admission:  12/24/2021  Assessment & Plan   Sukhi Johnson is a 71 year-old male with past medical history of hypertension, chronic fatigue who presents with left-sided weakness and speech difficulties. Admitted 12/24/2021.      Acute hemorrhagic stroke with vasogenic edema  Acute metabolic encephalopathy secondary to above  History of hemorrhagic stroke in 2013  Hypertension, untreated  *Presents with left-sided weakness and speech difficulty  *CT and MRI on admission with acute right frontoparietal intracranial hemorrhage with associated regional mass effect resulting in 2 mm leftward midline shift; MRI also noted with numerous chronic microhemorrhages throughout the cerebral hemispheres, deep gray nuclei and posterior fossa, possibly representing sequelae of hypertensive or amyloid angiopathy  *Neurosurgery evaluated and non-surgical management recommended  *Repeat head CT 12/26 with slight interval decrease in size of the large parenchymal hematoma, no definite evidence for new or increasing hemorrhage; stable minimal leftward midline shift of the third ventricle  *Repeat head CT 12/29 noted stable   *EEG 12/29 with no electrographic seizures  *ECHO 12/26 with LVEF >70%, negative bubble study; A1c 5.4%,   - Neurology (stroke) consulted, now signed off  - Goal SBP <160, more elevated when hydralazine held with loss of enteral route, control since has been much improved and trending toward the low normal side   - Decrease hydralazine to 25 mg PO TID. Continue enalapril, metoprolol.  - PT/OT consulted, recommending TCU. SW following for choices. Newly discovered that patient is not  to his significant other, so attempting to find contact information for siblings.   - Continue mitt to right hand as needed if pulling at tube    Acute hypoxic respiratory failure  Suspected aspiration event  *Episode  of respiratory distress 12/27. Tmax 100.8F, WBC elevated  *CXR without clear infiltrate, though episode concerning for aspiration event  *Completed 7 day course of piperacillin-tazobactam IV  - SLP consulted, diet per SLP. Slow recovery, if at all, anticipated, PEG placed    At risk for malnutrition   *PEG placed 1/5/22  - Nutrition following, continue tube feeds     Hypernatremia  Sodium peak of 152. Secondary to lack of free water intake. Sodium normal 1/3  - Continue free water flushes    Hypokalemia  - Monitor and replace per protocol     Suspected GI bleed  *Bloody BM noted since 12/29 with hgb down to 12.7 g/dl  *GI consulted, no immediate plans for endoscopy  - Has had small amounts of recurrent bleeding noted, with no change in hemoglobin   - Continue PPI     Chronic fatigue syndrome 2/2 Lyme disease  PTA on Adderall.   - Neurology recommending against stimulants at this time     COVID PCR screen negative  No vaccination records available, tested negative on admission 12/24    Clinically Significant Risk Factors Present on Admission                       Diet: Adult Formula Drip Feeding: Continuous Jevity 1.5; Other - Specify in Comment; Goal Rate: 50; mL/hr; Medication - Feeding Tube Flush Frequency: At least 15-30 mL water before and after medication administration and with tube clogging; Amount to Se...  NPO per Anesthesia Guidelines for Procedure/Surgery Except for: Meds    DVT Prophylaxis: Pneumatic Compression Devices  Tay Catheter: Not present  Code Status: Full Code      Disposition Plan   Expected Discharge: Ready to discharge to TCU once placement found      Anticipated discharge location:  Awaiting care coordination huddle  Delays:     Placement - TCU         Entered: Juan Francisco Albarran MD 01/07/2022, 3:32 PM       The patient's care was discussed with the patient, bedside RN     Juan Francisco Albarran MD  Hospitalist Service  [facility]  Hospital    ______________________________________________________________________    Interval History   No acute events overnight. Unable to obtain any meaningful history from patient due to encephalopathy/aphasia.  Discussed with RN, no new concerns.    Data reviewed today: I reviewed all medications, new labs and imaging results over the last 24 hours. I personally reviewed no images or EKG's today.    Physical Exam   Vital Signs: Temp: 98.4  F (36.9  C) Temp src: Axillary BP: 134/78 Pulse: 79   Resp: 18 SpO2: 95 % O2 Device: None (Room air)    Weight: 162 lbs 7.66 oz    Constitutional: NAD  Respiratory: Clear to auscultation bilaterally, good air movement bilaterally  Cardiovascular: RRR. No peripheral edema.  GI: Soft, non-tender, non-distended.   Skin/Integumen: Warm, dry  Neuro: Sleeping. Non-verbal. Spontaneously moving right arm and leg. No movement on the left.      Data   Recent Labs   Lab 01/07/22  0548 01/06/22  0611 01/05/22  0612 01/05/22  0203 01/04/22  2232 01/04/22  1649 01/04/22  0156 01/03/22  1329 01/02/22  1004   WBC  --   --   --   --   --   --   --  9.5  --    HGB  --   --  13.5  --  13.8  --   --  13.7 13.0*   MCV  --   --   --   --   --   --   --  91  --    PLT  --   --  311  --   --   --   --  307 280     --   --   --   --   --   --  139 143   POTASSIUM 3.0* 3.5 3.6  --   --   --    < > 3.8 3.7   CHLORIDE 115*  --   --   --   --   --   --  110* 113*   CO2 23  --   --   --   --   --   --  23 25   BUN 18  --   --   --   --   --   --  21 22   CR 0.54*  --   --   --   --   --   --  0.52* 0.67   ANIONGAP 4  --   --   --   --   --   --  6 5   MUSTAPHA 6.8*  --   --   --   --   --   --  8.6 8.3*   *  --   --  99  --  93   < > 135* 132*    < > = values in this interval not displayed.       No results found for this or any previous visit (from the past 24 hour(s)).  Medications       enalapril  5 mg Per Feeding Tube At Bedtime     [Held by provider] heparin ANTICOAGULANT  5,000 Units  Subcutaneous Q12H     hydrALAZINE  25 mg Per Feeding Tube Q8H TONY     metoprolol tartrate  50 mg Per Feeding Tube BID     pantoprazole  40 mg Per Feeding Tube BID     protein modular  1 packet Per Feeding Tube Daily     sodium chloride (PF)  10-40 mL Intracatheter Q7 Days     sodium chloride (PF)  3 mL Intracatheter Q8H

## 2022-01-07 NOTE — PLAN OF CARE
Lethargic, VSS on room air, no physical signs of pain noted. pt non verbal unable to assess neuros, turn and repo Q2, external male catheter, no BM this shift, assist x2 with lift, NPO, on continuous tube feeds, potasium replacement ordered this AM, TCU placement pending

## 2022-01-07 NOTE — PLAN OF CARE
Reason for Admission: Patient is currently here with right-sided nontraumatic intracerebral hemorrhage.         Seizure or isolation precaution: no  Cognitive/Mentation: unable to assess patient's level of consciousness and neurological status due to nonverbal status combined with a flat affect  Use of CPAP: no  Neuros/CMS: unable to assess patient's level of consciousness and neurological status due to nonverbal status combined with a flat affect  VS: stable  Tele: NSR, discontinued   GI: BS active x4  passing flatus, incontinent.  : incontinent. Using external catheter   Pulmonary: LS clear  Pain: no pain   Replacement Protocol: K+ protocol, recheck in AM      BG checks: No  Drains: right arm PICC line was CDI. Jevity 1.5 riya running via GT tube at 50ml/hr and water flush with 150ml Q4H. GT dressing was CDI.   Skin: intact  Edema: +2 edema on the left foot  Surgical site: no  Activity: Assist x 2 with lift  Diet: NPO, crush meds and give via GT tube      Consults: speech, PT  Discharge: pending. TCU

## 2022-01-07 NOTE — PLAN OF CARE
Reason for Admission: Patient is currently here with right-sided nontraumatic intracerebral hemorrhage.         Seizure or isolation precaution: no  Cognitive/Mentation: unable to assess patient's level of consciousness and neurological status due to nonverbal status combined with a flat affect  Use of CPAP: no  Neuros/CMS: unable to assess patient's level of consciousness and neurological status due to nonverbal status combined with a flat affect  VS: stable  Tele: discontinued   GI: BS active x4  passing flatus, incontinent.  : incontinent. Using external catheter   Pulmonary: LS clear  Pain: no pain   Replacement Protocol: K+ protocol, recheck scheduled for 1530     BG checks: No  Drains: right arm PICC line was CDI. Jevity 1.5 riya running via GT tube at 50ml/hr and water flush with 150ml Q4H. GT dressing was changed, dressing was CDI.   Skin: intact  Edema: +2 edema on the left foot  Surgical site: no  Activity: Assist x 2 with lift  Diet: NPO, crush meds and give via GT tube      Consults: speech, PT  Discharge: pending. TCU

## 2022-01-08 ENCOUNTER — APPOINTMENT (OUTPATIENT)
Dept: SPEECH THERAPY | Facility: CLINIC | Age: 72
DRG: 064 | End: 2022-01-08
Payer: COMMERCIAL

## 2022-01-08 LAB
LACTATE SERPL-SCNC: 1 MMOL/L (ref 0.7–2)
PLATELET # BLD AUTO: 191 10E3/UL (ref 150–450)
POTASSIUM BLD-SCNC: 3.8 MMOL/L (ref 3.4–5.3)
SARS-COV-2 RNA RESP QL NAA+PROBE: NEGATIVE

## 2022-01-08 PROCEDURE — 83605 ASSAY OF LACTIC ACID: CPT | Performed by: HOSPITALIST

## 2022-01-08 PROCEDURE — 92507 TX SP LANG VOICE COMM INDIV: CPT | Mod: GN

## 2022-01-08 PROCEDURE — 999N000190 HC STATISTIC VAT ROUNDS

## 2022-01-08 PROCEDURE — 85049 AUTOMATED PLATELET COUNT: CPT | Performed by: PHYSICIAN ASSISTANT

## 2022-01-08 PROCEDURE — 120N000001 HC R&B MED SURG/OB

## 2022-01-08 PROCEDURE — 84132 ASSAY OF SERUM POTASSIUM: CPT | Performed by: HOSPITALIST

## 2022-01-08 PROCEDURE — 250N000013 HC RX MED GY IP 250 OP 250 PS 637: Performed by: INTERNAL MEDICINE

## 2022-01-08 PROCEDURE — U0003 INFECTIOUS AGENT DETECTION BY NUCLEIC ACID (DNA OR RNA); SEVERE ACUTE RESPIRATORY SYNDROME CORONAVIRUS 2 (SARS-COV-2) (CORONAVIRUS DISEASE [COVID-19]), AMPLIFIED PROBE TECHNIQUE, MAKING USE OF HIGH THROUGHPUT TECHNOLOGIES AS DESCRIBED BY CMS-2020-01-R: HCPCS | Performed by: INTERNAL MEDICINE

## 2022-01-08 PROCEDURE — 99232 SBSQ HOSP IP/OBS MODERATE 35: CPT | Performed by: INTERNAL MEDICINE

## 2022-01-08 PROCEDURE — 250N000013 HC RX MED GY IP 250 OP 250 PS 637: Performed by: HOSPITALIST

## 2022-01-08 PROCEDURE — 92526 ORAL FUNCTION THERAPY: CPT | Mod: GN

## 2022-01-08 RX ORDER — HYDRALAZINE HYDROCHLORIDE 50 MG/1
50 TABLET, FILM COATED ORAL EVERY 8 HOURS SCHEDULED
Status: DISCONTINUED | OUTPATIENT
Start: 2022-01-08 | End: 2022-01-17 | Stop reason: HOSPADM

## 2022-01-08 RX ADMIN — HYDRALAZINE HYDROCHLORIDE 25 MG: 25 TABLET, FILM COATED ORAL at 05:59

## 2022-01-08 RX ADMIN — HYDRALAZINE HYDROCHLORIDE 50 MG: 50 TABLET, FILM COATED ORAL at 14:47

## 2022-01-08 RX ADMIN — ENALAPRIL MALEATE 5 MG: 5 TABLET ORAL at 21:33

## 2022-01-08 RX ADMIN — Medication 40 MG: at 09:09

## 2022-01-08 RX ADMIN — ACETAMINOPHEN 650 MG: 325 SUSPENSION ORAL at 09:09

## 2022-01-08 RX ADMIN — METOPROLOL TARTRATE 50 MG: 50 TABLET, FILM COATED ORAL at 09:09

## 2022-01-08 RX ADMIN — Medication 1 PACKET: at 09:10

## 2022-01-08 RX ADMIN — Medication 40 MG: at 21:32

## 2022-01-08 RX ADMIN — HYDRALAZINE HYDROCHLORIDE 50 MG: 50 TABLET, FILM COATED ORAL at 21:33

## 2022-01-08 RX ADMIN — ACETAMINOPHEN 650 MG: 325 SUSPENSION ORAL at 17:07

## 2022-01-08 RX ADMIN — METOPROLOL TARTRATE 50 MG: 50 TABLET, FILM COATED ORAL at 21:33

## 2022-01-08 ASSESSMENT — ACTIVITIES OF DAILY LIVING (ADL)
ADLS_ACUITY_SCORE: 31
ADLS_ACUITY_SCORE: 35
ADLS_ACUITY_SCORE: 31
ADLS_ACUITY_SCORE: 35
ADLS_ACUITY_SCORE: 35
ADLS_ACUITY_SCORE: 31
ADLS_ACUITY_SCORE: 35
ADLS_ACUITY_SCORE: 31
ADLS_ACUITY_SCORE: 35
ADLS_ACUITY_SCORE: 31
ADLS_ACUITY_SCORE: 35

## 2022-01-08 NOTE — PLAN OF CARE
Reason for Admission: Patient is currently here with right-sided nontraumatic intracerebral hemorrhage.         Seizure or isolation precaution: no  Cognitive/Mentation: unable to assess patient's level of consciousness and neurological status due to nonverbal status combined with a flat affect  Use of CPAP: no  Neuros/CMS: unable to assess patient's level of consciousness and neurological status due to nonverbal status combined with a flat affect  VS: stable, sepsis protocol was initiated. Lactate was normal. All vitals are now normal   Tele: discontinued   GI: BS active x4  passing flatus, incontinent. Pt had small BM  : incontinent. Using external catheter. Repositioned q2h   Pulmonary: LS clear  Pain: PRN tylenol 650mg was given for pain, it was effective. Pt is now comfortable   Replacement Protocol: K+ was rechecked today and it was normal, recheck scheduled for tomorrow morning      BG checks: No  Drains: right arm PICC line was CDI. Jevity 1.5 riya running via GT tube at 50ml/hr and water flush with 150ml Q4H. GT dressing was changed, dressing was CDI. buttons present to secure PEG tube   Skin: intact  Edema: +2 edema on the left foot  Surgical site: no  Activity: Assist x 2 with lift  Diet: NPO, crush meds and give via GT tube      Consults: speech, PT  Discharge: pending. referral sent to [facility]

## 2022-01-08 NOTE — PLAN OF CARE
3-7pm no changes in neuros,  patient up in chair and tolerated well. Mitt off  right hand and patient not pulling at tubes, keep tube feed hidden under sheet and he leave it alone. Needs mitt off if possible to go to rehab

## 2022-01-08 NOTE — PROGRESS NOTES
Care Management Follow Up    Length of Stay (days): 15    Expected Discharge Date: 01/10/2022     Concerns to be Addressed:       Patient plan of care discussed at interdisciplinary rounds: Yes    Anticipated Discharge Disposition:       Anticipated Discharge Services:    Anticipated Discharge DME:      Patient/family educated on Medicare website which has current facility and service quality ratings:    Education Provided on the Discharge Plan:    Patient/Family in Agreement with the Plan:      Referrals Placed by CM/SW:    Private pay costs discussed: Not applicable    Additional Information:    Per chart review pt's S/O, Miladis, is the decision maker for pt per verbal consent by siblings.  Billy reached out to Miladis to obtain TCU choices.  Miladis would like a referral sent to [facility] and plans on touring a few more facilities today; referral sent.  S/O is aware that obtaining additional choices may be necessary given limited capacity at TC currently; Sw to f/u later this afternoon for other choices.    Update:  Billy confirmed with [facility] that they do not have any beds available until Tues, 1/11/22; Sw to f/u with Miladis later this afternoon for additional choices.    Update:  Billy spoke with Miladis and she stated she spoke with [facility]CU and they told her they did receive the referral and a bed may be available Monday, 1/10/22.  Billy suggested that additional referrals be sent just in case they decline or there's very limited beds with multiple pending referrals.  Miladis asked that Billy meet with her in-person tomorrow at 1200 to review other facilities; Sw to meet with Miladis.  Billy and Miladis briefly discussed [facility]; Billy explained that if pt becomes oriented this form can be filled out to designate her as the legal medical decision maker for pt.        Estelita Waggoner, [facility]

## 2022-01-08 NOTE — PROVIDER NOTIFICATION
Doctor Deion Chicas said that pt does not need repeated MRI at this time, it should be done as an outpatient.

## 2022-01-08 NOTE — PLAN OF CARE
Pt here with right frontal ICH. Unable to assess LOC d/t pt is nonverbal. Pt open eyes to voice. Neuros left sided weakness, left droop. VSS. Tele NSR. NG-tube in  NPO. R PICC in placed, patent and intact. Up with A2/lift. Maintained SBP<160. External cath in placed with adequate and incont. Denies pain. Pt scoring green on the Aggression Stop Light Tool. Discharge pending.

## 2022-01-08 NOTE — PROGRESS NOTES
[facility]    Medicine Progress Note - Hospitalist Service       Date of Admission:  12/24/2021  Assessment & Plan   Sukhi Johnson is a 71 year-old male with past medical history of hypertension, chronic fatigue who presents with left-sided weakness and speech difficulties. Admitted 12/24/2021.      Acute hemorrhagic stroke with vasogenic edema  Acute metabolic encephalopathy secondary to above  History of hemorrhagic stroke in 2013  Hypertension, untreated  *Presents with left-sided weakness and speech difficulty  *CT and MRI on admission with acute right frontoparietal intracranial hemorrhage with associated regional mass effect resulting in 2 mm leftward midline shift; MRI also noted with numerous chronic microhemorrhages throughout the cerebral hemispheres, deep gray nuclei and posterior fossa, possibly representing sequelae of hypertensive or amyloid angiopathy  *Neurosurgery evaluated and non-surgical management recommended  *Repeat head CT 12/26 with slight interval decrease in size of the large parenchymal hematoma, no definite evidence for new or increasing hemorrhage; stable minimal leftward midline shift of the third ventricle  *Repeat head CT 12/29 noted stable   *EEG 12/29 with no electrographic seizures  *ECHO 12/26 with LVEF >70%, negative bubble study; A1c 5.4%,   *Neurology (stroke) consulted, now signed off  - Goal SBP <160, more elevated when enteral route was temporarily lost, since better controlled with low-normal blood pressure noted  - Hydralazine decrease to 25 mg 3 times daily, 1/7/22, though subsequently with elevated pressures so will return to 50 mg 3 times daily.  Continue enalapril, metoprolol.  - PT/OT consulted, recommending TCU. SW following for choices.   - Continue mitt to right hand as needed if pulling at tube    Acute hypoxic respiratory failure  Suspected aspiration event  *Episode of respiratory distress 12/27. Tmax 100.8F, WBC  elevated  *CXR without clear infiltrate, though episode concerning for aspiration event  *Completed 7 day course of piperacillin-tazobactam IV  - SLP consulted, diet per SLP. Slow recovery, if at all, anticipated, PEG placed    At risk for malnutrition   *PEG placed 1/5/22  - Nutrition following, continue tube feeds     Hypernatremia  Sodium peak of 152. Secondary to lack of free water intake. Sodium normal 1/7  - Continue free water flushes    Hypokalemia  - Monitor and replace per protocol     Suspected GI bleed  *Bloody BM noted since 12/29 with hgb down to 12.7 g/dl  *GI consulted, no immediate plans for endoscopy  - Has had small amounts of recurrent bleeding noted, with no change in hemoglobin   - Continue PPI     Chronic fatigue syndrome 2/2 Lyme disease  PTA on Adderall.   - Neurology recommending against stimulants at this time     COVID PCR screen negative  No vaccination records available, tested negative on admission 12/24    Clinically Significant Risk Factors Present on Admission                       Diet: Adult Formula Drip Feeding: Continuous Jevity 1.5; Other - Specify in Comment; Goal Rate: 50; mL/hr; Medication - Feeding Tube Flush Frequency: At least 15-30 mL water before and after medication administration and with tube clogging; Amount to Se...  NPO per Anesthesia Guidelines for Procedure/Surgery Except for: Meds    DVT Prophylaxis: Pneumatic Compression Devices  Tay Catheter: Not present  Code Status: Full Code      Disposition Plan   Expected Discharge: Ready to discharge to TCU once placement found      Anticipated discharge location:  Awaiting care coordination huddle  Delays:     Placement - TCU         Entered: Juan Francisco Albarran MD 01/08/2022, 11:05 AM       The patient's care was discussed with the patient, bedside RN, patient's wife     Juan Francisco Albarran MD  Hospitalist Service  [facility]    ______________________________________________________________________    Interval History   No acute events overnight. Unable to obtain any meaningful history from patient due to encephalopathy/aphasia.  Discussed with RN, no new concerns.    Data reviewed today: I reviewed all medications, new labs and imaging results over the last 24 hours. I personally reviewed no images or EKG's today.    Physical Exam   Vital Signs: Temp: 98  F (36.7  C) Temp src: Axillary BP: 118/59 Pulse: 71   Resp: 18 SpO2: 95 % O2 Device: None (Room air)    Weight: 162 lbs 7.66 oz    Constitutional: NAD  Respiratory: Clear to auscultation bilaterally, good air movement bilaterally  Cardiovascular: RRR. No peripheral edema.  GI: Soft, non-tender, non-distended.   Skin/Integumen: Warm, dry  Neuro: Sleeping. Non-verbal. Spontaneously moving right arm and leg. No movement on the left.      Data   Recent Labs   Lab 01/08/22  0833 01/08/22  0549 01/07/22  1734 01/07/22  1540 01/07/22  0548 01/06/22  0611 01/05/22  0612 01/05/22  0203 01/04/22  2232 01/04/22  0156 01/03/22  1329 01/02/22  1004   WBC  --   --   --   --   --   --   --   --   --   --  9.5  --    HGB  --   --   --   --   --   --  13.5  --  13.8  --  13.7 13.0*   MCV  --   --   --   --   --   --   --   --   --   --  91  --    PLT  --  191  --   --   --   --  311  --   --   --  307 280   NA  --   --   --   --  142  --   --   --   --   --  139 143   POTASSIUM 3.8  --   --  4.0 3.0*   < > 3.6  --   --    < > 3.8 3.7   CHLORIDE  --   --   --   --  115*  --   --   --   --   --  110* 113*   CO2  --   --   --   --  23  --   --   --   --   --  23 25   BUN  --   --   --   --  18  --   --   --   --   --  21 22   CR  --   --   --   --  0.54*  --   --   --   --   --  0.52* 0.67   ANIONGAP  --   --   --   --  4  --   --   --   --   --  6 5   MUSTAPHA  --   --   --   --  6.8*  --   --   --   --   --  8.6 8.3*   GLC  --   --  137*  --  103*  --   --  99  --    < > 135* 132*    < > = values in  this interval not displayed.       No results found for this or any previous visit (from the past 24 hour(s)).  Medications       enalapril  5 mg Per Feeding Tube At Bedtime     [Held by provider] heparin ANTICOAGULANT  5,000 Units Subcutaneous Q12H     hydrALAZINE  25 mg Per Feeding Tube Q8H TONY     metoprolol tartrate  50 mg Per Feeding Tube BID     pantoprazole  40 mg Per Feeding Tube BID     protein modular  1 packet Per Feeding Tube Daily     sodium chloride (PF)  10-40 mL Intracatheter Q7 Days     sodium chloride (PF)  3 mL Intracatheter Q8H

## 2022-01-09 LAB
POTASSIUM BLD-SCNC: 3.1 MMOL/L (ref 3.4–5.3)
POTASSIUM BLD-SCNC: 3.8 MMOL/L (ref 3.4–5.3)

## 2022-01-09 PROCEDURE — 250N000013 HC RX MED GY IP 250 OP 250 PS 637: Performed by: HOSPITALIST

## 2022-01-09 PROCEDURE — 84132 ASSAY OF SERUM POTASSIUM: CPT | Performed by: INTERNAL MEDICINE

## 2022-01-09 PROCEDURE — 250N000013 HC RX MED GY IP 250 OP 250 PS 637: Performed by: INTERNAL MEDICINE

## 2022-01-09 PROCEDURE — 250N000011 HC RX IP 250 OP 636: Performed by: INTERNAL MEDICINE

## 2022-01-09 PROCEDURE — 120N000001 HC R&B MED SURG/OB

## 2022-01-09 PROCEDURE — 84132 ASSAY OF SERUM POTASSIUM: CPT | Performed by: HOSPITALIST

## 2022-01-09 PROCEDURE — 99232 SBSQ HOSP IP/OBS MODERATE 35: CPT | Performed by: INTERNAL MEDICINE

## 2022-01-09 PROCEDURE — 999N000190 HC STATISTIC VAT ROUNDS

## 2022-01-09 RX ORDER — HEPARIN SODIUM 5000 [USP'U]/.5ML
5000 INJECTION, SOLUTION INTRAVENOUS; SUBCUTANEOUS EVERY 12 HOURS
Status: DISCONTINUED | OUTPATIENT
Start: 2022-01-09 | End: 2022-01-17 | Stop reason: HOSPADM

## 2022-01-09 RX ORDER — POTASSIUM CHLORIDE 1.5 G/1.58G
40 POWDER, FOR SOLUTION ORAL ONCE
Status: COMPLETED | OUTPATIENT
Start: 2022-01-09 | End: 2022-01-09

## 2022-01-09 RX ADMIN — METOPROLOL TARTRATE 50 MG: 50 TABLET, FILM COATED ORAL at 20:38

## 2022-01-09 RX ADMIN — Medication 40 MG: at 20:39

## 2022-01-09 RX ADMIN — HYDRALAZINE HYDROCHLORIDE 50 MG: 50 TABLET, FILM COATED ORAL at 22:52

## 2022-01-09 RX ADMIN — ENALAPRIL MALEATE 5 MG: 5 TABLET ORAL at 22:52

## 2022-01-09 RX ADMIN — HEPARIN SODIUM 5000 UNITS: 5000 INJECTION, SOLUTION INTRAVENOUS; SUBCUTANEOUS at 20:38

## 2022-01-09 RX ADMIN — HYDRALAZINE HYDROCHLORIDE 50 MG: 50 TABLET, FILM COATED ORAL at 14:30

## 2022-01-09 RX ADMIN — ACETAMINOPHEN 650 MG: 325 SUSPENSION ORAL at 14:30

## 2022-01-09 RX ADMIN — METOPROLOL TARTRATE 50 MG: 50 TABLET, FILM COATED ORAL at 09:00

## 2022-01-09 RX ADMIN — POTASSIUM CHLORIDE 40 MEQ: 1.5 POWDER, FOR SOLUTION ORAL at 09:43

## 2022-01-09 RX ADMIN — Medication 40 MG: at 09:00

## 2022-01-09 RX ADMIN — HYDRALAZINE HYDROCHLORIDE 50 MG: 50 TABLET, FILM COATED ORAL at 06:08

## 2022-01-09 RX ADMIN — Medication 1 PACKET: at 09:00

## 2022-01-09 ASSESSMENT — ACTIVITIES OF DAILY LIVING (ADL)
ADLS_ACUITY_SCORE: 31
ADLS_ACUITY_SCORE: 29
ADLS_ACUITY_SCORE: 31
ADLS_ACUITY_SCORE: 29
ADLS_ACUITY_SCORE: 31
ADLS_ACUITY_SCORE: 31
ADLS_ACUITY_SCORE: 29
ADLS_ACUITY_SCORE: 31
ADLS_ACUITY_SCORE: 29
ADLS_ACUITY_SCORE: 31
ADLS_ACUITY_SCORE: 29

## 2022-01-09 NOTE — PLAN OF CARE
Pt here with right frontal ICH. Unable to assess LOC fully, but Pt is alert but nonverbal. Pt open eyes to voice. Neuros left sided weakness, left droop. VSS. NPO, G-tube in placed with 150 ml free water flushes q4hrs. R PICC in placed, patent and intact. Up with A2/lift, T&Rep q2hrs Maintained SBP<160. External cath in placed with adequate and incont, had two small BM. Denies pain. Pt scoring green on the Aggression Stop Light Tool. Discharge pending.

## 2022-01-09 NOTE — PROGRESS NOTES
Care Management Follow Up    Length of Stay (days): 16    Expected Discharge Date: 01/10/2022     Concerns to be Addressed:       Patient plan of care discussed at interdisciplinary rounds: Yes    Anticipated Discharge Disposition:       Anticipated Discharge Services:    Anticipated Discharge DME:      Patient/family educated on Medicare website which has current facility and service quality ratings:    Education Provided on the Discharge Plan:    Patient/Family in Agreement with the Plan:      Referrals Placed by CM/SW:    Private pay costs discussed: Not applicable    Additional Information:    Sw met with S/O, Miladis, to discuss TCU options at 1200 today; meeting lasted 1 hour.  [facility] remains pending and S/O remains hopeful that a bed will be available tomorrow, 1/10/22.  Sw encouraged sending additional referrals.  Significant other has done extensive research on TCU facilities and has a tour scheduled at [facility] today and then will attempt to tour [facility], [facility], and [facility] tomorrow.  S/O asked if a referral was sent prior to her touring if that would mean pt would have to discharge there if she later decided it was not the right fit after her tour; Sw confirmed that, yes, pt would be expected to discharge given that a placement was found.  Because of this, S/O stated that she wanted to tour them first.  S/O inquired into DME once pt returns home (alternating pressure mattress and external cath system); Sw stated that TCU would address these needs once pt is ready to discharge home.  S/O expressed optimism in pt being able to fully recover but also understands the seriousness of his condition and is preparing for a potentially difficult recovery; the main reason she is carefully selecting TCU facilities.  [facility]  Sw will f/u with Miladis tomorrow regarding other TCU choices.    Update:  In previous conversation  (forgot to chart), Miladis notified Sw that pt's insurance has switched from [employer] to [employer] (ID:  [id number], Group #:  [id number]), Medicare ([id number]) as of 1/1/22.  Because of this, pt is not restricted to [employer] contracted facilities but FS shows [employer] insurance.  [facility] and all other potential TCU referrals sent will need to be updated on this to ensure pt is not denied for insurance reasons.      Estelita Waggoner, LICSW

## 2022-01-09 NOTE — PROGRESS NOTES
[facility]    Medicine Progress Note - Hospitalist Service       Date of Admission:  12/24/2021  Assessment & Plan   Sukhi Johnson is a 71 year-old male with past medical history of hypertension, chronic fatigue who presents with left-sided weakness and speech difficulties. Admitted 12/24/2021.      Acute hemorrhagic stroke with vasogenic edema  Acute metabolic encephalopathy secondary to above  History of hemorrhagic stroke in 2013  Hypertension, untreated  *Presents with left-sided weakness and speech difficulty  *CT and MRI on admission with acute right frontoparietal intracranial hemorrhage with associated regional mass effect resulting in 2 mm leftward midline shift; MRI also noted with numerous chronic microhemorrhages throughout the cerebral hemispheres, deep gray nuclei and posterior fossa, possibly representing sequelae of hypertensive or amyloid angiopathy  *Neurosurgery evaluated and non-surgical management recommended  *Repeat head CT 12/26 with slight interval decrease in size of the large parenchymal hematoma, no definite evidence for new or increasing hemorrhage; stable minimal leftward midline shift of the third ventricle  *Repeat head CT 12/29 noted stable   *EEG 12/29 with no electrographic seizures  *ECHO 12/26 with LVEF >70%, negative bubble study; A1c 5.4%,   *Neurology (stroke) consulted, now signed off  - Goal SBP <160, more elevated when enteral route was temporarily lost, since better controlled  - Continue hydralazine 50 mg TID, metoprolol 50 mg BID, enalapril 5 mg at bedtime  - PT/OT consulted, recommending TCU. SW following for choices.   - Continue mitt to right hand as needed if pulling at tube    Acute hypoxic respiratory failure  Suspected aspiration event  *Episode of respiratory distress 12/27. Tmax 100.8F, WBC elevated  *CXR without clear infiltrate, though episode concerning for aspiration event  *Completed 7 day course of  piperacillin-tazobactam IV  - SLP consulted, diet per SLP. Slow recovery, if at all, anticipated, PEG placed    Rash  Appears to be a contact dermatitis as located in area of abdominal binder, with similar appearing rash in shape of telemetry patch on his chest.   - Monitor     At risk for malnutrition   *PEG placed 1/5/22  - Nutrition following, continue tube feeds     Hypernatremia  Sodium peak of 152. Secondary to lack of free water intake. Sodium normal 1/7  - Continue free water flushes  - Monitor labs per tube feeding protocol     Hypokalemia  - Monitor and replace per protocol     Suspected GI bleed  *Bloody BM noted since 12/29 with hgb down to 12.7 g/dl  *GI consulted, no immediate plans for endoscopy  - Has had small amounts of recurrent bleeding noted, with no change in hemoglobin   - Most recently with brown stool  - Trial resuming heparin subcutaneous for DVT ppx  - Continue PPI     Chronic fatigue syndrome 2/2 Lyme disease  PTA on Adderall.   - Neurology recommending against stimulants at this time     COVID PCR screen negative  No vaccination records available, tested negative on admission 12/24    Clinically Significant Risk Factors Present on Admission                       Diet: Adult Formula Drip Feeding: Continuous Jevity 1.5; Other - Specify in Comment; Goal Rate: 50; mL/hr; Medication - Feeding Tube Flush Frequency: At least 15-30 mL water before and after medication administration and with tube clogging; Amount to Se...  NPO per Anesthesia Guidelines for Procedure/Surgery Except for: Meds    DVT Prophylaxis: Pneumatic Compression Devices, resume heparin subcutaneous as above  Tay Catheter: Not present  Code Status: Full Code      Disposition Plan   Expected Discharge: Ready to discharge to TCU once placement found      Anticipated discharge location:  Awaiting care coordination huddle  Delays:     Placement - TCU         Entered: Juan Francisco Albarran MD 01/09/2022, 2:13 PM       The patient's  care was discussed with the patient, bedside RN     Juan Francisco Albarran MD  Hospitalist Service  [facility]    ______________________________________________________________________    Interval History   No acute events overnight. RN noted rash on abdomen under abdominal binder.  Abdominal binder removed, and appears to be improving some today.  Has not had further blood with stools per RN.  Unable to obtain any history from patient due to his encephalopathy.    Data reviewed today: I reviewed all medications, new labs and imaging results over the last 24 hours. I personally reviewed no images or EKG's today.    Physical Exam   Vital Signs: Temp: 97.5  F (36.4  C) Temp src: Axillary BP: 128/71 Pulse: 87   Resp: 18 SpO2: 94 % O2 Device: None (Room air)    Weight: 162 lbs 7.66 oz    Constitutional: NAD  Respiratory: Clear to auscultation bilaterally, good air movement bilaterally  Cardiovascular: RRR. No peripheral edema.  GI: Soft, non-tender, non-distended.   Skin/Integumen: Warm, dry. Patchy erythema over abdomen with small square patch of erythema upper chest  Neuro: Sleeping. Non-verbal. Spontaneously moving right arm and leg. No movement on the left.    Data   Recent Labs   Lab 01/09/22  0548 01/08/22  0833 01/08/22  0549 01/07/22  1734 01/07/22  1540 01/07/22  0548 01/06/22  0611 01/05/22  0612 01/05/22  0203 01/04/22  2232 01/04/22  0156 01/03/22  1329   WBC  --   --   --   --   --   --   --   --   --   --   --  9.5   HGB  --   --   --   --   --   --   --  13.5  --  13.8  --  13.7   MCV  --   --   --   --   --   --   --   --   --   --   --  91   PLT  --   --  191  --   --   --   --  311  --   --   --  307   NA  --   --   --   --   --  142  --   --   --   --   --  139   POTASSIUM 3.1* 3.8  --   --  4.0 3.0*   < > 3.6  --   --    < > 3.8   CHLORIDE  --   --   --   --   --  115*  --   --   --   --   --  110*   CO2  --   --   --   --   --  23  --   --   --   --   --  23   BUN  --   --   --    --   --  18  --   --   --   --   --  21   CR  --   --   --   --   --  0.54*  --   --   --   --   --  0.52*   ANIONGAP  --   --   --   --   --  4  --   --   --   --   --  6   MUSTAPHA  --   --   --   --   --  6.8*  --   --   --   --   --  8.6   GLC  --   --   --  137*  --  103*  --   --  99  --    < > 135*    < > = values in this interval not displayed.       No results found for this or any previous visit (from the past 24 hour(s)).  Medications       enalapril  5 mg Per Feeding Tube At Bedtime     [Held by provider] heparin ANTICOAGULANT  5,000 Units Subcutaneous Q12H     hydrALAZINE  50 mg Per Feeding Tube Q8H TONY     metoprolol tartrate  50 mg Per Feeding Tube BID     pantoprazole  40 mg Per Feeding Tube BID     protein modular  1 packet Per Feeding Tube Daily     sodium chloride (PF)  10-40 mL Intracatheter Q7 Days     sodium chloride (PF)  3 mL Intracatheter Q8H

## 2022-01-09 NOTE — PLAN OF CARE
Reason for Admission: R ICH with vasogenic edema    Cognitive/Mentation: A/Ox ALLYSON, nonverbal. Patient alert, opens eyes spontaneously. Easy to arouse.   Neuros/CMS: L droop, LUE and LLE plegia. Nonverbal. Simple command following.   VS: on RA.  GI: BS audible, passing flatus, last BM today. Patient is not Continent.  : external catheter. Patient is not Continent.  Pulmonary: LS diminished, snoring.  Pain: some nonverbal indicators of pain, tylenol PRN.     Drains: none  Skin: rash on abdomen--MD notified. Suspect from abdominal binder. CTM.  Activity: Assist x 2 with lift.  Diet: NPO with TF running at 50mL/hr. 150 mL flushes every 4 hours. Pills crushed through PEG.     Therapies recs: TCU/ARU  Discharge: pending placement    End of shift summary: patient interactive, using hand signals for yes and no, one or two fingers to indicate bowel function

## 2022-01-10 ENCOUNTER — APPOINTMENT (OUTPATIENT)
Dept: PHYSICAL THERAPY | Facility: CLINIC | Age: 72
DRG: 064 | End: 2022-01-10
Payer: COMMERCIAL

## 2022-01-10 LAB
ANION GAP SERPL CALCULATED.3IONS-SCNC: 6 MMOL/L (ref 3–14)
BUN SERPL-MCNC: 24 MG/DL (ref 7–30)
CALCIUM SERPL-MCNC: 8.9 MG/DL (ref 8.5–10.1)
CHLORIDE BLD-SCNC: 105 MMOL/L (ref 94–109)
CO2 SERPL-SCNC: 27 MMOL/L (ref 20–32)
CREAT SERPL-MCNC: 0.68 MG/DL (ref 0.66–1.25)
GFR SERPL CREATININE-BSD FRML MDRD: >90 ML/MIN/1.73M2
GLUCOSE BLD-MCNC: 130 MG/DL (ref 70–99)
MAGNESIUM SERPL-MCNC: 2.3 MG/DL (ref 1.6–2.3)
PHOSPHATE SERPL-MCNC: 3.7 MG/DL (ref 2.5–4.5)
POTASSIUM BLD-SCNC: 3.8 MMOL/L (ref 3.4–5.3)
SODIUM SERPL-SCNC: 138 MMOL/L (ref 133–144)

## 2022-01-10 PROCEDURE — 120N000001 HC R&B MED SURG/OB

## 2022-01-10 PROCEDURE — 250N000011 HC RX IP 250 OP 636: Performed by: INTERNAL MEDICINE

## 2022-01-10 PROCEDURE — 250N000013 HC RX MED GY IP 250 OP 250 PS 637: Performed by: HOSPITALIST

## 2022-01-10 PROCEDURE — 99232 SBSQ HOSP IP/OBS MODERATE 35: CPT | Performed by: INTERNAL MEDICINE

## 2022-01-10 PROCEDURE — 250N000013 HC RX MED GY IP 250 OP 250 PS 637: Performed by: INTERNAL MEDICINE

## 2022-01-10 PROCEDURE — 97112 NEUROMUSCULAR REEDUCATION: CPT | Mod: GP

## 2022-01-10 PROCEDURE — 999N000190 HC STATISTIC VAT ROUNDS

## 2022-01-10 PROCEDURE — 83735 ASSAY OF MAGNESIUM: CPT | Performed by: INTERNAL MEDICINE

## 2022-01-10 PROCEDURE — 36415 COLL VENOUS BLD VENIPUNCTURE: CPT | Performed by: INTERNAL MEDICINE

## 2022-01-10 PROCEDURE — 84100 ASSAY OF PHOSPHORUS: CPT | Performed by: INTERNAL MEDICINE

## 2022-01-10 PROCEDURE — 97530 THERAPEUTIC ACTIVITIES: CPT | Mod: GP

## 2022-01-10 PROCEDURE — 80048 BASIC METABOLIC PNL TOTAL CA: CPT | Performed by: INTERNAL MEDICINE

## 2022-01-10 RX ORDER — HYDRALAZINE HYDROCHLORIDE 50 MG/1
50 TABLET, FILM COATED ORAL EVERY 8 HOURS
DISCHARGE
Start: 2022-01-10 | End: 2023-03-07

## 2022-01-10 RX ORDER — ENALAPRIL MALEATE 5 MG/1
5 TABLET ORAL AT BEDTIME
DISCHARGE
Start: 2022-01-10 | End: 2022-11-01

## 2022-01-10 RX ORDER — ONDANSETRON 4 MG/1
4 TABLET, ORALLY DISINTEGRATING ORAL EVERY 6 HOURS PRN
DISCHARGE
Start: 2022-01-10

## 2022-01-10 RX ORDER — ACETAMINOPHEN 650 MG/1
650 SUPPOSITORY RECTAL EVERY 4 HOURS PRN
DISCHARGE
Start: 2022-01-10

## 2022-01-10 RX ORDER — ACETAMINOPHEN 325 MG/10.15ML
650 LIQUID ORAL EVERY 4 HOURS PRN
DISCHARGE
Start: 2022-01-10 | End: 2024-10-01 | Stop reason: ALTCHOICE

## 2022-01-10 RX ORDER — POLYETHYLENE GLYCOL 3350 17 G/17G
17 POWDER, FOR SOLUTION ORAL 2 TIMES DAILY PRN
DISCHARGE
Start: 2022-01-10 | End: 2023-03-07

## 2022-01-10 RX ORDER — AMINO AC/PROTEIN HYDR/WHEY PRO 10G-100/30
1 LIQUID (ML) ORAL DAILY
DISCHARGE
Start: 2022-01-11 | End: 2022-01-18 | Stop reason: ALTCHOICE

## 2022-01-10 RX ORDER — METOPROLOL TARTRATE 50 MG
50 TABLET ORAL 2 TIMES DAILY
DISCHARGE
Start: 2022-01-10

## 2022-01-10 RX ORDER — HEPARIN SODIUM 5000 [USP'U]/.5ML
5000 INJECTION, SOLUTION INTRAVENOUS; SUBCUTANEOUS EVERY 12 HOURS
DISCHARGE
Start: 2022-01-10

## 2022-01-10 RX ADMIN — ALTEPLASE 2 MG: 2.2 INJECTION, POWDER, LYOPHILIZED, FOR SOLUTION INTRAVENOUS at 09:00

## 2022-01-10 RX ADMIN — Medication 40 MG: at 22:05

## 2022-01-10 RX ADMIN — METOPROLOL TARTRATE 50 MG: 50 TABLET, FILM COATED ORAL at 22:04

## 2022-01-10 RX ADMIN — HYDRALAZINE HYDROCHLORIDE 50 MG: 50 TABLET, FILM COATED ORAL at 14:39

## 2022-01-10 RX ADMIN — HEPARIN SODIUM 5000 UNITS: 5000 INJECTION, SOLUTION INTRAVENOUS; SUBCUTANEOUS at 09:04

## 2022-01-10 RX ADMIN — HEPARIN SODIUM 5000 UNITS: 5000 INJECTION, SOLUTION INTRAVENOUS; SUBCUTANEOUS at 19:53

## 2022-01-10 RX ADMIN — Medication 40 MG: at 09:05

## 2022-01-10 RX ADMIN — ACETAMINOPHEN 650 MG: 325 SUSPENSION ORAL at 19:14

## 2022-01-10 RX ADMIN — METOPROLOL TARTRATE 50 MG: 50 TABLET, FILM COATED ORAL at 09:05

## 2022-01-10 RX ADMIN — ALTEPLASE 2 MG: 2.2 INJECTION, POWDER, LYOPHILIZED, FOR SOLUTION INTRAVENOUS at 13:07

## 2022-01-10 RX ADMIN — HYDRALAZINE HYDROCHLORIDE 50 MG: 50 TABLET, FILM COATED ORAL at 05:44

## 2022-01-10 RX ADMIN — ENALAPRIL MALEATE 5 MG: 5 TABLET ORAL at 22:04

## 2022-01-10 RX ADMIN — ACETAMINOPHEN 650 MG: 325 SUSPENSION ORAL at 14:39

## 2022-01-10 RX ADMIN — HYDRALAZINE HYDROCHLORIDE 50 MG: 50 TABLET, FILM COATED ORAL at 22:04

## 2022-01-10 RX ADMIN — Medication 1 PACKET: at 10:45

## 2022-01-10 ASSESSMENT — ACTIVITIES OF DAILY LIVING (ADL)
ADLS_ACUITY_SCORE: 31
ADLS_ACUITY_SCORE: 29
ADLS_ACUITY_SCORE: 31
ADLS_ACUITY_SCORE: 31
ADLS_ACUITY_SCORE: 29
ADLS_ACUITY_SCORE: 31
ADLS_ACUITY_SCORE: 29
ADLS_ACUITY_SCORE: 29
ADLS_ACUITY_SCORE: 31
ADLS_ACUITY_SCORE: 29
ADLS_ACUITY_SCORE: 29
ADLS_ACUITY_SCORE: 31
ADLS_ACUITY_SCORE: 31
ADLS_ACUITY_SCORE: 29
ADLS_ACUITY_SCORE: 31
ADLS_ACUITY_SCORE: 29
ADLS_ACUITY_SCORE: 31
ADLS_ACUITY_SCORE: 29
ADLS_ACUITY_SCORE: 29

## 2022-01-10 NOTE — PLAN OF CARE
Reason for Admission: R ICH    Cognitive/Mentation: A/Ox ALLYSON, alert and opens eyes spontaneously. Nonverbal. Hand gestures/grunts for some communication  Neuros/CMS: L sided plegia, moves R side spontaneously, minimal command following  VS: Stable on RA.   Tele: SR.  GI: BS audible, passing flatus, last BM today. Not Continent.  : ex. Catheter, Continent.  Pulmonary: LS diminished.    Pain: headache, tylenol PRN.     Drains: none  Skin: rash on abdomen, improving  Activity: Assist x 2 with lift.  Diet:  NPO with TF at 50mL. 150mL flushes q4hrs.     Therapies recs: TCU  Discharge: pending placement

## 2022-01-10 NOTE — PROGRESS NOTES
Care Management Follow Up    Length of Stay (days): 17    Expected Discharge Date: 01/10/2022     Concerns to be Addressed:       Patient plan of care discussed at interdisciplinary rounds: Yes    Anticipated Discharge Disposition:  TCU  One referral is out to [facility].  JORGE note indicates that pt's SO is touring additional facilities,    Anticipated Discharge Services:  TCU  Anticipated Discharge DME:      Patient/family educated on Medicare website which has current facility and service quality ratings:    Education Provided on the Discharge Plan:    Patient/Family in Agreement with the Plan:      Referrals Placed by CM/SW:  SW received return call from Filiberto at [facility] indicating that they will not have a bed for pt as their bed situation has changed and an anticipated bed is not available. He will hang onto the referral incase any changes come up.     Additional Information:  JORGE called pt's S.O.Miladis with update and requested three TCU's to send referrals to today as pt is ready for discharge and multiple referrals need to be sent out in interest of securing a bed. JORGE requested return call.    JORGE spoke with Miladis and Filiberto at [facility]. Another referral requested as opening anticipated for Thursday and [facility] would like to review pt again for that potential bed. Referral resent. Miladis is happy with this TCU and is in agreement with this plan. Miladis looked at four other TCU's that did not have appropriate or available beds ( [facility], [facility], [facility] and [facility].    [facility]  Care Transitions  [phone]

## 2022-01-10 NOTE — PROVIDER NOTIFICATION
MD Notification    Notified Person: MD    Notified Person Name: Cassie    Notification Date/Time: 0620 1/10/22    Notification Interaction: page    Purpose of Notification: requesting clot buster (tenecteplase?) for blocked PICC line. Both lumens.    Orders Received:    Comments:

## 2022-01-10 NOTE — PROGRESS NOTES
[facility]    Hospitalist Progress Note    Assessment & Plan   Sukhi Johnson is a 71 year-old male with past medical history of hypertension, chronic fatigue who presents with left-sided weakness and speech difficulties. Admitted 12/24/2021.      Acute hemorrhagic stroke with vasogenic edema  Non-traumatic intracerebral hemorrhage of right frontoparietal region, likely due to cerebral amyloid angiopathy, with contribution from untreated HTN  Acute metabolic encephalopathy secondary to above  History of hemorrhagic stroke in 2013  Hypertension, untreated  *Presents with left-sided weakness and speech difficulty  *CT and MRI on admission with acute right frontoparietal intracranial hemorrhage with associated regional mass effect resulting in 2 mm leftward midline shift; MRI also noted with numerous chronic microhemorrhages throughout the cerebral hemispheres, deep gray nuclei and posterior fossa, possibly representing sequelae of hypertensive or amyloid angiopathy  *Neurosurgery evaluated and non-surgical management recommended  *Repeat head CT 12/26 with slight interval decrease in size of the large parenchymal hematoma, no definite evidence for new or increasing hemorrhage; stable minimal leftward midline shift of the third ventricle  *Repeat head CT 12/29 noted stable   *EEG 12/29 with no electrographic seizures  *ECHO 12/26 with LVEF >70%, negative bubble study; A1c 5.4%,   *Neurology (stroke) consulted, now signed off  - Goal SBP <160, more elevated when enteral route was temporarily lost, since better controlled  -somnolent, non verbal now at baseline, moves R side but not L side.   Plan;   - TcU  - Continue hydralazine 50 mg TID, metoprolol 50 mg BID, enalapril 5 mg at bedtime  - PT/OT consulted, recommending TCU. SW following for choices.   - Continue mitt to right hand as needed if pulling at tube  -=outpient neurology follow up  --Repeat brain MRI with and without  contrast in 3 months outpatient to evaluate for underlying lesion [order placed]  -Follow up with PCP post discharge  -Follow up with Stroke Neurology as outpatient in 6-10 weeks [next available appointment, referral placed]   -Up in chair     Acute hypoxic respiratory failure  Suspected aspiration event  *Episode of respiratory distress 12/27. Tmax 100.8F, WBC elevated  *CXR without clear infiltrate, though episode concerning for aspiration event  *Completed 7 day course of piperacillin-tazobactam IV  - SLP consulted, diet per SLP. Slow recovery, if at all, anticipated, PEG placed  -lungs clear. Breathing easily  -npo, on TFs     Rash  Appears to be a contact dermatitis as located in area of abdominal binder, with similar appearing rash in shape of telemetry patch on his chest.   - Monitor      At risk for malnutrition   *PEG placed 1/5/22  - Nutrition following, continue tube feeds, nutrition following     Hypernatremia  Sodium peak of 152. Secondary to lack of free water intake. Sodium normal 1/7  - Continue free water flushes  - Monitor labs per tube feeding protocol   -bmp pending     Hypokalemia  - Monitor and replace per protocol   Bmp pending     Suspected GI bleed  *Bloody BM noted since 12/29 with hgb down to 12.7 g/dl  *GI consulted, no immediate plans for endoscopy  - Has had small amounts of recurrent bleeding noted, with no change in hemoglobin   - Most recently with brown stool  - Trial resuming heparin subcutaneous for DVT ppx  - Continue PPI  -no bloody stools     Chronic fatigue syndrome 2/2 Lyme disease  PTA on Adderall.   - Neurology recommending against stimulants at this time     COVID PCR screen negative  No vaccination records available, tested negative on admission 12/24           Clinically Significant Risk Factors Present on Admission                  Diet: Adult Formula Drip Feeding: Continuous Jevity 1.5; Other - Specify in Comment; Goal Rate: 50; mL/hr; Medication - Feeding Tube Flush  Frequency: At least 15-30 mL water before and after medication administration and with tube clogging; Amount to Se...  NPO per Anesthesia Guidelines for Procedure/Surgery Except for: Meds    DVT Prophylaxis: Pneumatic Compression Devices, resume heparin subcutaneous as above  Tay Catheter: Not present  Code Status: Full Code          Disposition Plan-     Expected Discharge: Ready to discharge to TCU once placement found        Discussed with RN.     Bulmaro Webster MD  Text Page  (7am to 6pm)  Interval History   Stable night  Seen by nutrition  Was up in chair this am. Eyes open. Raised R hand in response to RN this am.     -Data reviewed today: I reviewed all new labs and imaging results over the last 24 hours. I personally reviewed labs last 24 hours.     Physical Exam   Temp: 98.2  F (36.8  C) Temp src: Axillary BP: 114/68 Pulse: 80   Resp: 16 SpO2: 98 % O2 Device: None (Room air)    Vitals:    12/24/21 1558 12/27/21 0445 01/03/22 2300   Weight: 83.9 kg (184 lb 15.5 oz) 83.5 kg (184 lb) 73.7 kg (162 lb 7.7 oz)     Vital Signs with Ranges  Temp:  [97.1  F (36.2  C)-98.9  F (37.2  C)] 98.2  F (36.8  C)  Pulse:  [] 80  Resp:  [16-18] 16  BP: (114-167)/(60-92) 114/68  SpO2:  [94 %-98 %] 98 %  I/O last 3 completed shifts:  In: 460 [NG/GT:460]  Out: 1500 [Urine:1500]    Constitutional: In bed, nad  Respiratory: CTAB, breathing easily   Cardiovascular: RRR no r/g/m  GI: soft, nt, nd.  Peg tube in place, small amount of redness around site. No clear purulence  Skin/Integumen: small redness at peg site as above. picc line site looks good  Neuro: L hemiparesis. Moves R sided spontineously. Perrla, somnolent currently was up in chair with eyes open earlier.  Nonverbal.     Medications       alteplase  2 mg Intravenous Q2H     enalapril  5 mg Per Feeding Tube At Bedtime     heparin ANTICOAGULANT  5,000 Units Subcutaneous Q12H     hydrALAZINE  50 mg Per Feeding Tube Q8H TONY     metoprolol tartrate  50 mg Per  Feeding Tube BID     pantoprazole  40 mg Per Feeding Tube BID     protein modular  1 packet Per Feeding Tube Daily     sodium chloride (PF)  10-40 mL Intracatheter Q7 Days     sodium chloride (PF)  3 mL Intracatheter Q8H       Data   Recent Labs   Lab 01/09/22  1436 01/09/22  0548 01/08/22  0833 01/08/22  0549 01/07/22  1734 01/07/22  1540 01/07/22  0548 01/06/22  0611 01/05/22  0612 01/05/22  0203 01/04/22  2232 01/04/22  0156 01/03/22  1329   WBC  --   --   --   --   --   --   --   --   --   --   --   --  9.5   HGB  --   --   --   --   --   --   --   --  13.5  --  13.8  --  13.7   MCV  --   --   --   --   --   --   --   --   --   --   --   --  91   PLT  --   --   --  191  --   --   --   --  311  --   --   --  307   NA  --   --   --   --   --   --  142  --   --   --   --   --  139   POTASSIUM 3.8 3.1* 3.8  --   --    < > 3.0*   < > 3.6  --   --    < > 3.8   CHLORIDE  --   --   --   --   --   --  115*  --   --   --   --   --  110*   CO2  --   --   --   --   --   --  23  --   --   --   --   --  23   BUN  --   --   --   --   --   --  18  --   --   --   --   --  21   CR  --   --   --   --   --   --  0.54*  --   --   --   --   --  0.52*   ANIONGAP  --   --   --   --   --   --  4  --   --   --   --   --  6   MUSTAPHA  --   --   --   --   --   --  6.8*  --   --   --   --   --  8.6   GLC  --   --   --   --  137*  --  103*  --   --  99  --    < > 135*    < > = values in this interval not displayed.       Imaging:   No results found for this or any previous visit (from the past 24 hour(s)).

## 2022-01-10 NOTE — PLAN OF CARE
Reason for Admission: R ICH    Cognitive/Mentation: A/Ox tez nonverbal  Neuros/CMS: Intact ex left sided hemiplegia, non-verbal, and left facial droop. Moves right side freely but inconsistent to commands.   VS: Stable.  GI: BS +, + flatus, last BM today. Incontinent.  : External catheter. Incontinent.  Pulmonary: LS clear.  Pain: rFLACC 0.    Drains: None  Skin: Rash to abdomen caused by abdominal binder, resolving. Blanchable redness to coccyx, mepilex in place  Activity: Assist x 2 with lift.  Diet: NPO, tube feed running at 50 mL with q4 150 mL flushes.     Aggression Stoplight Score: Green  Therapies recs: TCU  Discharge: Pending    End of shift summary: Alteplase placed in red capped PICC line x2, was able to withdraw and get blood return, both lumens working and saline locked now. Patient changed back to unit collect. Turned and repositioned every 2 hours, patient up to chair with PT for approx.1 hour and returned.

## 2022-01-10 NOTE — PROGRESS NOTES
Date paged: 1/10    Time paged: [phone]    Person paged: IV team    Paging system used: Web-based Paging    Message: 2 mL alteplase placed in red lumen of PICC, tried to draw back after 30 minutes and only got 0.5 mL back. I haven't flushed, want all 2 mL back. Please advise. Thanks, Lety JAIN *[phone]    Update: IV team called back, recommending to leave in for at least an hour rather than 30  Minutes recommended by MAR. Will leave in for 30 more minutes and recheck.

## 2022-01-10 NOTE — PROGRESS NOTES
CLINICAL NUTRITION SERVICES - REASSESSMENT NOTE      Malnutrition: (1/4)  % Weight Loss:  > 2% in 1 week (severe malnutrition) -- more weights needed to confirm  % Intake:  </= 50% for >/= 5 days (severe malnutrition)   Subcutaneous Fat Loss:  None previously observed  Muscle Loss:  None previously observed  Fluid Retention:  Trace-Moderate     Malnutrition Diagnosis: Severe malnutrition (suspected)  In Context of:  Acute illness or injury       EVALUATION OF PROGRESS TOWARD GOALS   Diet:    NPO    Nutrition Support:    Type of Feeding Tube:  GJ (feeding into J-port)  Enteral Frequency:  Continuous  Enteral Regimen: Jevity 1.5 at goal rate 50 ml/hr   Total Enteral Provisions: 1800 kcal, 77 gm pro, 25 gm fiber, 912 mL free water   Free Water Flush: (1/6 - MD) 150 ml q 4 hours   Total Fluid (TF + FWF) = 1812 ml/day (26 kcal/kg)     +1 packet Prosource/day = 40 kcal, 11 g protein  TOTAL (TF + Prosource) = 1840 kcal (26 kcal/kg) and 88 g protein (1.2 g/kg)    Intake/Tolerance:    Chart reviewed    1/8: BM x1  1/9: BM x2        ASSESSED NUTRITION NEEDS:  Dosing Weight 73.7 kg (1/3 - last wt)  Estimated Energy Needs: 6756-3282 kcals (25-30 Kcal/Kg)  Justification: maintenance   Estimated Protein Needs:  grams protein (1.2-1.5 g pro/Kg)  Justification: preservation of lean body mass      NEW FINDINGS:     Vitals:    12/24/21 1558 12/27/21 0445 01/03/22 2300   Weight: 83.9 kg (184 lb 15.5 oz) 83.5 kg (184 lb) 73.7 kg (162 lb 7.7 oz)     Working on TCU placement      Previous Goals (1/7):   EN to meet % est needs  Evaluation: Met    Previous Nutrition Diagnosis (1/7):   No nutrition diagnosis identified at this time   Evaluation: No change        CURRENT NUTRITION DIAGNOSIS  No nutrition diagnosis identified at this time       INTERVENTIONS  Recommendations / Nutrition Prescription  NPO  TF as above    Implementation  No intervention at this time    Goals  EN to meet % est needs      MONITORING AND  EVALUATION:  Progress towards goals will be monitored and evaluated per protocol and Practice Guidelines

## 2022-01-10 NOTE — PLAN OF CARE
Pt alert, ALLYSON orientation, nonverbal. Neuros hemiplegic on the left. Can communicate basic signs with R hand (thumbs up, ok, nod yes with fist etc), L facial droop. Incontinent of B&B. Turn & Repo q2h. NPO with TF running via PEG tube. Rash on abdomen and back suspected from abdominal binder. VSS on RA. Denies pain (or at least doesn't indicate a definitive yes). Discharge TBD.

## 2022-01-11 LAB
ERYTHROCYTE [DISTWIDTH] IN BLOOD BY AUTOMATED COUNT: 13.2 % (ref 10–15)
HCT VFR BLD AUTO: 38.7 % (ref 40–53)
HGB BLD-MCNC: 12.6 G/DL (ref 13.3–17.7)
MCH RBC QN AUTO: 29.6 PG (ref 26.5–33)
MCHC RBC AUTO-ENTMCNC: 32.6 G/DL (ref 31.5–36.5)
MCV RBC AUTO: 91 FL (ref 78–100)
PLATELET # BLD AUTO: 380 10E3/UL (ref 150–450)
PLATELET # BLD AUTO: 380 10E3/UL (ref 150–450)
POTASSIUM BLD-SCNC: 3.6 MMOL/L (ref 3.4–5.3)
RBC # BLD AUTO: 4.25 10E6/UL (ref 4.4–5.9)
WBC # BLD AUTO: 7 10E3/UL (ref 4–11)

## 2022-01-11 PROCEDURE — 85027 COMPLETE CBC AUTOMATED: CPT | Performed by: INTERNAL MEDICINE

## 2022-01-11 PROCEDURE — 250N000013 HC RX MED GY IP 250 OP 250 PS 637: Performed by: INTERNAL MEDICINE

## 2022-01-11 PROCEDURE — 250N000011 HC RX IP 250 OP 636: Performed by: INTERNAL MEDICINE

## 2022-01-11 PROCEDURE — 84132 ASSAY OF SERUM POTASSIUM: CPT | Performed by: HOSPITALIST

## 2022-01-11 PROCEDURE — 99232 SBSQ HOSP IP/OBS MODERATE 35: CPT | Performed by: INTERNAL MEDICINE

## 2022-01-11 PROCEDURE — 250N000013 HC RX MED GY IP 250 OP 250 PS 637: Performed by: HOSPITALIST

## 2022-01-11 PROCEDURE — 999N000190 HC STATISTIC VAT ROUNDS

## 2022-01-11 PROCEDURE — 85027 COMPLETE CBC AUTOMATED: CPT | Performed by: PHYSICIAN ASSISTANT

## 2022-01-11 PROCEDURE — 999N000040 HC STATISTIC CONSULT NO CHARGE VASC ACCESS

## 2022-01-11 PROCEDURE — 120N000001 HC R&B MED SURG/OB

## 2022-01-11 RX ORDER — POTASSIUM CHLORIDE 20MEQ/15ML
20 LIQUID (ML) ORAL ONCE
Status: COMPLETED | OUTPATIENT
Start: 2022-01-11 | End: 2022-01-11

## 2022-01-11 RX ADMIN — ENALAPRIL MALEATE 5 MG: 5 TABLET ORAL at 22:21

## 2022-01-11 RX ADMIN — HEPARIN SODIUM 5000 UNITS: 5000 INJECTION, SOLUTION INTRAVENOUS; SUBCUTANEOUS at 20:21

## 2022-01-11 RX ADMIN — HYDRALAZINE HYDROCHLORIDE 50 MG: 50 TABLET, FILM COATED ORAL at 06:17

## 2022-01-11 RX ADMIN — METOPROLOL TARTRATE 50 MG: 50 TABLET, FILM COATED ORAL at 20:21

## 2022-01-11 RX ADMIN — HYDRALAZINE HYDROCHLORIDE 50 MG: 50 TABLET, FILM COATED ORAL at 22:21

## 2022-01-11 RX ADMIN — POTASSIUM CHLORIDE 20 MEQ: 20 SOLUTION ORAL at 11:53

## 2022-01-11 RX ADMIN — Medication 40 MG: at 09:03

## 2022-01-11 RX ADMIN — METOPROLOL TARTRATE 50 MG: 50 TABLET, FILM COATED ORAL at 09:02

## 2022-01-11 RX ADMIN — Medication 1 PACKET: at 09:02

## 2022-01-11 RX ADMIN — HYDRALAZINE HYDROCHLORIDE 50 MG: 50 TABLET, FILM COATED ORAL at 13:12

## 2022-01-11 RX ADMIN — HEPARIN SODIUM 5000 UNITS: 5000 INJECTION, SOLUTION INTRAVENOUS; SUBCUTANEOUS at 09:02

## 2022-01-11 RX ADMIN — ACETAMINOPHEN 650 MG: 325 SUSPENSION ORAL at 09:03

## 2022-01-11 ASSESSMENT — ACTIVITIES OF DAILY LIVING (ADL)
ADLS_ACUITY_SCORE: 31
ADLS_ACUITY_SCORE: 33
ADLS_ACUITY_SCORE: 31
ADLS_ACUITY_SCORE: 33
ADLS_ACUITY_SCORE: 31
ADLS_ACUITY_SCORE: 33
ADLS_ACUITY_SCORE: 31
ADLS_ACUITY_SCORE: 31
ADLS_ACUITY_SCORE: 33
ADLS_ACUITY_SCORE: 31
ADLS_ACUITY_SCORE: 33
ADLS_ACUITY_SCORE: 31
ADLS_ACUITY_SCORE: 33
ADLS_ACUITY_SCORE: 33

## 2022-01-11 NOTE — PROGRESS NOTES
Pt here with ICH. ALLYSON orientation, nonverbal. Neuros show L side hemiplegia & facial droop, able to move R side but inconsistent with commands. VSS on RA. NPO. Takes pills through tube feed. Tube feed running at 50 ml/hr with Q4 150 ml  flushes. External catheter in place with adequate output. PICC line SL & flushing well. Up with 2A lift. No s/s of pain observed. Turn & repo Q2. Pt scoring green on the Aggression Stop Light Tool. Discharge to TCU pending.

## 2022-01-11 NOTE — PLAN OF CARE
Reason for Admission: right- sided nontraumatic intracerebral hemorrhage    Cognitive/Mentation: A/Ox self  Neuros/CMS: left sided weakness left facial droop withdraws.   VS: stable.   Tele: .  GI: BS present, passing flatus, last BM 1/11. Continent.  : external catheter. INContinent.  Pulmonary: LS clear.  Pain: no pain reported.     Drains: external cath.  Skin: redness noted on abdomen where tape was holding split gauze on abdomen. Cleaning performed and writer omitted take when applying new application of split gauze.  Activity: Assist x 2 with lift.  Diet: NPO with tube feed Takes pills crushed in J-tube.     Therapies recs:   Discharge: [facility] 2 days     End of shift summary: repositioned every two hours

## 2022-01-11 NOTE — PLAN OF CARE
Reason for Admission: ICH, left side weakness and speech difficulties     Cognitive/Mentation: tez, pt is nonverbal  Neuros/CMS: left sided hemiplegia, non-verbal, and left facial droop. Moves right side freely but inconsistent to commands.   VS: Stable.  GI: BS active, passing flatus, last BM today. Incontinent.  : using external catheter. Incontinent.  Pulmonary: LS clear.  Pain: Tylenol 650mg was given for leg pain, it was effective.      K+ protocol: K+ was 3.8, recheck in AM  Drains: he has PICC line on right arm. It was flushed and it was intact   Skin: Rash to abdomen caused by abdominal binder, resolving. Blanchable redness to coccyx, mepilex in place. He has trace edema on L. ankle   Activity: Assist x 2 with lift.  Diet: NPO, tube feed Jevity 1.5 riya. running at 50 mL with q4 150 mL flushes.      Discharge: TCU, Pending. Pt will need to have repeat MRI in 3 months.

## 2022-01-11 NOTE — PROGRESS NOTES
[facility]    Hospitalist Progress Note    Assessment & Plan   Sukhi Johnson is a 71 year-old male with past medical history of hypertension, chronic fatigue who presents with left-sided weakness and speech difficulties. Admitted 12/24/2021.      Acute hemorrhagic stroke with vasogenic edema  Non-traumatic intracerebral hemorrhage of right frontoparietal region, likely due to cerebral amyloid angiopathy, with contribution from untreated HTN  Acute metabolic encephalopathy secondary to above  History of hemorrhagic stroke in 2013  Hypertension, untreated  *Presents with left-sided weakness and speech difficulty  *CT and MRI on admission with acute right frontoparietal intracranial hemorrhage with associated regional mass effect resulting in 2 mm leftward midline shift; MRI also noted with numerous chronic microhemorrhages throughout the cerebral hemispheres, deep gray nuclei and posterior fossa, possibly representing sequelae of hypertensive or amyloid angiopathy  *Neurosurgery evaluated and non-surgical management recommended  *Repeat head CT 12/26 with slight interval decrease in size of the large parenchymal hematoma, no definite evidence for new or increasing hemorrhage; stable minimal leftward midline shift of the third ventricle  *Repeat head CT 12/29 noted stable   *EEG 12/29 with no electrographic seizures  *ECHO 12/26 with LVEF >70%, negative bubble study; A1c 5.4%,   *Neurology (stroke) consulted, now signed off  - Goal SBP <160, more elevated when enteral route was temporarily lost, since better controlled  -somnolent, non verbal now at baseline, moves R side but not L side.   -per RN, pt does demonstrate some limited interaction with use of R arm.   Nl bmp, check cbc  -stable to slightly improved neurologically,more alert      Plan;   - TcU  - Continue hydralazine 50 mg TID, metoprolol 50 mg BID, enalapril 5 mg at bedtime  - PT/OT consulted, recommending TCU. SW  following for choices.   - Continue mitt to right hand as needed if pulling at tube  -=outpient neurology follow up  --Repeat brain MRI with and without contrast in 3 months outpatient to evaluate for underlying lesion [order placed]  -Follow up with PCP post discharge  -Follow up with Stroke Neurology as outpatient in 6-10 weeks [next available appointment, referral placed]   -Up in chair     Acute hypoxic respiratory failure  Suspected aspiration event  *Episode of respiratory distress 12/27. Tmax 100.8F, WBC elevated  *CXR without clear infiltrate, though episode concerning for aspiration event  *Completed 7 day course of piperacillin-tazobactam IV  - SLP consulted, diet per SLP. Slow recovery, if at all, anticipated, PEG placed  -lungs clear. Breathing easily  -npo, on TFs     Rash  Appears to be a contact dermatitis as located in area of abdominal binder, with similar appearing rash in shape of telemetry patch on his chest.   - Monitor      At risk for malnutrition   *PEG placed 1/5/22  - Nutrition following, continue tube feeds, nutrition following  -nutrition consult at tcu     Hypernatremia  Sodium peak of 152. Secondary to lack of free water intake. Sodium normal 1/7  - Continue free water flushes  - Monitor labs per tube feeding protocol   -bmp nl  Follow bmp at tcu     Hypokalemia  - Monitor and replace per protocol   Bmp nl  K nl.  Given kdur     Suspected GI bleed  *Bloody BM noted since 12/29 with hgb down to 12.7 g/dl  *GI consulted, no immediate plans for endoscopy  - Has had small amounts of recurrent bleeding noted, with no change in hemoglobin   - Most recently with brown stool  - Trial resuming heparin subcutaneous for DVT ppx  - Continue PPI  -no bloody stools  -check cbc today     Chronic fatigue syndrome 2/2 Lyme disease  PTA on Adderall.   - Neurology recommending against stimulants at this time     COVID PCR screen negative  No vaccination records available, tested negative on admission  12/24           Clinically Significant Risk Factors Present on Admission                  Diet: Adult Formula Drip Feeding: Continuous Jevity 1.5; Other - Specify in Comment; Goal Rate: 50; mL/hr; Medication - Feeding Tube Flush Frequency: At least 15-30 mL water before and after medication administration and with tube clogging; Amount to Se...  NPO per Anesthesia Guidelines for Procedure/Surgery Except for: Meds    DVT Prophylaxis: Pneumatic Compression Devices, resume heparin subcutaneous as above  Tay Catheter: Not present  Code Status: Full Code          Disposition Plan-     Expected Discharge: Ready to discharge to TCU once placement found        Discussed with RN.     Bulmaro Webster MD  Text Page  (7am to 6pm)  Interval History   Stable night  Seen by nutrition  Was up in chair this am. Eyes open. Raised R hand in response to RN this am.     -Data reviewed today: I reviewed all new labs and imaging results over the last 24 hours. I personally reviewed labs last 24 hours.     Physical Exam   Temp: 97.8  F (36.6  C) Temp src: Oral BP: 132/70 Pulse: 80   Resp: 16 SpO2: 96 % O2 Device: None (Room air)    Vitals:    12/24/21 1558 12/27/21 0445 01/03/22 2300   Weight: 83.9 kg (184 lb 15.5 oz) 83.5 kg (184 lb) 73.7 kg (162 lb 7.7 oz)     Vital Signs with Ranges  Temp:  [97.3  F (36.3  C)-98.8  F (37.1  C)] 97.8  F (36.6  C)  Pulse:  [] 80  Resp:  [16] 16  BP: (108-156)/(56-85) 132/70  SpO2:  [92 %-98 %] 96 %  I/O last 3 completed shifts:  In: 1050 [P.O.:300; NG/GT:750]  Out: 1050 [Urine:1050]    Constitutional: In bed, nad  Respiratory: CTAB, breathing easily   Cardiovascular: RRR no r/g/m  GI: soft, nt, nd. Brown stool  Peg tube in place, small amount of redness around site. No clear purulence  Skin/Integumen: small redness at peg site as above. picc line site looks good  Neuro: L hemiparesis. Moves R sided spontineously. Usman awake. Followed some commands with RN earlier  Nonverbal.     Medications        enalapril  5 mg Per Feeding Tube At Bedtime     heparin ANTICOAGULANT  5,000 Units Subcutaneous Q12H     hydrALAZINE  50 mg Per Feeding Tube Q8H TONY     metoprolol tartrate  50 mg Per Feeding Tube BID     pantoprazole  40 mg Per Feeding Tube BID     protein modular  1 packet Per Feeding Tube Daily     sodium chloride (PF)  10-40 mL Intracatheter Q7 Days     sodium chloride (PF)  3 mL Intracatheter Q8H       Data   Recent Labs   Lab 01/11/22  0634 01/11/22  0633 01/10/22  1253 01/09/22  1436 01/08/22  0833 01/08/22  0549 01/07/22  1734 01/07/22  1540 01/07/22  0548 01/06/22  0611 01/05/22  0612 01/05/22  0203 01/04/22 2232   HGB  --   --   --   --   --   --   --   --   --   --  13.5  --  13.8   PLT  --  380  --   --   --  191  --   --   --   --  311  --   --    NA  --   --  138  --   --   --   --   --  142  --   --   --   --    POTASSIUM 3.6  --  3.8 3.8   < >  --   --    < > 3.0*   < > 3.6  --   --    CHLORIDE  --   --  105  --   --   --   --   --  115*  --   --   --   --    CO2  --   --  27  --   --   --   --   --  23  --   --   --   --    BUN  --   --  24  --   --   --   --   --  18  --   --   --   --    CR  --   --  0.68  --   --   --   --   --  0.54*  --   --   --   --    ANIONGAP  --   --  6  --   --   --   --   --  4  --   --   --   --    MUSTAPHA  --   --  8.9  --   --   --   --   --  6.8*  --   --   --   --    GLC  --   --  130*  --   --   --  137*  --  103*  --   --    < >  --     < > = values in this interval not displayed.       Imaging:   No results found for this or any previous visit (from the past 24 hour(s)).

## 2022-01-11 NOTE — PROGRESS NOTES
Care Management Follow Up    Length of Stay (days): 18    Expected Discharge Date: 01/13/2022     Concerns to be Addressed:       Patient plan of care discussed at interdisciplinary rounds: Yes    Anticipated Discharge Disposition:  TCU likely Thursday at [facility]     Anticipated Discharge Services: TCU    Anticipated Discharge DME:      Patient/family educated on Medicare website which has current facility and service quality ratings:    Education Provided on the Discharge Plan:  yes  Patient/Family in Agreement with the Plan: yes.     Referrals Placed by CM/SW:  Referrals out. Pt's S.O., Miladis continues to tour and call facilities on behalf of pt.  She has looked into [facility] and [facility]. All unable to accommodate pt for admission.  Private pay costs discussed:     Additional Information:  Per Filiberto at [facility], they anticipate a bed on Thrusday for pt.  Miladis is working with [facility] regarding request for specialty mattress and out dwelling cath.  Will need to confirm bed with Filiberto at [facility]    OLGA Gtz  [facility]  Care Transitions  [phone]

## 2022-01-12 ENCOUNTER — APPOINTMENT (OUTPATIENT)
Dept: SPEECH THERAPY | Facility: CLINIC | Age: 72
DRG: 064 | End: 2022-01-12
Payer: COMMERCIAL

## 2022-01-12 LAB
ANION GAP SERPL CALCULATED.3IONS-SCNC: 4 MMOL/L (ref 3–14)
BUN SERPL-MCNC: 21 MG/DL (ref 7–30)
CALCIUM SERPL-MCNC: 8.8 MG/DL (ref 8.5–10.1)
CHLORIDE BLD-SCNC: 106 MMOL/L (ref 94–109)
CO2 SERPL-SCNC: 29 MMOL/L (ref 20–32)
CREAT SERPL-MCNC: 0.7 MG/DL (ref 0.66–1.25)
GFR SERPL CREATININE-BSD FRML MDRD: >90 ML/MIN/1.73M2
GLUCOSE BLD-MCNC: 124 MG/DL (ref 70–99)
POTASSIUM BLD-SCNC: 3.7 MMOL/L (ref 3.4–5.3)
POTASSIUM BLD-SCNC: 3.7 MMOL/L (ref 3.4–5.3)
SODIUM SERPL-SCNC: 139 MMOL/L (ref 133–144)

## 2022-01-12 PROCEDURE — 999N000190 HC STATISTIC VAT ROUNDS

## 2022-01-12 PROCEDURE — 120N000001 HC R&B MED SURG/OB

## 2022-01-12 PROCEDURE — 250N000013 HC RX MED GY IP 250 OP 250 PS 637: Performed by: HOSPITALIST

## 2022-01-12 PROCEDURE — 84132 ASSAY OF SERUM POTASSIUM: CPT | Performed by: INTERNAL MEDICINE

## 2022-01-12 PROCEDURE — 250N000011 HC RX IP 250 OP 636: Performed by: INTERNAL MEDICINE

## 2022-01-12 PROCEDURE — 82310 ASSAY OF CALCIUM: CPT | Performed by: INTERNAL MEDICINE

## 2022-01-12 PROCEDURE — 92526 ORAL FUNCTION THERAPY: CPT | Mod: GN

## 2022-01-12 PROCEDURE — 92507 TX SP LANG VOICE COMM INDIV: CPT | Mod: GN

## 2022-01-12 PROCEDURE — 250N000013 HC RX MED GY IP 250 OP 250 PS 637: Performed by: INTERNAL MEDICINE

## 2022-01-12 PROCEDURE — 99232 SBSQ HOSP IP/OBS MODERATE 35: CPT | Performed by: INTERNAL MEDICINE

## 2022-01-12 RX ADMIN — HYDRALAZINE HYDROCHLORIDE 50 MG: 50 TABLET, FILM COATED ORAL at 22:39

## 2022-01-12 RX ADMIN — Medication 1 PACKET: at 08:03

## 2022-01-12 RX ADMIN — Medication 40 MG: at 22:54

## 2022-01-12 RX ADMIN — HYDRALAZINE HYDROCHLORIDE 50 MG: 50 TABLET, FILM COATED ORAL at 06:32

## 2022-01-12 RX ADMIN — HEPARIN SODIUM 5000 UNITS: 5000 INJECTION, SOLUTION INTRAVENOUS; SUBCUTANEOUS at 07:55

## 2022-01-12 RX ADMIN — HEPARIN SODIUM 5000 UNITS: 5000 INJECTION, SOLUTION INTRAVENOUS; SUBCUTANEOUS at 22:39

## 2022-01-12 RX ADMIN — ENALAPRIL MALEATE 5 MG: 5 TABLET ORAL at 22:45

## 2022-01-12 RX ADMIN — METOPROLOL TARTRATE 50 MG: 50 TABLET, FILM COATED ORAL at 08:03

## 2022-01-12 RX ADMIN — METOPROLOL TARTRATE 50 MG: 50 TABLET, FILM COATED ORAL at 22:44

## 2022-01-12 RX ADMIN — HYDRALAZINE HYDROCHLORIDE 50 MG: 50 TABLET, FILM COATED ORAL at 14:42

## 2022-01-12 RX ADMIN — Medication 40 MG: at 08:03

## 2022-01-12 ASSESSMENT — ACTIVITIES OF DAILY LIVING (ADL)
ADLS_ACUITY_SCORE: 35
ADLS_ACUITY_SCORE: 33
ADLS_ACUITY_SCORE: 35
ADLS_ACUITY_SCORE: 33
ADLS_ACUITY_SCORE: 35
ADLS_ACUITY_SCORE: 33
ADLS_ACUITY_SCORE: 33
ADLS_ACUITY_SCORE: 35
ADLS_ACUITY_SCORE: 33
ADLS_ACUITY_SCORE: 35
ADLS_ACUITY_SCORE: 33
ADLS_ACUITY_SCORE: 35

## 2022-01-12 ASSESSMENT — MIFFLIN-ST. JEOR: SCORE: 1482.5

## 2022-01-12 NOTE — PROGRESS NOTES
[facility]    Hospitalist Progress Note    Assessment & Plan   Sukhi Johnson is a 71 year-old male with past medical history of hypertension, chronic fatigue who presents with left-sided weakness and speech difficulties. Admitted 12/24/2021.      Acute hemorrhagic stroke with vasogenic edema  Non-traumatic intracerebral hemorrhage of right frontoparietal region, likely due to cerebral amyloid angiopathy, with contribution from untreated HTN  Acute metabolic encephalopathy secondary to above  History of hemorrhagic stroke in 2013  Hypertension, untreated  *Presents with left-sided weakness and speech difficulty  *CT and MRI on admission with acute right frontoparietal intracranial hemorrhage with associated regional mass effect resulting in 2 mm leftward midline shift; MRI also noted with numerous chronic microhemorrhages throughout the cerebral hemispheres, deep gray nuclei and posterior fossa, possibly representing sequelae of hypertensive or amyloid angiopathy  *Neurosurgery evaluated and non-surgical management recommended  *Repeat head CT 12/26 with slight interval decrease in size of the large parenchymal hematoma, no definite evidence for new or increasing hemorrhage; stable minimal leftward midline shift of the third ventricle  *Repeat head CT 12/29 noted stable   *EEG 12/29 with no electrographic seizures  *ECHO 12/26 with LVEF >70%, negative bubble study; A1c 5.4%,   *Neurology (stroke) consulted, now signed off  - Goal SBP <160, more elevated when enteral route was temporarily lost, since better controlled  -somnolent, non verbal now at baseline, moves R side but not L side.   -per RN, pt does demonstrate some limited interaction with use of R arm.   Nl bmp, nl Hb and wbc  -stable to slightly improved neurologically,more alert  Good control bp       Plan;   - TcU  - Continue hydralazine 50 mg TID, metoprolol 50 mg BID, enalapril 5 mg at bedtime  - PT/OT consulted,  recommending TCU. SW following for choices.   - Continue mitt to right hand as needed if pulling at tube  -=outpient neurology follow up  --Repeat brain MRI with and without contrast in 3 months outpatient to evaluate for underlying lesion [order placed]  -Follow up with PCP post discharge  -Follow up with Stroke Neurology as outpatient in 6-10 weeks [next available appointment, referral placed]   -Up in chair     Acute hypoxic respiratory failure  Suspected aspiration event  *Episode of respiratory distress 12/27. Tmax 100.8F, WBC elevated  *CXR without clear infiltrate, though episode concerning for aspiration event  *Completed 7 day course of piperacillin-tazobactam IV  - SLP consulted, diet per SLP. Slow recovery, if at all, anticipated, PEG placed  -lungs clear. Breathing easily  -npo, on TFs     Rash  Appears to be a contact dermatitis as located in area of abdominal binder, with similar appearing rash in shape of telemetry patch on his chest.   - Monitor      At risk for malnutrition   *PEG placed 1/5/22  - Nutrition following, continue tube feeds, nutrition following  -nutrition consult at u     Hypernatremia- resolved.   Sodium peak of 152. Secondary to lack of free water intake. Sodium normal 1/7  - Continue free water flushes  - Monitor labs per tube feeding protocol   -bmp nl  Follow bmp at tcu     Hypokalemia-resolved  - Monitor and replace per protocol   Bmp nl  K nl.  Given kdur     Suspected GI bleed  *Bloody BM noted since 12/29 with hgb down to 12.7 g/dl  *GI consulted, no immediate plans for endoscopy  - Has had small amounts of recurrent bleeding noted, with no change in hemoglobin   - having brown stools. Hb stable.   - Trial resuming heparin subcutaneous for DVT ppx  - Continue PPI  -no bloody stools  -check cbc intermitently.      Chronic fatigue syndrome 2/2 Lyme disease  PTA on Adderall.   - Neurology recommending against stimulants at this time     COVID PCR screen negative  No  vaccination records available, tested negative on admission 12/24           Clinically Significant Risk Factors Present on Admission                  Diet: Adult Formula Drip Feeding: Continuous Jevity 1.5; Other - Specify in Comment; Goal Rate: 50; mL/hr; Medication - Feeding Tube Flush Frequency: At least 15-30 mL water before and after medication administration and with tube clogging; Amount to Se...  NPO per Anesthesia Guidelines for Procedure/Surgery Except for: Meds    DVT Prophylaxis: Pneumatic Compression Devices, resume heparin subcutaneous as above  Tay Catheter: Not present  Code Status: Full Code          Disposition Plan-     Expected Discharge: Ready to discharge to TCU once placement found        Discussed with RN.     Bulmaro Webster MD  Text Page  (7am to 6pm)  Interval History   Stable night  Follows some simple commands for RN  No new issues.   Still looking for TCU.     -Data reviewed today: I reviewed all new labs and imaging results over the last 24 hours. I personally reviewed labs last 24 hours.     Physical Exam   Temp: 97.1  F (36.2  C) Temp src: Axillary BP: 135/85 Pulse: 85   Resp: 16 SpO2: 94 % O2 Device: None (Room air)    Vitals:    12/27/21 0445 01/03/22 2300 01/12/22 0437   Weight: 83.5 kg (184 lb) 73.7 kg (162 lb 7.7 oz) 75.3 kg (166 lb 0.1 oz)     Vital Signs with Ranges  Temp:  [97.1  F (36.2  C)-98.7  F (37.1  C)] 97.1  F (36.2  C)  Pulse:  [85-94] 85  Resp:  [16] 16  BP: (135-155)/(73-85) 135/85  SpO2:  [94 %-98 %] 94 %  I/O last 3 completed shifts:  In: 1472 [NG/GT:1472]  Out: 2575 [Urine:2575]    Constitutional: In bed, nad  Respiratory: CTAB, breathing easily   Cardiovascular: RRR no r/g/m  GI: soft, nt, nd. Brown stool  Peg tube in place, small amount of redness around site. No clear purulence  Skin/Integumen: small redness at peg site as above. picc line site looks good  Neuro: L hemiparesis. Moves R sided spontaneously. Perrla, awake. Followed some simple commands for  me.  Tries to mouth an answer  Nonverbal.     Medications       enalapril  5 mg Per Feeding Tube At Bedtime     heparin ANTICOAGULANT  5,000 Units Subcutaneous Q12H     hydrALAZINE  50 mg Per Feeding Tube Q8H TONY     metoprolol tartrate  50 mg Per Feeding Tube BID     pantoprazole  40 mg Per Feeding Tube BID     protein modular  1 packet Per Feeding Tube Daily     sodium chloride (PF)  10-40 mL Intracatheter Q7 Days     sodium chloride (PF)  3 mL Intracatheter Q8H       Data   Recent Labs   Lab 01/12/22  0639 01/11/22  0634 01/11/22  0633 01/10/22  1253 01/08/22  0833 01/08/22  0549 01/07/22  1734 01/07/22  1540 01/07/22  0548   WBC  --   --  7.0  --   --   --   --   --   --    HGB  --   --  12.6*  --   --   --   --   --   --    MCV  --   --  91  --   --   --   --   --   --    PLT  --   --  380  380  --   --  191  --   --   --      --   --  138  --   --   --   --  142   POTASSIUM 3.7  3.7 3.6  --  3.8   < >  --   --    < > 3.0*   CHLORIDE 106  --   --  105  --   --   --   --  115*   CO2 29  --   --  27  --   --   --   --  23   BUN 21  --   --  24  --   --   --   --  18   CR 0.70  --   --  0.68  --   --   --   --  0.54*   ANIONGAP 4  --   --  6  --   --   --   --  4   MUSTAPHA 8.8  --   --  8.9  --   --   --   --  6.8*   *  --   --  130*  --   --  137*  --  103*    < > = values in this interval not displayed.       Imaging:   No results found for this or any previous visit (from the past 24 hour(s)).

## 2022-01-12 NOTE — PROGRESS NOTES
Care Management Follow Up    Length of Stay (days): 19    Expected Discharge Date: 01/13/2022     Concerns to be Addressed:       Patient plan of care discussed at interdisciplinary rounds: Yes    Anticipated Discharge Disposition:       Anticipated Discharge Services:    Anticipated Discharge DME:      Patient/family educated on Medicare website which has current facility and service quality ratings:    Education Provided on the Discharge Plan:    Patient/Family in Agreement with the Plan:      Referrals Placed by CM/SW:    Private pay costs discussed: Not applicable    Additional Information:    Billy left Edward, admissions, at [facility] a vm to confirm bed availability.  Per chart review, bed may be available tomorrow, 1/13/22.  Sw waiting on return call  Back.  Pt has been clinically accepted at [facility].    Update:  Billy confirmed with Edward in admissions that pt is now declined due to vaccination status; new rule just went into effect at this TCU.  Billy reached out to S/O Miladis and she selected [facility], [facility], and [facility] (Billy left vm with Miladis Danielson's contact).  Billy stated that if these facilities decline/are full that additional referrals further away will need to be pursued; Miladis agreeable and stated she has some facilities in [place] that she is looking into.      Update:  [facility] does not have any beds available until end of Jan 2022.  [facility] and [facility] remain pending.    Update:  [facility] has declined due to pt's acuity of care and staffing shortages.  [facility] remains pending.  S/O asked that referrals be sent to [facility]; referrals ent.  S/O aware that additional TCU choices may be needed and is cooperative/agreeable; Sw to f/u tomorrow, 1/13/22.      Estelita Waggoner, YVETTESW

## 2022-01-12 NOTE — PROGRESS NOTES
Pt here with acute ICH. ALLYSON orientation, nonverbal. Pt able to give thumbs up & down. Neuros show L side hemiplegia & facial droop, able to move R side but inconsistent with commands. VSS on RA. NPO. Takes pills through tube feed. PEG tube feed running at 50 ml/hr with Q4 150 ml  flushes. External catheter in place with adequate output. PICC line SL & flushing well. T/R with 2A. No s/s of pain observed. Pt scoring green on the Aggression Stop Light Tool. Discharge to TCU pending, possibly Thursday.

## 2022-01-12 NOTE — PLAN OF CARE
[id number] Pt here with R frontoparietal acute ICH. ALLYSON orientation, patient nonverbal, able to give thumbs up or down for yes or no answers. Elevated BP, within parameters, SBP<160 otherwise VSS, on RA. LS diminished, clear. Denies pain, nonverbal signs indicate no pain. Following some commands, Neuro's exhibit; Lt side hemiplegia, L facial droop, nonverbal, pupils equal and reactive, RUE 4/5 strength, strong hand , RLE 3/5 strength. T&R, A2, q2hrs. NPO, freq oral care. PEG tube patent, dressing site CDI, TF going at goal rate 50mL/hr, with 150mL free h2o flushes, q4hr flushes. Incont B&B. Incont BM this shift. Mepilex replaced. External cath in place, adequately output. RUE PICC patent. Pt scoring green on Aggression Stop Light Tool. TCU at time of discharge.

## 2022-01-13 ENCOUNTER — APPOINTMENT (OUTPATIENT)
Dept: SPEECH THERAPY | Facility: CLINIC | Age: 72
DRG: 064 | End: 2022-01-13
Payer: COMMERCIAL

## 2022-01-13 LAB — GLUCOSE BLDC GLUCOMTR-MCNC: 143 MG/DL (ref 70–99)

## 2022-01-13 PROCEDURE — 92526 ORAL FUNCTION THERAPY: CPT | Mod: GN

## 2022-01-13 PROCEDURE — 250N000013 HC RX MED GY IP 250 OP 250 PS 637: Performed by: HOSPITALIST

## 2022-01-13 PROCEDURE — 250N000011 HC RX IP 250 OP 636: Performed by: INTERNAL MEDICINE

## 2022-01-13 PROCEDURE — 999N000190 HC STATISTIC VAT ROUNDS

## 2022-01-13 PROCEDURE — 99232 SBSQ HOSP IP/OBS MODERATE 35: CPT | Performed by: INTERNAL MEDICINE

## 2022-01-13 PROCEDURE — 120N000001 HC R&B MED SURG/OB

## 2022-01-13 PROCEDURE — 250N000013 HC RX MED GY IP 250 OP 250 PS 637: Performed by: INTERNAL MEDICINE

## 2022-01-13 RX ADMIN — ACETAMINOPHEN 650 MG: 325 SUSPENSION ORAL at 14:44

## 2022-01-13 RX ADMIN — Medication 40 MG: at 21:32

## 2022-01-13 RX ADMIN — ENALAPRIL MALEATE 5 MG: 5 TABLET ORAL at 21:29

## 2022-01-13 RX ADMIN — HEPARIN SODIUM 5000 UNITS: 5000 INJECTION, SOLUTION INTRAVENOUS; SUBCUTANEOUS at 21:30

## 2022-01-13 RX ADMIN — HYDRALAZINE HYDROCHLORIDE 50 MG: 50 TABLET, FILM COATED ORAL at 14:39

## 2022-01-13 RX ADMIN — Medication 40 MG: at 10:55

## 2022-01-13 RX ADMIN — HEPARIN SODIUM 5000 UNITS: 5000 INJECTION, SOLUTION INTRAVENOUS; SUBCUTANEOUS at 09:02

## 2022-01-13 RX ADMIN — Medication 1 PACKET: at 09:04

## 2022-01-13 RX ADMIN — METOPROLOL TARTRATE 50 MG: 50 TABLET, FILM COATED ORAL at 09:04

## 2022-01-13 RX ADMIN — HYDRALAZINE HYDROCHLORIDE 50 MG: 50 TABLET, FILM COATED ORAL at 05:45

## 2022-01-13 RX ADMIN — HYDRALAZINE HYDROCHLORIDE 50 MG: 50 TABLET, FILM COATED ORAL at 21:30

## 2022-01-13 RX ADMIN — METOPROLOL TARTRATE 50 MG: 50 TABLET, FILM COATED ORAL at 21:29

## 2022-01-13 ASSESSMENT — ACTIVITIES OF DAILY LIVING (ADL)
ADLS_ACUITY_SCORE: 29
ADLS_ACUITY_SCORE: 31
ADLS_ACUITY_SCORE: 35
ADLS_ACUITY_SCORE: 29
ADLS_ACUITY_SCORE: 31
ADLS_ACUITY_SCORE: 31
ADLS_ACUITY_SCORE: 29
ADLS_ACUITY_SCORE: 35
ADLS_ACUITY_SCORE: 31
ADLS_ACUITY_SCORE: 29
ADLS_ACUITY_SCORE: 31
ADLS_ACUITY_SCORE: 29
ADLS_ACUITY_SCORE: 35
ADLS_ACUITY_SCORE: 29
ADLS_ACUITY_SCORE: 29
ADLS_ACUITY_SCORE: 31
ADLS_ACUITY_SCORE: 35

## 2022-01-13 NOTE — PROGRESS NOTES
Care Management Follow Up    Length of Stay (days): 20    Expected Discharge Date: 01/14/2022     Concerns to be Addressed:       Patient plan of care discussed at interdisciplinary rounds: Yes    Anticipated Discharge Disposition:       Anticipated Discharge Services:    Anticipated Discharge DME:      Patient/family educated on Medicare website which has current facility and service quality ratings:    Education Provided on the Discharge Plan:    Patient/Family in Agreement with the Plan:      Referrals Placed by CM/SW:    Private pay costs discussed: Not applicable    Additional Information:  JORGE emailed financial counselor.  Pt new insurance on 1/1/22 is [employer].  [employer] is listed as primary insurance on pt facesheet, and SW will need to communicate that [employer] in primary to TCU's.  SW received voicemail from Miladis that she is continuing to tour TCU's and wants TCU's where she would be able to visit.   SW met with significant other face-to-face.  She provided 6 additional TCU choices which were sent.  Significant other is optimistic that pt is making improvements.        YVETTE RogersSW

## 2022-01-13 NOTE — PROGRESS NOTES
[facility]    Hospitalist Progress Note    Assessment & Plan   Sukhi Johnson is a 71 year-old male with past medical history of hypertension, chronic fatigue who presents with left-sided weakness and speech difficulties. Admitted 12/24/2021.      Acute hemorrhagic stroke with vasogenic edema  Non-traumatic intracerebral hemorrhage of right frontoparietal region, likely due to cerebral amyloid angiopathy, with contribution from untreated HTN  Acute metabolic encephalopathy secondary to above  History of hemorrhagic stroke in 2013  Hypertension, untreated  *Presents with left-sided weakness and speech difficulty  *CT and MRI on admission with acute right frontoparietal intracranial hemorrhage with associated regional mass effect resulting in 2 mm leftward midline shift; MRI also noted with numerous chronic microhemorrhages throughout the cerebral hemispheres, deep gray nuclei and posterior fossa, possibly representing sequelae of hypertensive or amyloid angiopathy  *Neurosurgery evaluated and non-surgical management recommended  *Repeat head CT 12/26 with slight interval decrease in size of the large parenchymal hematoma, no definite evidence for new or increasing hemorrhage; stable minimal leftward midline shift of the third ventricle  *Repeat head CT 12/29 noted stable   *EEG 12/29 with no electrographic seizures  *ECHO 12/26 with LVEF >70%, negative bubble study; A1c 5.4%,   *Neurology (stroke) consulted, now signed off  - Goal SBP <160, more elevated when enteral route was temporarily lost, since better controlled  -somnolent, non verbal now at baseline, moves R side but not L side.   -per RN, pt does demonstrate some limited interaction with use of R arm.   Nl bmp, nl Hb and wbc  -stable to slightly improved neurologically,more alert  Good control bp   -has had improved mentation since 1/11, using hand motions to communicate. Follows simple commands.   No acute issues.    Finding placement      Plan;   - TcU  -follow labs  - Continue hydralazine 50 mg TID, metoprolol 50 mg BID, enalapril 5 mg at bedtime  - PT/OT consulted, recommending TCU. SW following for choices.   - Continue mitt to right hand as needed if pulling at tube  -=outpient neurology follow up  --Repeat brain MRI with and without contrast in 3 months outpatient to evaluate for underlying lesion [order placed]  -Follow up with PCP post discharge  -Follow up with Stroke Neurology as outpatient in 6-10 weeks [next available appointment, referral placed]   -Up in chair     Acute hypoxic respiratory failure  Suspected aspiration event  *Episode of respiratory distress 12/27. Tmax 100.8F, WBC elevated  *CXR without clear infiltrate, though episode concerning for aspiration event  *Completed 7 day course of piperacillin-tazobactam IV  - SLP consulted, diet per SLP. Slow recovery, if at all, anticipated, PEG placed  -lungs clear. Breathing easily  -npo, on TFs     Rash-resolved  Appears to be a contact dermatitis as located in area of abdominal binder, with similar appearing rash in shape of telemetry patch on his chest.   - Monitor      At risk for malnutrition   *PEG placed 1/5/22  - Nutrition following, continue tube feeds, nutrition following  -nutrition consult at tcu     Hypernatremia- resolved.   Sodium peak of 152. Secondary to lack of free water intake. Sodium normal 1/7  - Continue free water flushes  - Monitor labs per tube feeding protocol   -bmp nl  Follow bmp at tcu     Hypokalemia-resolved  - Monitor and replace per protocol   Bmp nl  K nl.  Given kdur     Suspected GI bleed  *Bloody BM noted since 12/29 with hgb down to 12.7 g/dl  *GI consulted, no immediate plans for endoscopy  - Has had small amounts of recurrent bleeding noted, with no change in hemoglobin   - having brown stools. Hb stable.   - Trial resuming heparin subcutaneous for DVT ppx  - Continue PPI  -no bloody stools  -check cbc intermitently.       Chronic fatigue syndrome 2/2 Lyme disease  PTA on Adderall.   - Neurology recommending against stimulants at this time     COVID PCR screen negative  No vaccination records available, tested negative on admission 12/24           Clinically Significant Risk Factors Present on Admission                  Diet: Adult Formula Drip Feeding: Continuous Jevity 1.5; Other - Specify in Comment; Goal Rate: 50; mL/hr; Medication - Feeding Tube Flush Frequency: At least 15-30 mL water before and after medication administration and with tube clogging; Amount to Se...  NPO per Anesthesia Guidelines for Procedure/Surgery Except for: Meds    DVT Prophylaxis: Pneumatic Compression Devices, resume heparin subcutaneous as above  Tay Catheter: Not present  Code Status: Full Code          Disposition Plan-     Expected Discharge: Ready to discharge to TCU once placement found        Discussed with RN.     Bulmaro Webster MD  Text Page  (7am to 6pm)  Interval History   Stable night  No rn concerns  No new issues.   Communicating with rn with hand motions    -Data reviewed today: I reviewed all new labs and imaging results over the last 24 hours. I personally reviewed labs last 24 hours.     Physical Exam   Temp: 97  F (36.1  C) Temp src: Oral BP: 124/67 Pulse: 100 (apical assessed)   Resp: 16 SpO2: 94 % O2 Device: None (Room air)    Vitals:    12/27/21 0445 01/03/22 2300 01/12/22 0437   Weight: 83.5 kg (184 lb) 73.7 kg (162 lb 7.7 oz) 75.3 kg (166 lb 0.1 oz)     Vital Signs with Ranges  Temp:  [97  F (36.1  C)-98.7  F (37.1  C)] 97  F (36.1  C)  Pulse:  [] 100  Resp:  [16] 16  BP: (110-170)/(67-93) 124/67  SpO2:  [92 %-95 %] 94 %  I/O last 3 completed shifts:  In: 7965 [NG/GT:7965]  Out: 1375 [Urine:1375]    Constitutional: In bed, nad  Respiratory: CTAB, breathing easily   Cardiovascular: RRR no r/g/m  GI: soft, nt, nd. Brown stool  Peg tube in place, site looks fine  Skin/Integumen: no rash,  picc line site looks  good  Neuro: L hemiparesis. Moves R sided spontaneously. More awak nd interactive, uses hand motions to communicate. Perrla, awake. Followed some simple commands for me.    Nonverbal.     Medications       enalapril  5 mg Per Feeding Tube At Bedtime     heparin ANTICOAGULANT  5,000 Units Subcutaneous Q12H     hydrALAZINE  50 mg Per Feeding Tube Q8H TONY     metoprolol tartrate  50 mg Per Feeding Tube BID     pantoprazole  40 mg Per Feeding Tube BID     protein modular  1 packet Per Feeding Tube Daily     sodium chloride (PF)  10-40 mL Intracatheter Q7 Days     sodium chloride (PF)  3 mL Intracatheter Q8H       Data   Recent Labs   Lab 01/12/22  0639 01/11/22  0634 01/11/22  0633 01/10/22  1253 01/08/22  0833 01/08/22  0549 01/07/22  1734 01/07/22  1540 01/07/22  0548   WBC  --   --  7.0  --   --   --   --   --   --    HGB  --   --  12.6*  --   --   --   --   --   --    MCV  --   --  91  --   --   --   --   --   --    PLT  --   --  380  380  --   --  191  --   --   --      --   --  138  --   --   --   --  142   POTASSIUM 3.7  3.7 3.6  --  3.8   < >  --   --    < > 3.0*   CHLORIDE 106  --   --  105  --   --   --   --  115*   CO2 29  --   --  27  --   --   --   --  23   BUN 21  --   --  24  --   --   --   --  18   CR 0.70  --   --  0.68  --   --   --   --  0.54*   ANIONGAP 4  --   --  6  --   --   --   --  4   MUSTAPHA 8.8  --   --  8.9  --   --   --   --  6.8*   *  --   --  130*  --   --  137*  --  103*    < > = values in this interval not displayed.       Imaging:   No results found for this or any previous visit (from the past 24 hour(s)).

## 2022-01-13 NOTE — PLAN OF CARE
Reason for Admission: (R) intracerebral hemorrhage    Cognitive/Mentation: A/O to self   Neuros/CMS: left facial droop, left-sided weakness. Intermittently follows commands.   VS: stable.  GI: BS present, passing flatus, last BM 8 am. inContinent.  : voids using external catheter. inContinent.  Pulmonary: LS clear bilaterally.  Pain: reports no pain patient able to respond with hand signals.     Drains: external catheter.   Skin: CDI blanchable redness on coccyx   Activity: Assist x 2 with lift.  Diet: NPO. Takes pills crushed.     Therapies recs: TCU placement  Discharge: awaiting placement to TCU     End of shift summary: pt cooperative with care Q2hrs repo.

## 2022-01-13 NOTE — PROGRESS NOTES
"/69 (BP Location: Right arm)   Pulse 84   Temp 98.3  F (36.8  C) (Oral)   Resp 16   Ht 1.727 m (5' 7.99\")   Wt 75.3 kg (166 lb 0.1 oz)   SpO2 95%   BMI 25.25 kg/m      Pt here with R. Side frontoparietal ICH.Orientation ALLYSON.Neuros L.facial droop, L.hemiplegia. VSS on RA, SBP<160. NPO, TF running @50mL/hr with 150 m/L q4 flushes. 7225mL intake cleared on the pump before stating a new nutrition with 4523 water flushes cleared. Currently has a R. Upper arm PICC,q2 T/R.Takes pills crushed in J-tube. Incontinent of B&B, purewick in place. Up with A2/lift. Pt scoring green on the Aggression Stop Light Tool. Discharge plan pending placement.         "

## 2022-01-13 NOTE — PROGRESS NOTES
CLINICAL NUTRITION SERVICES - REASSESSMENT NOTE      Malnutrition: (1/4)  % Weight Loss:  > 2% in 1 week (severe malnutrition) -- more weights needed to confirm, large fluctuation in wts  % Intake:  </= 50% for >/= 5 days (severe malnutrition)  - EN had been interrupted for several days, up until this past week  Subcutaneous Fat Loss:  None previously observed  Muscle Loss:  None previously observed  Fluid Retention:  Trace-Moderate     Malnutrition Diagnosis: Severe malnutrition (suspected)  In Context of:  Acute illness or injury       EVALUATION OF PROGRESS TOWARD GOALS   Diet:    NPO    Nutrition Support:    12/27/21: Enteral Nutrition initiated   1/5: GJ - 16 [place], gastrojejunostomy tube was advanced until the tip was in the proximal jejunum.    Type of Feeding Tube:  GJ (feeding into J-port)  Enteral Frequency:  Continuous  Enteral Regimen: Jevity 1.5 at goal rate 50 ml/hr   Total Enteral Provisions: 1800 kcal, 77 gm pro, 25 gm fiber, 912 mL free water   Free Water Flush: (1/6 - MD) 150 ml q 4 hours   Total Fluid (TF + FWF) = 1812 ml/day (24 mL/kg)     +1 packet Prosource/day = 40 kcal, 11 g protein  TOTAL (TF + Prosource) = 1840 kcal (24 kcal/kg, 98% est needs) and 88 g protein (1.17 g/kg, 98% est needs)    Intake/Tolerance:    Chart reviewed  Pt has been tolerating TF at goal rate (uninterrupted for the past week)    BM x2      ASSESSED NUTRITION NEEDS:  Dosing Weight 75.3 kg (1/12 - current)  Estimated Energy Needs: 1791-3446 kcals (25-30 Kcal/Kg)  Justification: maintenance   Estimated Protein Needs:  grams protein (1.2-1.5 g pro/Kg)  Justification: preservation of lean body mass      NEW FINDINGS:     Vitals:    12/24/21 1558 12/27/21 0445 01/03/22 2300 01/12/22 0437   Weight: 83.9 kg (184 lb 15.5 oz) 83.5 kg (184 lb) 73.7 kg (162 lb 7.7 oz) 75.3 kg (166 lb 0.1 oz)     Question accuracy of wts - records reflect 22# (12%) wt loss within 1 week (12/27-1/3) after starting EN  Net I/O: +10,180  mL    Plan for TCU once bed found      Previous Goals (1/10):   EN to meet % est needs  Evaluation: Met    Previous Nutrition Diagnosis (1/10):   No nutrition diagnosis identified at this time  Evaluation: No change        CURRENT NUTRITION DIAGNOSIS  No nutrition diagnosis identified at this time       INTERVENTIONS  Recommendations / Nutrition Prescription  NPO  TF as above    Implementation  No intervention at this time    Goals  EN to meet % est needs      MONITORING AND EVALUATION:  Progress towards goals will be monitored and evaluated per protocol and Practice Guidelines

## 2022-01-13 NOTE — PLAN OF CARE
Date & Time: 1/12 0700-1900  Diagnosis: R ICH   Procedures: 1/5 - GJ tube placed in IR   Orientation/Cognitive: AOx0/ALLYSON  VS/O2: VSS ex HTN, RA   Mobility: A2 + lift   Diet: NPO, TF @ 50ml//hr (goal)   Pain Management: Appears comfortable   Bowel & Bladder: Incontinent - external cath   Skin: Pale, GJ tube   Abnormal Labs: WDL   Tele: NA  IV Access/Drips/Fluids: R PICC double lumen SL   Drains: External catheter - patent   Tests: CT - R frontoparietal ICH  Consults: Hospitalist, stroke neuro   Discharge Plan: Pending - difficult placement d/t not vaccinated  Other: Neuro - ALLYSON, L hemiplegia & L droop

## 2022-01-14 ENCOUNTER — APPOINTMENT (OUTPATIENT)
Dept: PHYSICAL THERAPY | Facility: CLINIC | Age: 72
DRG: 064 | End: 2022-01-14
Payer: COMMERCIAL

## 2022-01-14 LAB
ANION GAP SERPL CALCULATED.3IONS-SCNC: 5 MMOL/L (ref 3–14)
BUN SERPL-MCNC: 21 MG/DL (ref 7–30)
CALCIUM SERPL-MCNC: 9 MG/DL (ref 8.5–10.1)
CHLORIDE BLD-SCNC: 109 MMOL/L (ref 94–109)
CO2 SERPL-SCNC: 24 MMOL/L (ref 20–32)
CREAT SERPL-MCNC: 0.67 MG/DL (ref 0.66–1.25)
ERYTHROCYTE [DISTWIDTH] IN BLOOD BY AUTOMATED COUNT: 13.1 % (ref 10–15)
GFR SERPL CREATININE-BSD FRML MDRD: >90 ML/MIN/1.73M2
GLUCOSE BLD-MCNC: 107 MG/DL (ref 70–99)
HCT VFR BLD AUTO: 42.7 % (ref 40–53)
HGB BLD-MCNC: 13.6 G/DL (ref 13.3–17.7)
MCH RBC QN AUTO: 29.2 PG (ref 26.5–33)
MCHC RBC AUTO-ENTMCNC: 31.9 G/DL (ref 31.5–36.5)
MCV RBC AUTO: 92 FL (ref 78–100)
PHOSPHATE SERPL-MCNC: 3.2 MG/DL (ref 2.5–4.5)
PLATELET # BLD AUTO: 296 10E3/UL (ref 150–450)
POTASSIUM BLD-SCNC: 3.9 MMOL/L (ref 3.4–5.3)
RBC # BLD AUTO: 4.66 10E6/UL (ref 4.4–5.9)
SODIUM SERPL-SCNC: 138 MMOL/L (ref 133–144)
WBC # BLD AUTO: 8.6 10E3/UL (ref 4–11)

## 2022-01-14 PROCEDURE — 36415 COLL VENOUS BLD VENIPUNCTURE: CPT | Performed by: INTERNAL MEDICINE

## 2022-01-14 PROCEDURE — 99233 SBSQ HOSP IP/OBS HIGH 50: CPT | Performed by: INTERNAL MEDICINE

## 2022-01-14 PROCEDURE — 85014 HEMATOCRIT: CPT | Performed by: INTERNAL MEDICINE

## 2022-01-14 PROCEDURE — 97112 NEUROMUSCULAR REEDUCATION: CPT | Mod: GP

## 2022-01-14 PROCEDURE — 84100 ASSAY OF PHOSPHORUS: CPT | Performed by: INTERNAL MEDICINE

## 2022-01-14 PROCEDURE — 120N000001 HC R&B MED SURG/OB

## 2022-01-14 PROCEDURE — 82310 ASSAY OF CALCIUM: CPT | Performed by: INTERNAL MEDICINE

## 2022-01-14 PROCEDURE — 250N000011 HC RX IP 250 OP 636: Performed by: INTERNAL MEDICINE

## 2022-01-14 PROCEDURE — 97530 THERAPEUTIC ACTIVITIES: CPT | Mod: GP

## 2022-01-14 PROCEDURE — 250N000013 HC RX MED GY IP 250 OP 250 PS 637: Performed by: INTERNAL MEDICINE

## 2022-01-14 PROCEDURE — 250N000013 HC RX MED GY IP 250 OP 250 PS 637: Performed by: HOSPITALIST

## 2022-01-14 PROCEDURE — 999N000190 HC STATISTIC VAT ROUNDS

## 2022-01-14 RX ADMIN — METOPROLOL TARTRATE 50 MG: 50 TABLET, FILM COATED ORAL at 08:19

## 2022-01-14 RX ADMIN — ENALAPRIL MALEATE 5 MG: 5 TABLET ORAL at 20:57

## 2022-01-14 RX ADMIN — HEPARIN SODIUM 5000 UNITS: 5000 INJECTION, SOLUTION INTRAVENOUS; SUBCUTANEOUS at 07:44

## 2022-01-14 RX ADMIN — ACETAMINOPHEN 650 MG: 325 SUSPENSION ORAL at 01:00

## 2022-01-14 RX ADMIN — METOPROLOL TARTRATE 50 MG: 50 TABLET, FILM COATED ORAL at 20:57

## 2022-01-14 RX ADMIN — HEPARIN SODIUM 5000 UNITS: 5000 INJECTION, SOLUTION INTRAVENOUS; SUBCUTANEOUS at 21:01

## 2022-01-14 RX ADMIN — HYDRALAZINE HYDROCHLORIDE 50 MG: 50 TABLET, FILM COATED ORAL at 20:58

## 2022-01-14 RX ADMIN — HYDRALAZINE HYDROCHLORIDE 50 MG: 50 TABLET, FILM COATED ORAL at 06:12

## 2022-01-14 RX ADMIN — Medication 40 MG: at 10:31

## 2022-01-14 RX ADMIN — Medication 40 MG: at 20:57

## 2022-01-14 RX ADMIN — HYDRALAZINE HYDROCHLORIDE 50 MG: 50 TABLET, FILM COATED ORAL at 13:31

## 2022-01-14 RX ADMIN — Medication 1 PACKET: at 10:31

## 2022-01-14 RX ADMIN — ACETAMINOPHEN 650 MG: 325 SUSPENSION ORAL at 08:19

## 2022-01-14 RX ADMIN — ACETAMINOPHEN 650 MG: 325 SUSPENSION ORAL at 13:31

## 2022-01-14 ASSESSMENT — ACTIVITIES OF DAILY LIVING (ADL)
ADLS_ACUITY_SCORE: 33
ADLS_ACUITY_SCORE: 31
ADLS_ACUITY_SCORE: 33
ADLS_ACUITY_SCORE: 29
ADLS_ACUITY_SCORE: 33

## 2022-01-14 NOTE — PROGRESS NOTES
Care Management Follow Up    Length of Stay (days): 21    Expected Discharge Date: 01/18/2022     Concerns to be Addressed:       Patient plan of care discussed at interdisciplinary rounds: Yes    Anticipated Discharge Disposition:  TCU     Anticipated Discharge Services: inpatient rehab   Anticipated Discharge DME:      Patient/family educated on Medicare website which has current facility and service quality ratings:    Education Provided on the Discharge Plan:  yes  Patient/Family in Agreement with the Plan:  yes    Referrals Placed by CM/SW:  Many referrals out. Pt's OBJECTIVE:. Miladis very involved in touring facilities for pt's placement.  Miladis called this am and indicated that she was going to try and come to hospital today. No updates this am from TCU referrals.  Private pay costs discussed:     Additional Information:  JORGE received a call from Shilpi at [facility] asking is pt is still needing placement. Updated notes sent for review.      OLGA Gtz  [facility]  Care Transitions  [phone]

## 2022-01-14 NOTE — PROGRESS NOTES
[facility]    Hospitalist Progress Note    Assessment & Plan   Sukhi Johnson is a 71 year-old male with past medical history of hypertension, chronic fatigue who presents with left-sided weakness and speech difficulties. Admitted 12/24/2021.      Acute hemorrhagic stroke with vasogenic edema  Non-traumatic intracerebral hemorrhage of right frontoparietal region, likely due to cerebral amyloid angiopathy, with contribution from untreated HTN  Acute metabolic encephalopathy secondary to above  History of hemorrhagic stroke in 2013  Hypertension, untreated  *Presents with left-sided weakness and speech difficulty  *CT and MRI on admission with acute right frontoparietal intracranial hemorrhage with associated regional mass effect resulting in 2 mm leftward midline shift; MRI also noted with numerous chronic microhemorrhages throughout the cerebral hemispheres, deep gray nuclei and posterior fossa, possibly representing sequelae of hypertensive or amyloid angiopathy  *Neurosurgery evaluated and non-surgical management recommended  *Repeat head CT 12/26 with slight interval decrease in size of the large parenchymal hematoma, no definite evidence for new or increasing hemorrhage; stable minimal leftward midline shift of the third ventricle  *Repeat head CT 12/29 noted stable   *EEG 12/29 with no electrographic seizures  *ECHO 12/26 with LVEF >70%, negative bubble study; A1c 5.4%,   *Neurology (stroke) consulted, now signed off  - Goal SBP <160, more elevated when enteral route was temporarily lost, since better controlled  -somnolent, non verbal now at baseline, moves R side but not L side.   -per RN, pt does demonstrate some limited interaction with use of R arm.   Nl bmp, nl Hb and wbc  -stable to slightly improved neurologically,more alert  Good control bp   -has had improved mentation since 1/11, using hand motions to communicate. Follows simple commands.   No acute issues.    Finding placement  Slight slow recovery. Not likely to have significant further improvement in neuro function      Plan;   - TcU  -follow labs  - Continue hydralazine 50 mg TID, metoprolol 50 mg BID, enalapril 5 mg at bedtime  - PT/OT consulted, recommending TCU. SW following for choices.   - Continue mitt to right hand as needed if pulling at tube  -=outpient neurology follow up  --Repeat brain MRI with and without contrast in 3 months outpatient to evaluate for underlying lesion [order placed]  -Follow up with PCP post discharge  -Follow up with Stroke Neurology as outpatient in 6-10 weeks [next available appointment, referral placed]   -Up in chair     Acute hypoxic respiratory failure  Suspected aspiration event  *Episode of respiratory distress 12/27. Tmax 100.8F, WBC elevated  *CXR without clear infiltrate, though episode concerning for aspiration event  *Completed 7 day course of piperacillin-tazobactam IV  - SLP consulted, diet per SLP. Slow recovery, if at all, anticipated, PEG placed  -lungs clear. Breathing easily  -npo, on TFs     Rash-resolved  Appears to be a contact dermatitis as located in area of abdominal binder, with similar appearing rash in shape of telemetry patch on his chest.   - Monitor      At risk for malnutrition   *PEG placed 1/5/22  - Nutrition following, continue tube feeds, nutrition following  -nutrition consult at tcu     Hypernatremia- resolved.   Sodium peak of 152. Secondary to lack of free water intake. Sodium normal 1/7  - Continue free water flushes  - Monitor labs per tube feeding protocol   -bmp nl  Follow bmp at tcu     Hypokalemia-resolved  - Monitor and replace per protocol   Bmp nl  K nl.  Nl bmp     Suspected GI bleed  *Bloody BM noted since 12/29 with hgb down to 12.7 g/dl  *GI consulted, no immediate plans for endoscopy  - Has had small amounts of recurrent bleeding noted, with no change in hemoglobin   - having brown stools. Hb stable.   - Trial resuming heparin  subcutaneous for DVT ppx  - Continue PPI  -no bloody stools  -check cbc intermitently. Nl cbc 1/14     Chronic fatigue syndrome 2/2 Lyme disease  PTA on Adderall.   - Neurology recommending against stimulants at this time     COVID PCR screen negative  No vaccination records available, tested negative on admission 12/24           Clinically Significant Risk Factors Present on Admission                  Diet: Adult Formula Drip Feeding: Continuous Jevity 1.5; Other - Specify in Comment; Goal Rate: 50; mL/hr; Medication - Feeding Tube Flush Frequency: At least 15-30 mL water before and after medication administration and with tube clogging; Amount to Se...  NPO per Anesthesia Guidelines for Procedure/Surgery Except for: Meds      DVT Prophylaxis: Pneumatic Compression Devices, resume heparin subcutaneous as above  Tay Catheter: Not present  Code Status: Full Code          Disposition Plan-     Expected Discharge: Ready to discharge to TCU once placement found        Informed pt's significant other that unlikely to be accepted by TCU given subtle prolonged recovery to minimal recovery going forward, may need to start looking at long term care beds.     Discussed with RN and care coordinator and pt's SO    Bulmaro Webster MD  Text Page  (7am to 6pm)  Interval History   Stable night  No rn concerns  No new issues.   Communicating with rn with hand motions    -Data reviewed today: I reviewed all new labs and imaging results over the last 24 hours. I personally reviewed labs last 24 hours.     Physical Exam   Temp: 97.1  F (36.2  C) Temp src: Oral BP: (!) 158/90 Pulse: 98   Resp: 16 SpO2: 95 % O2 Device: None (Room air)    Vitals:    12/27/21 0445 01/03/22 2300 01/12/22 0437   Weight: 83.5 kg (184 lb) 73.7 kg (162 lb 7.7 oz) 75.3 kg (166 lb 0.1 oz)     Vital Signs with Ranges  Temp:  [97.1  F (36.2  C)-98.2  F (36.8  C)] 97.1  F (36.2  C)  Pulse:  [] 98  Resp:  [14-16] 16  BP: (136-182)/() 158/90  SpO2:   [92 %-95 %] 95 %  I/O last 3 completed shifts:  In: 400 [NG/GT:400]  Out: 1325 [Urine:1325]    Constitutional: In chair, nad  Respiratory: CTAB, breathing easily   Cardiovascular: RRR no r/g/m  GI: soft, nt, nd. Brown stool  Peg tube in place, site looks fine  Skin/Integumen: no rash,  picc line site looks good  Neuro: L hemiparesis. Moves R sided spontaneously. Steadily more awake and interactive, uses hand motions to communicate. Perrla, awake. Followed some simple commands for me.  Seems to recognize his SO at bedside  Nonverbal.     Medications       enalapril  5 mg Per Feeding Tube At Bedtime     heparin ANTICOAGULANT  5,000 Units Subcutaneous Q12H     hydrALAZINE  50 mg Per Feeding Tube Q8H TONY     metoprolol tartrate  50 mg Per Feeding Tube BID     pantoprazole  40 mg Per Feeding Tube BID     protein modular  1 packet Per Feeding Tube Daily     sodium chloride (PF)  10-40 mL Intracatheter Q7 Days     sodium chloride (PF)  3 mL Intracatheter Q8H       Data   Recent Labs   Lab 01/14/22  0820 01/13/22  2104 01/12/22  0639 01/11/22  0634 01/11/22  0633 01/10/22  1253 01/08/22  0833 01/08/22  0549   WBC 8.6  --   --   --  7.0  --   --   --    HGB 13.6  --   --   --  12.6*  --   --   --    MCV 92  --   --   --  91  --   --   --      --   --   --  380  380  --   --  191     --  139  --   --  138  --   --    POTASSIUM 3.9  --  3.7  3.7 3.6  --  3.8   < >  --    CHLORIDE 109  --  106  --   --  105  --   --    CO2 24  --  29  --   --  27  --   --    BUN 21  --  21  --   --  24  --   --    CR 0.67  --  0.70  --   --  0.68  --   --    ANIONGAP 5  --  4  --   --  6  --   --    MUSTAPHA 9.0  --  8.8  --   --  8.9  --   --    * 143* 124*  --   --  130*   < >  --     < > = values in this interval not displayed.       Imaging:   No results found for this or any previous visit (from the past 24 hour(s)).

## 2022-01-14 NOTE — PLAN OF CARE
Neuros unchanged. Pt has intermittent incomprehensible sounds/speech, inconsistently follows, localizes pain. Intermittent hand signals using yes/no questions is somewhat effective. VSS ex Tachy -110. SBP 110s-140s. Purewick in place. Fecal incontinence x1. GJ tube to TF at goal with 150ml q4 flushes. Denies pain. A2 Lift t/r q2 hrs. Meds in tube, NPO, oral swabs. R PICC SL. . Discharge pending placement.

## 2022-01-14 NOTE — PLAN OF CARE
Pt here with R ICH. A&O to self. Pt. Also non verbal. Neuros including a left facial droop, L sided hemiplegia. VSS. NPO diet. TF running at 50ml/hr with 150ml programmed flushes. Takes pills crushed via PEG tube. Up with A2/lift. Tylenol given 1x this shift for headache. Pt no longer has headache. Pt scoring green on the Aggression Stop Light Tool. Plan to discharge to TCU pending placment.

## 2022-01-14 NOTE — PLAN OF CARE
Reason for Admission: R sided nontraumatic intracerebral hemorrhage.      Cognitive/Mentation: A/Ox  self  Neuros/CMS: left sided hemiplegia. Left facial droop. Non-verbal with incomprehensible sounds.  VS: stable.  GI: BS present all four quadrants, passing flatus, last BM . Continent.  : external catheter in place. inContinent.  Pulmonary: LS clear bilaterally.  Pain: pt was given tylenol twice on shift for stiffness/ headache.    Drains: external catheter   Skin: CDI meplex and split gauze around g- tube   Activity: Assist x two with lift.  Diet: NPO  Tube feed running at 50 ml with 150ml  Scheduled to free water flushesTakes pills crushed in tube.     Therapies recs: tcu  Discharge:waiting TCU placement    End of shift summary: pt wife met with nurse and MD to discuss FMLA documents.

## 2022-01-15 ENCOUNTER — APPOINTMENT (OUTPATIENT)
Dept: SPEECH THERAPY | Facility: CLINIC | Age: 72
DRG: 064 | End: 2022-01-15
Payer: COMMERCIAL

## 2022-01-15 LAB
POTASSIUM BLD-SCNC: 3.4 MMOL/L (ref 3.4–5.3)
POTASSIUM BLD-SCNC: 3.8 MMOL/L (ref 3.4–5.3)

## 2022-01-15 PROCEDURE — 120N000001 HC R&B MED SURG/OB

## 2022-01-15 PROCEDURE — 84132 ASSAY OF SERUM POTASSIUM: CPT | Performed by: INTERNAL MEDICINE

## 2022-01-15 PROCEDURE — 99232 SBSQ HOSP IP/OBS MODERATE 35: CPT | Performed by: INTERNAL MEDICINE

## 2022-01-15 PROCEDURE — 36415 COLL VENOUS BLD VENIPUNCTURE: CPT | Performed by: INTERNAL MEDICINE

## 2022-01-15 PROCEDURE — 250N000013 HC RX MED GY IP 250 OP 250 PS 637: Performed by: HOSPITALIST

## 2022-01-15 PROCEDURE — 250N000013 HC RX MED GY IP 250 OP 250 PS 637: Performed by: INTERNAL MEDICINE

## 2022-01-15 PROCEDURE — 92507 TX SP LANG VOICE COMM INDIV: CPT | Mod: GN | Performed by: SPEECH-LANGUAGE PATHOLOGIST

## 2022-01-15 PROCEDURE — 92526 ORAL FUNCTION THERAPY: CPT | Mod: GN | Performed by: SPEECH-LANGUAGE PATHOLOGIST

## 2022-01-15 PROCEDURE — 999N000190 HC STATISTIC VAT ROUNDS

## 2022-01-15 PROCEDURE — 250N000011 HC RX IP 250 OP 636: Performed by: INTERNAL MEDICINE

## 2022-01-15 RX ORDER — POTASSIUM CHLORIDE 1.5 G/1.58G
40 POWDER, FOR SOLUTION ORAL ONCE
Status: COMPLETED | OUTPATIENT
Start: 2022-01-15 | End: 2022-01-15

## 2022-01-15 RX ADMIN — Medication 40 MG: at 21:13

## 2022-01-15 RX ADMIN — ENALAPRIL MALEATE 5 MG: 5 TABLET ORAL at 21:13

## 2022-01-15 RX ADMIN — METOPROLOL TARTRATE 50 MG: 50 TABLET, FILM COATED ORAL at 21:13

## 2022-01-15 RX ADMIN — ACETAMINOPHEN 650 MG: 325 SUSPENSION ORAL at 08:47

## 2022-01-15 RX ADMIN — METOPROLOL TARTRATE 50 MG: 50 TABLET, FILM COATED ORAL at 08:48

## 2022-01-15 RX ADMIN — Medication 1 PACKET: at 08:47

## 2022-01-15 RX ADMIN — ACETAMINOPHEN 650 MG: 325 SUSPENSION ORAL at 14:26

## 2022-01-15 RX ADMIN — HYDRALAZINE HYDROCHLORIDE 50 MG: 50 TABLET, FILM COATED ORAL at 06:03

## 2022-01-15 RX ADMIN — HEPARIN SODIUM 5000 UNITS: 5000 INJECTION, SOLUTION INTRAVENOUS; SUBCUTANEOUS at 08:48

## 2022-01-15 RX ADMIN — Medication 40 MG: at 08:48

## 2022-01-15 RX ADMIN — ACETAMINOPHEN 650 MG: 325 SUSPENSION ORAL at 21:13

## 2022-01-15 RX ADMIN — HYDRALAZINE HYDROCHLORIDE 50 MG: 50 TABLET, FILM COATED ORAL at 21:13

## 2022-01-15 RX ADMIN — HEPARIN SODIUM 5000 UNITS: 5000 INJECTION, SOLUTION INTRAVENOUS; SUBCUTANEOUS at 21:16

## 2022-01-15 RX ADMIN — HYDRALAZINE HYDROCHLORIDE 50 MG: 50 TABLET, FILM COATED ORAL at 14:26

## 2022-01-15 RX ADMIN — POTASSIUM CHLORIDE 40 MEQ: 1.5 POWDER, FOR SOLUTION ORAL at 08:47

## 2022-01-15 ASSESSMENT — ACTIVITIES OF DAILY LIVING (ADL)
ADLS_ACUITY_SCORE: 33
ADLS_ACUITY_SCORE: 31
ADLS_ACUITY_SCORE: 33
ADLS_ACUITY_SCORE: 37
ADLS_ACUITY_SCORE: 33
ADLS_ACUITY_SCORE: 37
ADLS_ACUITY_SCORE: 33
ADLS_ACUITY_SCORE: 37
ADLS_ACUITY_SCORE: 33
ADLS_ACUITY_SCORE: 37
ADLS_ACUITY_SCORE: 33
ADLS_ACUITY_SCORE: 33
ADLS_ACUITY_SCORE: 37
ADLS_ACUITY_SCORE: 31
ADLS_ACUITY_SCORE: 37
ADLS_ACUITY_SCORE: 31

## 2022-01-15 NOTE — PROGRESS NOTES
Care Management Follow Up    Length of Stay (days): 22    Expected Discharge Date: 01/18/2022     Concerns to be Addressed:       Patient plan of care discussed at interdisciplinary rounds: Yes    Anticipated Discharge Disposition:  TCU     Anticipated Discharge Services:    Anticipated Discharge DME:      Patient/family educated on Medicare website which has current facility and service quality ratings:    Education Provided on the Discharge Plan:  yes  Patient/Family in Agreement with the Plan:  yes    Referrals Placed by CM/SW:    Private pay costs discussed: yes    Additional Information:  SW called and left message at [facility] regarding their assessment of pt. SW has been speaking with Shilpi in admissions.  SW left voice message requesting return call.  SW received voice message from pt's Miladis GALEANA stating that she had been to [facility] to tour and they indicate they may have a bed available early next week. SW to follow up.  Pt is ready for discharge pending bed availability.      OLGA Gtz  [facility]  Care Transitions  [phone]

## 2022-01-15 NOTE — PLAN OF CARE
Reason for Admission: R ICH    Cognitive/Mentation: A/Ox 2. self and place. Nonverbal but communicates with hand motions  Neuros/CMS: Intact ex L droop, L sided hemiplegia  VS: stable.  GI: BS active, + flatus, last BM 1/15/21. InContinent.  : voiding. InContinent.  Pulmonary: LS clear.  Pain: mild. Relief with tylenol.     Drains: PIV and PEG  Skin: intact ex blanchable redness on coccyx  Activity: Assist x 2 with lift.  Diet: NPO. TF @ 50 mL/hr with 150 flushes q 4 hours.     Therapies recs: TCU vs LTC  Discharge: pending placement    End of shift summary: Patient stable throughout shift.

## 2022-01-15 NOTE — PROGRESS NOTES
[id number]: Pt here with R ICH. A&O to self only. Neuros left droop, left hemiplegia, intermittently follows commands. Pt is non verbal and makes incomprehensible sounds, does communicate with a thumbs up or down or moves eye back and forth for no. VSS. NPO diet, TF running at goal of 50/hr, flushes programed 150 Q4. Takes pills crushed in PEG tube. Up with Ax2 lift. Denies pain. External miller in place and working well. Pt scoring green on the Aggression Stop Light Tool. Plan for discharge to long term care facility no longer to TCU.

## 2022-01-15 NOTE — PROGRESS NOTES
[facility]    Medicine Progress Note - Hospitalist Service       Date of Admission:  12/24/2021  Assessment & Plan   Sukhi Johnson is a 71 year-old male with past medical history of hypertension, chronic fatigue who presents with left-sided weakness and speech difficulties. Admitted 12/24/2021.      Acute hemorrhagic stroke with vasogenic edema  Non-traumatic intracerebral hemorrhage of right frontoparietal region, likely due to cerebral amyloid angiopathy, with contribution from untreated hypertension  Acute metabolic encephalopathy secondary to above  History of hemorrhagic stroke in 2013  Hypertension, untreated  *Presents with left-sided weakness and speech difficulty  *CT and MRI on admission with acute right frontoparietal intracranial hemorrhage with associated regional mass effect resulting in 2 mm leftward midline shift; MRI also noted with numerous chronic microhemorrhages throughout the cerebral hemispheres, deep gray nuclei and posterior fossa, possibly representing sequelae of hypertensive or amyloid angiopathy  *Neurosurgery evaluated and non-surgical management recommended  *Repeat head CT 12/26 with slight interval decrease in size of the large parenchymal hematoma, no definite evidence for new or increasing hemorrhage; stable minimal leftward midline shift of the third ventricle  *Repeat head CT 12/29 noted stable   *EEG 12/29 with no electrographic seizures  *ECHO 12/26 with LVEF >70%, negative bubble study; A1c 5.4%,   *Neurology (stroke) consulted, now signed off  - Goal SBP <160    - Continue metoprolol, hydralazine, enalapril   - Repeat brain MRI in 3 months as outpatient  - Follow-up with neurology in 6-10 weeks.   - Continue mitt to right hand as needed if pulling at tube     Acute hypoxic respiratory failure  Suspected aspiration event  *Episode of respiratory distress 12/27. Tmax 100.8F, WBC elevated  *CXR without clear infiltrate, though episode  concerning for aspiration event  *Completed 7 day course of piperacillin-tazobactam IV  - SLP consulted, diet per SLP. Slow recovery, if at all, anticipated, PEG placed  - Monitor respiratory status      Contact dermatitis  Noted to have rash, most consistent with a contact dermatitis as located in area of abdominal binder, with similar appearing rash in shape of telemetry patch on his chest.   - Monitor      At risk for malnutrition   *PEG placed 1/5/22   - Nutrition following, continue tube feeds with management per dietician      Hypernatremia- resolved.   Sodium peak of 152. Secondary to lack of free water intake. Sodium normal 1/7  - Continue free water flushes  - Monitor labs per tube feeding protocol      Hypokalemia-resolved  - Monitor and replace per protocol      Suspected GI bleed  *Bloody BM noted since 12/29 with hgb down to 12.7 g/dl  *GI consulted, no immediate plans for endoscopy  - Has had small amounts of recurrent bleeding noted, with no change in hemoglobin   - having brown stools. Hb stable.   - Trial resuming heparin subcutaneous for DVT ppx  - Continue PPI  - Check cbc intermitently. Normal cbc 1/14     Chronic fatigue syndrome 2/2 Lyme disease  PTA on Adderall.   - Neurology recommending against stimulants at this time     COVID PCR screen negative  No vaccination records available, tested negative on admission 12/24         Clinically Significant Risk Factors Present on Admission                       Diet: Adult Formula Drip Feeding: Continuous Jevity 1.5; Other - Specify in Comment; Goal Rate: 50; mL/hr; Medication - Feeding Tube Flush Frequency: At least 15-30 mL water before and after medication administration and with tube clogging; Amount to Se...  NPO per Anesthesia Guidelines for Procedure/Surgery Except for: Meds  Diet    DVT Prophylaxis: Heparin SQ  Tay Catheter: Not present  Code Status: Full Code      Disposition Plan   Expected Discharge: 01/18/2022     Anticipated discharge  location:  Awaiting care coordination huddle  Delays:     Placement - TCU         Entered: Juan Francisco Albarran MD 01/15/2022, 9:13 AM       The patient's care was discussed with the patient  Juan Francisco Albarran MD  Hospitalist Service  [facility]    ______________________________________________________________________    Interval History   No acute events overnight. Unable to obtain history from patient due to CVA/nonverbal status.      Data reviewed today: I reviewed all medications, new labs and imaging results over the last 24 hours. I personally reviewed no images or EKG's today.    Physical Exam   Vital Signs: Temp: 98.1  F (36.7  C) Temp src: Axillary BP: (!) 149/63 Pulse: 99   Resp: 16 SpO2: 94 % O2 Device: None (Room air)    Weight: 166 lbs .1 oz    Constitutional: NAD  Respiratory: Normal respiratory effort at rest, non-labored breathing   Cardiovascular: RRR. No peripheral edema.  GI: Soft, non-tender, non-distended.    Skin/Integumen: Warm, dry  Other:       Data   Recent Labs   Lab 01/15/22  0647 01/14/22  0820 01/13/22  2104 01/12/22  0639 01/11/22  0634 01/11/22  0633 01/10/22  1253   WBC  --  8.6  --   --   --  7.0  --    HGB  --  13.6  --   --   --  12.6*  --    MCV  --  92  --   --   --  91  --    PLT  --  296  --   --   --  380  380  --    NA  --  138  --  139  --   --  138   POTASSIUM 3.4 3.9  --  3.7  3.7   < >  --  3.8   CHLORIDE  --  109  --  106  --   --  105   CO2  --  24  --  29  --   --  27   BUN  --  21  --  21  --   --  24   CR  --  0.67  --  0.70  --   --  0.68   ANIONGAP  --  5  --  4  --   --  6   MUSTAPHA  --  9.0  --  8.8  --   --  8.9   GLC  --  107* 143* 124*  --   --  130*    < > = values in this interval not displayed.       No results found for this or any previous visit (from the past 24 hour(s)).  Medications       enalapril  5 mg Per Feeding Tube At Bedtime     heparin ANTICOAGULANT  5,000 Units Subcutaneous Q12H     hydrALAZINE  50 mg Per Feeding Tube Q8H  TONY     metoprolol tartrate  50 mg Per Feeding Tube BID     pantoprazole  40 mg Per Feeding Tube BID     protein modular  1 packet Per Feeding Tube Daily     sodium chloride (PF)  10-40 mL Intracatheter Q7 Days     sodium chloride (PF)  3 mL Intracatheter Q8H

## 2022-01-15 NOTE — PROGRESS NOTES
"Received page from RN - \"Patient is apparently complaining of hungry. Can we increase his TF? Patient's SO and Speech therapy were able to understand the patient enough to get this information out.\"    Diet: NPO    Currently receiving TF via GJ (J-port) -   Jevity 1.5 at goal rate 50 ml/hr =  1800 kcal, 77 gm pro, 25 gm fiber, 912 mL free water   +1 packet Prosource/day = 40 kcal, 11 g protein  TOTAL (TF + Prosource) = 1840 kcal (24 kcal/kg, 98% est needs) and 88 g protein (1.17 g/kg, 98% est needs)    Free Water Flush: (1/6 - MD) 150 ml q 4 hours   Total Fluid (TF + FWF) = 1812 ml/day (24 mL/kg)    Tolerating TF  BM x3      ASSESSED NUTRITION NEEDS:  Dosing Weight 75.3 kg (1/12 - current)  Estimated Energy Needs: 0257-3646 kcals (25-30 Kcal/Kg)  Justification: maintenance   Estimated Protein Needs:  grams protein (1.2-1.5 g pro/Kg)  Justification: preservation of lean body mass    PLAN:  Will increase TF ---> Jevity 1.5 @ 60 mL/hr = 2160 cals (29), 92 gm pro (1.2), 30 gm fiber, 1094 mL free water   Discontinue the Prosource    Christine Burks RD, LD  Clinical Dietitian - [facility]   Pager - [phone]        "

## 2022-01-15 NOTE — PLAN OF CARE
Pt here with R ICH. A&O to self. Pt. Also non verbal but communicates with hand motions. Neuros including a left facial droop, L sided hemiplegia. VSS. NPO diet. TF running at 50ml/hr with 150ml programmed flushes. Takes pills crushed via PEG tube. Up with A2/lift. 2 small BM for the shift. Pt scoring green on the Aggression Stop Light Tool. Plan to discharge to TCU pending placment.

## 2022-01-15 NOTE — PROGRESS NOTES
"Paged Nutrition: \"Patient is apparently complaining of hungry. Can we increase his TF? Patient's SO and Speech therapy were able to understand the patient enough to get this information out.\"  "

## 2022-01-15 NOTE — PLAN OF CARE
Reason for Admission: ICH, left side weakness and speech difficulties     Cognitive/Mentation: tez, pt is nonverbal  Neuros/CMS: left sided hemiplegia, non-verbal, and left facial droop. Moves right side freely but inconsistent to commands.   VS: Stable.  GI: BS active, passing flatus, Incontinent.   : using external catheter. Incontinent.   Pulmonary: LS clear.   Pain: no pain.      K+ protocol: K+ was 3.9, recheck in AM  Drains: he has PICC line on right arm. It was CDI  Skin: Rash to abdomen caused by abdominal binder, resolving. Blanchable redness to coccyx, mepilex in place. He has +2 edema on L. ankle   Activity: Assist x 2 with lift.  Diet: NPO, tube feed Jevity 1.5 riya. running at 50mL/hr with 150mL flushes q4h.      Discharge: TCU, Pending. Pt will likely go to LTC due to lack of improvement. Pt will need to have repeat MRI in 3 months as an outpatient.

## 2022-01-16 ENCOUNTER — APPOINTMENT (OUTPATIENT)
Dept: SPEECH THERAPY | Facility: CLINIC | Age: 72
DRG: 064 | End: 2022-01-16
Payer: COMMERCIAL

## 2022-01-16 LAB
POTASSIUM BLD-SCNC: 3.6 MMOL/L (ref 3.4–5.3)
SARS-COV-2 RNA RESP QL NAA+PROBE: NEGATIVE

## 2022-01-16 PROCEDURE — 92507 TX SP LANG VOICE COMM INDIV: CPT | Mod: GN | Performed by: SPEECH-LANGUAGE PATHOLOGIST

## 2022-01-16 PROCEDURE — 99232 SBSQ HOSP IP/OBS MODERATE 35: CPT | Performed by: INTERNAL MEDICINE

## 2022-01-16 PROCEDURE — 120N000001 HC R&B MED SURG/OB

## 2022-01-16 PROCEDURE — 92526 ORAL FUNCTION THERAPY: CPT | Mod: GN | Performed by: SPEECH-LANGUAGE PATHOLOGIST

## 2022-01-16 PROCEDURE — 250N000013 HC RX MED GY IP 250 OP 250 PS 637: Performed by: INTERNAL MEDICINE

## 2022-01-16 PROCEDURE — 250N000013 HC RX MED GY IP 250 OP 250 PS 637: Performed by: HOSPITALIST

## 2022-01-16 PROCEDURE — 250N000011 HC RX IP 250 OP 636: Performed by: INTERNAL MEDICINE

## 2022-01-16 PROCEDURE — 84132 ASSAY OF SERUM POTASSIUM: CPT | Performed by: INTERNAL MEDICINE

## 2022-01-16 PROCEDURE — 999N000190 HC STATISTIC VAT ROUNDS

## 2022-01-16 PROCEDURE — 87635 SARS-COV-2 COVID-19 AMP PRB: CPT | Performed by: INTERNAL MEDICINE

## 2022-01-16 RX ADMIN — HYDRALAZINE HYDROCHLORIDE 50 MG: 50 TABLET, FILM COATED ORAL at 14:35

## 2022-01-16 RX ADMIN — Medication 40 MG: at 10:17

## 2022-01-16 RX ADMIN — HEPARIN SODIUM 5000 UNITS: 5000 INJECTION, SOLUTION INTRAVENOUS; SUBCUTANEOUS at 10:17

## 2022-01-16 RX ADMIN — HYDRALAZINE HYDROCHLORIDE 50 MG: 50 TABLET, FILM COATED ORAL at 22:22

## 2022-01-16 RX ADMIN — METOPROLOL TARTRATE 50 MG: 50 TABLET, FILM COATED ORAL at 10:17

## 2022-01-16 RX ADMIN — METOPROLOL TARTRATE 50 MG: 50 TABLET, FILM COATED ORAL at 22:22

## 2022-01-16 RX ADMIN — ACETAMINOPHEN 650 MG: 325 SUSPENSION ORAL at 10:17

## 2022-01-16 RX ADMIN — HEPARIN SODIUM 5000 UNITS: 5000 INJECTION, SOLUTION INTRAVENOUS; SUBCUTANEOUS at 22:22

## 2022-01-16 RX ADMIN — ENALAPRIL MALEATE 5 MG: 5 TABLET ORAL at 22:22

## 2022-01-16 RX ADMIN — Medication 40 MG: at 22:22

## 2022-01-16 RX ADMIN — HYDRALAZINE HYDROCHLORIDE 50 MG: 50 TABLET, FILM COATED ORAL at 05:51

## 2022-01-16 ASSESSMENT — ACTIVITIES OF DAILY LIVING (ADL)
ADLS_ACUITY_SCORE: 29
ADLS_ACUITY_SCORE: 31
ADLS_ACUITY_SCORE: 29
ADLS_ACUITY_SCORE: 31
ADLS_ACUITY_SCORE: 33
ADLS_ACUITY_SCORE: 31
ADLS_ACUITY_SCORE: 35
ADLS_ACUITY_SCORE: 31
ADLS_ACUITY_SCORE: 31
ADLS_ACUITY_SCORE: 33
ADLS_ACUITY_SCORE: 33
ADLS_ACUITY_SCORE: 31
ADLS_ACUITY_SCORE: 29
ADLS_ACUITY_SCORE: 31
ADLS_ACUITY_SCORE: 33
ADLS_ACUITY_SCORE: 35
ADLS_ACUITY_SCORE: 31
ADLS_ACUITY_SCORE: 31
ADLS_ACUITY_SCORE: 35
ADLS_ACUITY_SCORE: 33
ADLS_ACUITY_SCORE: 33
ADLS_ACUITY_SCORE: 31
ADLS_ACUITY_SCORE: 29
ADLS_ACUITY_SCORE: 29

## 2022-01-16 NOTE — PLAN OF CARE
Reason for Admission: ICH, left side weakness and speech difficulties     Cognitive/Mentation: tez, pt is nonverbal  Neuros/CMS: left sided hemiplegia, non-verbal, and left facial droop. Moves right side freely but inconsistent to commands.   VS: Stable.  GI: BS active, passing flatus, Incontinent.   : using external catheter. Incontinent.   Pulmonary: LS clear.   Pain: PRN tylenol was given for leg pain, it was effective.      K+ protocol: K+ was 3.8, recheck in AM  Drains: he has PICC line on right arm. It was CDI  Skin: Rash to abdomen caused by abdominal binder, resolved. Blanchable redness to coccyx, mepilex in place. He has +2 edema on L. ankle   Activity: Assist x 2 with lift.  Diet: NPO, ok to have ice chips if alert and oriented and sitting upright per SPL. tube feed Jevity 1.5 riya. running at 60mL/hr with 150mL flushes q4h.      Discharge: TCU, Pending. Pt will likely go to LTC due to lack of improvement. Pt will need to have repeat MRI in 3 months as an outpatient.

## 2022-01-16 NOTE — PLAN OF CARE
"Pt here with R ICH w/ vasogenic edema. Intermittently alert/lethargic. Unable to speak. Squeezes R hand or gives a thumbs up for \"yes\". Neuros with L droop, L hemiplegia, unable to follow some commands. VSS. On TF running at 60ml/hr with 150ml flushes q4 hours. Ok for ice chips when alert and upright. Up with lift, T/R. Has external cath for incontinence, had BM x2 overnight, once incontinent and once in bedpan. Denies pain. Pt scoring green on the Aggression Stop Light Tool. Plan for discharge to LTC pending placement.      "

## 2022-01-16 NOTE — PLAN OF CARE
Reason for Admission: R ICH     Cognitive/Mentation: A/Ox 2. self and place. Nonverbal but communicates with hand motions  Neuros/CMS: Intact ex L droop, L sided hemiplegia  VS: stable.  GI: BS active, + flatus, last BM 1/16/21. InContinent.  : voiding. InContinent.  Pulmonary: LS clear.  Pain: mild. Relief with tylenol.      Drains: PIV and PEG  Skin: intact ex blanchable redness on coccyx  Activity: Assist x 2 with lift.  Diet: NPO. TF @ 60 mL/hr with 150 flushes q 4 hours.      Therapies recs: TCU vs LTC  Discharge: pending placement     End of shift summary: Patient stable throughout shift.

## 2022-01-16 NOTE — PROGRESS NOTES
[facility]    Medicine Progress Note - Hospitalist Service       Date of Admission:  12/24/2021  Assessment & Plan   Sukhi Johsnon is a 71 year-old male with past medical history of hypertension, chronic fatigue who presents with left-sided weakness and speech difficulties. Admitted 12/24/2021.    Acute hemorrhagic stroke with vasogenic edema  Non-traumatic intracerebral hemorrhage of right frontoparietal region, likely due to cerebral amyloid angiopathy, with contribution from untreated hypertension  Acute metabolic encephalopathy secondary to above  History of hemorrhagic stroke in 2013  Hypertension, untreated  *Presents with left-sided weakness and speech difficulty  *CT and MRI on admission with acute right frontoparietal intracranial hemorrhage with associated regional mass effect resulting in 2 mm leftward midline shift; MRI also noted with numerous chronic microhemorrhages throughout the cerebral hemispheres, deep gray nuclei and posterior fossa, possibly representing sequelae of hypertensive or amyloid angiopathy  *Neurosurgery evaluated and non-surgical management recommended  *Repeat head CT 12/26 with slight interval decrease in size of the large parenchymal hematoma, no definite evidence for new or increasing hemorrhage; stable minimal leftward midline shift of the third ventricle  *Repeat head CT 12/29 noted stable   *EEG 12/29 with no electrographic seizures  *ECHO 12/26 with LVEF >70%, negative bubble study; A1c 5.4%,   *Neurology (stroke) consulted, now signed off  - Goal SBP <160    - Continue metoprolol, hydralazine, enalapril   - Repeat brain MRI in 3 months as outpatient  - Follow-up with neurology in 6-10 weeks.   - Continue mitt to right hand as needed if pulling at tube     Acute hypoxic respiratory failure  Suspected aspiration event  *Episode of respiratory distress 12/27. Tmax 100.8F, WBC elevated  *CXR without clear infiltrate, though episode  concerning for aspiration event  *Completed 7 day course of piperacillin-tazobactam IV  - SLP consulted, diet per SLP. Slow recovery, if at all, anticipated, PEG placed  - Monitor respiratory status     At risk for malnutrition   *PEG placed 1/5/22   - Nutrition following, continue tube feeds with management per dietician. Increased rate 1/15/22 as patient seemed to clearly communicate he was feeling hunger    Contact dermatitis  Noted to have rash, most consistent with a contact dermatitis as located in area of abdominal binder, with similar appearing rash in shape of telemetry patch on his chest.   - Monitor       Hypernatremia, resolved  Sodium peak of 152. Secondary to lack of free water intake. Sodium normal 1/7  - Continue free water flushes  - Monitor labs per tube feeding protocol      Hypokalemia, resolved  - Monitor and replace per protocol      Suspected GI bleed  *Bloody BM noted since 12/29 with hgb down to 12.7 g/dl  *GI consulted, no immediate plans for endoscopy  *Has had small amounts of recurrent bleeding noted, with no change in hemoglobin   - Continue PPI  - Monitor   - Check CBC intermitently. Normal cbc 1/14     Chronic fatigue syndrome 2/2 Lyme disease  PTA on Adderall.   - Neurology recommending against stimulants at this time     COVID PCR screen negative  No vaccination records available, tested negative on admission 12/24         Clinically Significant Risk Factors Present on Admission                       Diet: Diet  NPO per Anesthesia Guidelines for Procedure/Surgery Except for: Meds, Ice Chips  Adult Formula Drip Feeding: Continuous Jevity 1.5; Jejunostomy; Goal Rate: 60; mL/hr; Medication - Feeding Tube Flush Frequency: At least 15-30 mL water before and after medication administration and with tube clogging; Amount to Send (Nutrition u...    DVT Prophylaxis: Heparin SQ  Tay Catheter: Not present  Code Status: Full Code      Disposition Plan   Expected Discharge: 01/16/2022      Anticipated discharge location:  Awaiting care coordination huddle  Delays:     Placement - TCU         Entered: Juan Francisco Albarran MD 01/16/2022, 10:27 AM       The patient's care was discussed with the patient, bedside RN and patient's wife    Juan Francisco Albarran MD  Hospitalist Service  [facility]    ______________________________________________________________________    Interval History   No acute events overnight. Yesterday it was reported that patient appeared to clearly communicate to SLP that he was hungry, so TF rates increased. Discussed with RN, no new concerns. Unable to obtain history from patient due to CVA/nonverbal status.      Data reviewed today: I reviewed all medications, new labs and imaging results over the last 24 hours. I personally reviewed no images or EKG's today.    Physical Exam   Vital Signs: Temp: 98.3  F (36.8  C) Temp src: Oral BP: (!) 147/83 Pulse: 95   Resp: 20 SpO2: 94 % O2 Device: None (Room air)    Weight: 166 lbs .1 oz    Constitutional: NAD  Respiratory: Normal respiratory effort at rest, non-labored breathing   Cardiovascular: RRR. No peripheral edema.  GI: Soft, non-tender, non-distended.    Skin/Integumen: Warm, dry  Neuro:   Opens eyes to touch and voice, following commands with movement on right side.     Data   Recent Labs   Lab 01/16/22  0558 01/15/22  1312 01/15/22  0647 01/14/22  0820 01/13/22  2104 01/12/22  0639 01/11/22  0634 01/11/22  0633 01/10/22  1253   WBC  --   --   --  8.6  --   --   --  7.0  --    HGB  --   --   --  13.6  --   --   --  12.6*  --    MCV  --   --   --  92  --   --   --  91  --    PLT  --   --   --  296  --   --   --  380  380  --    NA  --   --   --  138  --  139  --   --  138   POTASSIUM 3.6 3.8 3.4 3.9  --  3.7  3.7   < >  --  3.8   CHLORIDE  --   --   --  109  --  106  --   --  105   CO2  --   --   --  24  --  29  --   --  27   BUN  --   --   --  21  --  21  --   --  24   CR  --   --   --  0.67  --  0.70  --   --   0.68   ANIONGAP  --   --   --  5  --  4  --   --  6   MUSTAPHA  --   --   --  9.0  --  8.8  --   --  8.9   GLC  --   --   --  107* 143* 124*  --   --  130*    < > = values in this interval not displayed.       No results found for this or any previous visit (from the past 24 hour(s)).  Medications       enalapril  5 mg Per Feeding Tube At Bedtime     heparin ANTICOAGULANT  5,000 Units Subcutaneous Q12H     hydrALAZINE  50 mg Per Feeding Tube Q8H TONY     metoprolol tartrate  50 mg Per Feeding Tube BID     pantoprazole  40 mg Per Feeding Tube BID     sodium chloride (PF)  10-40 mL Intracatheter Q7 Days     sodium chloride (PF)  3 mL Intracatheter Q8H

## 2022-01-17 ENCOUNTER — APPOINTMENT (OUTPATIENT)
Dept: SPEECH THERAPY | Facility: CLINIC | Age: 72
DRG: 064 | End: 2022-01-17
Payer: COMMERCIAL

## 2022-01-17 VITALS
SYSTOLIC BLOOD PRESSURE: 152 MMHG | TEMPERATURE: 98.4 F | RESPIRATION RATE: 18 BRPM | HEART RATE: 92 BPM | WEIGHT: 166.01 LBS | BODY MASS INDEX: 25.16 KG/M2 | DIASTOLIC BLOOD PRESSURE: 76 MMHG | OXYGEN SATURATION: 93 % | HEIGHT: 68 IN

## 2022-01-17 LAB
LACTATE SERPL-SCNC: 0.7 MMOL/L (ref 0.7–2)
PLATELET # BLD AUTO: 317 10E3/UL (ref 150–450)
POTASSIUM BLD-SCNC: 3.6 MMOL/L (ref 3.4–5.3)

## 2022-01-17 PROCEDURE — 85049 AUTOMATED PLATELET COUNT: CPT | Performed by: PHYSICIAN ASSISTANT

## 2022-01-17 PROCEDURE — 84132 ASSAY OF SERUM POTASSIUM: CPT | Performed by: INTERNAL MEDICINE

## 2022-01-17 PROCEDURE — 250N000011 HC RX IP 250 OP 636: Performed by: INTERNAL MEDICINE

## 2022-01-17 PROCEDURE — 83605 ASSAY OF LACTIC ACID: CPT | Performed by: INTERNAL MEDICINE

## 2022-01-17 PROCEDURE — 99239 HOSP IP/OBS DSCHRG MGMT >30: CPT | Performed by: INTERNAL MEDICINE

## 2022-01-17 PROCEDURE — 250N000013 HC RX MED GY IP 250 OP 250 PS 637: Performed by: HOSPITALIST

## 2022-01-17 PROCEDURE — 92507 TX SP LANG VOICE COMM INDIV: CPT | Mod: GN | Performed by: SPEECH-LANGUAGE PATHOLOGIST

## 2022-01-17 PROCEDURE — 999N000190 HC STATISTIC VAT ROUNDS

## 2022-01-17 PROCEDURE — 92526 ORAL FUNCTION THERAPY: CPT | Mod: GN | Performed by: SPEECH-LANGUAGE PATHOLOGIST

## 2022-01-17 PROCEDURE — 250N000013 HC RX MED GY IP 250 OP 250 PS 637: Performed by: INTERNAL MEDICINE

## 2022-01-17 PROCEDURE — 36415 COLL VENOUS BLD VENIPUNCTURE: CPT | Performed by: INTERNAL MEDICINE

## 2022-01-17 RX ADMIN — Medication 40 MG: at 08:36

## 2022-01-17 RX ADMIN — HYDRALAZINE HYDROCHLORIDE 50 MG: 50 TABLET, FILM COATED ORAL at 13:41

## 2022-01-17 RX ADMIN — HEPARIN SODIUM 5000 UNITS: 5000 INJECTION, SOLUTION INTRAVENOUS; SUBCUTANEOUS at 08:37

## 2022-01-17 RX ADMIN — HYDRALAZINE HYDROCHLORIDE 50 MG: 50 TABLET, FILM COATED ORAL at 06:34

## 2022-01-17 RX ADMIN — ACETAMINOPHEN 650 MG: 325 SUSPENSION ORAL at 06:34

## 2022-01-17 RX ADMIN — METOPROLOL TARTRATE 50 MG: 50 TABLET, FILM COATED ORAL at 08:37

## 2022-01-17 ASSESSMENT — ACTIVITIES OF DAILY LIVING (ADL)
ADLS_ACUITY_SCORE: 35

## 2022-01-17 NOTE — PLAN OF CARE
"/71 (BP Location: Left arm)   Pulse 85   Temp 99.1  F (37.3  C) (Oral)   Resp 16   Ht 1.727 m (5' 7.99\")   Wt 75.3 kg (166 lb 0.1 oz)   SpO2 94%   BMI 25.25 kg/m      Patient is currently here with right-sided nontraumatic intracerebral hemorrhage. Unable to assess patient's level of consciousness and neurological status due to nonverbal status combined with a flat affect, however a left-sided facial droop, unequal pupils and left-sided hemiplegia is apparent. VSS on RA. Telemetry reveals a normal sinus rhythm. Patient is currently NPO with PEG tube feedings running at 60 mL/hr and 150 mL flushes every four hours. Patient has a peripherally inserted central catheter on the right-upper arm that is both patent and in proper position. Patient transfers and ambulates with an assist of two plus the utilization of a lift device. Patient is incontinent of bladder and bowel, external catheter was replaced and is in position with adequate urinary output. Skin is intact aside from scattered bruising, edema in the left foot and nonblanchable redness on the patient's sacrum/coccyx. Denies pain. Patient is scoring yellow on the Aggression Stop Light Tool related to confusion. Discharge orders have been placed, patient transportation is in place for 1600 today in order to transfer him to a transitional care unit for continuation of the patient's designated treatment plan.  "

## 2022-01-17 NOTE — PROGRESS NOTES
"[facility] GERIATRIC SERVICES    PRIMARY CARE PROVIDER AND CLINIC:  Physician No Ref-Primary  Chief Complaint   Patient presents with     Hospital F/U      [facility] Medical Record Number:  [id number]  Place of Service where encounter took place:  [facility] [[id number]]    Sukhi Johnson  is a 71 year old  (1950), admitted to the above facility from  [facility]. Hospital stay 12/24/21 through 1/17/22.  Brief Hospitalization Summary:  Sukhi Pereira was admitted to [facility] after being \"found down\" by his wife. Head CT found a 6.1 x 4.4 parenchymal hemorrhage in the right posterior frontoparietal region. Neurosurgery was consulted and it was determined that he was not a surgical candidate. His hospitalization was complicated by acute hypoxic respiratory failure secondary to suspected aspiration, hypernatremia, and suspected GI bleed.  CXR was negative and he was treated with IV Zosyn x 1 week. A PEG was placed and his hypernatremia resolved with free water flushes. GI was consulted for hematochezia. Conservative management was elected. A PPI was started and no procedures were done. The anemia resolved prior to hospital discharge.     1/18: New admit, Hgb down to 12.0 today    Sukhi is non-verbal. All information obtained through chart review and speaking with staff.  Nursing without acute concerns today.  He can answer simple yes and no questions with head nodding.    CODE STATUS/ADVANCE DIRECTIVES DISCUSSION:  Full Code    ALLERGIES: No Known Allergies   PAST MEDICAL HISTORY: No past medical history on file.   PAST SURGICAL HISTORY:   has a past surgical history that includes IR Gastro Jejunostomy Tube Placement (1/5/2022).  FAMILY HISTORY: family history is not on file.  SOCIAL HISTORY:     Patient's living condition: lives with spouse    Post Discharge Medication Reconciliation Status: discharge medications reconciled, continue medications " without change  No current facility-administered medications for this visit.     Current Outpatient Medications   Medication Sig     acetaminophen (TYLENOL) 325 MG/10.15ML solution 20.3 mLs (650 mg) by Oral or Feeding Tube route every 4 hours as needed for mild pain or fever     acetaminophen (TYLENOL) 650 MG suppository Place 1 suppository (650 mg) rectally every 4 hours as needed for mild pain     docusate (COLACE) 50 MG/5ML liquid 10 mLs (100 mg) by Oral or J tube route daily as needed for constipation     enalapril (VASOTEC) 5 MG tablet 1 tablet (5 mg) by Per Feeding Tube route At Bedtime     Heparin Sodium, Porcine, (HEPARIN ANTICOAGULANT) 5000 UNIT/0.5ML injection Inject 0.5 mLs (5,000 Units) Subcutaneous every 12 hours     hydrALAZINE (APRESOLINE) 50 MG tablet 1 tablet (50 mg) by Per Feeding Tube route every 8 hours     metoprolol tartrate (LOPRESSOR) 50 MG tablet 1 tablet (50 mg) by Per Feeding Tube route 2 times daily     ondansetron (ZOFRAN-ODT) 4 MG ODT tab Take 1 tablet (4 mg) by mouth every 6 hours as needed for nausea or vomiting     pantoprazole (PROTONIX) 2 mg/mL SUSP suspension 20 mLs (40 mg) by Per Feeding Tube route 2 times daily     polyethylene glycol (MIRALAX) 17 g packet 17 g by Oral or Feeding Tube route 2 times daily as needed (constipation)     protein modular (PROSOURCE TF) LIQD 1 packet by Per Feeding Tube route daily     sennosides (SENOKOT) 8.8 MG/5ML syrup 5 mLs by Oral or Feeding Tube route 2 times daily as needed for constipation     Facility-Administered Medications Ordered in Other Visits   Medication     acetaminophen (TYLENOL) solution 650 mg     acetaminophen (TYLENOL) Suppository 650 mg     docusate (COLACE) 50 MG/5ML liquid 100 mg     enalapril (VASOTEC) tablet 5 mg     heparin ANTICOAGULANT injection 5,000 Units     hydrALAZINE (APRESOLINE) injection 10-20 mg     hydrALAZINE (APRESOLINE) tablet 50 mg     labetalol (NORMODYNE/TRANDATE) injection 10-20 mg     lidocaine (LMX4)  "cream     lidocaine (XYLOCAINE) 2 % external gel     lidocaine 1 % 0.1-1 mL     melatonin tablet 1 mg     metoprolol (LOPRESSOR) injection 2.5 mg     metoprolol tartrate (LOPRESSOR) tablet 50 mg     ondansetron (ZOFRAN-ODT) ODT tab 4 mg    Or     ondansetron (ZOFRAN) injection 4 mg     pantoprazole (PROTONIX) 2 mg/mL suspension 40 mg     polyethylene glycol (MIRALAX) Packet 17 g     Reason statin medication order not selected     sennosides (SENOKOT) syrup 5 mL     sodium chloride (OCEAN) 0.65 % nasal spray 1 spray     sodium chloride (PF) 0.9% PF flush 10-20 mL     sodium chloride (PF) 0.9% PF flush 10-40 mL     sodium chloride (PF) 0.9% PF flush 10-40 mL     sodium chloride (PF) 0.9% PF flush 3 mL     sodium chloride (PF) 0.9% PF flush 3 mL       ROS:  Unobtainable secondary to aphasia.     Vitals:  BP (!) 155/84   Pulse 98   Temp 98.3  F (36.8  C)   Resp 20   Ht 1.727 m (5' 7.99\")   Wt 75.3 kg (166 lb)   SpO2 92%   BMI 25.25 kg/m      Exam:  GENERAL APPEARANCE:  Alert, in no distress  EYES:  Conjunctiva and lids normal  RESP:  respiratory effort and palpation of chest normal, lungs clear to auscultation , no respiratory distress  CV:  Palpation and auscultation of heart done , regular rate and rhythm, no murmur, rub, or gallop, no edema  ABDOMEN:  normal bowel sounds, soft, nontender, no hepatosplenomegaly or other masses, no guarding or rebound, bowel sounds normal  M/S:   no gross deformities  SKIN:  no rash, warm and dry  NEURO:   alert, non verbal, tracks with eyes, left hemiplegia  PSYCH:  calm    Lab/Diagnostic data:    Most Recent 3 CBC's:Recent Labs   Lab Test 01/18/22  0545 01/17/22  0645 01/14/22  0820 01/11/22  0633   WBC 7.0  --  8.6 7.0   HGB 12.0*  --  13.6 12.6*   MCV 94  --  92 91    317 296 380  380     Most Recent 3 BMP's:Recent Labs   Lab Test 01/18/22  0545 01/17/22  0645 01/16/22  0558 01/15/22  0647 01/14/22  0820 01/13/22  2104 01/12/22  0639     --   --   --  138  --  " 139   POTASSIUM 4.0 3.6 3.6   < > 3.9  --  3.7  3.7   CHLORIDE 109*  --   --   --  109  --  106   CO2 24  --   --   --  24  --  29   BUN 26  --   --   --  21  --  21   CR 0.65*  --   --   --  0.67  --  0.70   ANIONGAP 10  --   --   --  5  --  4   MUSTAPHA 8.8  --   --   --  9.0  --  8.8     --   --   --  107* 143* 124*    < > = values in this interval not displayed.     Most Recent Hemoglobin A1c:Recent Labs   Lab Test 12/25/21  1116   A1C 5.4       ASSESSMENT/PLAN:  (I61.9) Right-sided nontraumatic intracerebral hemorrhage, unspecified cerebral location (H)  (primary encounter diagnosis)  (I10) Primary hypertension  Plan:   -Vasotec 5 mg via tube at at bedtime  -Heparin sodium 5000 units every 12 hours for DVT prophylaxis  -Hydralazine 50 mg every 8 hours via  tube  -Metoprolol tartrate 50 mg twice daily via tube  -Follow up with [facility]rology 4-6 weeks. Facility to call and schedule appointment   -Monitor neuro status  -PT and OT  -Check BMP, Mg, and phosphorus on 1/24      (R13.10) Dysphagia, unspecified type  (Z93.1) G tube feedings (H)  Plan:   -NPO  -Speech therapy   -RD to follow while on TF  -TF Isosource 1.5 @ 60ml/hr continuous via J-port. Flush with at least 15-30 mL before and after med admin.  -HOB > 30 degrees during TF    (D62) Anemia due to blood loss, acute  Comment: Suspected lower GIB  Plan:   -Protonix 40 mg daily twice daily via tube  -Check CBC with platelets on 1/24  -Monitor for bleeding    (R53.82) Chronic fatigue syndrome  Comment: Secondary to Lyme's disease  Plan:   -Avoid stimulants      Total time spent with patient visit at the skilled nursing facility was 45 min including patient visit, review of past records, phone call to patient contact for POLST completion, medication reconciliation, and plan of care. Greater than 50% of total time spent with counseling and coordinating care due to medical complexity.     Electronically signed by:  Rich Roper, JERMAINE

## 2022-01-17 NOTE — PLAN OF CARE
Reason for Admission: ICH, left side weakness and speech difficulties     Cognitive/Mentation: tez, pt is nonverbal  Neuros/CMS: left sided hemiplegia, non-verbal, and left facial droop. Moves right side freely but inconsistent to commands.   VS: Stable.  GI: BS active, passing flatus, Incontinent.   : using external catheter. Incontinent.   Pulmonary: LS clear.   Pain: no pain.      K+ protocol: K+ was 3.6, recheck in AM  Drains: he has PICC line on right arm. It was CDI and patent. It was flushed  Skin: Rash to abdomen resolved. Blanchable redness to coccyx and heels, mepilex in place. He pulled out some of his PEG tube sutures. Suture is still in place and intact. Left note to hospitalist.    Activity: Assist x 2 with lift.  Diet: NPO, ok to have ice chips if alert and oriented and sitting upright per SPL. he had some ice chips at 1800 and he tolerate it well. tube feed Jevity 1.5 riya. running at 60mL/hr with 150mL flushes q4h.      Discharge: TCU: [facility], pending.

## 2022-01-17 NOTE — PROGRESS NOTES
CLINICAL NUTRITION SERVICES - REASSESSMENT NOTE      Malnutrition: (1/4)  % Weight Loss:  > 2% in 1 week (severe malnutrition) -- more weights needed to confirm, large fluctuation in wts  % Intake:  </= 50% for >/= 5 days (severe malnutrition)  - EN had been interrupted for several days, up until this past week  Subcutaneous Fat Loss:  None previously observed  Muscle Loss:  None previously observed  Fluid Retention:  Trace-Moderate     Malnutrition Diagnosis: Severe malnutrition (suspected)  In Context of:  Acute illness or injury       EVALUATION OF PROGRESS TOWARD GOALS   Diet:    NPO    Nutrition Support:    12/27/21: Enteral Nutrition initiated   1/5: GJ - 16 Korean, gastrojejunostomy tube was advanced until the tip was in the proximal jejunum.  1/15 TF increased d/t c/o hunger    Type of Feeding Tube:  GJ (feeding into J-port)  Enteral Frequency:  Continuous  Enteral Regimen: Jevity 1.5 at goal rate 60 ml/hr   Total Enteral Provisions: 2160 cals (29), 92 gm pro (1.2), 30 gm fiber, 1094 mL free water   Free Water Flush: (1/6 - MD) 150 ml q 4 hours   Total Fluid (TF + FWF) = 1994 ml/day (24 mL/kg)     Intake/Tolerance:    Chart reviewed  Pt has been tolerating TF at goal rate (uninterrupted for the past week)    1-2 loose BM/day      ASSESSED NUTRITION NEEDS:  Dosing Weight 75.3 kg (1/12 - current)  Estimated Energy Needs: 4591-4061 kcals (25-30 Kcal/Kg)  Justification: maintenance   Estimated Protein Needs:  grams protein (1.2-1.5 g pro/Kg)  Justification: preservation of lean body mass      NEW FINDINGS:     Vitals:    12/24/21 1558 12/27/21 0445 01/03/22 2300 01/12/22 0437   Weight: 83.9 kg (184 lb 15.5 oz) 83.5 kg (184 lb) 73.7 kg (162 lb 7.7 oz) 75.3 kg (166 lb 0.1 oz)     Question accuracy of wts - records reflect 22# (12%) wt loss within 1 week (12/27-1/3) after starting EN  Net I/O: +10,180 mL    Plan for TCU once bed found      Previous Goals (1/10):   EN to meet % est needs  Evaluation:  Met    Previous Nutrition Diagnosis (1/10):   No nutrition diagnosis identified at this time  Evaluation: No change        CURRENT NUTRITION DIAGNOSIS  No nutrition diagnosis identified at this time       INTERVENTIONS  Recommendations / Nutrition Prescription  NPO  TF as above    Implementation  No intervention at this time    Goals  EN to meet % est needs      MONITORING AND EVALUATION:  Progress towards goals will be monitored and evaluated per protocol and Practice Guidelines

## 2022-01-17 NOTE — PROGRESS NOTES
Care Management Discharge Note    Discharge Date: 01/17/2022  Expected Time of Departure: 16:00    Discharge Disposition: Transitional Care,Long Term Care    Discharge Services:  TCU at [facility]    Discharge DME:      Discharge Transportation: health plan transportation via stretcher due to lack of core strength and inability to sit upright safely.  PCS completed, faxed and placed on pt chart.    Private pay costs discussed: potential private pay for LTC was discussed and application for MA discussed.    Pt's insurance is BC/BS Medicare Advantage.  JORGE contacted [facility]  And opened case # [id number] with [facility].  JORGE spoke with Leila regarding clinical information and  Was provided with authorization # [id number]. This will cover time at TCU until 1/26/22  [employer] received by fax and will be sent along to the TCU with discharge orders when completed.    PAS Confirmation Code:[id number]  Patient/family educated on Medicare website which has current facility and service quality ratings: yes    Education Provided on the Discharge Plan:  yes  Persons Notified of Discharge Plans: hospitalist, ALHAJI,RN,HUC,BB, S.O. Miladis and Jennifer in admissions at [facility]  Patient/Family in Agreement with the Plan: yes    Handoff Referral Completed: No    Additional Information:Discharge orders sent DOD. Missing external cath order and equivalent TF order as facility uses Isosource. Hospitalist paged. Changes requested. Will resend orders once completed.  JORGE confirmed with Jennifer at [facility], the insurance authorization and dishcharge time.    OLGA Gtz  [facility]  Care Transitions  [phone]

## 2022-01-18 ENCOUNTER — LAB REQUISITION (OUTPATIENT)
Dept: LAB | Facility: CLINIC | Age: 72
End: 2022-01-18
Payer: COMMERCIAL

## 2022-01-18 ENCOUNTER — TRANSITIONAL CARE UNIT VISIT (OUTPATIENT)
Dept: GERIATRICS | Facility: CLINIC | Age: 72
End: 2022-01-18
Payer: COMMERCIAL

## 2022-01-18 ENCOUNTER — PATIENT OUTREACH (OUTPATIENT)
Dept: CARE COORDINATION | Facility: CLINIC | Age: 72
End: 2022-01-18
Payer: COMMERCIAL

## 2022-01-18 VITALS
SYSTOLIC BLOOD PRESSURE: 155 MMHG | DIASTOLIC BLOOD PRESSURE: 84 MMHG | BODY MASS INDEX: 25.16 KG/M2 | HEART RATE: 98 BPM | HEIGHT: 68 IN | TEMPERATURE: 98.3 F | RESPIRATION RATE: 20 BRPM | WEIGHT: 166 LBS | OXYGEN SATURATION: 92 %

## 2022-01-18 DIAGNOSIS — Z93.1 G TUBE FEEDINGS (H): ICD-10-CM

## 2022-01-18 DIAGNOSIS — D62 ANEMIA DUE TO BLOOD LOSS, ACUTE: ICD-10-CM

## 2022-01-18 DIAGNOSIS — Z71.89 OTHER SPECIFIED COUNSELING: ICD-10-CM

## 2022-01-18 DIAGNOSIS — I61.9 RIGHT-SIDED NONTRAUMATIC INTRACEREBRAL HEMORRHAGE, UNSPECIFIED CEREBRAL LOCATION (H): Primary | ICD-10-CM

## 2022-01-18 DIAGNOSIS — R13.10 DYSPHAGIA, UNSPECIFIED TYPE: ICD-10-CM

## 2022-01-18 DIAGNOSIS — G93.32 CHRONIC FATIGUE SYNDROME: ICD-10-CM

## 2022-01-18 DIAGNOSIS — I61.9 NONTRAUMATIC INTRACEREBRAL HEMORRHAGE, UNSPECIFIED (H): ICD-10-CM

## 2022-01-18 DIAGNOSIS — I10 PRIMARY HYPERTENSION: ICD-10-CM

## 2022-01-18 LAB
ANION GAP SERPL CALCULATED.3IONS-SCNC: 10 MMOL/L (ref 5–18)
BUN SERPL-MCNC: 26 MG/DL (ref 8–28)
CALCIUM SERPL-MCNC: 8.8 MG/DL (ref 8.5–10.5)
CHLORIDE BLD-SCNC: 109 MMOL/L (ref 98–107)
CO2 SERPL-SCNC: 24 MMOL/L (ref 22–31)
CREAT SERPL-MCNC: 0.65 MG/DL (ref 0.7–1.3)
ERYTHROCYTE [DISTWIDTH] IN BLOOD BY AUTOMATED COUNT: 13.4 % (ref 10–15)
GFR SERPL CREATININE-BSD FRML MDRD: >90 ML/MIN/1.73M2
GLUCOSE BLD-MCNC: 109 MG/DL (ref 70–125)
HCT VFR BLD AUTO: 38.5 % (ref 40–53)
HGB BLD-MCNC: 12 G/DL (ref 13.3–17.7)
MAGNESIUM SERPL-MCNC: 2 MG/DL (ref 1.8–2.6)
MCH RBC QN AUTO: 29.2 PG (ref 26.5–33)
MCHC RBC AUTO-ENTMCNC: 31.2 G/DL (ref 31.5–36.5)
MCV RBC AUTO: 94 FL (ref 78–100)
PHOSPHATE SERPL-MCNC: 3.9 MG/DL (ref 2.5–4.5)
PLATELET # BLD AUTO: 305 10E3/UL (ref 150–450)
POTASSIUM BLD-SCNC: 4 MMOL/L (ref 3.5–5)
RBC # BLD AUTO: 4.11 10E6/UL (ref 4.4–5.9)
SODIUM SERPL-SCNC: 143 MMOL/L (ref 136–145)
WBC # BLD AUTO: 7 10E3/UL (ref 4–11)

## 2022-01-18 PROCEDURE — 85027 COMPLETE CBC AUTOMATED: CPT | Mod: ORL | Performed by: FAMILY MEDICINE

## 2022-01-18 PROCEDURE — 84100 ASSAY OF PHOSPHORUS: CPT | Mod: ORL | Performed by: FAMILY MEDICINE

## 2022-01-18 PROCEDURE — 99310 SBSQ NF CARE HIGH MDM 45: CPT | Performed by: NURSE PRACTITIONER

## 2022-01-18 PROCEDURE — 83735 ASSAY OF MAGNESIUM: CPT | Mod: ORL | Performed by: FAMILY MEDICINE

## 2022-01-18 PROCEDURE — 80048 BASIC METABOLIC PNL TOTAL CA: CPT | Mod: ORL | Performed by: FAMILY MEDICINE

## 2022-01-18 ASSESSMENT — MIFFLIN-ST. JEOR: SCORE: 1482.31

## 2022-01-18 NOTE — PLAN OF CARE
Physical Therapy Discharge Summary    Reason for therapy discharge:    Discharged to transitional care facility.    Progress towards therapy goal(s). See goals on Care Plan in [facility] electronic health record for goal details.  Goals partially met.  Barriers to achieving goals:   discharge from facility.    Therapy recommendation(s):    Continued therapy is recommended.  Rationale/Recommendations:  Recommend continued PT at TCU to progress towards meeting functional mobility goals.

## 2022-01-18 NOTE — PROGRESS NOTES
Speech Language Therapy Discharge Summary    Reason for therapy discharge:    Discharged to transitional care facility.    Progress towards therapy goal(s). See goals on Care Plan in [facility] electronic health record for goal details.  Goals partially met.  Barriers to achieving goals:   discharge from facility.    Therapy recommendation(s):    Continued therapy is recommended.  Rationale/Recommendations:  see below.    Last SLP Note: Patient with continued severe dysphagia and aphasia.  Continue NPO with TF. encourage patient to verbalize and use gestures for communication.  Continue SLP Tx for basic language and swallow

## 2022-01-18 NOTE — LETTER
"    1/18/2022        RE: Sukhi Johnson  [address]  [place] [address]        [facility] GERIATRIC SERVICES    PRIMARY CARE PROVIDER AND CLINIC:  Physician No Ref-Primary  Chief Complaint   Patient presents with     Hospital F/U      [place] Medical Record Number:  [id number]  Place of Service where encounter took place:  [facility]  AT [facility] ([facility]) [[id number]]    Sukhi Johnson  is a 71 year old  (1950), admitted to the above facility from  [facility]. Hospital stay 12/24/21 through 1/17/22.  Brief Hospitalization Summary:  Sukhi Pereira was admitted to [facility] after being \"found down\" by his wife. Head CT found a 6.1 x 4.4 parenchymal hemorrhage in the right posterior frontoparietal region. Neurosurgery was consulted and it was determined that he was not a surgical candidate. His hospitalization was complicated by acute hypoxic respiratory failure secondary to suspected aspiration, hypernatremia, and suspected GI bleed.  CXR was negative and he was treated with IV Zosyn x 1 week. A PEG was placed and his hypernatremia resolved with free water flushes. GI was consulted for hematochezia. Conservative management was elected. A PPI was started and no procedures were done. The anemia resolved prior to hospital discharge.     1/18: New admit, Hgb down to 12.0 today    Sukhi is non-verbal. All information obtained through chart review and speaking with staff.  Nursing without acute concerns today.  He can answer simple yes and no questions with head nodding.    CODE STATUS/ADVANCE DIRECTIVES DISCUSSION:  Full Code    ALLERGIES: No Known Allergies   PAST MEDICAL HISTORY: No past medical history on file.   PAST SURGICAL HISTORY:   has a past surgical history that includes IR Gastro Jejunostomy Tube Placement (1/5/2022).  FAMILY HISTORY: family history is not on file.  SOCIAL HISTORY:     Patient's living condition: lives with spouse    Post Discharge " Medication Reconciliation Status: discharge medications reconciled, continue medications without change  No current facility-administered medications for this visit.     Current Outpatient Medications   Medication Sig     acetaminophen (TYLENOL) 325 MG/10.15ML solution 20.3 mLs (650 mg) by Oral or Feeding Tube route every 4 hours as needed for mild pain or fever     acetaminophen (TYLENOL) 650 MG suppository Place 1 suppository (650 mg) rectally every 4 hours as needed for mild pain     docusate (COLACE) 50 MG/5ML liquid 10 mLs (100 mg) by Oral or J tube route daily as needed for constipation     enalapril (VASOTEC) 5 MG tablet 1 tablet (5 mg) by Per Feeding Tube route At Bedtime     Heparin Sodium, Porcine, (HEPARIN ANTICOAGULANT) 5000 UNIT/0.5ML injection Inject 0.5 mLs (5,000 Units) Subcutaneous every 12 hours     hydrALAZINE (APRESOLINE) 50 MG tablet 1 tablet (50 mg) by Per Feeding Tube route every 8 hours     metoprolol tartrate (LOPRESSOR) 50 MG tablet 1 tablet (50 mg) by Per Feeding Tube route 2 times daily     ondansetron (ZOFRAN-ODT) 4 MG ODT tab Take 1 tablet (4 mg) by mouth every 6 hours as needed for nausea or vomiting     pantoprazole (PROTONIX) 2 mg/mL SUSP suspension 20 mLs (40 mg) by Per Feeding Tube route 2 times daily     polyethylene glycol (MIRALAX) 17 g packet 17 g by Oral or Feeding Tube route 2 times daily as needed (constipation)     protein modular (PROSOURCE TF) LIQD 1 packet by Per Feeding Tube route daily     sennosides (SENOKOT) 8.8 MG/5ML syrup 5 mLs by Oral or Feeding Tube route 2 times daily as needed for constipation     Facility-Administered Medications Ordered in Other Visits   Medication     acetaminophen (TYLENOL) solution 650 mg     acetaminophen (TYLENOL) Suppository 650 mg     docusate (COLACE) 50 MG/5ML liquid 100 mg     enalapril (VASOTEC) tablet 5 mg     heparin ANTICOAGULANT injection 5,000 Units     hydrALAZINE (APRESOLINE) injection 10-20 mg     hydrALAZINE (APRESOLINE)  "tablet 50 mg     labetalol (NORMODYNE/TRANDATE) injection 10-20 mg     lidocaine (LMX4) cream     lidocaine (XYLOCAINE) 2 % external gel     lidocaine 1 % 0.1-1 mL     melatonin tablet 1 mg     metoprolol (LOPRESSOR) injection 2.5 mg     metoprolol tartrate (LOPRESSOR) tablet 50 mg     ondansetron (ZOFRAN-ODT) ODT tab 4 mg    Or     ondansetron (ZOFRAN) injection 4 mg     pantoprazole (PROTONIX) 2 mg/mL suspension 40 mg     polyethylene glycol (MIRALAX) Packet 17 g     Reason statin medication order not selected     sennosides (SENOKOT) syrup 5 mL     sodium chloride (OCEAN) 0.65 % nasal spray 1 spray     sodium chloride (PF) 0.9% PF flush 10-20 mL     sodium chloride (PF) 0.9% PF flush 10-40 mL     sodium chloride (PF) 0.9% PF flush 10-40 mL     sodium chloride (PF) 0.9% PF flush 3 mL     sodium chloride (PF) 0.9% PF flush 3 mL       ROS:  Unobtainable secondary to aphasia.     Vitals:  BP (!) 155/84   Pulse 98   Temp 98.3  F (36.8  C)   Resp 20   Ht 1.727 m (5' 7.99\")   Wt 75.3 kg (166 lb)   SpO2 92%   BMI 25.25 kg/m      Exam:  GENERAL APPEARANCE:  Alert, in no distress  EYES:  Conjunctiva and lids normal  RESP:  respiratory effort and palpation of chest normal, lungs clear to auscultation , no respiratory distress  CV:  Palpation and auscultation of heart done , regular rate and rhythm, no murmur, rub, or gallop, no edema  ABDOMEN:  normal bowel sounds, soft, nontender, no hepatosplenomegaly or other masses, no guarding or rebound, bowel sounds normal  M/S:   no gross deformities  SKIN:  no rash, warm and dry  NEURO:   alert, non verbal, tracks with eyes, left hemiplegia  PSYCH:  calm    Lab/Diagnostic data:    Most Recent 3 CBC's:Recent Labs   Lab Test 01/18/22  0545 01/17/22  0645 01/14/22  0820 01/11/22  0633   WBC 7.0  --  8.6 7.0   HGB 12.0*  --  13.6 12.6*   MCV 94  --  92 91    317 296 380  380     Most Recent 3 BMP's:Recent Labs   Lab Test 01/18/22  0545 01/17/22  0645 01/16/22  0558 " 01/15/22  0647 01/14/22  0820 01/13/22  2104 01/12/22  0639     --   --   --  138  --  139   POTASSIUM 4.0 3.6 3.6   < > 3.9  --  3.7  3.7   CHLORIDE 109*  --   --   --  109  --  106   CO2 24  --   --   --  24  --  29   BUN 26  --   --   --  21  --  21   CR 0.65*  --   --   --  0.67  --  0.70   ANIONGAP 10  --   --   --  5  --  4   MUSTAPHA 8.8  --   --   --  9.0  --  8.8     --   --   --  107* 143* 124*    < > = values in this interval not displayed.     Most Recent Hemoglobin A1c:Recent Labs   Lab Test 12/25/21  1116   A1C 5.4       ASSESSMENT/PLAN:  (I61.9) Right-sided nontraumatic intracerebral hemorrhage, unspecified cerebral location (H)  (primary encounter diagnosis)  (I10) Primary hypertension  Plan:   -Vasotec 5 mg via tube at at bedtime  -Heparin sodium 5000 units every 12 hours for DVT prophylaxis  -Hydralazine 50 mg every 8 hours via  tube  -Metoprolol tartrate 50 mg twice daily via tube  -Follow up with [facility] 4-6 weeks. Facility to call and schedule appointment   -Monitor neuro status  -PT and OT  -Check BMP, Mg, and phosphorus on 1/24      (R13.10) Dysphagia, unspecified type  (Z93.1) G tube feedings (H)  Plan:   -NPO  -Speech therapy   -RD to follow while on TF  -TF Isosource 1.5 @ 60ml/hr continuous via J-port. Flush with at least 15-30 mL before and after med admin.  -HOB > 30 degrees during TF    (D62) Anemia due to blood loss, acute  Comment: Suspected lower GIB  Plan:   -Protonix 40 mg daily twice daily via tube  -Check CBC with platelets on 1/24  -Monitor for bleeding    (R53.82) Chronic fatigue syndrome  Comment: Secondary to Lyme's disease  Plan:   -Avoid stimulants      Total time spent with patient visit at the [facility] nursing [facility] was 45 min including patient visit, review of past records, phone call to patient contact for POLST completion, medication reconciliation, and plan of care. Greater than 50% of total time spent with counseling and coordinating care due to  medical complexity.     Electronically signed by:  Rich Roper CNP                        Sincerely,        Rich Roper, CNP

## 2022-01-18 NOTE — PROGRESS NOTES
Clinic Care Coordination Contact    Background: Care Coordination referral placed from [facility] discharge report for reason of patient meeting criteria for a TCM outreach call by [facility] team.    Assessment: Upon chart review, CCRC Team member will cancel/close the referral for TCM outreach due to reason below:    Patient is not established within [facility]. Upon chart review, CCRC Team member noted patient discharged to TCU    Plan: Care Coordination referral for TCM outreach canceled.    Jania Aldridge MA  [facility], [facility]

## 2022-01-21 ENCOUNTER — LAB REQUISITION (OUTPATIENT)
Dept: LAB | Facility: CLINIC | Age: 72
End: 2022-01-21
Payer: COMMERCIAL

## 2022-01-21 DIAGNOSIS — I61.9 NONTRAUMATIC INTRACEREBRAL HEMORRHAGE, UNSPECIFIED (H): ICD-10-CM

## 2022-01-21 LAB
ANION GAP SERPL CALCULATED.3IONS-SCNC: 8 MMOL/L (ref 5–18)
BUN SERPL-MCNC: 23 MG/DL (ref 8–28)
CALCIUM SERPL-MCNC: 8.9 MG/DL (ref 8.5–10.5)
CHLORIDE BLD-SCNC: 107 MMOL/L (ref 98–107)
CO2 SERPL-SCNC: 23 MMOL/L (ref 22–31)
CREAT SERPL-MCNC: 0.61 MG/DL (ref 0.7–1.3)
ERYTHROCYTE [DISTWIDTH] IN BLOOD BY AUTOMATED COUNT: 13.3 % (ref 10–15)
GFR SERPL CREATININE-BSD FRML MDRD: >90 ML/MIN/1.73M2
GLUCOSE BLD-MCNC: 109 MG/DL (ref 70–125)
HCT VFR BLD AUTO: 40.9 % (ref 40–53)
HGB BLD-MCNC: 12.9 G/DL (ref 13.3–17.7)
MAGNESIUM SERPL-MCNC: 1.9 MG/DL (ref 1.8–2.6)
MCH RBC QN AUTO: 29.3 PG (ref 26.5–33)
MCHC RBC AUTO-ENTMCNC: 31.5 G/DL (ref 31.5–36.5)
MCV RBC AUTO: 93 FL (ref 78–100)
PHOSPHATE SERPL-MCNC: 3.5 MG/DL (ref 2.5–4.5)
PLATELET # BLD AUTO: 257 10E3/UL (ref 150–450)
POTASSIUM BLD-SCNC: 4.1 MMOL/L (ref 3.5–5)
RBC # BLD AUTO: 4.41 10E6/UL (ref 4.4–5.9)
SODIUM SERPL-SCNC: 138 MMOL/L (ref 136–145)
WBC # BLD AUTO: 6.1 10E3/UL (ref 4–11)

## 2022-01-21 PROCEDURE — 85027 COMPLETE CBC AUTOMATED: CPT | Mod: ORL | Performed by: FAMILY MEDICINE

## 2022-01-21 PROCEDURE — 84100 ASSAY OF PHOSPHORUS: CPT | Mod: ORL | Performed by: FAMILY MEDICINE

## 2022-01-21 PROCEDURE — 80048 BASIC METABOLIC PNL TOTAL CA: CPT | Mod: ORL | Performed by: FAMILY MEDICINE

## 2022-01-21 PROCEDURE — 83735 ASSAY OF MAGNESIUM: CPT | Mod: ORL | Performed by: FAMILY MEDICINE

## 2022-01-24 ENCOUNTER — TRANSITIONAL CARE UNIT VISIT (OUTPATIENT)
Dept: GERIATRICS | Facility: CLINIC | Age: 72
End: 2022-01-24
Payer: COMMERCIAL

## 2022-01-24 ENCOUNTER — LAB REQUISITION (OUTPATIENT)
Dept: LAB | Facility: CLINIC | Age: 72
End: 2022-01-24
Payer: COMMERCIAL

## 2022-01-24 VITALS
HEART RATE: 89 BPM | TEMPERATURE: 97.7 F | SYSTOLIC BLOOD PRESSURE: 144 MMHG | BODY MASS INDEX: 25.42 KG/M2 | OXYGEN SATURATION: 94 % | DIASTOLIC BLOOD PRESSURE: 77 MMHG | RESPIRATION RATE: 18 BRPM | WEIGHT: 167.7 LBS | HEIGHT: 68 IN

## 2022-01-24 DIAGNOSIS — I61.9 RIGHT-SIDED NONTRAUMATIC INTRACEREBRAL HEMORRHAGE, UNSPECIFIED CEREBRAL LOCATION (H): Primary | ICD-10-CM

## 2022-01-24 DIAGNOSIS — D62 ANEMIA DUE TO BLOOD LOSS, ACUTE: ICD-10-CM

## 2022-01-24 DIAGNOSIS — I10 ESSENTIAL (PRIMARY) HYPERTENSION: ICD-10-CM

## 2022-01-24 DIAGNOSIS — Z93.1 G TUBE FEEDINGS (H): ICD-10-CM

## 2022-01-24 DIAGNOSIS — R13.10 DYSPHAGIA, UNSPECIFIED TYPE: ICD-10-CM

## 2022-01-24 LAB
ANION GAP SERPL CALCULATED.3IONS-SCNC: 11 MMOL/L (ref 5–18)
BUN SERPL-MCNC: 22 MG/DL (ref 8–28)
CALCIUM SERPL-MCNC: 8.7 MG/DL (ref 8.5–10.5)
CHLORIDE BLD-SCNC: 107 MMOL/L (ref 98–107)
CO2 SERPL-SCNC: 20 MMOL/L (ref 22–31)
CREAT SERPL-MCNC: 0.63 MG/DL (ref 0.7–1.3)
ERYTHROCYTE [DISTWIDTH] IN BLOOD BY AUTOMATED COUNT: 13.6 % (ref 10–15)
GFR SERPL CREATININE-BSD FRML MDRD: >90 ML/MIN/1.73M2
GLUCOSE BLD-MCNC: 97 MG/DL (ref 70–125)
HCT VFR BLD AUTO: 40.8 % (ref 40–53)
HGB BLD-MCNC: 13.1 G/DL (ref 13.3–17.7)
MAGNESIUM SERPL-MCNC: 2 MG/DL (ref 1.8–2.6)
MCH RBC QN AUTO: 29.8 PG (ref 26.5–33)
MCHC RBC AUTO-ENTMCNC: 32.1 G/DL (ref 31.5–36.5)
MCV RBC AUTO: 93 FL (ref 78–100)
PHOSPHATE SERPL-MCNC: 4.1 MG/DL (ref 2.5–4.5)
PLATELET # BLD AUTO: 279 10E3/UL (ref 150–450)
POTASSIUM BLD-SCNC: 4.3 MMOL/L (ref 3.5–5)
RBC # BLD AUTO: 4.4 10E6/UL (ref 4.4–5.9)
SODIUM SERPL-SCNC: 138 MMOL/L (ref 136–145)
WBC # BLD AUTO: 5.8 10E3/UL (ref 4–11)

## 2022-01-24 PROCEDURE — 80048 BASIC METABOLIC PNL TOTAL CA: CPT | Mod: ORL | Performed by: NURSE PRACTITIONER

## 2022-01-24 PROCEDURE — 85027 COMPLETE CBC AUTOMATED: CPT | Mod: ORL | Performed by: NURSE PRACTITIONER

## 2022-01-24 PROCEDURE — 83735 ASSAY OF MAGNESIUM: CPT | Mod: ORL | Performed by: NURSE PRACTITIONER

## 2022-01-24 PROCEDURE — 84100 ASSAY OF PHOSPHORUS: CPT | Mod: ORL | Performed by: NURSE PRACTITIONER

## 2022-01-24 PROCEDURE — 99305 1ST NF CARE MODERATE MDM 35: CPT | Performed by: FAMILY MEDICINE

## 2022-01-24 ASSESSMENT — MIFFLIN-ST. JEOR: SCORE: 1490.18

## 2022-01-24 NOTE — PROGRESS NOTES
"[facility] GERIATRICS    Chief Complaint   Patient presents with     RECHECK     HPI:  Sukhi Johnson is a 71 year old  (1950), who is being seen today for an episodic care visit at: [facility] ([facility]) [[id number]].    Brief Hospitalization Summary:  Sukhi Pereira was admitted to [facility] after being \"found down\" by his wife. Head CT found a 6.1 x 4.4 parenchymal hemorrhage in the right posterior frontoparietal region. Neurosurgery was consulted and it was determined that he was not a surgical candidate. His hospitalization was complicated by acute hypoxic respiratory failure secondary to suspected aspiration, hypernatremia, and suspected GI bleed.  CXR was negative and he was treated with IV Zosyn x 1 week. A PEG was placed and his hypernatremia resolved with free water flushes. GI was consulted for hematochezia. Conservative management was elected. A PPI was started and no procedures were done. The anemia resolved prior to hospital discharge.      1/18: New admit, Hgb down to 12.0 today  1/25: MD visit on 1/24, Hgb improved 13.1 today,    Sukhi remains nonverbal from his stroke.  Nursing reported that his tube feeding was increased yesterday due to hunger per his significant other.  No other acute concerns    Allergies, and PMH/PSH reviewed in [facility] today.  REVIEW OF SYSTEMS:  Unobtainable secondary to aphasia.     Objective:   /76   Pulse 105   Temp 97.9  F (36.6  C)   Resp 18   Ht 1.727 m (5' 8\")   Wt 76.1 kg (167 lb 11.2 oz)   SpO2 93%   BMI 25.50 kg/m      GENERAL APPEARANCE:  Alert, in no distress  EYES:  Conjunctiva and lids normal  RESP:  respiratory effort and palpation of chest normal, lungs clear to auscultation , no respiratory distress  CV:  Palpation and auscultation of heart done , regular rate and rhythm, no murmur, rub, or gallop, no edema  ABDOMEN:  normal bowel sounds, soft, nontender, no hepatosplenomegaly or other masses, no " guarding or rebound, bowel sounds normal  M/S:   no gross deformities  SKIN:  no rash, warm and dry  NEURO:   alert, non verbal, tracks with eyes, left hemiplegia  PSYCH:  calm      Most Recent 3 CBC's:Recent Labs   Lab Test 01/24/22  1511 01/21/22  0545 01/18/22  0545   WBC 5.8 6.1 7.0   HGB 13.1* 12.9* 12.0*   MCV 93 93 94    257 305     Most Recent 3 BMP's:Recent Labs   Lab Test 01/24/22  1511 01/21/22  0545 01/18/22  0545    138 143   POTASSIUM 4.3 4.1 4.0   CHLORIDE 107 107 109*   CO2 20* 23 24   BUN 22 23 26   CR 0.63* 0.61* 0.65*   ANIONGAP 11 8 10   MUSTAPHA 8.7 8.9 8.8   GLC 97 109 109       Assessment/Plan:  (I61.9) Right-sided nontraumatic intracerebral hemorrhage, unspecified cerebral location (H)  (primary encounter diagnosis)  (I10) Primary hypertension  Plan:  -Vasotec 5 mg via tube at at bedtime  -Heparin sodium 5000 units every 12 hours for DVT prophylaxis  -Hydralazine 50 mg every 8 hours via  tube  -Metoprolol tartrate 50 mg twice daily via tube  -Follow up with [facility] 3-5 weeks. Facility to call and schedule appointment   -Monitor neuro status  -Continue PT and OT  -Check BMP and CBC on 1/31/22    (R13.10) Dysphagia, unspecified type  (Z93.1) G tube feedings (H)  Plan:   -NPO  -Speech therapy   -RD to follow while on TF  -TF Isosource 1.5 @ 65ml/hr continuous via J-port. Flush with at least 15-30 mL before and after med admin.  -HOB > 30 degrees during TF       (D62) Anemia due to blood loss, acute  Comment: Suspected lower GIB  Plan:   -Hgb stable and improved  -Protonix 40 mg daily twice daily via tube  -Check CBC with platelets on 1/31  -Monitor for bleeding    Electronically signed by: Rich Roper, CNP

## 2022-01-24 NOTE — LETTER
1/24/2022        RE: Sukhi Johnson  [address]  [place] [address]        [facility] GERIATRIC SERVICES  PRIMARY CARE PROVIDER AND CLINIC:  Physician No Ref-Primary, No address on file  Chief Complaint   Patient presents with     Hospital F/U     [place] Medical Record Number:  [id number]  Place of Service where encounter took place:  [facility]  AT [facility] ([facility]) [[id number]]    Sukhi Johnson  is a 71 year old  (1950), admitted to the above facility from  [facility]. Hospital stay 12/24/21 through 1/17/21..  Admitted to this facility for  rehab, medical management and nursing care.    HPI:    HPI information obtained from: facility chart records, facility staff and [facility] chart review.   Brief Summary of Hospital Course:  - Pt hospitalzied with  paryncheaml hemorrhage in Rt post frontparietal region, managed conservativley (not a surgical candidate.  - hospital stay c/w acute hypoxic respriatoyr failure 2/2 query aspriatoin s/p PEG tube placement; GIB  And anemia (GI started PPI, no scopes was done) and hypernatremia.       Today:  - pt seen and examiend. Non verbal. Nursing staff have no concern. Nurse manager reports feeding tube ongoing, tolerating well, no concern for nausea/vomiting or diarrhea; patient mostly in bed, requried mara lift for transfer.   =======================================================  CODE STATUS/ADVANCE DIRECTIVES DISCUSSION:   CPR/Full code   Patient's living condition: lives with spouse  ALLERGIES: Patient has no known allergies.  PAST MEDICAL HISTORY: ICH, dysphagia, acute blood loss anemia.   PAST SURGICAL HISTORY:   has a past surgical history that includes IR Gastro Jejunostomy Tube Placement (1/5/2022).  FAMILY HISTORY: patient is aphasic, care every where is inaccessible for this patient. Regardless, Family history is non relevant at this time.   SOCIAL HISTORY:  , never smoke.     Post Discharge Medication Reconciliation  "Status: discharge medications reconciled and changed, per note/orders  Current Outpatient Medications   Medication Sig Dispense Refill     acetaminophen (TYLENOL) 325 MG/10.15ML solution 20.3 mLs (650 mg) by Oral or Feeding Tube route every 4 hours as needed for mild pain or fever       acetaminophen (TYLENOL) 650 MG suppository Place 1 suppository (650 mg) rectally every 4 hours as needed for mild pain       Cranberry-Vitamin C (AZO CRANBERRY URINARY TRACT PO) Take 1 tablet by mouth 2 times daily       docusate (COLACE) 50 MG/5ML liquid 10 mLs (100 mg) by Oral or J tube route daily as needed for constipation       enalapril (VASOTEC) 5 MG tablet 1 tablet (5 mg) by Per Feeding Tube route At Bedtime       Heparin Sodium, Porcine, (HEPARIN ANTICOAGULANT) 5000 UNIT/0.5ML injection Inject 0.5 mLs (5,000 Units) Subcutaneous every 12 hours       hydrALAZINE (APRESOLINE) 50 MG tablet 1 tablet (50 mg) by Per Feeding Tube route every 8 hours       metoprolol tartrate (LOPRESSOR) 50 MG tablet 1 tablet (50 mg) by Per Feeding Tube route 2 times daily       ondansetron (ZOFRAN-ODT) 4 MG ODT tab Take 1 tablet (4 mg) by mouth every 6 hours as needed for nausea or vomiting       pantoprazole (PROTONIX) 2 mg/mL SUSP suspension 20 mLs (40 mg) by Per Feeding Tube route 2 times daily       polyethylene glycol (MIRALAX) 17 g packet 17 g by Oral or Feeding Tube route 2 times daily as needed (constipation)       sennosides (SENOKOT) 8.8 MG/5ML syrup 5 mLs by Oral or Feeding Tube route 2 times daily as needed for constipation       ROS: 10 point ROS of systems including Constitutional, Eyes, Respiratory, Cardiovascular, Gastroenterology, Genitourinary, Integumentary, Musculoskeletal, Psychiatric were all negative except for pertinent positives noted in my HPI.    Vitals:  BP (!) 144/77   Pulse 89   Temp 97.7  F (36.5  C)   Resp 18   Ht 1.727 m (5' 8\")   Wt 76.1 kg (167 lb 11.2 oz)   SpO2 94%   BMI 25.50 kg/m    Exam:  GENERAL " APPEARANCE:  in bed tired looking  ENT: oral mucosa dry.   EYES:  EOMI, Pupil rounded and equal.  RESP:  lungs clear to auscultation   CV:  S1S2 audible, regular HR, no murmur appreciated.   ABDOMEN:  soft, NT/ND, BS audible. no mass appreciated on palpation.   M/S:   no joint deformity noted on observation.   SKIN:  PEG tube in place  NEURO:   Non verbal. Diffuse muscles atrophy. Ms strenght 0/5 left side. Moves right side.   PSYCH: flat affect and mood      Lab/Diagnostic data: Reviewed in the chart and EHR.        ASSESSMENT/PLAN:  ----------------------------  Right-sided nontraumatic intracerebral hemorrhage, unspecified cerebral location (H)   Dysphagia, unspecified type  PEG tube feedings (H)  - followed by Neurology  - requires extensive nursing care  - G tube care  - Dietician/ SLP/OT and PT on the case. Appreciate help.       Recent GIB with acute blood loss anemia: trend HH. On pantoprazole. GI routine follow up      Electronically signed by:  Joel Blanchard MD           Sincerely,        Joel Blanchard MD

## 2022-01-24 NOTE — PROGRESS NOTES
[place] GERIATRIC SERVICES  PRIMARY CARE PROVIDER AND CLINIC:  Physician No Ref-Primary, No address on file  Chief Complaint   Patient presents with     Hospital F/U     [place] Medical Record Number:  [id number]  Place of Service where encounter took place:  [facility]  AT [facility] ([facility]) [[id number]]    Sukhi Johnson  is a 71 year old  (1950), admitted to the above facility from  [facility]. Hospital stay 12/24/21 through 1/17/21..  Admitted to this facility for  rehab, medical management and nursing care.    HPI:    HPI information obtained from: facility chart records, facility staff and [facility] Epic chart review.   Brief Summary of Hospital Course:  - Pt hospitalzied with  paryncheaml hemorrhage in Rt post frontparietal region, managed conservativley (not a surgical candidate.  - hospital stay c/w acute hypoxic respriatoyr failure 2/2 query aspriatoin s/p PEG tube placement; GIB  And anemia (GI started PPI, no scopes was done) and hypernatremia.       Today:  - pt seen and examiend. Non verbal. Nursing staff have no concern. Nurse manager reports feeding tube ongoing, tolerating well, no concern for nausea/vomiting or diarrhea; patient mostly in bed, requried mara lift for transfer.   =======================================================  CODE STATUS/ADVANCE DIRECTIVES DISCUSSION:   CPR/Full code   Patient's living condition: lives with spouse  ALLERGIES: Patient has no known allergies.  PAST MEDICAL HISTORY: ICH, dysphagia, acute blood loss anemia.   PAST SURGICAL HISTORY:   has a past surgical history that includes IR Gastro Jejunostomy Tube Placement (1/5/2022).  FAMILY HISTORY: patient is aphasic, care every where is inaccessible for this patient. Regardless, Family history is non relevant at this time.   SOCIAL HISTORY:  , never smoke.     Post Discharge Medication Reconciliation Status: discharge medications reconciled and changed, per note/orders  Current Outpatient  "Medications   Medication Sig Dispense Refill     acetaminophen (TYLENOL) 325 MG/10.15ML solution 20.3 mLs (650 mg) by Oral or Feeding Tube route every 4 hours as needed for mild pain or fever       acetaminophen (TYLENOL) 650 MG suppository Place 1 suppository (650 mg) rectally every 4 hours as needed for mild pain       Cranberry-Vitamin C (AZO CRANBERRY URINARY TRACT PO) Take 1 tablet by mouth 2 times daily       docusate (COLACE) 50 MG/5ML liquid 10 mLs (100 mg) by Oral or J tube route daily as needed for constipation       enalapril (VASOTEC) 5 MG tablet 1 tablet (5 mg) by Per Feeding Tube route At Bedtime       Heparin Sodium, Porcine, (HEPARIN ANTICOAGULANT) 5000 UNIT/0.5ML injection Inject 0.5 mLs (5,000 Units) Subcutaneous every 12 hours       hydrALAZINE (APRESOLINE) 50 MG tablet 1 tablet (50 mg) by Per Feeding Tube route every 8 hours       metoprolol tartrate (LOPRESSOR) 50 MG tablet 1 tablet (50 mg) by Per Feeding Tube route 2 times daily       ondansetron (ZOFRAN-ODT) 4 MG ODT tab Take 1 tablet (4 mg) by mouth every 6 hours as needed for nausea or vomiting       pantoprazole (PROTONIX) 2 mg/mL SUSP suspension 20 mLs (40 mg) by Per Feeding Tube route 2 times daily       polyethylene glycol (MIRALAX) 17 g packet 17 g by Oral or Feeding Tube route 2 times daily as needed (constipation)       sennosides (SENOKOT) 8.8 MG/5ML syrup 5 mLs by Oral or Feeding Tube route 2 times daily as needed for constipation       ROS: 10 point ROS of systems including Constitutional, Eyes, Respiratory, Cardiovascular, Gastroenterology, Genitourinary, Integumentary, Musculoskeletal, Psychiatric were all negative except for pertinent positives noted in my HPI.    Vitals:  BP (!) 144/77   Pulse 89   Temp 97.7  F (36.5  C)   Resp 18   Ht 1.727 m (5' 8\")   Wt 76.1 kg (167 lb 11.2 oz)   SpO2 94%   BMI 25.50 kg/m    Exam:  GENERAL APPEARANCE:  in bed tired looking  ENT: oral mucosa dry.   EYES:  EOMI, Pupil rounded and " equal.  RESP:  lungs clear to auscultation   CV:  S1S2 audible, regular HR, no murmur appreciated.   ABDOMEN:  soft, NT/ND, BS audible. no mass appreciated on palpation.   M/S:   no joint deformity noted on observation.   SKIN:  PEG tube in place  NEURO:   Non verbal. Diffuse muscles atrophy. Ms strenght 0/5 left side. Moves right side.   PSYCH: flat affect and mood      Lab/Diagnostic data: Reviewed in the chart and EHR.        ASSESSMENT/PLAN:  ----------------------------  Right-sided nontraumatic intracerebral hemorrhage, unspecified cerebral location (H)   Dysphagia, unspecified type  PEG tube feedings (H)  - followed by Neurology  - requires extensive nursing care  - G tube care  - Dietician/ SLP/OT and PT on the case. Appreciate help.       Recent GIB with acute blood loss anemia: trend HH. On pantoprazole. GI routine follow up      Electronically signed by:  Joel Blanchard MD

## 2022-01-25 ENCOUNTER — TRANSITIONAL CARE UNIT VISIT (OUTPATIENT)
Dept: GERIATRICS | Facility: CLINIC | Age: 72
End: 2022-01-25
Payer: COMMERCIAL

## 2022-01-25 VITALS
BODY MASS INDEX: 25.42 KG/M2 | DIASTOLIC BLOOD PRESSURE: 76 MMHG | WEIGHT: 167.7 LBS | RESPIRATION RATE: 18 BRPM | HEART RATE: 105 BPM | TEMPERATURE: 97.9 F | OXYGEN SATURATION: 93 % | SYSTOLIC BLOOD PRESSURE: 139 MMHG | HEIGHT: 68 IN

## 2022-01-25 DIAGNOSIS — I10 PRIMARY HYPERTENSION: ICD-10-CM

## 2022-01-25 DIAGNOSIS — R13.10 DYSPHAGIA, UNSPECIFIED TYPE: ICD-10-CM

## 2022-01-25 DIAGNOSIS — D62 ANEMIA DUE TO BLOOD LOSS, ACUTE: ICD-10-CM

## 2022-01-25 DIAGNOSIS — I61.9 RIGHT-SIDED NONTRAUMATIC INTRACEREBRAL HEMORRHAGE, UNSPECIFIED CEREBRAL LOCATION (H): Primary | ICD-10-CM

## 2022-01-25 DIAGNOSIS — Z93.1 G TUBE FEEDINGS (H): ICD-10-CM

## 2022-01-25 PROCEDURE — 99309 SBSQ NF CARE MODERATE MDM 30: CPT | Performed by: NURSE PRACTITIONER

## 2022-01-25 ASSESSMENT — MIFFLIN-ST. JEOR: SCORE: 1490.18

## 2022-01-25 NOTE — LETTER
"    1/25/2022        RE: Sukhi Johnson  [address]  [place] [address]        [facility] GERIATRICS    Chief Complaint   Patient presents with     RECHECK     HPI:  Sukhi Johnson is a 71 year old  (1950), who is being seen today for an episodic care visit at: [facility] AT [facility] ([facility]) [[id number]].    Brief Hospitalization Summary:  Sukhi Pereira was admitted to [facility] after being \"found down\" by his wife. Head CT found a 6.1 x 4.4 parenchymal hemorrhage in the right posterior frontoparietal region. Neurosurgery was consulted and it was determined that he was not a surgical candidate. His hospitalization was complicated by acute hypoxic respiratory failure secondary to suspected aspiration, hypernatremia, and suspected GI bleed.  CXR was negative and he was treated with IV Zosyn x 1 week. A PEG was placed and his hypernatremia resolved with free water flushes. GI was consulted for hematochezia. Conservative management was elected. A PPI was started and no procedures were done. The anemia resolved prior to hospital discharge.      1/18: New admit, Hgb down to 12.0 today  1/25: MD visit on 1/24, Hgb improved 13.1 today,    Sukhi remains nonverbal from his stroke.  Nursing reported that his tube feeding was increased yesterday due to hunger per his significant other.  No other acute concerns    Allergies, and PMH/PSH reviewed in [facility] today.  REVIEW OF SYSTEMS:  Unobtainable secondary to aphasia.     Objective:   /76   Pulse 105   Temp 97.9  F (36.6  C)   Resp 18   Ht 1.727 m (5' 8\")   Wt 76.1 kg (167 lb 11.2 oz)   SpO2 93%   BMI 25.50 kg/m      GENERAL APPEARANCE:  Alert, in no distress  EYES:  Conjunctiva and lids normal  RESP:  respiratory effort and palpation of chest normal, lungs clear to auscultation , no respiratory distress  CV:  Palpation and auscultation of heart done , regular rate and rhythm, no murmur, rub, or gallop, no " edema  ABDOMEN:  normal bowel sounds, soft, nontender, no hepatosplenomegaly or other masses, no guarding or rebound, bowel sounds normal  M/S:   no gross deformities  SKIN:  no rash, warm and dry  NEURO:   alert, non verbal, tracks with eyes, left hemiplegia  PSYCH:  calm      Most Recent 3 CBC's:Recent Labs   Lab Test 01/24/22  1511 01/21/22  0545 01/18/22  0545   WBC 5.8 6.1 7.0   HGB 13.1* 12.9* 12.0*   MCV 93 93 94    257 305     Most Recent 3 BMP's:Recent Labs   Lab Test 01/24/22  1511 01/21/22  0545 01/18/22  0545    138 143   POTASSIUM 4.3 4.1 4.0   CHLORIDE 107 107 109*   CO2 20* 23 24   BUN 22 23 26   CR 0.63* 0.61* 0.65*   ANIONGAP 11 8 10   MUSTAPHA 8.7 8.9 8.8   GLC 97 109 109       Assessment/Plan:  (I61.9) Right-sided nontraumatic intracerebral hemorrhage, unspecified cerebral location (H)  (primary encounter diagnosis)  (I10) Primary hypertension  Plan:  -Vasotec 5 mg via tube at at bedtime  -Heparin sodium 5000 units every 12 hours for DVT prophylaxis  -Hydralazine 50 mg every 8 hours via  tube  -Metoprolol tartrate 50 mg twice daily via tube  -Follow up with [facility] 3-5 weeks. Facility to call and schedule appointment   -Monitor neuro status  -Continue PT and OT  -Check BMP and CBC on 1/31/22    (R13.10) Dysphagia, unspecified type  (Z93.1) G tube feedings (H)  Plan:   -NPO  -Speech therapy   -RD to follow while on TF  -TF Isosource 1.5 @ 65ml/hr continuous via J-port. Flush with at least 15-30 mL before and after med admin.  -HOB > 30 degrees during TF       (D62) Anemia due to blood loss, acute  Comment: Suspected lower GIB  Plan:   -Hgb stable and improved  -Protonix 40 mg daily twice daily via tube  -Check CBC with platelets on 1/31  -Monitor for bleeding    Electronically signed by: Rich Roper, JERMAINE              Sincerely,        Rich Roper, CNP

## 2022-01-31 ENCOUNTER — LAB REQUISITION (OUTPATIENT)
Dept: LAB | Facility: CLINIC | Age: 72
End: 2022-01-31
Payer: COMMERCIAL

## 2022-01-31 DIAGNOSIS — I10 ESSENTIAL (PRIMARY) HYPERTENSION: ICD-10-CM

## 2022-01-31 LAB
ANION GAP SERPL CALCULATED.3IONS-SCNC: 11 MMOL/L (ref 5–18)
BUN SERPL-MCNC: 20 MG/DL (ref 8–28)
CALCIUM SERPL-MCNC: 8.8 MG/DL (ref 8.5–10.5)
CHLORIDE BLD-SCNC: 106 MMOL/L (ref 98–107)
CO2 SERPL-SCNC: 22 MMOL/L (ref 22–31)
CREAT SERPL-MCNC: 0.63 MG/DL (ref 0.7–1.3)
ERYTHROCYTE [DISTWIDTH] IN BLOOD BY AUTOMATED COUNT: 13.4 % (ref 10–15)
GFR SERPL CREATININE-BSD FRML MDRD: >90 ML/MIN/1.73M2
GLUCOSE BLD-MCNC: 102 MG/DL (ref 70–125)
HCT VFR BLD AUTO: 39.8 % (ref 40–53)
HGB BLD-MCNC: 12.9 G/DL (ref 13.3–17.7)
MCH RBC QN AUTO: 30.4 PG (ref 26.5–33)
MCHC RBC AUTO-ENTMCNC: 32.4 G/DL (ref 31.5–36.5)
MCV RBC AUTO: 94 FL (ref 78–100)
PLATELET # BLD AUTO: 271 10E3/UL (ref 150–450)
POTASSIUM BLD-SCNC: 4.2 MMOL/L (ref 3.5–5)
RBC # BLD AUTO: 4.24 10E6/UL (ref 4.4–5.9)
SODIUM SERPL-SCNC: 139 MMOL/L (ref 136–145)
WBC # BLD AUTO: 5.2 10E3/UL (ref 4–11)

## 2022-01-31 PROCEDURE — 85027 COMPLETE CBC AUTOMATED: CPT | Mod: ORL | Performed by: NURSE PRACTITIONER

## 2022-01-31 PROCEDURE — 80048 BASIC METABOLIC PNL TOTAL CA: CPT | Mod: ORL | Performed by: NURSE PRACTITIONER

## 2022-02-01 NOTE — PROGRESS NOTES
"[facility] GERIATRICS    Chief Complaint   Patient presents with     RECHECK     HPI:  Sukhi Johnson is a 71 year old  (1950), who is being seen today for an episodic care visit at: [facility] ([facility]) [[id number]].   Brief Hospitalization Summary:  Sukhi Pereira was admitted to [facility] after being \"found down\" by his wife. Head CT found a 6.1 x 4.4 parenchymal hemorrhage in the right posterior frontoparietal region. Neurosurgery was consulted and it was determined that he was not a surgical candidate. His hospitalization was complicated by acute hypoxic respiratory failure secondary to suspected aspiration, hypernatremia, and suspected GI bleed.  CXR was negative and he was treated with IV Zosyn x 1 week. A PEG was placed and his hypernatremia resolved with free water flushes. GI was consulted for hematochezia. Conservative management was elected. A PPI was started and no procedures were done. The anemia resolved prior to hospital discharge.      1/18: New admit, Hgb down to 12.0 today  1/25: MD visit on 1/24, Hgb improved 13.1 today,  2/2: Hgb stable 12.9 on 1/31, working with therapies    No acute concerns from nursing today.  Sukhi made attempts to speak today with mumbling/groaning.    Allergies, and PMH/PSH reviewed in [facility] today.  REVIEW OF SYSTEMS:  Unobtainable secondary to aphasia.     Objective:   BP (!) 142/76   Pulse 90   Temp 97.8  F (36.6  C)   Resp 17   Ht 1.727 m (5' 8\")   Wt 76.3 kg (168 lb 4.8 oz)   SpO2 93%   BMI 25.59 kg/m      GENERAL APPEARANCE:  Alert, in no distress  ENT:  Mouth and posterior oropharynx normal, moist mucous membranes  RESP:  respiratory effort and palpation of chest normal, lungs clear to auscultation , no respiratory distress  CV:  Palpation and auscultation of heart done , regular rate and rhythm, no murmur, rub, or gallop, no edema  ABDOMEN:  no guarding or rebound, bowel sounds normal  M/S:   No gross " deformities or joint swelling/erythema  SKIN:  No rash, warm and dry  NEURO:   Alert, moans to speak, left hemiplegia  PSYCH:  Calm, nonverbal    Recent Results (from the past 240 hour(s))   Basic metabolic panel    Collection Time: 01/24/22  3:11 PM   Result Value Ref Range    Sodium 138 136 - 145 mmol/L    Potassium 4.3 3.5 - 5.0 mmol/L    Chloride 107 98 - 107 mmol/L    Carbon Dioxide (CO2) 20 (L) 22 - 31 mmol/L    Anion Gap 11 5 - 18 mmol/L    Urea Nitrogen 22 8 - 28 mg/dL    Creatinine 0.63 (L) 0.70 - 1.30 mg/dL    Calcium 8.7 8.5 - 10.5 mg/dL    Glucose 97 70 - 125 mg/dL    GFR Estimate >90 >60 mL/min/1.73m2   CBC with platelets    Collection Time: 01/24/22  3:11 PM   Result Value Ref Range    WBC Count 5.8 4.0 - 11.0 10e3/uL    RBC Count 4.40 4.40 - 5.90 10e6/uL    Hemoglobin 13.1 (L) 13.3 - 17.7 g/dL    Hematocrit 40.8 40.0 - 53.0 %    MCV 93 78 - 100 fL    MCH 29.8 26.5 - 33.0 pg    MCHC 32.1 31.5 - 36.5 g/dL    RDW 13.6 10.0 - 15.0 %    Platelet Count 279 150 - 450 10e3/uL   Magnesium    Collection Time: 01/24/22  3:11 PM   Result Value Ref Range    Magnesium 2.0 1.8 - 2.6 mg/dL   Phosphorus    Collection Time: 01/24/22  3:11 PM   Result Value Ref Range    Phosphorus 4.1 2.5 - 4.5 mg/dL   Basic metabolic panel    Collection Time: 01/31/22  6:00 AM   Result Value Ref Range    Sodium 139 136 - 145 mmol/L    Potassium 4.2 3.5 - 5.0 mmol/L    Chloride 106 98 - 107 mmol/L    Carbon Dioxide (CO2) 22 22 - 31 mmol/L    Anion Gap 11 5 - 18 mmol/L    Urea Nitrogen 20 8 - 28 mg/dL    Creatinine 0.63 (L) 0.70 - 1.30 mg/dL    Calcium 8.8 8.5 - 10.5 mg/dL    Glucose 102 70 - 125 mg/dL    GFR Estimate >90 >60 mL/min/1.73m2   CBC with platelets    Collection Time: 01/31/22  6:00 AM   Result Value Ref Range    WBC Count 5.2 4.0 - 11.0 10e3/uL    RBC Count 4.24 (L) 4.40 - 5.90 10e6/uL    Hemoglobin 12.9 (L) 13.3 - 17.7 g/dL    Hematocrit 39.8 (L) 40.0 - 53.0 %    MCV 94 78 - 100 fL    MCH 30.4 26.5 - 33.0 pg    MCHC 32.4  31.5 - 36.5 g/dL    RDW 13.4 10.0 - 15.0 %    Platelet Count 271 150 - 450 10e3/uL       Assessment/Plan:  (I61.9) Right-sided nontraumatic intracerebral hemorrhage, unspecified cerebral location (H)  (primary encounter diagnosis)  Plan:   -Stable  -Follow-up with neurology as directed per hospital discharge  -Heparin 5000 units every 12 hours for DVT prophylaxis  -Continue working with PT, OT and speech therapy  -Monitor neuro status      (I10) Primary hypertension  Plan:   -Established, well controlled  -Continue Vasotec 5 mg at bedtime  -Continue hydralazine 50 mg every 8 hours  -Continue metoprolol tartrate 50 mg twice daily  -Monitor BP and heart rate    (Z93.1) G tube feedings (H)  (R13.10) Dysphagia, unspecified type  Plan:   -NPO  -Speech therapy   -RD to follow while on TF  -TF Isosource 1.5 @ 65ml/hr continuous via J-port. Flush with at least 15-30 mL before and after med admin.  -HOB > 30 degrees during TF       (D62) Anemia due to blood loss, acute  Comment: Suspected lower GIB  Plan:   -Stable  -Continue Protonix 40 mg daily twice daily   -Check Hgb 2/7/22  -Monitor for bleeding    Electronically signed by: Rich Roper, JERMAINE

## 2022-02-02 ENCOUNTER — TRANSITIONAL CARE UNIT VISIT (OUTPATIENT)
Dept: GERIATRICS | Facility: CLINIC | Age: 72
End: 2022-02-02
Payer: COMMERCIAL

## 2022-02-02 VITALS
TEMPERATURE: 97.8 F | SYSTOLIC BLOOD PRESSURE: 142 MMHG | RESPIRATION RATE: 17 BRPM | OXYGEN SATURATION: 93 % | WEIGHT: 168.3 LBS | BODY MASS INDEX: 25.51 KG/M2 | HEIGHT: 68 IN | HEART RATE: 90 BPM | DIASTOLIC BLOOD PRESSURE: 76 MMHG

## 2022-02-02 DIAGNOSIS — D62 ANEMIA DUE TO BLOOD LOSS, ACUTE: ICD-10-CM

## 2022-02-02 DIAGNOSIS — I61.9 RIGHT-SIDED NONTRAUMATIC INTRACEREBRAL HEMORRHAGE, UNSPECIFIED CEREBRAL LOCATION (H): Primary | ICD-10-CM

## 2022-02-02 DIAGNOSIS — I10 PRIMARY HYPERTENSION: ICD-10-CM

## 2022-02-02 DIAGNOSIS — Z93.1 G TUBE FEEDINGS (H): ICD-10-CM

## 2022-02-02 DIAGNOSIS — R13.10 DYSPHAGIA, UNSPECIFIED TYPE: ICD-10-CM

## 2022-02-02 PROCEDURE — 99309 SBSQ NF CARE MODERATE MDM 30: CPT | Performed by: NURSE PRACTITIONER

## 2022-02-02 ASSESSMENT — MIFFLIN-ST. JEOR: SCORE: 1492.9

## 2022-02-02 NOTE — LETTER
"    2/2/2022        RE: Sukhi Johnson  [address]  [place] [address]        [facility] GERIATRICS    Chief Complaint   Patient presents with     RECHECK     HPI:  Sukhi Johnson is a 71 year old  (1950), who is being seen today for an episodic care visit at: [facility] AT [facility] ([facility]) [[id number]].   Brief Hospitalization Summary:  Sukhi Pereira was admitted to [facility] after being \"found down\" by his wife. Head CT found a 6.1 x 4.4 parenchymal hemorrhage in the right posterior frontoparietal region. Neurosurgery was consulted and it was determined that he was not a surgical candidate. His hospitalization was complicated by acute hypoxic respiratory failure secondary to suspected aspiration, hypernatremia, and suspected GI bleed.  CXR was negative and he was treated with IV Zosyn x 1 week. A PEG was placed and his hypernatremia resolved with free water flushes. GI was consulted for hematochezia. Conservative management was elected. A PPI was started and no procedures were done. The anemia resolved prior to hospital discharge.      1/18: New admit, Hgb down to 12.0 today  1/25: MD visit on 1/24, Hgb improved 13.1 today,  2/2: Hgb stable 12.9 on 1/31, working with therapies    No acute concerns from nursing today.  Sukhi made attempts to speak today with mumbling/groaning.    Allergies, and PMH/PSH reviewed in [facility] today.  REVIEW OF SYSTEMS:  Unobtainable secondary to aphasia.     Objective:   BP (!) 142/76   Pulse 90   Temp 97.8  F (36.6  C)   Resp 17   Ht 1.727 m (5' 8\")   Wt 76.3 kg (168 lb 4.8 oz)   SpO2 93%   BMI 25.59 kg/m      GENERAL APPEARANCE:  Alert, in no distress  ENT:  Mouth and posterior oropharynx normal, moist mucous membranes  RESP:  respiratory effort and palpation of chest normal, lungs clear to auscultation , no respiratory distress  CV:  Palpation and auscultation of heart done , regular rate and rhythm, no murmur, rub, or gallop, " no edema  ABDOMEN:  no guarding or rebound, bowel sounds normal  M/S:   No gross deformities or joint swelling/erythema  SKIN:  No rash, warm and dry  NEURO:   Alert, moans to speak, left hemiplegia  PSYCH:  Calm, nonverbal    Recent Results (from the past 240 hour(s))   Basic metabolic panel    Collection Time: 01/24/22  3:11 PM   Result Value Ref Range    Sodium 138 136 - 145 mmol/L    Potassium 4.3 3.5 - 5.0 mmol/L    Chloride 107 98 - 107 mmol/L    Carbon Dioxide (CO2) 20 (L) 22 - 31 mmol/L    Anion Gap 11 5 - 18 mmol/L    Urea Nitrogen 22 8 - 28 mg/dL    Creatinine 0.63 (L) 0.70 - 1.30 mg/dL    Calcium 8.7 8.5 - 10.5 mg/dL    Glucose 97 70 - 125 mg/dL    GFR Estimate >90 >60 mL/min/1.73m2   CBC with platelets    Collection Time: 01/24/22  3:11 PM   Result Value Ref Range    WBC Count 5.8 4.0 - 11.0 10e3/uL    RBC Count 4.40 4.40 - 5.90 10e6/uL    Hemoglobin 13.1 (L) 13.3 - 17.7 g/dL    Hematocrit 40.8 40.0 - 53.0 %    MCV 93 78 - 100 fL    MCH 29.8 26.5 - 33.0 pg    MCHC 32.1 31.5 - 36.5 g/dL    RDW 13.6 10.0 - 15.0 %    Platelet Count 279 150 - 450 10e3/uL   Magnesium    Collection Time: 01/24/22  3:11 PM   Result Value Ref Range    Magnesium 2.0 1.8 - 2.6 mg/dL   Phosphorus    Collection Time: 01/24/22  3:11 PM   Result Value Ref Range    Phosphorus 4.1 2.5 - 4.5 mg/dL   Basic metabolic panel    Collection Time: 01/31/22  6:00 AM   Result Value Ref Range    Sodium 139 136 - 145 mmol/L    Potassium 4.2 3.5 - 5.0 mmol/L    Chloride 106 98 - 107 mmol/L    Carbon Dioxide (CO2) 22 22 - 31 mmol/L    Anion Gap 11 5 - 18 mmol/L    Urea Nitrogen 20 8 - 28 mg/dL    Creatinine 0.63 (L) 0.70 - 1.30 mg/dL    Calcium 8.8 8.5 - 10.5 mg/dL    Glucose 102 70 - 125 mg/dL    GFR Estimate >90 >60 mL/min/1.73m2   CBC with platelets    Collection Time: 01/31/22  6:00 AM   Result Value Ref Range    WBC Count 5.2 4.0 - 11.0 10e3/uL    RBC Count 4.24 (L) 4.40 - 5.90 10e6/uL    Hemoglobin 12.9 (L) 13.3 - 17.7 g/dL    Hematocrit 39.8  (L) 40.0 - 53.0 %    MCV 94 78 - 100 fL    MCH 30.4 26.5 - 33.0 pg    MCHC 32.4 31.5 - 36.5 g/dL    RDW 13.4 10.0 - 15.0 %    Platelet Count 271 150 - 450 10e3/uL       Assessment/Plan:  (I61.9) Right-sided nontraumatic intracerebral hemorrhage, unspecified cerebral location (H)  (primary encounter diagnosis)  Plan:   -Stable  -Follow-up with neurology as directed per hospital discharge  -Heparin 5000 units every 12 hours for DVT prophylaxis  -Continue working with PT, OT and speech therapy  -Monitor neuro status      (I10) Primary hypertension  Plan:   -Established, well controlled  -Continue Vasotec 5 mg at bedtime  -Continue hydralazine 50 mg every 8 hours  -Continue metoprolol tartrate 50 mg twice daily  -Monitor BP and heart rate    (Z93.1) G tube feedings (H)  (R13.10) Dysphagia, unspecified type  Plan:   -NPO  -Speech therapy   -RD to follow while on TF  -TF Isosource 1.5 @ 65ml/hr continuous via J-port. Flush with at least 15-30 mL before and after med admin.  -HOB > 30 degrees during TF       (D62) Anemia due to blood loss, acute  Comment: Suspected lower GIB  Plan:   -Stable  -Continue Protonix 40 mg daily twice daily   -Check Hgb 2/7/22  -Monitor for bleeding    Electronically signed by: Rich Roper, JERMAINE              Sincerely,        Rich Roper, CNP

## 2022-02-07 ENCOUNTER — LAB REQUISITION (OUTPATIENT)
Dept: LAB | Facility: CLINIC | Age: 72
End: 2022-02-07
Payer: COMMERCIAL

## 2022-02-07 LAB
ANION GAP SERPL CALCULATED.3IONS-SCNC: 12 MMOL/L (ref 5–18)
BUN SERPL-MCNC: 19 MG/DL (ref 8–28)
CALCIUM SERPL-MCNC: 8.9 MG/DL (ref 8.5–10.5)
CHLORIDE BLD-SCNC: 105 MMOL/L (ref 98–107)
CO2 SERPL-SCNC: 21 MMOL/L (ref 22–31)
CREAT SERPL-MCNC: 0.61 MG/DL (ref 0.7–1.3)
GFR SERPL CREATININE-BSD FRML MDRD: >90 ML/MIN/1.73M2
GLUCOSE BLD-MCNC: 109 MG/DL (ref 70–125)
HGB BLD-MCNC: 12.8 G/DL (ref 13.3–17.7)
POTASSIUM BLD-SCNC: 4 MMOL/L (ref 3.5–5)
SODIUM SERPL-SCNC: 138 MMOL/L (ref 136–145)

## 2022-02-07 PROCEDURE — 80048 BASIC METABOLIC PNL TOTAL CA: CPT | Mod: ORL | Performed by: NURSE PRACTITIONER

## 2022-02-07 PROCEDURE — 85018 HEMOGLOBIN: CPT | Mod: ORL | Performed by: NURSE PRACTITIONER

## 2022-02-09 NOTE — PROGRESS NOTES
"[facility] GERIATRICS    Chief Complaint   Patient presents with     RECHECK     HPI:  Sukhi Johnson is a 71 year old  (1950), who is being seen today for an episodic care visit at: [facility] ([facility]) [[id number]].     Brief Hospitalization Summary:  Sukhi Pereira was admitted to [facility] after being \"found down\" by his wife. Head CT found a 6.1 x 4.4 parenchymal hemorrhage in the right posterior frontoparietal region. Neurosurgery was consulted and it was determined that he was not a surgical candidate. His hospitalization was complicated by acute hypoxic respiratory failure secondary to suspected aspiration, hypernatremia, and suspected GI bleed.  CXR was negative and he was treated with IV Zosyn x 1 week. A PEG was placed and his hypernatremia resolved with free water flushes. GI was consulted for hematochezia. Conservative management was elected. A PPI was started and no procedures were done. The anemia resolved prior to hospital discharge.      1/18: New admit, Hgb down to 12.0 today  1/25: MD visit on 1/24, Hgb improved 13.1 today,  2/2: Hgb stable 12.9 on 1/31, working with therapies  2/10 Hgb stable 12.8, working with therapies    Today, nursing reported that Sukhi has had left shoulder pain.  They have been giving him Tylenol which helps.  When asked if he had pain, Sukhi pointed to his left shoulder. He attempts to speak with mumbling.    Allergies, and PMH/PSH reviewed in [facility] today.  REVIEW OF SYSTEMS:  Unobtainable secondary to aphasia.     Objective:   BP (!) 153/82   Pulse 92   Temp 98.2  F (36.8  C)   Resp 16   Ht 1.727 m (5' 8\")   Wt 76.4 kg (168 lb 6.4 oz)   SpO2 97%   BMI 25.61 kg/m        GENERAL APPEARANCE:  Alert, in no distress  ENT:  Mouth and posterior oropharynx normal, moist mucous membranes  RESP:  respiratory effort and palpation of chest normal, lungs clear to auscultation , no respiratory distress  CV:  Palpation and " auscultation of heart done , regular rate and rhythm, no murmur, rub, or gallop, no edema  ABDOMEN:  no guarding or rebound, bowel sounds normal  M/S:   no gross deformities, transfer with a mara lift  SKIN:  no rash  NEURO:   alert, moans when speaking, left hemiplegia  PSYCH:  calm, non verbal    Recent Results (from the past 240 hour(s))   Basic metabolic panel    Collection Time: 02/07/22  4:40 AM   Result Value Ref Range    Sodium 138 136 - 145 mmol/L    Potassium 4.0 3.5 - 5.0 mmol/L    Chloride 105 98 - 107 mmol/L    Carbon Dioxide (CO2) 21 (L) 22 - 31 mmol/L    Anion Gap 12 5 - 18 mmol/L    Urea Nitrogen 19 8 - 28 mg/dL    Creatinine 0.61 (L) 0.70 - 1.30 mg/dL    Calcium 8.9 8.5 - 10.5 mg/dL    Glucose 109 70 - 125 mg/dL    GFR Estimate >90 >60 mL/min/1.73m2   Hemoglobin    Collection Time: 02/07/22  4:40 AM   Result Value Ref Range    Hemoglobin 12.8 (L) 13.3 - 17.7 g/dL       Assessment/Plan:  (I61.9) Right-sided nontraumatic intracerebral hemorrhage, unspecified cerebral location (H)  (primary encounter diagnosis)  Plan:   -Stable  -Follow-up with neurology as directed per hospital discharge  -Heparin 5000 units every 12 hours for DVT prophylaxis  -Continue working with PT, OT and speech therapy  -Monitor neuro status    (I10) Primary hypertension  Plan:   -Established, well controlled  -Continue Vasotec 5 mg at bedtime  -Continue hydralazine 50 mg every 8 hours  -Continue metoprolol tartrate 50 mg twice daily  -Monitor BP and heart rate    (Z93.1) G tube feedings (H)  (R13.10) Dysphagia, unspecified type  Plan:  -Stable  -Puree diet  -Speech therapy   -RD to follow while on TF  -TF Isosource 1.5 @ 65ml/hr continuous via J-port. Flush with at least 15-30 mL before and after med admin.  -HOB > 30 degrees during TF    (D62) Anemia due to blood loss, acute  Comment  Plan:  -Stable  -Continue Protonix 40 mg daily twice daily   -Check Hgb 2/14/22  -Monitor for bleeding    (M25.512) Acute pain of left  shoulder  Plan:   -New diagnosis  -Tylenol as needed for pain  -Start Voltaren gel twice daily      Electronically signed by: Rich Roper CNP

## 2022-02-10 ENCOUNTER — TRANSITIONAL CARE UNIT VISIT (OUTPATIENT)
Dept: GERIATRICS | Facility: CLINIC | Age: 72
End: 2022-02-10
Payer: COMMERCIAL

## 2022-02-10 VITALS
RESPIRATION RATE: 16 BRPM | HEIGHT: 68 IN | TEMPERATURE: 98.2 F | HEART RATE: 92 BPM | WEIGHT: 168.4 LBS | OXYGEN SATURATION: 97 % | SYSTOLIC BLOOD PRESSURE: 153 MMHG | DIASTOLIC BLOOD PRESSURE: 82 MMHG | BODY MASS INDEX: 25.52 KG/M2

## 2022-02-10 DIAGNOSIS — I61.9 RIGHT-SIDED NONTRAUMATIC INTRACEREBRAL HEMORRHAGE, UNSPECIFIED CEREBRAL LOCATION (H): Primary | ICD-10-CM

## 2022-02-10 DIAGNOSIS — D62 ANEMIA DUE TO BLOOD LOSS, ACUTE: ICD-10-CM

## 2022-02-10 DIAGNOSIS — R13.10 DYSPHAGIA, UNSPECIFIED TYPE: ICD-10-CM

## 2022-02-10 DIAGNOSIS — I10 PRIMARY HYPERTENSION: ICD-10-CM

## 2022-02-10 DIAGNOSIS — Z93.1 G TUBE FEEDINGS (H): ICD-10-CM

## 2022-02-10 DIAGNOSIS — M25.512 ACUTE PAIN OF LEFT SHOULDER: ICD-10-CM

## 2022-02-10 PROCEDURE — 99309 SBSQ NF CARE MODERATE MDM 30: CPT | Performed by: NURSE PRACTITIONER

## 2022-02-10 ASSESSMENT — MIFFLIN-ST. JEOR: SCORE: 1493.36

## 2022-02-10 NOTE — LETTER
"    2/10/2022        RE: Sukhi Johnson  [address]  [place] [address]        [facility] GERIATRICS    Chief Complaint   Patient presents with     RECHECK     HPI:  Sukhi Johnson is a 71 year old  (1950), who is being seen today for an episodic care visit at: [facility] AT [facility] ([facility]) [[id number]].     Brief Hospitalization Summary:  Sukhi Pereira was admitted to [facility] after being \"found down\" by his wife. Head CT found a 6.1 x 4.4 parenchymal hemorrhage in the right posterior frontoparietal region. Neurosurgery was consulted and it was determined that he was not a surgical candidate. His hospitalization was complicated by acute hypoxic respiratory failure secondary to suspected aspiration, hypernatremia, and suspected GI bleed.  CXR was negative and he was treated with IV Zosyn x 1 week. A PEG was placed and his hypernatremia resolved with free water flushes. GI was consulted for hematochezia. Conservative management was elected. A PPI was started and no procedures were done. The anemia resolved prior to hospital discharge.      1/18: New admit, Hgb down to 12.0 today  1/25: MD visit on 1/24, Hgb improved 13.1 today,  2/2: Hgb stable 12.9 on 1/31, working with therapies  2/10 Hgb stable 12.8, working with therapies    Today, nursing reported that Sukhi has had left shoulder pain.  They have been giving him Tylenol which helps.  When asked if he had pain, Sukhi pointed to his left shoulder. He attempts to speak with mumbling.    Allergies, and PMH/PSH reviewed in [facility] today.  REVIEW OF SYSTEMS:  Unobtainable secondary to aphasia.     Objective:   BP (!) 153/82   Pulse 92   Temp 98.2  F (36.8  C)   Resp 16   Ht 1.727 m (5' 8\")   Wt 76.4 kg (168 lb 6.4 oz)   SpO2 97%   BMI 25.61 kg/m        GENERAL APPEARANCE:  Alert, in no distress  ENT:  Mouth and posterior oropharynx normal, moist mucous membranes  RESP:  respiratory effort and palpation of chest " normal, lungs clear to auscultation , no respiratory distress  CV:  Palpation and auscultation of heart done , regular rate and rhythm, no murmur, rub, or gallop, no edema  ABDOMEN:  no guarding or rebound, bowel sounds normal  M/S:   no gross deformities, transfer with a mara lift  SKIN:  no rash  NEURO:   alert, moans when speaking, left hemiplegia  PSYCH:  calm, non verbal    Recent Results (from the past 240 hour(s))   Basic metabolic panel    Collection Time: 02/07/22  4:40 AM   Result Value Ref Range    Sodium 138 136 - 145 mmol/L    Potassium 4.0 3.5 - 5.0 mmol/L    Chloride 105 98 - 107 mmol/L    Carbon Dioxide (CO2) 21 (L) 22 - 31 mmol/L    Anion Gap 12 5 - 18 mmol/L    Urea Nitrogen 19 8 - 28 mg/dL    Creatinine 0.61 (L) 0.70 - 1.30 mg/dL    Calcium 8.9 8.5 - 10.5 mg/dL    Glucose 109 70 - 125 mg/dL    GFR Estimate >90 >60 mL/min/1.73m2   Hemoglobin    Collection Time: 02/07/22  4:40 AM   Result Value Ref Range    Hemoglobin 12.8 (L) 13.3 - 17.7 g/dL       Assessment/Plan:  (I61.9) Right-sided nontraumatic intracerebral hemorrhage, unspecified cerebral location (H)  (primary encounter diagnosis)  Plan:   -Stable  -Follow-up with neurology as directed per hospital discharge  -Heparin 5000 units every 12 hours for DVT prophylaxis  -Continue working with PT, OT and speech therapy  -Monitor neuro status    (I10) Primary hypertension  Plan:   -Established, well controlled  -Continue Vasotec 5 mg at bedtime  -Continue hydralazine 50 mg every 8 hours  -Continue metoprolol tartrate 50 mg twice daily  -Monitor BP and heart rate    (Z93.1) G tube feedings (H)  (R13.10) Dysphagia, unspecified type  Plan:  -Stable  -Puree diet  -Speech therapy   -RD to follow while on TF  -TF Isosource 1.5 @ 65ml/hr continuous via J-port. Flush with at least 15-30 mL before and after med admin.  -HOB > 30 degrees during TF    (D62) Anemia due to blood loss, acute  Comment  Plan:  -Stable  -Continue Protonix 40 mg daily twice  daily   -Check Hgb 2/14/22  -Monitor for bleeding    (M25.512) Acute pain of left shoulder  Plan:   -New diagnosis  -Tylenol as needed for pain  -Start Voltaren gel twice daily      Electronically signed by: Rich Roper CNP              Sincerely,        Rich Roper, CNP

## 2022-02-14 ENCOUNTER — TELEPHONE (OUTPATIENT)
Dept: GERIATRICS | Facility: CLINIC | Age: 72
End: 2022-02-14
Payer: COMMERCIAL

## 2022-02-14 ENCOUNTER — LAB REQUISITION (OUTPATIENT)
Dept: LAB | Facility: CLINIC | Age: 72
End: 2022-02-14
Payer: COMMERCIAL

## 2022-02-14 LAB — HGB BLD-MCNC: 13.6 G/DL (ref 13.3–17.7)

## 2022-02-14 PROCEDURE — 85018 HEMOGLOBIN: CPT | Mod: ORL | Performed by: FAMILY MEDICINE

## 2022-02-14 NOTE — TELEPHONE ENCOUNTER
[facility] NON-EMERGENT VOICEMAIL ENCOUNTER    Sukhi Johnson is a 71 year old  (1950), Voicemail Message received today regarding:   VM received on the after hours line that patient had a fall on 2/14 around 6:45 am.  Patient rolled out of bed.  No injuries noted.  Facility will continue to monitor per protocol.      Requests    FYI      Electronically signed by:   Danyell Holloway RN

## 2022-02-18 ENCOUNTER — DOCUMENTATION ONLY (OUTPATIENT)
Dept: OTHER | Facility: CLINIC | Age: 72
End: 2022-02-18
Payer: COMMERCIAL

## 2022-02-19 ENCOUNTER — TELEPHONE (OUTPATIENT)
Dept: GERIATRICS | Facility: CLINIC | Age: 72
End: 2022-02-19
Payer: COMMERCIAL

## 2022-02-21 ENCOUNTER — TELEPHONE (OUTPATIENT)
Dept: GERIATRICS | Facility: CLINIC | Age: 72
End: 2022-02-21
Payer: COMMERCIAL

## 2022-02-21 ENCOUNTER — LAB REQUISITION (OUTPATIENT)
Dept: LAB | Facility: CLINIC | Age: 72
End: 2022-02-21
Payer: COMMERCIAL

## 2022-02-21 DIAGNOSIS — N39.0 URINARY TRACT INFECTION, SITE NOT SPECIFIED: ICD-10-CM

## 2022-02-21 LAB
ALBUMIN UR-MCNC: 10 MG/DL
AMORPH CRY #/AREA URNS HPF: ABNORMAL /HPF
APPEARANCE UR: ABNORMAL
BACTERIA #/AREA URNS HPF: ABNORMAL /HPF
BILIRUB UR QL STRIP: NEGATIVE
COLOR UR AUTO: YELLOW
GLUCOSE UR STRIP-MCNC: NEGATIVE MG/DL
HGB UR QL STRIP: NEGATIVE
KETONES UR STRIP-MCNC: NEGATIVE MG/DL
LEUKOCYTE ESTERASE UR QL STRIP: NEGATIVE
MUCOUS THREADS #/AREA URNS LPF: PRESENT /LPF
NITRATE UR QL: NEGATIVE
PH UR STRIP: 7.5 [PH] (ref 5–7)
RBC URINE: 4 /HPF
SP GR UR STRIP: 1.02 (ref 1–1.03)
SQUAMOUS EPITHELIAL: 1 /HPF
UROBILINOGEN UR STRIP-MCNC: <2 MG/DL
WBC URINE: 2 /HPF

## 2022-02-21 PROCEDURE — 87086 URINE CULTURE/COLONY COUNT: CPT | Mod: ORL | Performed by: FAMILY MEDICINE

## 2022-02-21 PROCEDURE — 81001 URINALYSIS AUTO W/SCOPE: CPT | Mod: ORL | Performed by: FAMILY MEDICINE

## 2022-02-21 NOTE — PROGRESS NOTES
"[facility] GERIATRICS    Chief Complaint   Patient presents with     RECHECK     HPI:  Sukhi Johnson is a 71 year old  (1950), who is being seen today for an episodic care visit at: [facility] ([facility]) [[id number]].     Brief Hospitalization Summary:  Sukhi Pereira was admitted to [facility] after being \"found down\" by his wife. Head CT found a 6.1 x 4.4 parenchymal hemorrhage in the right posterior frontoparietal region. Neurosurgery was consulted and it was determined that he was not a surgical candidate. His hospitalization was complicated by acute hypoxic respiratory failure secondary to suspected aspiration, hypernatremia, and suspected GI bleed.  CXR was negative and he was treated with IV Zosyn x 1 week. A PEG was placed and his hypernatremia resolved with free water flushes. GI was consulted for hematochezia. Conservative management was elected. A PPI was started and no procedures were done. The anemia resolved prior to hospital discharge.      1/18: New admit, Hgb down to 12.0 today  1/25: MD visit on 1/24, Hgb improved 13.1 today,  2/2: Hgb stable 12.9 on 1/31, working with therapies  2/10 Hgb stable 12.8, working with therapies  2/22 multiple fall over the weekend. Hgb stable, UC pending    Sukhi fell multiple times over the weekend per nursing report. He attempted to ambulate but he is very weak. No reported injuries. UC is pending. He did say \"good\" today when asked how he was doing.     Allergies, and PMH/PSH reviewed in [facility] today.  REVIEW OF SYSTEMS:  Unobtainable secondary to aphasia.     Objective:   /74   Pulse 83   Temp 97  F (36.1  C)   Resp 16   Ht 1.727 m (5' 8\")   Wt 73.6 kg (162 lb 4.8 oz)   SpO2 93%   BMI 24.68 kg/m      GENERAL APPEARANCE:  Alert, in no distress  ENT:  Mouth and posterior oropharynx normal, moist mucous membranes  EYES:  EOM normal, conjunctiva and lids normal  RESP:  respiratory effort and palpation of " chest normal, lungs clear to auscultation , no respiratory distress  CV:  Palpation and auscultation of heart done , regular rate and rhythm, no murmur, rub, or gallop, no edema  ABDOMEN:  normal bowel sounds, soft, nontender, no hepatosplenomegaly or other masses  M/S:   Gait and station abnormal transfer with a mara lift  no gross deformities  SKIN:  no rash, warm and dry  NEURO:   alert, left hemiplegia  PSYCH:  calm, essentially non verbal    Recent Results (from the past 240 hour(s))   Hemoglobin    Collection Time: 02/14/22 11:00 AM   Result Value Ref Range    Hemoglobin 13.6 13.3 - 17.7 g/dL   UA with Microscopic    Collection Time: 02/21/22  5:55 PM   Result Value Ref Range    Color Urine Yellow Colorless, Straw, Light Yellow, Yellow    Appearance Urine Cloudy (A) Clear    Glucose Urine Negative Negative mg/dL    Bilirubin Urine Negative Negative    Ketones Urine Negative Negative mg/dL    Specific Gravity Urine 1.021 1.001 - 1.030    Blood Urine Negative Negative    pH Urine 7.5 (H) 5.0 - 7.0    Protein Albumin Urine 10  (A) Negative mg/dL    Urobilinogen Urine <2.0 <2.0 mg/dL    Nitrite Urine Negative Negative    Leukocyte Esterase Urine Negative Negative    Bacteria Urine Few (A) None Seen /HPF    Mucus Urine Present (A) None Seen /LPF    Amorphous Crystals Urine Moderate (A) None Seen /HPF    RBC Urine 4 (H) <=2 /HPF    WBC Urine 2 <=5 /HPF    Squamous Epithelials Urine 1 <=1 /HPF   Basic metabolic panel    Collection Time: 02/22/22  6:50 AM   Result Value Ref Range    Sodium 139 136 - 145 mmol/L    Potassium 4.3 3.5 - 5.0 mmol/L    Chloride 106 98 - 107 mmol/L    Carbon Dioxide (CO2) 22 22 - 31 mmol/L    Anion Gap 11 5 - 18 mmol/L    Urea Nitrogen 21 8 - 28 mg/dL    Creatinine 0.61 (L) 0.70 - 1.30 mg/dL    Calcium 8.4 (L) 8.5 - 10.5 mg/dL    Glucose 117 70 - 125 mg/dL    GFR Estimate >90 >60 mL/min/1.73m2   CBC with platelets    Collection Time: 02/22/22  6:50 AM   Result Value Ref Range    WBC Count  6.2 4.0 - 11.0 10e3/uL    RBC Count 4.23 (L) 4.40 - 5.90 10e6/uL    Hemoglobin 12.9 (L) 13.3 - 17.7 g/dL    Hematocrit 41.0 40.0 - 53.0 %    MCV 97 78 - 100 fL    MCH 30.5 26.5 - 33.0 pg    MCHC 31.5 31.5 - 36.5 g/dL    RDW 13.3 10.0 - 15.0 %    Platelet Count 234 150 - 450 10e3/uL       Assessment/Plan:  (I61.9) Right-sided nontraumatic intracerebral hemorrhage, unspecified cerebral location (H)  (primary encounter diagnosis)  Plan:   -Stable  -Transfers with a mara lift  -Continue working with therapies    (I10) Primary hypertension  Plan:   -Established, well controlled  -Continue Vasotec 5 mg at bedtime  -Continue hydralazine 50 mg every 8 hours  -Continue metoprolol tartrate 50 mg twice daily  -Monitor BP and heart rate    (D62) Anemia due to blood loss, acute  Plan:   -Stable, no acute bleeding  -Check Hgb on 2/29/22    (W19.XXXS) Fall, sequela  Plan:   -CBC without leukocytosis, BMP unremarkable  -UC pending  -Fall prevention strategies call light within reach, adequate lighting, , etc.      Electronically signed by: Rich Roper CNP

## 2022-02-21 NOTE — TELEPHONE ENCOUNTER
[facility] Triage Nurse Telephone Encounter    Provider: KALI Kinney CNP  Facility: [facility] Facility Type:  TCU    Caller: Bartolo   Call Back Number: [phone]    Allergies:  No Known Allergies     Reason for call:   Nursing is calling to request labs on the pt as the pt has had a few falls, the pt is a total lift and he was trying to get out of bed and walk. Pt's VS stable, no urinary symptoms at this time. Nursing is reporting some slight confusion. No other symptoms      Verbal Order/Direction given by Provider: BMP, CBC and UA/UC    Provider giving Order:  KALI Kinney CNP    Verbal Order given to: Bartolo Miranda RN

## 2022-02-22 ENCOUNTER — LAB REQUISITION (OUTPATIENT)
Dept: LAB | Facility: CLINIC | Age: 72
End: 2022-02-22
Payer: COMMERCIAL

## 2022-02-22 ENCOUNTER — TRANSITIONAL CARE UNIT VISIT (OUTPATIENT)
Dept: GERIATRICS | Facility: CLINIC | Age: 72
End: 2022-02-22
Payer: COMMERCIAL

## 2022-02-22 VITALS
WEIGHT: 162.3 LBS | RESPIRATION RATE: 16 BRPM | TEMPERATURE: 97 F | SYSTOLIC BLOOD PRESSURE: 123 MMHG | OXYGEN SATURATION: 93 % | HEART RATE: 83 BPM | HEIGHT: 68 IN | DIASTOLIC BLOOD PRESSURE: 74 MMHG | BODY MASS INDEX: 24.6 KG/M2

## 2022-02-22 DIAGNOSIS — I61.9 RIGHT-SIDED NONTRAUMATIC INTRACEREBRAL HEMORRHAGE, UNSPECIFIED CEREBRAL LOCATION (H): Primary | ICD-10-CM

## 2022-02-22 DIAGNOSIS — I10 PRIMARY HYPERTENSION: ICD-10-CM

## 2022-02-22 DIAGNOSIS — I10 ESSENTIAL (PRIMARY) HYPERTENSION: ICD-10-CM

## 2022-02-22 DIAGNOSIS — D62 ANEMIA DUE TO BLOOD LOSS, ACUTE: ICD-10-CM
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
## 2022-02-22 LAB
ANION GAP SERPL CALCULATED.3IONS-SCNC: 11 MMOL/L (ref 5–18)
BUN SERPL-MCNC: 21 MG/DL (ref 8–28)
CALCIUM SERPL-MCNC: 8.4 MG/DL (ref 8.5–10.5)
CHLORIDE BLD-SCNC: 106 MMOL/L (ref 98–107)
CO2 SERPL-SCNC: 22 MMOL/L (ref 22–31)
CREAT SERPL-MCNC: 0.61 MG/DL (ref 0.7–1.3)
ERYTHROCYTE [DISTWIDTH] IN BLOOD BY AUTOMATED COUNT: 13.3 % (ref 10–15)
GFR SERPL CREATININE-BSD FRML MDRD: >90 ML/MIN/1.73M2
GLUCOSE BLD-MCNC: 117 MG/DL (ref 70–125)
HCT VFR BLD AUTO: 41 % (ref 40–53)
HGB BLD-MCNC: 12.9 G/DL (ref 13.3–17.7)
MCH RBC QN AUTO: 30.5 PG (ref 26.5–33)
MCHC RBC AUTO-ENTMCNC: 31.5 G/DL (ref 31.5–36.5)
MCV RBC AUTO: 97 FL (ref 78–100)
PLATELET # BLD AUTO: 234 10E3/UL (ref 150–450)
POTASSIUM BLD-SCNC: 4.3 MMOL/L (ref 3.5–5)
RBC # BLD AUTO: 4.23 10E6/UL (ref 4.4–5.9)
SODIUM SERPL-SCNC: 139 MMOL/L (ref 136–145)
WBC # BLD AUTO: 6.2 10E3/UL (ref 4–11)

## 2022-02-22 PROCEDURE — P9603 ONE-WAY ALLOW PRORATED MILES: HCPCS | Mod: ORL | Performed by: FAMILY MEDICINE

## 2022-02-22 PROCEDURE — 85027 COMPLETE CBC AUTOMATED: CPT | Mod: ORL | Performed by: FAMILY MEDICINE

## 2022-02-22 PROCEDURE — 80048 BASIC METABOLIC PNL TOTAL CA: CPT | Mod: ORL | Performed by: FAMILY MEDICINE

## 2022-02-22 PROCEDURE — 36415 COLL VENOUS BLD VENIPUNCTURE: CPT | Mod: ORL | Performed by: FAMILY MEDICINE

## 2022-02-22 PROCEDURE — 99309 SBSQ NF CARE MODERATE MDM 30: CPT | Performed by: NURSE PRACTITIONER

## 2022-02-22 NOTE — LETTER
"    2/22/2022        RE: Sukhi Johnson  [address]  [place] [address]        [facility] GERIATRICS    Chief Complaint   Patient presents with     RECHECK     HPI:  Sukhi Johnson is a 71 year old  (1950), who is being seen today for an episodic care visit at: [facility] AT [facility] ([facility]) [[id number]].     Brief Hospitalization Summary:  Sukhi Pereira was admitted to [facility] after being \"found down\" by his wife. Head CT found a 6.1 x 4.4 parenchymal hemorrhage in the right posterior frontoparietal region. Neurosurgery was consulted and it was determined that he was not a surgical candidate. His hospitalization was complicated by acute hypoxic respiratory failure secondary to suspected aspiration, hypernatremia, and suspected GI bleed.  CXR was negative and he was treated with IV Zosyn x 1 week. A PEG was placed and his hypernatremia resolved with free water flushes. GI was consulted for hematochezia. Conservative management was elected. A PPI was started and no procedures were done. The anemia resolved prior to hospital discharge.      1/18: New admit, Hgb down to 12.0 today  1/25: MD visit on 1/24, Hgb improved 13.1 today,  2/2: Hgb stable 12.9 on 1/31, working with therapies  2/10 Hgb stable 12.8, working with therapies  2/22 multiple fall over the weekend. Hgb stable, UC pending    Sukhi fell multiple times over the weekend per nursing report. He attempted to ambulate but he is very weak. No reported injuries. UC is pending. He did say \"good\" today when asked how he was doing.     Allergies, and PMH/PSH reviewed in [facility] today.  REVIEW OF SYSTEMS:  Unobtainable secondary to aphasia.     Objective:   /74   Pulse 83   Temp 97  F (36.1  C)   Resp 16   Ht 1.727 m (5' 8\")   Wt 73.6 kg (162 lb 4.8 oz)   SpO2 93%   BMI 24.68 kg/m      GENERAL APPEARANCE:  Alert, in no distress  ENT:  Mouth and posterior oropharynx normal, moist mucous membranes  EYES:  " EOM normal, conjunctiva and lids normal  RESP:  respiratory effort and palpation of chest normal, lungs clear to auscultation , no respiratory distress  CV:  Palpation and auscultation of heart done , regular rate and rhythm, no murmur, rub, or gallop, no edema  ABDOMEN:  normal bowel sounds, soft, nontender, no hepatosplenomegaly or other masses  M/S:   Gait and station abnormal transfer with a mara lift  no gross deformities  SKIN:  no rash, warm and dry  NEURO:   alert, left hemiplegia  PSYCH:  calm, essentially non verbal    Recent Results (from the past 240 hour(s))   Hemoglobin    Collection Time: 02/14/22 11:00 AM   Result Value Ref Range    Hemoglobin 13.6 13.3 - 17.7 g/dL   UA with Microscopic    Collection Time: 02/21/22  5:55 PM   Result Value Ref Range    Color Urine Yellow Colorless, Straw, Light Yellow, Yellow    Appearance Urine Cloudy (A) Clear    Glucose Urine Negative Negative mg/dL    Bilirubin Urine Negative Negative    Ketones Urine Negative Negative mg/dL    Specific Gravity Urine 1.021 1.001 - 1.030    Blood Urine Negative Negative    pH Urine 7.5 (H) 5.0 - 7.0    Protein Albumin Urine 10  (A) Negative mg/dL    Urobilinogen Urine <2.0 <2.0 mg/dL    Nitrite Urine Negative Negative    Leukocyte Esterase Urine Negative Negative    Bacteria Urine Few (A) None Seen /HPF    Mucus Urine Present (A) None Seen /LPF    Amorphous Crystals Urine Moderate (A) None Seen /HPF    RBC Urine 4 (H) <=2 /HPF    WBC Urine 2 <=5 /HPF    Squamous Epithelials Urine 1 <=1 /HPF   Basic metabolic panel    Collection Time: 02/22/22  6:50 AM   Result Value Ref Range    Sodium 139 136 - 145 mmol/L    Potassium 4.3 3.5 - 5.0 mmol/L    Chloride 106 98 - 107 mmol/L    Carbon Dioxide (CO2) 22 22 - 31 mmol/L    Anion Gap 11 5 - 18 mmol/L    Urea Nitrogen 21 8 - 28 mg/dL    Creatinine 0.61 (L) 0.70 - 1.30 mg/dL    Calcium 8.4 (L) 8.5 - 10.5 mg/dL    Glucose 117 70 - 125 mg/dL    GFR Estimate >90 >60 mL/min/1.73m2   CBC with  platelets    Collection Time: 02/22/22  6:50 AM   Result Value Ref Range    WBC Count 6.2 4.0 - 11.0 10e3/uL    RBC Count 4.23 (L) 4.40 - 5.90 10e6/uL    Hemoglobin 12.9 (L) 13.3 - 17.7 g/dL    Hematocrit 41.0 40.0 - 53.0 %    MCV 97 78 - 100 fL    MCH 30.5 26.5 - 33.0 pg    MCHC 31.5 31.5 - 36.5 g/dL    RDW 13.3 10.0 - 15.0 %    Platelet Count 234 150 - 450 10e3/uL       Assessment/Plan:  (I61.9) Right-sided nontraumatic intracerebral hemorrhage, unspecified cerebral location (H)  (primary encounter diagnosis)  Plan:   -Stable  -Transfers with a mara lift  -Continue working with therapies    (I10) Primary hypertension  Plan:   -Established, well controlled  -Continue Vasotec 5 mg at bedtime  -Continue hydralazine 50 mg every 8 hours  -Continue metoprolol tartrate 50 mg twice daily  -Monitor BP and heart rate    (D62) Anemia due to blood loss, acute  Plan:   -Stable, no acute bleeding  -Check Hgb on 2/29/22    (W19.XXXS) Fall, sequela  Plan:   -CBC without leukocytosis, BMP unremarkable  -UC pending  -Fall prevention strategies call light within reach, adequate lighting, , etc.      Electronically signed by: Rich Roper, JERMAINE              Sincerely,        Rich Roper, CNP

## 2022-02-23 LAB — BACTERIA UR CULT: NORMAL

## 2022-03-01 ENCOUNTER — LAB REQUISITION (OUTPATIENT)
Dept: LAB | Facility: CLINIC | Age: 72
End: 2022-03-01
Payer: COMMERCIAL

## 2022-03-01 DIAGNOSIS — D50.9 IRON DEFICIENCY ANEMIA, UNSPECIFIED: ICD-10-CM

## 2022-03-01 LAB — HGB BLD-MCNC: 12.8 G/DL (ref 13.3–17.7)

## 2022-03-01 PROCEDURE — 85018 HEMOGLOBIN: CPT | Mod: ORL | Performed by: NURSE PRACTITIONER

## 2022-03-04 ENCOUNTER — TELEPHONE (OUTPATIENT)
Dept: GERIATRICS | Facility: CLINIC | Age: 72
End: 2022-03-04
Payer: COMMERCIAL

## 2022-03-05 NOTE — TELEPHONE ENCOUNTER
[facility] G[facility]IATRIC SERVICES TELEPHONE ENCOUNTER    Chief Complaint   Patient presents with     peg tube concern       Sukhi Johnson is a 71 year old  (1950),Nurse called today to report: peg tube concern. Per Registered Nurse she was about to connect TF for overnight but then noticed that his J tube has been clogged, unknown how long it was clogged for, but staff are assuming most of the day. Per Registered Nurse, TF is via J tube and medications are given through G tube. Registered Nurse has been attempting to unclog the J tube since 1800 with pepsi with no relief. Registered Nurse attempted to declog once more now and to flush both lumens of peg however while she was flushing with water she heard a loud pop and noticed that the peg tube balloon was outside of the body, however peg tube lumen device still appears to remain at insertion site. Immediately after balloon was dislodged the peg tube started to drain around insertion site. Current TF orders was: 85ml/hr for 14 hours overnight.     Per chart review, peg tube was placed on 1/5/22 for severe malnutrition at that time. However per Registered Nurse his oral intake his improved. Dietician recommends to have patient be on NDD2 diet with NTL which he has been allowing and consuming 100% of all meals. Family and patient is hoping to not have peg tube for long term      ASSESSMENT/PLAN      Continue oral diet per dietician of NDD2 with NTL. Due to patient now eating 100% of all meals    HOLD tube feeding at this time for now.     OK to crush medications and given orally at this time    Apply pressure dressing to peg tube site to keep intact for now.     Contact IR on Monday for possible replacement needs if warranted, otherwise may recommend to continue without peg tube due to improvement of oral intake needs    Dietician to remain involved    Rounding PCP to follow up when on site    Electronically signed by:   KALI Jung CNP

## 2022-03-07 ENCOUNTER — TELEPHONE (OUTPATIENT)
Dept: GERIATRICS | Facility: CLINIC | Age: 72
End: 2022-03-07
Payer: COMMERCIAL

## 2022-03-07 NOTE — TELEPHONE ENCOUNTER
[facility] Geriatrics Triage Nurse Telephone Encounter    Provider: KALI Kinney CNP  Facility: [facility]              Facility Type:  TCU    Caller: Zaina  Call Back Number: [phone]    Allergies:  No Known Allergies     Reason for call:  Fri 3/4 G/J clogged, then balloon popped & came out. On call ordered Hold TF, meds oral, call IR today to arrange new GJ tube. Pt is on TF 85cc/hr X 14hr daily. Admit wt 1/17/22 was 170.2#, today wt 156.1#. Pt is working with ST & on honey thick liquids & consuming 25-75% of meals.    Verbal Order/Direction given by Provider: Call IR & arrange for new GJ tube.    Provider giving Order:  KALI Kinney CNP    Verbal Order given to: Zaina Gould RN

## 2022-03-08 ENCOUNTER — MEDICAL CORRESPONDENCE (OUTPATIENT)
Dept: HEALTH INFORMATION MANAGEMENT | Facility: CLINIC | Age: 72
End: 2022-03-08
Payer: COMMERCIAL

## 2022-03-09 ENCOUNTER — HOSPITAL ENCOUNTER (OUTPATIENT)
Dept: INTERVENTIONAL RADIOLOGY/VASCULAR | Facility: HOSPITAL | Age: 72
Discharge: HOME | End: 2022-03-09
Attending: RADIOLOGY | Admitting: RADIOLOGY
Payer: COMMERCIAL

## 2022-03-09 DIAGNOSIS — E46 MALNUTRITION (H): ICD-10-CM

## 2022-03-09 PROCEDURE — C1769 GUIDE WIRE: HCPCS

## 2022-03-09 PROCEDURE — 49452 REPLACE G-J TUBE PERC: CPT

## 2022-03-09 PROCEDURE — 272N000117 HC CATH CR2

## 2022-03-09 PROCEDURE — 255N000002 HC RX 255 OP 636: Performed by: RADIOLOGY

## 2022-03-09 PROCEDURE — 272N000583 ZZ HC TUBE GASTRO CR12

## 2022-03-09 RX ADMIN — IOHEXOL 20 ML: 350 INJECTION, SOLUTION INTRAVENOUS at 14:24

## 2022-03-09 NOTE — PROGRESS NOTES
"[facility] GERIATRICS    Chief Complaint   Patient presents with     RECHECK     HPI:  Sukhi Johnson is a 71 year old  (1950), who is being seen today for an episodic care visit at: No question data found..     Brief Hospitalization Summary:  Sukhi Pereira was admitted to [facility] after being \"found down\" by his wife. Head CT found a 6.1 x 4.4 parenchymal hemorrhage in the right posterior frontoparietal region. Neurosurgery was consulted and it was determined that he was not a surgical candidate. His hospitalization was complicated by acute hypoxic respiratory failure secondary to suspected aspiration, hypernatremia, and suspected GI bleed.  CXR was negative and he was treated with IV Zosyn x 1 week. A PEG was placed and his hypernatremia resolved with free water flushes. GI was consulted for hematochezia. Conservative management was elected. A PPI was started and no procedures were done. The anemia resolved prior to hospital discharge.      1/18: New admit, Hgb down to 12.0 today  1/25: MD visit on 1/24, Hgb improved 13.1 today,  2/2: Hgb stable 12.9 on 1/31, working with therapies  2/10 Hgb stable 12.8, working with therapies  2/22 multiple fall over the weekend. Hgb stable, UC pending  3/10 working with therapies, feeding tube replaced IR yesterday    Sukhi's feeding tube came out over the weekend it was replaced IR yesterday.  No acute concerns from nursing.  He is able to now speak 3 word at a time    Allergies, and PMH/PSH reviewed in [facility] today.  REVIEW OF SYSTEMS:  Unobtainable secondary to aphasia.     Objective:   /73   Pulse 90   Temp 98.3  F (36.8  C)   Resp 19   Ht 1.727 m (5' 8\")   Wt 71.2 kg (157 lb)   SpO2 92%   BMI 23.87 kg/m       GENERAL APPEARANCE:  Alert, in no distress  ENT:  Mouth and posterior oropharynx normal, moist mucous membranes  RESP:  respiratory effort and palpation of chest normal, lungs clear to auscultation , no " respiratory distress  CV:  Palpation and auscultation of heart done , regular rate and rhythm, no murmur, rub, or gallop, no edema  ABDOMEN:  normal bowel sounds, soft, nontender, no hepatosplenomegaly or other masses  M/S:   transfer with a mara lift, no gross deformities  SKIN:  no rash  NEURO:   alert, clear speech, left upper and lower extremity flaccid    Recent labs in [facility] reviewed by me today.     Assessment/Plan:    (I61.9) Right-sided nontraumatic intracerebral hemorrhage, unspecified cerebral location (H)  (primary encounter diagnosis)  (I10) Primary hypertension  Plan:   -Blood pressure well controlled, continue with therapies  -Continue current medication  -Follow-up with neurologist as directed    (D62) Anemia due to blood loss, acute  Plan:   -Stable  -Check hemoglobin 3/11/22      (Z93.1) G tube feedings (H)  (R13.10) Dysphagia, unspecified type  Plan:   -Mechanical chopped diet with thickened liquids  -Feeding tube was dislodged over the week.  IR replaced feeding tube 3/9/22  -Isosource 1.5 at 75 mg/h for 12 hours    Electronically signed by: Rich Roper, CNP

## 2022-03-10 ENCOUNTER — TRANSITIONAL CARE UNIT VISIT (OUTPATIENT)
Dept: GERIATRICS | Facility: CLINIC | Age: 72
End: 2022-03-10
Payer: MEDICARE

## 2022-03-10 VITALS
RESPIRATION RATE: 19 BRPM | BODY MASS INDEX: 23.79 KG/M2 | HEIGHT: 68 IN | HEART RATE: 90 BPM | DIASTOLIC BLOOD PRESSURE: 73 MMHG | TEMPERATURE: 98.3 F | WEIGHT: 157 LBS | SYSTOLIC BLOOD PRESSURE: 131 MMHG | OXYGEN SATURATION: 92 %

## 2022-03-10 DIAGNOSIS — Z93.1 G TUBE FEEDINGS (H): ICD-10-CM

## 2022-03-10 DIAGNOSIS — I10 PRIMARY HYPERTENSION: ICD-10-CM

## 2022-03-10 DIAGNOSIS — I61.9 RIGHT-SIDED NONTRAUMATIC INTRACEREBRAL HEMORRHAGE, UNSPECIFIED CEREBRAL LOCATION (H): Primary | ICD-10-CM

## 2022-03-10 DIAGNOSIS — R13.10 DYSPHAGIA, UNSPECIFIED TYPE: ICD-10-CM

## 2022-03-10 DIAGNOSIS — D62 ANEMIA DUE TO BLOOD LOSS, ACUTE: ICD-10-CM

## 2022-03-10 PROCEDURE — 99308 SBSQ NF CARE LOW MDM 20: CPT | Performed by: NURSE PRACTITIONER

## 2022-03-10 NOTE — LETTER
"    3/10/2022        RE: Sukhi Johnson  [address]  [place] [address]        [facility] GERIATRICS    Chief Complaint   Patient presents with     RECHECK     HPI:  Sukhi Johnson is a 71 year old  (1950), who is being seen today for an episodic care visit at: No question data found..     Brief Hospitalization Summary:  Sukhi Pereria was admitted to [facility] after being \"found down\" by his wife. Head CT found a 6.1 x 4.4 parenchymal hemorrhage in the right posterior frontoparietal region. Neurosurgery was consulted and it was determined that he was not a surgical candidate. His hospitalization was complicated by acute hypoxic respiratory failure secondary to suspected aspiration, hypernatremia, and suspected GI bleed.  CXR was negative and he was treated with IV Zosyn x 1 week. A PEG was placed and his hypernatremia resolved with free water flushes. GI was consulted for hematochezia. Conservative management was elected. A PPI was started and no procedures were done. The anemia resolved prior to hospital discharge.      1/18: New admit, Hgb down to 12.0 today  1/25: MD visit on 1/24, Hgb improved 13.1 today,  2/2: Hgb stable 12.9 on 1/31, working with therapies  2/10 Hgb stable 12.8, working with therapies  2/22 multiple fall over the weekend. Hgb stable, UC pending  3/10 working with therapies, feeding tube replaced IR yesterday    Sukhi's feeding tube came out over the weekend it was replaced IR yesterday.  No acute concerns from nursing.  He is able to now speak 3 word at a time    Allergies, and PMH/PSH reviewed in [facility] today.  REVIEW OF SYSTEMS:  Unobtainable secondary to aphasia.     Objective:   /73   Pulse 90   Temp 98.3  F (36.8  C)   Resp 19   Ht 1.727 m (5' 8\")   Wt 71.2 kg (157 lb)   SpO2 92%   BMI 23.87 kg/m       GENERAL APPEARANCE:  Alert, in no distress  ENT:  Mouth and posterior oropharynx normal, moist mucous membranes  RESP:  " respiratory effort and palpation of chest normal, lungs clear to auscultation , no respiratory distress  CV:  Palpation and auscultation of heart done , regular rate and rhythm, no murmur, rub, or gallop, no edema  ABDOMEN:  normal bowel sounds, soft, nontender, no hepatosplenomegaly or other masses  M/S:   transfer with a mara lift, no gross deformities  SKIN:  no rash  NEURO:   alert, clear speech, left upper and lower extremity flaccid    Recent labs in [facility] reviewed by me today.     Assessment/Plan:    (I61.9) Right-sided nontraumatic intracerebral hemorrhage, unspecified cerebral location (H)  (primary encounter diagnosis)  (I10) Primary hypertension  Plan:   -Blood pressure well controlled, continue with therapies  -Continue current medication  -Follow-up with neurologist as directed    (D62) Anemia due to blood loss, acute  Plan:   -Stable  -Check hemoglobin 3/11/22      (Z93.1) G tube feedings (H)  (R13.10) Dysphagia, unspecified type  Plan:   -Mechanical chopped diet with thickened liquids  -Feeding tube was dislodged over the week.  IR replaced feeding tube 3/9/22  -Isosource 1.5 at 75 mg/h for 12 hours    Electronically signed by: Rich Roper, JERMAINE              Sincerely,        Rich Roper, CNP

## 2022-03-11 ENCOUNTER — LAB REQUISITION (OUTPATIENT)
Dept: LAB | Facility: CLINIC | Age: 72
End: 2022-03-11
Payer: COMMERCIAL

## 2022-03-11 DIAGNOSIS — D50.9 IRON DEFICIENCY ANEMIA, UNSPECIFIED: ICD-10-CM

## 2022-03-11 LAB
HGB BLD-MCNC: 13.2 G/DL (ref 13.3–17.7)
HGB BLD-MCNC: 13.3 G/DL (ref 13.3–17.7)

## 2022-03-11 PROCEDURE — 85018 HEMOGLOBIN: CPT | Mod: ORL | Performed by: NURSE PRACTITIONER

## 2022-03-22 ENCOUNTER — HOSPITAL ENCOUNTER (OUTPATIENT)
Dept: MRI IMAGING | Facility: CLINIC | Age: 72
Discharge: HOME | End: 2022-03-22
Attending: PHYSICIAN ASSISTANT | Admitting: PHYSICIAN ASSISTANT
Payer: COMMERCIAL

## 2022-03-22 DIAGNOSIS — I61.9 RIGHT-SIDED NONTRAUMATIC INTRACEREBRAL HEMORRHAGE, UNSPECIFIED CEREBRAL LOCATION (H): ICD-10-CM

## 2022-03-22 PROCEDURE — 70553 MRI BRAIN STEM W/O & W/DYE: CPT

## 2022-03-22 PROCEDURE — 70553 MRI BRAIN STEM W/O & W/DYE: CPT | Mod: 26 | Performed by: RADIOLOGY

## 2022-03-22 PROCEDURE — 255N000002 HC RX 255 OP 636: Performed by: PHYSICIAN ASSISTANT

## 2022-03-22 PROCEDURE — A9585 GADOBUTROL INJECTION: HCPCS | Performed by: PHYSICIAN ASSISTANT

## 2022-03-22 RX ORDER — GADOBUTROL 604.72 MG/ML
7.5 INJECTION INTRAVENOUS ONCE
Status: COMPLETED | OUTPATIENT
Start: 2022-03-22 | End: 2022-03-22

## 2022-03-22 RX ADMIN — GADOBUTROL 7.5 ML: 604.72 INJECTION INTRAVENOUS at 16:29

## 2022-03-25 ENCOUNTER — TELEPHONE (OUTPATIENT)
Dept: NEUROLOGY | Facility: CLINIC | Age: 72
End: 2022-03-25
Payer: COMMERCIAL

## 2022-03-25 NOTE — TELEPHONE ENCOUNTER
"Per the inpatient stroke neurology note form Donna SON, \"Patient Follow-up    -Follow up with PCP post discharge  -Follow up with Stroke Neurology as outpatient in 6-10 weeks [next available appointment, referral placed]\"    I do not see a formal neurology referral placed for general neurology, so assuming this patient was intended to follow up with APPs and our care coordination team was just not contacted.    Routing to APPs, should pt be placed in a TIA slot if available since he is now 3 months post discharge and MRI was completed. Looks like pt is residing at [facility].     Cathy Peña BS, RN, SCRN  RN Stroke Neurology Care Coordinator  [facility] Neuroscience Service Line    "

## 2022-03-25 NOTE — TELEPHONE ENCOUNTER
[facility] Call Center    Phone Message    May a detailed message be left on voicemail: yes     Reason for Call: Other: Andressa calling in requesting to discuss the results for the MRI further and discuss patients plan of care. Please call back to discuss further thank you      Action Taken: Message routed to:  [facility] neurology    Travel Screening: Not Applicable

## 2022-03-25 NOTE — TELEPHONE ENCOUNTER
TCU will be difficult for a video visit, but if they can do Monday at 2pm then he can be scheduled with Torrie (jl) then

## 2022-03-28 NOTE — TELEPHONE ENCOUNTER
Spoke to vicki- Declined scheduling prior to a touch base regarding overall diagnosis and interpretation of  imaging- She states she would like to be given a heads up before visit to be able to prepare patient, as he is very sensitive. Routing to Stroke RNCC and STROKE AME team, No TIA in workqueue offered TIA slot tomorrow.    REBECCA MONTEJO, CMA

## 2022-03-28 NOTE — TELEPHONE ENCOUNTER
Received and reviewed by RNCC. Given new findings on MRI, should be discussed by provider directly. Unclear what info pt's spouse is requesting prior to appointment as the appointment is intended for discussion of pt's current health state and the MRI results.     Routing to APPs, please review and reach out to pt's spouse if indicated. Otherwise, Inga can you please contact her and explain that it is advised they have a formal appointment scheduled with the stroke provider to discuss the pt's MRI results and diagnosis instead of an informal phone call prior to the appointment as all the questions she has are likely to be answered during the appointment.    Cathy Peña BS, RN, SCRN  RN Stroke Neurology Care Coordinator  [facility] Neuroscience Service Line

## 2022-03-28 NOTE — TELEPHONE ENCOUNTER
D/w Dr. Hein. This cannot be all sorted out over the phone. MRI shows improvement of area of bleed but there is a new finding. Pt needs to be scheduled for in-person neuro exam per Dr. Hein. Miladis did not answer, LMOM, will try again tomorrow.

## 2022-03-29 NOTE — TELEPHONE ENCOUNTER
Our general neurology providers are booked out into July in [place].    Maritza Johnson, RN   Neurology Care Coordinator

## 2022-03-29 NOTE — TELEPHONE ENCOUNTER
Called and left nonspecific VM for pt's spouse. Contacted TCU and confirmed with them that they need to organize medical transport for in-person appointments.     Confirmed with Carmen SON, okay for pt to see general neuro if they are able to see the pt sooner. Recommended within the next 2-3 weeks.     Noted pt is currently at [facility] in [place]. Routing to [place] (intended for neurology), [facility], and [place]. Between the 3 locations is there a neurology provider who can the see pt within the next 2-3 weeks for an in-person appointment?    Cathy Peña BS, RN, SCRN  RN Stroke Neurology Care Coordinator  [facility] Neuroscience Service Line

## 2022-03-29 NOTE — TELEPHONE ENCOUNTER
Spoke to Miladis just now. Explained new pachymeningeal enhancement deserving of formal in-person eval.  She is agreeable to in-person general neuro visit. She hopes transportation can be set up through medicare. This Friday does not work for her, in case there's a cancellation then. I hope we can get him wait listed for an urgent visit with any general neurologist at any site next week.   Thank you  Carmen

## 2022-03-29 NOTE — TELEPHONE ENCOUNTER
Per Phylicia Mejía, RN: Next available appt with general neurology at the [facility] is July/August.    Noted that next available at [place] is July.    Routing to [place], is there any way to have this patient seen by general neurology in the next 2-3 weeks?    Cathy Peña BS, RN, SCRN  RN Stroke Neurology Care Coordinator  [facility] Neuroscience Service Line

## 2022-03-30 ENCOUNTER — TELEPHONE (OUTPATIENT)
Dept: NEUROLOGY | Facility: CLINIC | Age: 72
End: 2022-03-30
Payer: COMMERCIAL

## 2022-03-30 NOTE — TELEPHONE ENCOUNTER
Spoke with pt's wife, Andressa, and let her know that we are able to fit pt in on 4/8/22. She was agreeable to this date.     Shanice Katz RN on 3/30/2022 at 2:30 PM

## 2022-03-30 NOTE — TELEPHONE ENCOUNTER
Contacted both [facility] and [facility]. Neither have appointment availability until end of April, early May.    Was able to coordinate with [facility] to have pt added on for 4/8/22 at 11:10am with Dr. Santos. CRISTOBAL Prasad will be contacting the pt's wife to update her on appointment and I will contact the pt's TCU.     Spoke with Yesi at the TCU and confirmed appointment information. They will coordinate medical transport for him.     Cathy DRAPER, RN, SCRN  RN Stroke Neurology Care Coordinator  [facility] Neuroscience Service Line

## 2022-03-30 NOTE — TELEPHONE ENCOUNTER
[facility] has no available appointments until July. Contacted CRISTOBAL Prasad at [facility] to request if a provider could review the pt and add them on to their schedule. Will await response from her    Will also reach out to pt's spouse Miladis regarding possibly having the pt seen by an outside neurology clinic for the sake of expedited in-person evaluation.     Cathy DRAPER, RN, SCRN  RN Stroke Neurology Care Coordinator  [facility] Neuroscience Service Line

## 2022-04-08 ENCOUNTER — OFFICE VISIT (OUTPATIENT)
Dept: NEUROLOGY | Facility: CLINIC | Age: 72
End: 2022-04-08
Payer: COMMERCIAL

## 2022-04-08 VITALS
HEART RATE: 81 BPM | HEIGHT: 68 IN | WEIGHT: 157 LBS | BODY MASS INDEX: 23.79 KG/M2 | DIASTOLIC BLOOD PRESSURE: 68 MMHG | SYSTOLIC BLOOD PRESSURE: 104 MMHG

## 2022-04-08 DIAGNOSIS — R93.89 ABNORMAL MRI: ICD-10-CM

## 2022-04-08 DIAGNOSIS — I61.9 RIGHT-SIDED NONTRAUMATIC INTRACEREBRAL HEMORRHAGE, UNSPECIFIED CEREBRAL LOCATION (H): Primary | ICD-10-CM

## 2022-04-08 PROCEDURE — 99215 OFFICE O/P EST HI 40 MIN: CPT | Performed by: PSYCHIATRY & NEUROLOGY

## 2022-04-08 NOTE — NURSING NOTE
Chief Complaint   Patient presents with     New Patient     stroke     Kati Romero MA on 4/8/2022 at 10:51 AM

## 2022-04-08 NOTE — PROGRESS NOTES
NEUROLOGY OUTPATIENT CONSULT NOTE   Apr 8, 2022     CHIEF COMPLAINT/REASON FOR VISIT/REASON FOR CONSULT  Patient presents with:  New Patient: stroke    REASON FOR CONSULTATION- Stroke, abnormal MRI    REFERRAL SOURCE  Dr. Zaina Schneider  CC Dr. Zaina Schneider    HISTORY OF PRESENT ILLNESS  Sukhi Johnson is a 72 year old male seen for evaluation of an abnormal MRI.  Patient initially around late December 2021 had a spell where he fell down due to left-sided weakness.  It is unclear if he fell down first and then the left-sided weakness came on all he had the left-sided weakness first and then fell down because of that.  He was taken to the hospital and was found to have a right parietal/frontal intraparenchymal hemorrhage.  This was thought to be related to cerebral amyloid angiopathy or hypertension.  Patient was on Adderall for chronic Lyme's disease which was thought to be the cause of his hypertension.  This has not been stopped.  He did therapies and has been involved with speech therapy/Occupational Therapy/physical therapy and has made some improvement though remains wheelchair-bound with left-sided weakness.    Repeat 3 months can was done to evaluate for causes of hemorrhage and no underlying lesion was identified.  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  Patient reports no previous history of cancer or any autoimmune disease.  No recent infectious exposures.  No weight loss or any other new symptoms.    Previous history is reviewed and this is unchanged.    PAST MEDICAL/SURGICAL HISTORY  No past medical history on file.  Patient Active Problem List   Diagnosis     Right-sided nontraumatic intracerebral hemorrhage, unspecified cerebral location (H)   Reviewed and otherwise noncontributory    FAMILY HISTORY  No family history on file.   Stroke in mother at age 90    SOCIAL HISTORY  Social History     Tobacco Use     Smoking status:  "Never Smoker     Smokeless tobacco: Never Used   Substance Use Topics     Alcohol use: Never     Drug use: Never       SYSTEMS REVIEW  Twelve-system ROS was done and other than the HPI this was negative.  Pertinent positives noted in the HPI.    MEDICATIONS  acetaminophen (TYLENOL) 325 MG/10.15ML solution, 20.3 mLs (650 mg) by Oral or Feeding Tube route every 4 hours as needed for mild pain or fever  acetaminophen (TYLENOL) 650 MG suppository, Place 1 suppository (650 mg) rectally every 4 hours as needed for mild pain  Cranberry-Vitamin C (AZO CRANBERRY URINARY TRACT PO), Take 1 tablet by mouth 2 times daily  diclofenac (VOLTAREN) 1 % topical gel, Apply to left shoulder BID  docusate (COLACE) 50 MG/5ML liquid, 10 mLs (100 mg) by Oral or J tube route daily as needed for constipation  hydrALAZINE (APRESOLINE) 50 MG tablet, 1 tablet (50 mg) by Per Feeding Tube route every 8 hours  metoprolol tartrate (LOPRESSOR) 50 MG tablet, 1 tablet (50 mg) by Per Feeding Tube route 2 times daily  ondansetron (ZOFRAN-ODT) 4 MG ODT tab, Take 1 tablet (4 mg) by mouth every 6 hours as needed for nausea or vomiting  pantoprazole (PROTONIX) 2 mg/mL SUSP suspension, 20 mLs (40 mg) by Per Feeding Tube route 2 times daily  polyethylene glycol (MIRALAX) 17 g packet, 17 g by Oral or Feeding Tube route 2 times daily as needed (constipation)  sennosides (SENOKOT) 8.8 MG/5ML syrup, 5 mLs by Oral or Feeding Tube route 2 times daily as needed for constipation  enalapril (VASOTEC) 5 MG tablet, 1 tablet (5 mg) by Per Feeding Tube route At Bedtime  Heparin Sodium, Porcine, (HEPARIN ANTICOAGULANT) 5000 UNIT/0.5ML injection, Inject 0.5 mLs (5,000 Units) Subcutaneous every 12 hours    No current facility-administered medications on file prior to visit.       PHYSICAL EXAMINATION  VITALS: /68 (BP Location: Right arm, Patient Position: Sitting)   Pulse 81   Ht 1.727 m (5' 8\")   Wt 71.2 kg (157 lb)   BMI 23.87 kg/m    GENERAL: Healthy appearing, " alert, no acute distress, normal habitus.  CARDIOVASCULAR: Extremities warm and well perfused. Pulses present.   EYES: Funduscopic exam limited  NEUROLOGICAL:  Patient is awake and grossly oriented to self, place and time.  Attention span is slightly decreased.  Memory is grossly intact.  Language is limited to few words.  Can follow some commands. Appropriate fund of knowledge. Cranial nerves 2-12 are intact with left-sided facial droop.  There might be left visual field cut though unclear. There is no pronator drift.  Motor exam shows 5/5 strength in right arm and leg.  Left arm is 2 out of 5.  Left leg is 3 out of 5.  Tone is normal in right arm and leg.  Left arm and leg do show evidence of spasticity.  Reflexes are 2+ in upper extremities and lower extremities the brisker on the left side. Sensory exam is grossly intact to light touch, pin prick and vibration but subjectively being decreased on the left side.  Finger to nose is normal on the right side.  Unable to do on the left side due to weakness.  Unable to do heel-to-shin bilaterally.  Gait cannot be tested safely due to ongoing weakness.  He is unable to ambulate due to weakness.    DIAGNOSTICS  MRI 3/22/22-images reviewed.  Pachymeningeal enhancement noted.  Previous subacute right parietal frontal hemorrhage noted  IMPRESSION:  1. Expected signal changes of now late subacute right frontoparietal  intraparenchymal hemorrhage.   2. Extensive microhemorrhages on susceptibility weighted images;  sequelae of hypertensive microangiopathy and/or possibly amyloid  angiopathy and chronic small vessel ischemic disease.  3. New symmetric pachymeningeal enhancement. Differential includes  idiopathic intracranial hypotension, neoplasm, metastatic disease or  granulomatous disease.    CT 12/29/21.  IMPRESSION:   1. Stable parenchymal hematoma centered at the frontoparietal junction  of the right cerebral hemisphere resulting in minimal leftward midline  shift again  noted without change. No definite evidence for new or  increasing intracranial hemorrhage.  2. Diffuse cerebral volume loss and cerebral white matter changes  consistent with chronic small vessel ischemic disease.    MRI 12/25/21 -images reviewed.  Right peripheral internal hemorrhage noted.                                                         IMPRESSION:  1.  Right frontoparietal acute intracranial hemorrhage with associated regional mass effect resulting in 2 mm leftward midline shift, not significantly changed since previous exam allowing for differences in technique. No associated abnormal enhancement   to suggest underlying tumor.  2.  Mild to moderate chronic small vessel ischemic disease and mild generalized brain parenchymal volume loss.  3.  Tiny focus of chronic infarction within the left posterior cerebral artery territory.  4.  Numerous chronic microhemorrhages throughout the cerebral hemispheres, deep gray nuclei and posterior fossa, possibly representing sequelae of hypertensive or amyloid angiopathy.    IMPRESSION:  This EEG recording during waking was abnormal due to generalized delta-theta slowing and to greater delta-theta slowing over the right hemisphere.  No interictal epileptiform abnormalities and no electrographic seizures were recorded.   These findings indicate mild-moderate electrographic encephalopathy and greater right hemispheric dysfunction; the history of hemorrhage may account for this asymmetric dysfunction.  Clinical correlation is recommended.  Vinec Iglesias M.D., Professor of Neurology     OUTSIDE RECORDS  Outside referral notes and chart notes were reviewed and pertinent information has been summarized (in addition to the HPI):-          IMPRESSION/REPORT/PLAN  Right-sided nontraumatic intracerebral hemorrhage, unspecified cerebral location (H)  History of hypertension related to Adderall use  Question of cerebral amyloid angiopathy  Pachymeningeal enhancement/Abnormal  MRI    This is a 72 year old male with history of right frontoparietal intraparenchymal hemorrhage thought to be related to hypertension versus cerebral amyloid angiopathy.  Patient's hypertension is thought to be related to Adderall use which he stopped.  Repeat MRI has not shown any progression of the intraparenchymal hemorrhage.  No underlying lesion has been identified.    Patient is currently doing physical therapy, speech therapy, occupational therapy, music therapy for recovery from the stroke.  Discussed prognosis with the family.  It will take a lot of time but he should keep on improving.    For the pachymeningeal enhancement we will check an MRA, MRV.  We will further check blood work to look for any evidence of autoimmune disease/infection/neoplastic process.    Lumbar puncture was discussed extensively.  Family is not interested in doing this at this point.  It would be appropriate to hold off on this given intraparenchymal hemorrhage/intracranial lesion.      We will consider repeating MRI brain without without contrast in 3 months to see if the pachymeningeal enhancement improves on its own otherwise we will consider lumbar puncture at that point.  Also follow-up on the intraparenchymal hemorrhage.    I can see them back in 1 month after the testing.    -     MRA Head w/o Contrast Angiogram; Future  -     MRV Brain wo & w Contrast; Future  -     Vitamin B12; Future  -     TSH with free T4 reflex; Future  -     Anti Nuclear Bernadette IgG by IFA with Reflex; Future  -     Angiotensin converting enzyme; Future  -     ANCA IgG by IFA with Reflex to Titer; Future  -     CBC with Platelets & Differential; Future  -     Erythrocyte sedimentation rate auto; Future  -     Lyme Disease Bernadette with reflex to WB Serum; Future  -     Methylmalonic Acid; Future  -     Rheumatoid factor; Future  -     Paraneoplastic antibody; Future    Return in about 1 month (around 5/8/2022) for In-Clinic Visit (must), After  testing.    Over 70 minutes were spent coordinating the care for the patient on the day of the encounter.  This includes previsit, during visit and post visit activities as documented above.  Reviewed outside records from multiple sources.  Multiple tests reviewed.  Multiple MRI images reviewed.  Discussing lumbar puncture.  Counseling patient and family.  (Activities include but not inclusive of reviewing chart, reviewing outside records, reviewing labs and imaging study results as well as the images, patient visit time including getting history and exam,  use if applicable, review of test results with the patient and coming up with a plan in a shared model, counseling patient and family, education and answering patient questions, EMR , EMR diagnosis entry and problem list management, medication reconciliation and prescription management if applicable, paperwork if applicable, printing documents and documentation of the visit activities.)  Billing:-4 data points, 4 problem points, 4 risk points    Orlando Santos MD  Neurologist  [facility]  Tel:- [phone]    This note was dictated using voice recognition software.  Any grammatical or context distortions are unintentional and inherent to the software.

## 2022-04-08 NOTE — LETTER
4/8/2022         RE: Sukhi Johnson  [address]  [place] [address]        Dear Colleague,    Thank you for referring your patient, Sukhi Johnson, to the [facility] [place]. Please see a copy of my visit note below.    NEUROLOGY OUTPATIENT CONSULT NOTE   Apr 8, 2022     CHIEF COMPLAINT/REASON FOR VISIT/REASON FOR CONSULT  Patient presents with:  New Patient: stroke    REASON FOR CONSULTATION- Stroke, abnormal MRI    REFERRAL SOURCE  Dr. Zaina Schneider  CC Dr. Zaina Schneider    HISTORY OF PRESENT ILLNESS  Sukhi Johnson is a 72 year old male seen for evaluation of an abnormal MRI.  Patient initially around late December 2021 had a spell where he fell down due to left-sided weakness.  It is unclear if he fell down first and then the left-sided weakness came on all he had the left-sided weakness first and then fell down because of that.  He was taken to the hospital and was found to have a right parietal/frontal intraparenchymal hemorrhage.  This was thought to be related to cerebral amyloid angiopathy or hypertension.  Patient was on Adderall for chronic Lyme's disease which was thought to be the cause of his hypertension.  This has not been stopped.  He did therapies and has been involved with speech therapy/Occupational Therapy/physical therapy and has made some improvement though remains wheelchair-bound with left-sided weakness.    Repeat 3 months can was done to evaluate for causes of hemorrhage and no underlying lesion was identified.  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  Patient reports no previous history of cancer or any autoimmune disease.  No recent infectious exposures.  No weight loss or any other new symptoms.    Previous history is reviewed and this is unchanged.    PAST MEDICAL/SURGICAL HISTORY  No past medical history on file.  Patient Active Problem List   Diagnosis      Right-sided nontraumatic intracerebral hemorrhage, unspecified cerebral location (H)   Reviewed and otherwise noncontributory    FAMILY HISTORY  No family history on file.   Stroke in mother at age 90    SOCIAL HISTORY  Social History     Tobacco Use     Smoking status: Never Smoker     Smokeless tobacco: Never Used   Substance Use Topics     Alcohol use: Never     Drug use: Never       SYSTEMS REVIEW  Twelve-system ROS was done and other than the HPI this was negative.  Pertinent positives noted in the HPI.    MEDICATIONS  acetaminophen (TYLENOL) 325 MG/10.15ML solution, 20.3 mLs (650 mg) by Oral or Feeding Tube route every 4 hours as needed for mild pain or fever  acetaminophen (TYLENOL) 650 MG suppository, Place 1 suppository (650 mg) rectally every 4 hours as needed for mild pain  Cranberry-Vitamin C (AZO CRANBERRY URINARY TRACT PO), Take 1 tablet by mouth 2 times daily  diclofenac (VOLTAREN) 1 % topical gel, Apply to left shoulder BID  docusate (COLACE) 50 MG/5ML liquid, 10 mLs (100 mg) by Oral or J tube route daily as needed for constipation  hydrALAZINE (APRESOLINE) 50 MG tablet, 1 tablet (50 mg) by Per Feeding Tube route every 8 hours  metoprolol tartrate (LOPRESSOR) 50 MG tablet, 1 tablet (50 mg) by Per Feeding Tube route 2 times daily  ondansetron (ZOFRAN-ODT) 4 MG ODT tab, Take 1 tablet (4 mg) by mouth every 6 hours as needed for nausea or vomiting  pantoprazole (PROTONIX) 2 mg/mL SUSP suspension, 20 mLs (40 mg) by Per Feeding Tube route 2 times daily  polyethylene glycol (MIRALAX) 17 g packet, 17 g by Oral or Feeding Tube route 2 times daily as needed (constipation)  sennosides (SENOKOT) 8.8 MG/5ML syrup, 5 mLs by Oral or Feeding Tube route 2 times daily as needed for constipation  enalapril (VASOTEC) 5 MG tablet, 1 tablet (5 mg) by Per Feeding Tube route At Bedtime  Heparin Sodium, Porcine, (HEPARIN ANTICOAGULANT) 5000 UNIT/0.5ML injection, Inject 0.5 mLs (5,000 Units) Subcutaneous every 12 hours    No  "current facility-administered medications on file prior to visit.       PHYSICAL EXAMINATION  VITALS: /68 (BP Location: Right arm, Patient Position: Sitting)   Pulse 81   Ht 1.727 m (5' 8\")   Wt 71.2 kg (157 lb)   BMI 23.87 kg/m    GENERAL: Healthy appearing, alert, no acute distress, normal habitus.  CARDIOVASCULAR: Extremities warm and well perfused. Pulses present.   EYES: Funduscopic exam limited  NEUROLOGICAL:  Patient is awake and grossly oriented to self, place and time.  Attention span is slightly decreased.  Memory is grossly intact.  Language is limited to few words.  Can follow some commands. Appropriate fund of knowledge. Cranial nerves 2-12 are intact with left-sided facial droop.  There might be left visual field cut though unclear. There is no pronator drift.  Motor exam shows 5/5 strength in right arm and leg.  Left arm is 2 out of 5.  Left leg is 3 out of 5.  Tone is normal in right arm and leg.  Left arm and leg do show evidence of spasticity.  Reflexes are 2+ in upper extremities and lower extremities the brisker on the left side. Sensory exam is grossly intact to light touch, pin prick and vibration but subjectively being decreased on the left side.  Finger to nose is normal on the right side.  Unable to do on the left side due to weakness.  Unable to do heel-to-shin bilaterally.  Gait cannot be tested safely due to ongoing weakness.  He is unable to ambulate due to weakness.    DIAGNOSTICS  MRI 3/22/22-images reviewed.  Pachymeningeal enhancement noted.  Previous subacute right parietal frontal hemorrhage noted  IMPRESSION:  1. Expected signal changes of now late subacute right frontoparietal  intraparenchymal hemorrhage.   2. Extensive microhemorrhages on susceptibility weighted images;  sequelae of hypertensive microangiopathy and/or possibly amyloid  angiopathy and chronic small vessel ischemic disease.  3. New symmetric pachymeningeal enhancement. Differential includes  idiopathic " intracranial hypotension, neoplasm, metastatic disease or  granulomatous disease.    CT 12/29/21.  IMPRESSION:   1. Stable parenchymal hematoma centered at the frontoparietal junction  of the right cerebral hemisphere resulting in minimal leftward midline  shift again noted without change. No definite evidence for new or  increasing intracranial hemorrhage.  2. Diffuse cerebral volume loss and cerebral white matter changes  consistent with chronic small vessel ischemic disease.    MRI 12/25/21 -images reviewed.  Right peripheral internal hemorrhage noted.                                                         IMPRESSION:  1.  Right frontoparietal acute intracranial hemorrhage with associated regional mass effect resulting in 2 mm leftward midline shift, not significantly changed since previous exam allowing for differences in technique. No associated abnormal enhancement   to suggest underlying tumor.  2.  Mild to moderate chronic small vessel ischemic disease and mild generalized brain parenchymal volume loss.  3.  Tiny focus of chronic infarction within the left posterior cerebral artery territory.  4.  Numerous chronic microhemorrhages throughout the cerebral hemispheres, deep gray nuclei and posterior fossa, possibly representing sequelae of hypertensive or amyloid angiopathy.    IMPRESSION:  This EEG recording during waking was abnormal due to generalized delta-theta slowing and to greater delta-theta slowing over the right hemisphere.  No interictal epileptiform abnormalities and no electrographic seizures were recorded.   These findings indicate mild-moderate electrographic encephalopathy and greater right hemispheric dysfunction; the history of hemorrhage may account for this asymmetric dysfunction.  Clinical correlation is recommended.  Vince Iglesias M.D., Professor of Neurology     OUTSIDE RECORDS  Outside referral notes and chart notes were reviewed and pertinent information has been summarized (in  addition to the HPI):-          IMPRESSION/REPORT/PLAN  Right-sided nontraumatic intracerebral hemorrhage, unspecified cerebral location (H)  History of hypertension related to Adderall use  Question of cerebral amyloid angiopathy  Pachymeningeal enhancement/Abnormal MRI    This is a 72 year old male with history of right frontoparietal intraparenchymal hemorrhage thought to be related to hypertension versus cerebral amyloid angiopathy.  Patient's hypertension is thought to be related to Adderall use which he stopped.  Repeat MRI has not shown any progression of the intraparenchymal hemorrhage.  No underlying lesion has been identified.    Patient is currently doing physical therapy, speech therapy, occupational therapy, music therapy for recovery from the stroke.  Discussed prognosis with the family.  It will take a lot of time but he should keep on improving.    For the pachymeningeal enhancement we will check an MRA, MRV.  We will further check blood work to look for any evidence of autoimmune disease/infection/neoplastic process.    Lumbar puncture was discussed extensively.  Family is not interested in doing this at this point.  It would be appropriate to hold off on this given intraparenchymal hemorrhage/intracranial lesion.      We will consider repeating MRI brain without without contrast in 3 months to see if the pachymeningeal enhancement improves on its own otherwise we will consider lumbar puncture at that point.  Also follow-up on the intraparenchymal hemorrhage.    I can see them back in 1 month after the testing.    -     MRA Head w/o Contrast Angiogram; Future  -     MRV Brain wo & w Contrast; Future  -     Vitamin B12; Future  -     TSH with free T4 reflex; Future  -     Anti Nuclear Bernadette IgG by IFA with Reflex; Future  -     Angiotensin converting enzyme; Future  -     ANCA IgG by IFA with Reflex to Titer; Future  -     CBC with Platelets & Differential; Future  -     Erythrocyte sedimentation rate  auto; Future  -     Lyme Disease Bernadette with reflex to WB Serum; Future  -     Methylmalonic Acid; Future  -     Rheumatoid factor; Future  -     Paraneoplastic antibody; Future    Return in about 1 month (around 5/8/2022) for In-Clinic Visit (must), After testing.    Over 70 minutes were spent coordinating the care for the patient on the day of the encounter.  This includes previsit, during visit and post visit activities as documented above.  Reviewed outside records from multiple sources.  Multiple tests reviewed.  Multiple MRI images reviewed.  Discussing lumbar puncture.  Counseling patient and family.  (Activities include but not inclusive of reviewing chart, reviewing outside records, reviewing labs and imaging study results as well as the images, patient visit time including getting history and exam,  use if applicable, review of test results with the patient and coming up with a plan in a shared model, counseling patient and family, education and answering patient questions, EMR , EMR diagnosis entry and problem list management, medication reconciliation and prescription management if applicable, paperwork if applicable, printing documents and documentation of the visit activities.)  Billing:-4 data points, 4 problem points, 4 risk points    Orlando Santos MD  Neurologist  [facility]  Tel:- [phone]    This note was dictated using voice recognition software.  Any grammatical or context distortions are unintentional and inherent to the software.        Again, thank you for allowing me to participate in the care of your patient.        Sincerely,        Orlando Santos MD

## 2022-04-11 ENCOUNTER — TRANSFERRED RECORDS (OUTPATIENT)
Dept: HEALTH INFORMATION MANAGEMENT | Facility: CLINIC | Age: 72
End: 2022-04-11

## 2022-04-11 ENCOUNTER — LAB REQUISITION (OUTPATIENT)
Dept: LAB | Facility: CLINIC | Age: 72
End: 2022-04-11
Payer: COMMERCIAL

## 2022-04-11 DIAGNOSIS — I61.9 NONTRAUMATIC INTRACEREBRAL HEMORRHAGE, UNSPECIFIED (H): ICD-10-CM

## 2022-04-11 LAB
BASOPHILS # BLD AUTO: 0 10E3/UL (ref 0–0.2)
BASOPHILS NFR BLD AUTO: 1 %
EOSINOPHIL # BLD AUTO: 0 10E3/UL (ref 0–0.7)
EOSINOPHIL NFR BLD AUTO: 1 %
ERYTHROCYTE [DISTWIDTH] IN BLOOD BY AUTOMATED COUNT: 12.6 % (ref 10–15)
ERYTHROCYTE [SEDIMENTATION RATE] IN BLOOD BY WESTERGREN METHOD: 8 MM/HR (ref 0–15)
HCT VFR BLD AUTO: 44.7 % (ref 40–53)
HGB BLD-MCNC: 14.8 G/DL (ref 13.3–17.7)
IMM GRANULOCYTES # BLD: 0 10E3/UL
IMM GRANULOCYTES NFR BLD: 1 %
LYMPHOCYTES # BLD AUTO: 1.4 10E3/UL (ref 0.8–5.3)
LYMPHOCYTES NFR BLD AUTO: 25 %
MCH RBC QN AUTO: 31.1 PG (ref 26.5–33)
MCHC RBC AUTO-ENTMCNC: 33.1 G/DL (ref 31.5–36.5)
MCV RBC AUTO: 94 FL (ref 78–100)
MONOCYTES # BLD AUTO: 0.3 10E3/UL (ref 0–1.3)
MONOCYTES NFR BLD AUTO: 6 %
NEUTROPHILS # BLD AUTO: 3.6 10E3/UL (ref 1.6–8.3)
NEUTROPHILS NFR BLD AUTO: 66 %
NRBC # BLD AUTO: 0 10E3/UL
NRBC BLD AUTO-RTO: 0 /100
PLATELET # BLD AUTO: 292 10E3/UL (ref 150–450)
RBC # BLD AUTO: 4.76 10E6/UL (ref 4.4–5.9)
RHEUMATOID FACT SER NEPH-ACNC: <15 IU/ML (ref 0–30)
T4 FREE SERPL-MCNC: 1.5 NG/DL (ref 0.7–1.8)
VIT B12 SERPL-MCNC: 443 PG/ML (ref 213–816)
WBC # BLD AUTO: 5.4 10E3/UL (ref 4–11)

## 2022-04-11 PROCEDURE — 82607 VITAMIN B-12: CPT | Mod: ORL | Performed by: FAMILY MEDICINE

## 2022-04-11 PROCEDURE — 86256 FLUORESCENT ANTIBODY TITER: CPT | Mod: ORL | Performed by: FAMILY MEDICINE

## 2022-04-11 PROCEDURE — 85025 COMPLETE CBC W/AUTO DIFF WBC: CPT | Mod: ORL | Performed by: FAMILY MEDICINE

## 2022-04-11 PROCEDURE — 86431 RHEUMATOID FACTOR QUANT: CPT | Mod: ORL | Performed by: FAMILY MEDICINE

## 2022-04-11 PROCEDURE — 85652 RBC SED RATE AUTOMATED: CPT | Mod: ORL | Performed by: FAMILY MEDICINE

## 2022-04-11 PROCEDURE — 83921 ORGANIC ACID SINGLE QUANT: CPT | Mod: ORL | Performed by: FAMILY MEDICINE

## 2022-04-11 PROCEDURE — 86038 ANTINUCLEAR ANTIBODIES: CPT | Mod: ORL | Performed by: FAMILY MEDICINE

## 2022-04-11 PROCEDURE — 84439 ASSAY OF FREE THYROXINE: CPT | Mod: ORL | Performed by: FAMILY MEDICINE

## 2022-04-11 PROCEDURE — 86618 LYME DISEASE ANTIBODY: CPT | Mod: ORL | Performed by: FAMILY MEDICINE

## 2022-04-12 LAB — B BURGDOR IGG+IGM SER QL: 0.17

## 2022-04-13 LAB
ANA PAT SER IF-IMP: ABNORMAL
ANA SER QL IF: ABNORMAL
ANA TITR SER IF: ABNORMAL {TITER}
ANCA AB PATTERN SER IF-IMP: NORMAL
C-ANCA TITR SER IF: NORMAL {TITER}

## 2022-04-13 NOTE — PROGRESS NOTES
[place] [facility]  Chief Complaint   Patient presents with     [facility] Regulatory     [place] Medical Record Number:  [id number]  Place of Service where encounter took place:  [facility] () [[id number]]    HPI:    Sukhi Johnson  is 72 year old (1950), who is being seen today for a federally mandated E/M visit.  HPI information obtained from: facility chart records, facility staff and [facility] chart review.       Today's concerns are:  -  - Resident seen and examined. Has severe aphasia  - RN reports was taken off feeding tube last few days, tolerating well, on modified diet and supplement  ---------------------------------  - - Past Medical, social, family histories, medications, and allergies reviewed and updated  - Medications reviewed: in the chart and EHR.   - Case Management:   I have reviewed the care plan and MDS and do agree with the plan. Patient's desire to return to the community is not present.  Information reviewed:  Medications, vital signs, orders, and nursing notes.    MEDICATIONS:  Current Outpatient Medications   Medication Sig Dispense Refill     acetaminophen (TYLENOL) 325 MG/10.15ML solution 20.3 mLs (650 mg) by Oral or Feeding Tube route every 4 hours as needed for mild pain or fever       acetaminophen (TYLENOL) 650 MG suppository Place 1 suppository (650 mg) rectally every 4 hours as needed for mild pain       Cranberry-Vitamin C (AZO CRANBERRY URINARY TRACT PO) Take 1 tablet by mouth 2 times daily       diclofenac (VOLTAREN) 1 % topical gel Apply to left shoulder BID 50 g 1     docusate (COLACE) 50 MG/5ML liquid 10 mLs (100 mg) by Oral or J tube route daily as needed for constipation       enalapril (VASOTEC) 5 MG tablet 1 tablet (5 mg) by Per Feeding Tube route At Bedtime       Heparin Sodium, Porcine, (HEPARIN ANTICOAGULANT) 5000 UNIT/0.5ML injection Inject 0.5 mLs (5,000 Units) Subcutaneous every 12 hours       hydrALAZINE (APRESOLINE) 50 MG  "tablet 1 tablet (50 mg) by Per Feeding Tube route every 8 hours       metoprolol tartrate (LOPRESSOR) 50 MG tablet 1 tablet (50 mg) by Per Feeding Tube route 2 times daily       ondansetron (ZOFRAN-ODT) 4 MG ODT tab Take 1 tablet (4 mg) by mouth every 6 hours as needed for nausea or vomiting       pantoprazole (PROTONIX) 2 mg/mL SUSP suspension 20 mLs (40 mg) by Per Feeding Tube route 2 times daily       polyethylene glycol (MIRALAX) 17 g packet 17 g by Oral or Feeding Tube route 2 times daily as needed (constipation)       sennosides (SENOKOT) 8.8 MG/5ML syrup 5 mLs by Oral or Feeding Tube route 2 times daily as needed for constipation       ROS:  4 point ROS including Respiratory, CV, GI and , other than that noted in the HPI,  is negative    Vitals:  BP (!) 154/76   Pulse 86   Temp 98  F (36.7  C)   Resp 18   Ht 1.727 m (5' 8\")   Wt 68.5 kg (151 lb)   SpO2 93%   BMI 22.96 kg/m    Body mass index is 22.96 kg/m .  Exam:  GENERAL APPEARANCE:  in bed tired looking  RESP:  lungs clear to auscultation   CV:  S1S2 audible, regular HR, no murmur appreciated.   ABDOMEN:  soft, NT/ND, BS audible. no mass appreciated on palpation.   M/S:   Ms strenght: 0/5 LUE, 2/5 LLE. .   SKIN:  PEG tube in place  NEURO:   Non verbal. Diffuse muscles atrophy. Ms strenght 0/5 left side. Moves right side.   PSYCH: flat affect and mood      Lab/Diagnostic data: Reviewed in the chart and EHR.        ASSESSMENT/PLAN  ---------------------------  Right-sided nontraumatic intracerebral hemorrhage, unspecified cerebral location (H)   Dysphagia, unspecified type  Protein calorie malnutrition  - followed by Neurology appreciate help. Extensive work up ordered, and will see neurology once results are available. Please see Dr. Santos's note 4/8.   - off G-tube feeding, tolerating.   - Body mass index is 22.96 kg/m .       Essential HTN:  - w/i acceptable range. On enalapril, hydralazine, lopressor    Hx of GIB with acute blood loss anemia: "   - Dec 2021. No scopes was done, Hb dropped down to 12.7 gm/dl. Hb wnl. No concern on Protonix, consider Protonix frequency reduction from bid to once daily, with close monitoring clinically and lab.       Chronic fatigue syndrome 2/2 Lyme disease:  - Significant  Deficits requiring NH placement. Requiring extensive assistance from nursing. Up for meals only o/w spends the day resting in bed    Electronically signed by:  Joel Blanchard MD

## 2022-04-14 ENCOUNTER — NURSING HOME VISIT (OUTPATIENT)
Dept: GERIATRICS | Facility: CLINIC | Age: 72
End: 2022-04-14
Payer: COMMERCIAL

## 2022-04-14 ENCOUNTER — LAB REQUISITION (OUTPATIENT)
Dept: LAB | Facility: CLINIC | Age: 72
End: 2022-04-14
Payer: COMMERCIAL

## 2022-04-14 VITALS
DIASTOLIC BLOOD PRESSURE: 76 MMHG | SYSTOLIC BLOOD PRESSURE: 154 MMHG | HEART RATE: 86 BPM | WEIGHT: 151 LBS | BODY MASS INDEX: 22.88 KG/M2 | HEIGHT: 68 IN | OXYGEN SATURATION: 93 % | TEMPERATURE: 98 F | RESPIRATION RATE: 18 BRPM

## 2022-04-14 DIAGNOSIS — R13.10 DYSPHAGIA, UNSPECIFIED TYPE: ICD-10-CM

## 2022-04-14 DIAGNOSIS — D62 ANEMIA DUE TO BLOOD LOSS, ACUTE: ICD-10-CM

## 2022-04-14 DIAGNOSIS — I61.9 NONTRAUMATIC INTRACEREBRAL HEMORRHAGE, UNSPECIFIED (H): ICD-10-CM

## 2022-04-14 DIAGNOSIS — I10 PRIMARY HYPERTENSION: ICD-10-CM

## 2022-04-14 DIAGNOSIS — I61.9 RIGHT-SIDED NONTRAUMATIC INTRACEREBRAL HEMORRHAGE, UNSPECIFIED CEREBRAL LOCATION (H): Primary | ICD-10-CM

## 2022-04-14 DIAGNOSIS — G93.32 CHRONIC FATIGUE SYNDROME: ICD-10-CM

## 2022-04-14 LAB — METHYLMALONATE SERPL-SCNC: 0.29 UMOL/L (ref 0–0.4)

## 2022-04-14 PROCEDURE — 99309 SBSQ NF CARE MODERATE MDM 30: CPT | Performed by: FAMILY MEDICINE

## 2022-04-14 NOTE — LETTER
4/14/2022        RE: Sukhi Johnson  [address]  [place] [address]        [facility]  Chief Complaint   Patient presents with     longterm Regulatory     [facility] Medical Record Number:  [id number]  Place of Service where encounter took place:  [facility] () [[id number]]    HPI:    Sukhi Johnson  is 72 year old (1950), who is being seen today for a federally mandated E/M visit.  HPI information obtained from: facility chart records, facility staff and [facility] chart review.       Today's concerns are:  -  - Resident seen and examined. Has severe aphasia  - RN reports was taken off feeding tube last few days, tolerating well, on modified diet and supplement  ---------------------------------  - - Past Medical, social, family histories, medications, and allergies reviewed and updated  - Medications reviewed: in the chart and EHR.   - Case Management:   I have reviewed the care plan and MDS and do agree with the plan. Patient's desire to return to the community is not present.  Information reviewed:  Medications, vital signs, orders, and nursing notes.    MEDICATIONS:  Current Outpatient Medications   Medication Sig Dispense Refill     acetaminophen (TYLENOL) 325 MG/10.15ML solution 20.3 mLs (650 mg) by Oral or Feeding Tube route every 4 hours as needed for mild pain or fever       acetaminophen (TYLENOL) 650 MG suppository Place 1 suppository (650 mg) rectally every 4 hours as needed for mild pain       Cranberry-Vitamin C (AZO CRANBERRY URINARY TRACT PO) Take 1 tablet by mouth 2 times daily       diclofenac (VOLTAREN) 1 % topical gel Apply to left shoulder BID 50 g 1     docusate (COLACE) 50 MG/5ML liquid 10 mLs (100 mg) by Oral or J tube route daily as needed for constipation       enalapril (VASOTEC) 5 MG tablet 1 tablet (5 mg) by Per Feeding Tube route At Bedtime       Heparin Sodium, Porcine, (HEPARIN ANTICOAGULANT) 5000 UNIT/0.5ML injection Inject  "0.5 mLs (5,000 Units) Subcutaneous every 12 hours       hydrALAZINE (APRESOLINE) 50 MG tablet 1 tablet (50 mg) by Per Feeding Tube route every 8 hours       metoprolol tartrate (LOPRESSOR) 50 MG tablet 1 tablet (50 mg) by Per Feeding Tube route 2 times daily       ondansetron (ZOFRAN-ODT) 4 MG ODT tab Take 1 tablet (4 mg) by mouth every 6 hours as needed for nausea or vomiting       pantoprazole (PROTONIX) 2 mg/mL SUSP suspension 20 mLs (40 mg) by Per Feeding Tube route 2 times daily       polyethylene glycol (MIRALAX) 17 g packet 17 g by Oral or Feeding Tube route 2 times daily as needed (constipation)       sennosides (SENOKOT) 8.8 MG/5ML syrup 5 mLs by Oral or Feeding Tube route 2 times daily as needed for constipation       ROS:  4 point ROS including Respiratory, CV, GI and , other than that noted in the HPI,  is negative    Vitals:  BP (!) 154/76   Pulse 86   Temp 98  F (36.7  C)   Resp 18   Ht 1.727 m (5' 8\")   Wt 68.5 kg (151 lb)   SpO2 93%   BMI 22.96 kg/m    Body mass index is 22.96 kg/m .  Exam:  GENERAL APPEARANCE:  in bed tired looking  RESP:  lungs clear to auscultation   CV:  S1S2 audible, regular HR, no murmur appreciated.   ABDOMEN:  soft, NT/ND, BS audible. no mass appreciated on palpation.   M/S:   Ms strenght: 0/5 LUE, 2/5 LLE. .   SKIN:  PEG tube in place  NEURO:   Non verbal. Diffuse muscles atrophy. Ms strenght 0/5 left side. Moves right side.   PSYCH: flat affect and mood      Lab/Diagnostic data: Reviewed in the chart and EHR.        ASSESSMENT/PLAN  ---------------------------  Right-sided nontraumatic intracerebral hemorrhage, unspecified cerebral location (H)   Dysphagia, unspecified type  Protein calorie malnutrition  - followed by Neurology appreciate help. Extensive work up ordered, and will see neurology once results are available. Please see Dr. Santos's note 4/8.   - off G-tube feeding, tolerating.   - Body mass index is 22.96 kg/m .       Essential HTN:  - w/i acceptable " range. On enalapril, hydralazine, lopressor    Hx of GIB with acute blood loss anemia:   - Dec 2021. No scopes was done, Hb dropped down to 12.7 gm/dl. Hb wnl. No concern on Protonix, consider Protonix frequency reduction from bid to once daily, with close monitoring clinically and lab.       Chronic fatigue syndrome 2/2 Lyme disease:  - Significant  Deficits requiring NH placement. Requiring extensive assistance from nursing. Up for meals only o/w spends the day resting in bed    Electronically signed by:  Joel Blanchard MD        Sincerely,        Joel Blanchard MD

## 2022-04-15 ENCOUNTER — TELEPHONE (OUTPATIENT)
Dept: NEUROLOGY | Facility: CLINIC | Age: 72
End: 2022-04-15

## 2022-04-15 ENCOUNTER — LAB REQUISITION (OUTPATIENT)
Dept: LAB | Facility: CLINIC | Age: 72
End: 2022-04-15
Payer: COMMERCIAL

## 2022-04-15 DIAGNOSIS — I61.9 NONTRAUMATIC INTRACEREBRAL HEMORRHAGE, UNSPECIFIED (H): ICD-10-CM

## 2022-04-15 PROCEDURE — 86596 VOLTAGE-GTD CA CHNL ANTB EA: CPT | Mod: ORL | Performed by: FAMILY MEDICINE

## 2022-04-18 NOTE — TELEPHONE ENCOUNTER
Please let patient know her AL is borderline positive otherwise testing is negative.  The borderline positive AL could be a false positive.

## 2022-04-23 LAB
AMPHIPHYSIN AB TITR SER: NEGATIVE TITER
CV2 IGG TITR SER: NEGATIVE TITER
GLIAL NUC TYPE 1 AB TITR SER: NEGATIVE TITER
HU1 AB TITR SER: NEGATIVE TITER
HU2 AB TITR SER IF: NEGATIVE TITER
HU3 AB TITR SER: NEGATIVE TITER
IMMUNOLOGIST REVIEW: NORMAL
LABORATORY COMMENT REPORT: NORMAL
PCA-1 AB TITR SER: NEGATIVE TITER
PCA-2 AB TITR SER: NEGATIVE TITER
PCA-TR AB TITR SER IF: NEGATIVE TITER
VGCC-P/Q BIND AB SER-SCNC: 0 NMOL/L
VGKC AB SER-SCNC: 0 NMOL/L

## 2022-04-29 ENCOUNTER — HOSPITAL ENCOUNTER (OUTPATIENT)
Dept: MRI IMAGING | Facility: CLINIC | Age: 72
Discharge: HOME | End: 2022-04-29
Attending: PSYCHIATRY & NEUROLOGY
Payer: COMMERCIAL

## 2022-04-29 DIAGNOSIS — I61.9 RIGHT-SIDED NONTRAUMATIC INTRACEREBRAL HEMORRHAGE, UNSPECIFIED CEREBRAL LOCATION (H): ICD-10-CM

## 2022-04-29 DIAGNOSIS — R93.89 ABNORMAL MRI: ICD-10-CM

## 2022-04-29 PROCEDURE — A9585 GADOBUTROL INJECTION: HCPCS | Performed by: PSYCHIATRY & NEUROLOGY

## 2022-04-29 PROCEDURE — 255N000002 HC RX 255 OP 636: Performed by: PSYCHIATRY & NEUROLOGY

## 2022-04-29 PROCEDURE — 70544 MR ANGIOGRAPHY HEAD W/O DYE: CPT

## 2022-04-29 PROCEDURE — 70546 MR ANGIOGRAPH HEAD W/O&W/DYE: CPT

## 2022-04-29 RX ORDER — GADOBUTROL 604.72 MG/ML
7 INJECTION INTRAVENOUS ONCE
Status: COMPLETED | OUTPATIENT
Start: 2022-04-29 | End: 2022-04-29

## 2022-04-29 RX ADMIN — GADOBUTROL 7 ML: 604.72 INJECTION INTRAVENOUS at 09:56

## 2022-05-10 ENCOUNTER — OFFICE VISIT (OUTPATIENT)
Dept: NEUROLOGY | Facility: CLINIC | Age: 72
End: 2022-05-10
Payer: COMMERCIAL

## 2022-05-10 VITALS
WEIGHT: 151 LBS | BODY MASS INDEX: 22.88 KG/M2 | SYSTOLIC BLOOD PRESSURE: 107 MMHG | HEIGHT: 68 IN | HEART RATE: 87 BPM | DIASTOLIC BLOOD PRESSURE: 59 MMHG

## 2022-05-10 DIAGNOSIS — I61.9 RIGHT-SIDED NONTRAUMATIC INTRACEREBRAL HEMORRHAGE, UNSPECIFIED CEREBRAL LOCATION (H): Primary | ICD-10-CM

## 2022-05-10 DIAGNOSIS — R93.89 ABNORMAL MRI: ICD-10-CM

## 2022-05-10 PROCEDURE — 99215 OFFICE O/P EST HI 40 MIN: CPT | Performed by: PSYCHIATRY & NEUROLOGY

## 2022-05-10 NOTE — LETTER
5/10/2022         RE: Sukhi Johnson  [address]  [place] [address]        Dear Colleague,    Thank you for referring your patient, Sukhi Johnson, to the [facility] [place]. Please see a copy of my visit note below.    NEUROLOGY OUTPATIENT PROGRESS NOTE   May 10, 2022     CHIEF COMPLAINT/REASON FOR VISIT/REASON FOR CONSULT  Patient presents with:  RECHECK: Right-sided nontraumatic intracerebral hemorrhage      REASON FOR CONSULTATION- Stroke, abnormal MRI    REFERRAL SOURCE  Dr. Zaina Schneider  CC Dr. Zaina Schneider    HISTORY OF PRESENT ILLNESS  Sukhi Johnson is a 72 year old male seen for evaluation of an abnormal MRI.  Patient initially around late December 2021 had a spell where he fell down due to left-sided weakness.  It is unclear if he fell down first and then the left-sided weakness came on all he had the left-sided weakness first and then fell down because of that.  He was taken to the hospital and was found to have a right parietal/frontal intraparenchymal hemorrhage.  This was thought to be related to cerebral amyloid angiopathy or hypertension.  Patient was on Adderall for chronic Lyme's disease which was thought to be the cause of his hypertension.  This has not been stopped.  He did therapies and has been involved with speech therapy/Occupational Therapy/physical therapy and has made some improvement though remains wheelchair-bound with left-sided weakness.    Repeat 3 months can was done to evaluate for causes of hemorrhage and no underlying lesion was identified.  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  Patient reports no previous history of cancer or any autoimmune disease.  No recent infectious exposures.  No weight loss or any other new symptoms.    5/10/22  Patient returns today.  Continues to have left-sided weakness.  Doing physical therapy and has made minimal improvement.  Is  remaining in the assisted living.  Family is requesting a one-to-one physical therapy.  Reports no headaches or new neurological symptoms.  No seizure-like activity.  No spasticity or any limitations for him to do physical therapy.    Previous history is reviewed and this is unchanged.    PAST MEDICAL/SURGICAL HISTORY  No past medical history on file.  Patient Active Problem List   Diagnosis     Right-sided nontraumatic intracerebral hemorrhage, unspecified cerebral location (H)   Reviewed and otherwise noncontributory    FAMILY HISTORY  No family history on file.   Stroke in mother at age 90    SOCIAL HISTORY  Social History     Tobacco Use     Smoking status: Never Smoker     Smokeless tobacco: Never Used   Substance Use Topics     Alcohol use: Never     Drug use: Never       SYSTEMS REVIEW  Twelve-system ROS was done and other than the HPI this was negative.  Pertinent positives noted in the HPI.  No new issues/concerns reported today.    MEDICATIONS  acetaminophen (TYLENOL) 325 MG/10.15ML solution, 20.3 mLs (650 mg) by Oral or Feeding Tube route every 4 hours as needed for mild pain or fever  acetaminophen (TYLENOL) 650 MG suppository, Place 1 suppository (650 mg) rectally every 4 hours as needed for mild pain  Cranberry-Vitamin C (AZO CRANBERRY URINARY TRACT PO), Take 1 tablet by mouth 2 times daily  diclofenac (VOLTAREN) 1 % topical gel, Apply to left shoulder BID  docusate (COLACE) 50 MG/5ML liquid, 10 mLs (100 mg) by Oral or J tube route daily as needed for constipation  enalapril (VASOTEC) 5 MG tablet, 1 tablet (5 mg) by Per Feeding Tube route At Bedtime  Heparin Sodium, Porcine, (HEPARIN ANTICOAGULANT) 5000 UNIT/0.5ML injection, Inject 0.5 mLs (5,000 Units) Subcutaneous every 12 hours  hydrALAZINE (APRESOLINE) 50 MG tablet, 1 tablet (50 mg) by Per Feeding Tube route every 8 hours  metoprolol tartrate (LOPRESSOR) 50 MG tablet, 1 tablet (50 mg) by Per Feeding Tube route 2 times daily  ondansetron  "(ZOFRAN-ODT) 4 MG ODT tab, Take 1 tablet (4 mg) by mouth every 6 hours as needed for nausea or vomiting  pantoprazole (PROTONIX) 2 mg/mL SUSP suspension, 20 mLs (40 mg) by Per Feeding Tube route 2 times daily  polyethylene glycol (MIRALAX) 17 g packet, 17 g by Oral or Feeding Tube route 2 times daily as needed (constipation)  sennosides (SENOKOT) 8.8 MG/5ML syrup, 5 mLs by Oral or Feeding Tube route 2 times daily as needed for constipation    No current facility-administered medications on file prior to visit.       PHYSICAL EXAMINATION  VITALS: /59 (BP Location: Right arm, Patient Position: Sitting)   Pulse 87   Ht 1.727 m (5' 8\")   Wt 68.5 kg (151 lb)   BMI 22.96 kg/m    GENERAL: Healthy appearing, alert, no acute distress, normal habitus.  CARDIOVASCULAR: Extremities warm and well perfused. Pulses present.   NEUROLOGICAL:  Patient is awake and grossly oriented to self, place and time.  Attention span is slightly decreased.  Memory is grossly intact.  Language is limited to few words.  Can follow some commands. Appropriate fund of knowledge. Cranial nerves 2-12 are intact with left-sided facial droop.  There might be left visual field cut though unclear. There is no pronator drift.  Motor exam shows 5/5 strength in right arm and leg.  Left arm is 2 out of 5.  Left leg is 3 out of 5.  Tone is normal in right arm and leg.  Left arm and leg do show evidence of spasticity.  (Previously reflexes are 2+ in upper extremities and lower extremities the brisker on the left side. Sensory exam is grossly intact to light touch, pin prick and vibration but subjectively being decreased on the left side.)  Finger to nose is normal on the right side.  Unable to do on the left side due to weakness.  Unable to do heel-to-shin bilaterally.  Gait cannot be tested safely due to ongoing weakness.  He is unable to ambulate due to weakness.  Exam overall unchanged compared to before.  Questionable increased tone versus poor " effort.    DIAGNOSTICS  MRI 3/22/22-images reviewed.  Pachymeningeal enhancement noted.  Previous subacute right parietal frontal hemorrhage noted  IMPRESSION:  1. Expected signal changes of now late subacute right frontoparietal  intraparenchymal hemorrhage.   2. Extensive microhemorrhages on susceptibility weighted images;  sequelae of hypertensive microangiopathy and/or possibly amyloid  angiopathy and chronic small vessel ischemic disease.  3. New symmetric pachymeningeal enhancement. Differential includes  idiopathic intracranial hypotension, neoplasm, metastatic disease or  granulomatous disease.    CT 12/29/21.  IMPRESSION:   1. Stable parenchymal hematoma centered at the frontoparietal junction  of the right cerebral hemisphere resulting in minimal leftward midline  shift again noted without change. No definite evidence for new or  increasing intracranial hemorrhage.  2. Diffuse cerebral volume loss and cerebral white matter changes  consistent with chronic small vessel ischemic disease.    MRI 12/25/21 -images reviewed.  Right peripheral internal hemorrhage noted.                                                         IMPRESSION:  1.  Right frontoparietal acute intracranial hemorrhage with associated regional mass effect resulting in 2 mm leftward midline shift, not significantly changed since previous exam allowing for differences in technique. No associated abnormal enhancement   to suggest underlying tumor.  2.  Mild to moderate chronic small vessel ischemic disease and mild generalized brain parenchymal volume loss.  3.  Tiny focus of chronic infarction within the left posterior cerebral artery territory.  4.  Numerous chronic microhemorrhages throughout the cerebral hemispheres, deep gray nuclei and posterior fossa, possibly representing sequelae of hypertensive or amyloid angiopathy.    IMPRESSION:  This EEG recording during waking was abnormal due to generalized delta-theta slowing and to greater  delta-theta slowing over the right hemisphere.  No interictal epileptiform abnormalities and no electrographic seizures were recorded.   These findings indicate mild-moderate electrographic encephalopathy and greater right hemispheric dysfunction; the history of hemorrhage may account for this asymmetric dysfunction.  Clinical correlation is recommended.  Vince Iglesias M.D., Professor of Neurology     OUTSIDE RECORDS  Outside referral notes and chart notes were reviewed and pertinent information has been summarized (in addition to the HPI):-        MRA images reviewed.  IMPRESSION:  1.  No central arterial stenosis/occlusion, aneurysm, or high flow vascular malformation identified.  2.  Continued decrease in size of the previously demonstrated parenchymal hematoma centered in the right frontoparietal region.    MRV-images reviewed.  HEAD MRV:   1.  No dural venous sinus thrombosis or significant stenosis.    LABS  Component      Latest Ref Rng & Units 4/11/2022   WBC      4.0 - 11.0 10e3/uL 5.4   RBC Count      4.40 - 5.90 10e6/uL 4.76   Hemoglobin      13.3 - 17.7 g/dL 14.8   Hematocrit      40.0 - 53.0 % 44.7   MCV      78 - 100 fL 94   MCH      26.5 - 33.0 pg 31.1   MCHC      31.5 - 36.5 g/dL 33.1   RDW      10.0 - 15.0 % 12.6   Platelet Count      150 - 450 10e3/uL 292   % Neutrophils      % 66   % Lymphocytes      % 25   % Monocytes      % 6   % Eosinophils      % 1   % Basophils      % 1   % Immature Granulocytes      % 1   NRBCs per 100 WBC      <1 /100 0   Absolute Neutrophils      1.6 - 8.3 10e3/uL 3.6   Absolute Lymphocytes      0.8 - 5.3 10e3/uL 1.4   Absolute Monocytes      0.0 - 1.3 10e3/uL 0.3   Absolute Eosinophils      0.0 - 0.7 10e3/uL 0.0   Absolute Basophils      0.0 - 0.2 10e3/uL 0.0   Absolute Immature Granulocytes      <=0.4 10e3/uL 0.0   Absolute NRBCs      10e3/uL 0.0   Interpretive Comments          Amphiphysin Ab, S      <1:240 titer    AGNA-1, S      <1:240 titer    VIRGINIA-1, S       <1:240 titer    VIRGINIA-2, S      <1:240 titer    VIRGINIA-3, S      <1:240 titer    CRMP-5-IgG, S      <1:240 titer    Neuronal (V-G) K+ Channel Ab, S      <=0.02 nmol/L    P/Q-Type Calcium Channel AB      <=0.02 nmol/L    PCA-1, S      <1:240 titer    PCA-2, S      <1:240 titer    PCA-Tr, S      <1:240 titer    Reflex Added          AL interpretation      Negative Borderline Positive (A)   AL pattern 1       Speckled   AL titer 1       1:80   Neutrophil Cytoplasmic Antibody      <1:10 <1:10   Neutrophil Cytoplasmic Antibody Pattern       The ANCA IFA is <1:10.  No further testing will be performed.   T4 Free      0.70 - 1.80 ng/dL 1.50   Rheumatoid Factor      0 - 30 IU/mL <15   Lyme Disease Antibodies Serum      <0.90 0.17   Sed Rate      0 - 15 mm/hr 8   Methylmalonic Acid      0.00 - 0.40 umol/L 0.29   Vitamin B12      213 - 816 pg/mL 443     Component      Latest Ref Rng & Units 4/15/2022   WBC      4.0 - 11.0 10e3/uL    RBC Count      4.40 - 5.90 10e6/uL    Hemoglobin      13.3 - 17.7 g/dL    Hematocrit      40.0 - 53.0 %    MCV      78 - 100 fL    MCH      26.5 - 33.0 pg    MCHC      31.5 - 36.5 g/dL    RDW      10.0 - 15.0 %    Platelet Count      150 - 450 10e3/uL    % Neutrophils      %    % Lymphocytes      %    % Monocytes      %    % Eosinophils      %    % Basophils      %    % Immature Granulocytes      %    NRBCs per 100 WBC      <1 /100    Absolute Neutrophils      1.6 - 8.3 10e3/uL    Absolute Lymphocytes      0.8 - 5.3 10e3/uL    Absolute Monocytes      0.0 - 1.3 10e3/uL    Absolute Eosinophils      0.0 - 0.7 10e3/uL    Absolute Basophils      0.0 - 0.2 10e3/uL    Absolute Immature Granulocytes      <=0.4 10e3/uL    Absolute NRBCs      10e3/uL    Interpretive Comments       SEE NOTE   Amphiphysin Ab, S      <1:240 titer Negative   AGNA-1, S      <1:240 titer Negative   VIRGINIA-1, S      <1:240 titer Negative   VIRGINIA-2, S      <1:240 titer Negative   VIRGINIA-3, S      <1:240 titer Negative   CRMP-5-IgG, S       <1:240 titer Negative   Neuronal (V-G) K+ Channel Ab, S      <=0.02 nmol/L 0.00   P/Q-Type Calcium Channel AB      <=0.02 nmol/L 0.00   PCA-1, S      <1:240 titer Negative   PCA-2, S      <1:240 titer Negative   PCA-Tr, S      <1:240 titer Negative   Reflex Added       None.   AL interpretation      Negative    AL pattern 1          AL titer 1          Neutrophil Cytoplasmic Antibody      <1:10    Neutrophil Cytoplasmic Antibody Pattern          T4 Free      0.70 - 1.80 ng/dL    Rheumatoid Factor      0 - 30 IU/mL    Lyme Disease Antibodies Serum      <0.90    Sed Rate      0 - 15 mm/hr    Methylmalonic Acid      0.00 - 0.40 umol/L    Vitamin B12      213 - 816 pg/mL          IMPRESSION/REPORT/PLAN  Right-sided nontraumatic intracerebral hemorrhage, unspecified cerebral location (H)  History of hypertension related to Adderall use  Question of cerebral amyloid angiopathy  Pachymeningeal enhancement/Abnormal MRI  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  Patient's hypertension is thought to be related to Adderall use which he stopped.  Repeat MRI has not shown any progression of the intraparenchymal hemorrhage.  No underlying lesion has been identified.    Patient is currently doing physical therapy, speech therapy, occupational therapy, music therapy for recovery from the stroke.  Discussed prognosis with the family.  It will take a lot of time but he should keep on improving.  MRI does show some improvement in the hemorrhage area.  We further discussed about doing a one-to-one physical therapy as requested by the family.    For the pachymeningeal enhancement/MRA/MRV was negative.  Blood work was negative except for positive AL which I suspect is a false positive.  Could consider rheumatology referral if he had more joint symptoms/lupus symptoms though he does not  complain of anything today.    Lumbar puncture was discussed extensively.  Family is not interested in doing this at this point.  It would be appropriate to hold off on this given intraparenchymal hemorrhage/intracranial lesion.  We will continue to hold off again today.    Repeat MRI brain with and without contrast in 3 months to see if the pachymeningeal enhancement improves on its own.  If things are worsening would definitely consider the lumbar puncture at that time.  Also follow-up on the intraparenchymal hemorrhage on imaging at that point.    I can see him back in 3 months.    -     MR Brain w/o & w Contrast; Future  -     Physical Therapy Referral; Future    Return in about 3 months (around 8/10/2022) for In-Clinic Visit (must), After testing.    Over 44 minutes were spent coordinating the care for the patient on the day of the encounter.  This includes previsit, during visit and post visit activities as documented above.  Multiple MRI images reviewed.  Multiple test reviewed with the family.  Counseling family.  Exam is hard to do due to panic weakness.  Testing ordered.  (Activities include but not inclusive of reviewing chart, reviewing outside records, reviewing labs and imaging study results as well as the images, patient visit time including getting history and exam,  use if applicable, review of test results with the patient and coming up with a plan in a shared model, counseling patient and family, education and answering patient questions, EMR , EMR diagnosis entry and problem list management, medication reconciliation and prescription management if applicable, paperwork if applicable, printing documents and documentation of the visit activities.)    Orlando Santos MD  Neurologist  [facility]  Tel:- [phone]    This note was dictated using voice recognition software.  Any grammatical or context distortions are unintentional and inherent  to the software.        Again, thank you for allowing me to participate in the care of your patient.        Sincerely,        Orlando Santos MD

## 2022-05-10 NOTE — NURSING NOTE
Chief Complaint   Patient presents with     RECHECK     Right-sided nontraumatic intracerebral hemorrhage       Kati Romero MA on 5/10/2022 at 12:22 PM

## 2022-05-10 NOTE — PROGRESS NOTES
NEUROLOGY OUTPATIENT PROGRESS NOTE   May 10, 2022     CHIEF COMPLAINT/REASON FOR VISIT/REASON FOR CONSULT  Patient presents with:  RECHECK: Right-sided nontraumatic intracerebral hemorrhage      REASON FOR CONSULTATION- Stroke, abnormal MRI    REFERRAL SOURCE  Dr. Zaina Schneider   Dr. Zaina Schneider    HISTORY OF PRESENT ILLNESS  Sukhi Johnson is a 72 year old male seen for evaluation of an abnormal MRI.  Patient initially around late December 2021 had a spell where he fell down due to left-sided weakness.  It is unclear if he fell down first and then the left-sided weakness came on all he had the left-sided weakness first and then fell down because of that.  He was taken to the hospital and was found to have a right parietal/frontal intraparenchymal hemorrhage.  This was thought to be related to cerebral amyloid angiopathy or hypertension.  Patient was on Adderall for chronic Lyme's disease which was thought to be the cause of his hypertension.  This has not been stopped.  He did therapies and has been involved with speech therapy/Occupational Therapy/physical therapy and has made some improvement though remains wheelchair-bound with left-sided weakness.    Repeat 3 months can was done to evaluate for causes of hemorrhage and no underlying lesion was identified.  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  Patient reports no previous history of cancer or any autoimmune disease.  No recent infectious exposures.  No weight loss or any other new symptoms.    5/10/22  Patient returns today.  Continues to have left-sided weakness.  Doing physical therapy and has made minimal improvement.  Is remaining in the assisted living.  Family is requesting a one-to-one physical therapy.  Reports no headaches or new neurological symptoms.  No seizure-like activity.  No spasticity or any limitations for him to do physical therapy.    Previous history is  reviewed and this is unchanged.    PAST MEDICAL/SURGICAL HISTORY  No past medical history on file.  Patient Active Problem List   Diagnosis     Right-sided nontraumatic intracerebral hemorrhage, unspecified cerebral location (H)   Reviewed and otherwise noncontributory    FAMILY HISTORY  No family history on file.   Stroke in mother at age 90    SOCIAL HISTORY  Social History     Tobacco Use     Smoking status: Never Smoker     Smokeless tobacco: Never Used   Substance Use Topics     Alcohol use: Never     Drug use: Never       SYSTEMS REVIEW  Twelve-system ROS was done and other than the HPI this was negative.  Pertinent positives noted in the HPI.  No new issues/concerns reported today.    MEDICATIONS  acetaminophen (TYLENOL) 325 MG/10.15ML solution, 20.3 mLs (650 mg) by Oral or Feeding Tube route every 4 hours as needed for mild pain or fever  acetaminophen (TYLENOL) 650 MG suppository, Place 1 suppository (650 mg) rectally every 4 hours as needed for mild pain  Cranberry-Vitamin C (AZO CRANBERRY URINARY TRACT PO), Take 1 tablet by mouth 2 times daily  diclofenac (VOLTAREN) 1 % topical gel, Apply to left shoulder BID  docusate (COLACE) 50 MG/5ML liquid, 10 mLs (100 mg) by Oral or J tube route daily as needed for constipation  enalapril (VASOTEC) 5 MG tablet, 1 tablet (5 mg) by Per Feeding Tube route At Bedtime  Heparin Sodium, Porcine, (HEPARIN ANTICOAGULANT) 5000 UNIT/0.5ML injection, Inject 0.5 mLs (5,000 Units) Subcutaneous every 12 hours  hydrALAZINE (APRESOLINE) 50 MG tablet, 1 tablet (50 mg) by Per Feeding Tube route every 8 hours  metoprolol tartrate (LOPRESSOR) 50 MG tablet, 1 tablet (50 mg) by Per Feeding Tube route 2 times daily  ondansetron (ZOFRAN-ODT) 4 MG ODT tab, Take 1 tablet (4 mg) by mouth every 6 hours as needed for nausea or vomiting  pantoprazole (PROTONIX) 2 mg/mL SUSP suspension, 20 mLs (40 mg) by Per Feeding Tube route 2 times daily  polyethylene glycol (MIRALAX) 17 g packet, 17 g by Oral  "or Feeding Tube route 2 times daily as needed (constipation)  sennosides (SENOKOT) 8.8 MG/5ML syrup, 5 mLs by Oral or Feeding Tube route 2 times daily as needed for constipation    No current facility-administered medications on file prior to visit.       PHYSICAL EXAMINATION  VITALS: /59 (BP Location: Right arm, Patient Position: Sitting)   Pulse 87   Ht 1.727 m (5' 8\")   Wt 68.5 kg (151 lb)   BMI 22.96 kg/m    GENERAL: Healthy appearing, alert, no acute distress, normal habitus.  CARDIOVASCULAR: Extremities warm and well perfused. Pulses present.   NEUROLOGICAL:  Patient is awake and grossly oriented to self, place and time.  Attention span is slightly decreased.  Memory is grossly intact.  Language is limited to few words.  Can follow some commands. Appropriate fund of knowledge. Cranial nerves 2-12 are intact with left-sided facial droop.  There might be left visual field cut though unclear. There is no pronator drift.  Motor exam shows 5/5 strength in right arm and leg.  Left arm is 2 out of 5.  Left leg is 3 out of 5.  Tone is normal in right arm and leg.  Left arm and leg do show evidence of spasticity.  (Previously reflexes are 2+ in upper extremities and lower extremities the brisker on the left side. Sensory exam is grossly intact to light touch, pin prick and vibration but subjectively being decreased on the left side.)  Finger to nose is normal on the right side.  Unable to do on the left side due to weakness.  Unable to do heel-to-shin bilaterally.  Gait cannot be tested safely due to ongoing weakness.  He is unable to ambulate due to weakness.  Exam overall unchanged compared to before.  Questionable increased tone versus poor effort.    DIAGNOSTICS  MRI 3/22/22-images reviewed.  Pachymeningeal enhancement noted.  Previous subacute right parietal frontal hemorrhage noted  IMPRESSION:  1. Expected signal changes of now late subacute right frontoparietal  intraparenchymal hemorrhage.   2. " Extensive microhemorrhages on susceptibility weighted images;  sequelae of hypertensive microangiopathy and/or possibly amyloid  angiopathy and chronic small vessel ischemic disease.  3. New symmetric pachymeningeal enhancement. Differential includes  idiopathic intracranial hypotension, neoplasm, metastatic disease or  granulomatous disease.    CT 12/29/21.  IMPRESSION:   1. Stable parenchymal hematoma centered at the frontoparietal junction  of the right cerebral hemisphere resulting in minimal leftward midline  shift again noted without change. No definite evidence for new or  increasing intracranial hemorrhage.  2. Diffuse cerebral volume loss and cerebral white matter changes  consistent with chronic small vessel ischemic disease.    MRI 12/25/21 -images reviewed.  Right peripheral internal hemorrhage noted.                                                         IMPRESSION:  1.  Right frontoparietal acute intracranial hemorrhage with associated regional mass effect resulting in 2 mm leftward midline shift, not significantly changed since previous exam allowing for differences in technique. No associated abnormal enhancement   to suggest underlying tumor.  2.  Mild to moderate chronic small vessel ischemic disease and mild generalized brain parenchymal volume loss.  3.  Tiny focus of chronic infarction within the left posterior cerebral artery territory.  4.  Numerous chronic microhemorrhages throughout the cerebral hemispheres, deep gray nuclei and posterior fossa, possibly representing sequelae of hypertensive or amyloid angiopathy.    IMPRESSION:  This EEG recording during waking was abnormal due to generalized delta-theta slowing and to greater delta-theta slowing over the right hemisphere.  No interictal epileptiform abnormalities and no electrographic seizures were recorded.   These findings indicate mild-moderate electrographic encephalopathy and greater right hemispheric dysfunction; the history of  hemorrhage may account for this asymmetric dysfunction.  Clinical correlation is recommended.  Vince Iglesias M.D., Professor of Neurology     OUTSIDE RECORDS  Outside referral notes and chart notes were reviewed and pertinent information has been summarized (in addition to the HPI):-        MRA images reviewed.  IMPRESSION:  1.  No central arterial stenosis/occlusion, aneurysm, or high flow vascular malformation identified.  2.  Continued decrease in size of the previously demonstrated parenchymal hematoma centered in the right frontoparietal region.    MRV-images reviewed.  HEAD MRV:   1.  No dural venous sinus thrombosis or significant stenosis.    LABS  Component      Latest Ref Rng & Units 4/11/2022   WBC      4.0 - 11.0 10e3/uL 5.4   RBC Count      4.40 - 5.90 10e6/uL 4.76   Hemoglobin      13.3 - 17.7 g/dL 14.8   Hematocrit      40.0 - 53.0 % 44.7   MCV      78 - 100 fL 94   MCH      26.5 - 33.0 pg 31.1   MCHC      31.5 - 36.5 g/dL 33.1   RDW      10.0 - 15.0 % 12.6   Platelet Count      150 - 450 10e3/uL 292   % Neutrophils      % 66   % Lymphocytes      % 25   % Monocytes      % 6   % Eosinophils      % 1   % Basophils      % 1   % Immature Granulocytes      % 1   NRBCs per 100 WBC      <1 /100 0   Absolute Neutrophils      1.6 - 8.3 10e3/uL 3.6   Absolute Lymphocytes      0.8 - 5.3 10e3/uL 1.4   Absolute Monocytes      0.0 - 1.3 10e3/uL 0.3   Absolute Eosinophils      0.0 - 0.7 10e3/uL 0.0   Absolute Basophils      0.0 - 0.2 10e3/uL 0.0   Absolute Immature Granulocytes      <=0.4 10e3/uL 0.0   Absolute NRBCs      10e3/uL 0.0   Interpretive Comments          Amphiphysin Ab, S      <1:240 titer    AGNA-1, S      <1:240 titer    VIRGINIA-1, S      <1:240 titer    VIRGINIA-2, S      <1:240 titer    VIRGINIA-3, S      <1:240 titer    CRMP-5-IgG, S      <1:240 titer    Neuronal (V-G) K+ Channel Ab, S      <=0.02 nmol/L    P/Q-Type Calcium Channel AB      <=0.02 nmol/L    PCA-1, S      <1:240 titer    PCA-2, S      <1:240  titer    PCA-Tr, S      <1:240 titer    Reflex Added          AL interpretation      Negative Borderline Positive (A)   AL pattern 1       Speckled   AL titer 1       1:80   Neutrophil Cytoplasmic Antibody      <1:10 <1:10   Neutrophil Cytoplasmic Antibody Pattern       The ANCA IFA is <1:10.  No further testing will be performed.   T4 Free      0.70 - 1.80 ng/dL 1.50   Rheumatoid Factor      0 - 30 IU/mL <15   Lyme Disease Antibodies Serum      <0.90 0.17   Sed Rate      0 - 15 mm/hr 8   Methylmalonic Acid      0.00 - 0.40 umol/L 0.29   Vitamin B12      213 - 816 pg/mL 443     Component      Latest Ref Rng & Units 4/15/2022   WBC      4.0 - 11.0 10e3/uL    RBC Count      4.40 - 5.90 10e6/uL    Hemoglobin      13.3 - 17.7 g/dL    Hematocrit      40.0 - 53.0 %    MCV      78 - 100 fL    MCH      26.5 - 33.0 pg    MCHC      31.5 - 36.5 g/dL    RDW      10.0 - 15.0 %    Platelet Count      150 - 450 10e3/uL    % Neutrophils      %    % Lymphocytes      %    % Monocytes      %    % Eosinophils      %    % Basophils      %    % Immature Granulocytes      %    NRBCs per 100 WBC      <1 /100    Absolute Neutrophils      1.6 - 8.3 10e3/uL    Absolute Lymphocytes      0.8 - 5.3 10e3/uL    Absolute Monocytes      0.0 - 1.3 10e3/uL    Absolute Eosinophils      0.0 - 0.7 10e3/uL    Absolute Basophils      0.0 - 0.2 10e3/uL    Absolute Immature Granulocytes      <=0.4 10e3/uL    Absolute NRBCs      10e3/uL    Interpretive Comments       SEE NOTE   Amphiphysin Ab, S      <1:240 titer Negative   AGNA-1, S      <1:240 titer Negative   VIRGINIA-1, S      <1:240 titer Negative   VIRGINIA-2, S      <1:240 titer Negative   VIRGINIA-3, S      <1:240 titer Negative   CRMP-5-IgG, S      <1:240 titer Negative   Neuronal (V-G) K+ Channel Ab, S      <=0.02 nmol/L 0.00   P/Q-Type Calcium Channel AB      <=0.02 nmol/L 0.00   PCA-1, S      <1:240 titer Negative   PCA-2, S      <1:240 titer Negative   PCA-Tr, S      <1:240 titer Negative   Reflex Added        None.   AL interpretation      Negative    AL pattern 1          AL titer 1          Neutrophil Cytoplasmic Antibody      <1:10    Neutrophil Cytoplasmic Antibody Pattern          T4 Free      0.70 - 1.80 ng/dL    Rheumatoid Factor      0 - 30 IU/mL    Lyme Disease Antibodies Serum      <0.90    Sed Rate      0 - 15 mm/hr    Methylmalonic Acid      0.00 - 0.40 umol/L    Vitamin B12      213 - 816 pg/mL          IMPRESSION/REPORT/PLAN  Right-sided nontraumatic intracerebral hemorrhage, unspecified cerebral location (H)  History of hypertension related to Adderall use  Question of cerebral amyloid angiopathy  Pachymeningeal enhancement/Abnormal MRI  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  Patient's hypertension is thought to be related to Adderall use which he stopped.  Repeat MRI has not shown any progression of the intraparenchymal hemorrhage.  No underlying lesion has been identified.    Patient is currently doing physical therapy, speech therapy, occupational therapy, music therapy for recovery from the stroke.  Discussed prognosis with the family.  It will take a lot of time but he should keep on improving.  MRI does show some improvement in the hemorrhage area.  We further discussed about doing a one-to-one physical therapy as requested by the family.    For the pachymeningeal enhancement/MRA/MRV was negative.  Blood work was negative except for positive AL which I suspect is a false positive.  Could consider rheumatology referral if he had more joint symptoms/lupus symptoms though he does not complain of anything today.    Lumbar puncture was discussed extensively.  Family is not interested in doing this at this point.  It would be appropriate to hold off on this given intraparenchymal hemorrhage/intracranial lesion.  We will continue to hold off again  today.    Repeat MRI brain with and without contrast in 3 months to see if the pachymeningeal enhancement improves on its own.  If things are worsening would definitely consider the lumbar puncture at that time.  Also follow-up on the intraparenchymal hemorrhage on imaging at that point.    I can see him back in 3 months.    -     MR Brain w/o & w Contrast; Future  -     Physical Therapy Referral; Future    Return in about 3 months (around 8/10/2022) for In-Clinic Visit (must), After testing.    Over 44 minutes were spent coordinating the care for the patient on the day of the encounter.  This includes previsit, during visit and post visit activities as documented above.  Multiple MRI images reviewed.  Multiple test reviewed with the family.  Counseling family.  Exam is hard to do due to panic weakness.  Testing ordered.  (Activities include but not inclusive of reviewing chart, reviewing outside records, reviewing labs and imaging study results as well as the images, patient visit time including getting history and exam,  use if applicable, review of test results with the patient and coming up with a plan in a shared model, counseling patient and family, education and answering patient questions, EMR , EMR diagnosis entry and problem list management, medication reconciliation and prescription management if applicable, paperwork if applicable, printing documents and documentation of the visit activities.)    Orlando Santos MD  Neurologist  [facility]  Tel:- [phone]    This note was dictated using voice recognition software.  Any grammatical or context distortions are unintentional and inherent to the software.

## 2022-06-10 ENCOUNTER — NURSING HOME VISIT (OUTPATIENT)
Dept: GERIATRICS | Facility: CLINIC | Age: 72
End: 2022-06-10
Payer: COMMERCIAL

## 2022-06-10 VITALS
HEIGHT: 68 IN | SYSTOLIC BLOOD PRESSURE: 105 MMHG | DIASTOLIC BLOOD PRESSURE: 59 MMHG | TEMPERATURE: 98.1 F | WEIGHT: 143 LBS | HEART RATE: 85 BPM | OXYGEN SATURATION: 93 % | BODY MASS INDEX: 21.67 KG/M2 | RESPIRATION RATE: 18 BRPM

## 2022-06-10 DIAGNOSIS — I10 HYPERTENSION, UNSPECIFIED TYPE: ICD-10-CM

## 2022-06-10 DIAGNOSIS — M24.542 CONTRACTURE OF HAND JOINT, LEFT: ICD-10-CM

## 2022-06-10 DIAGNOSIS — G81.94 LEFT HEMIPLEGIA (H): ICD-10-CM

## 2022-06-10 DIAGNOSIS — R13.0 APHAGIA: ICD-10-CM

## 2022-06-10 DIAGNOSIS — G93.41 METABOLIC ENCEPHALOPATHY: ICD-10-CM

## 2022-06-10 DIAGNOSIS — I61.9 RIGHT-SIDED NONTRAUMATIC INTRACEREBRAL HEMORRHAGE, UNSPECIFIED CEREBRAL LOCATION (H): Primary | ICD-10-CM

## 2022-06-10 PROCEDURE — 99318 PR ANNUAL NURSING FAC ASSESSMNT, STABLE: CPT | Performed by: NURSE PRACTITIONER

## 2022-06-10 NOTE — PROGRESS NOTES
[facility] GERIATRICS  Chief Complaint   Patient presents with     Annual Comprehensive Nursing Home     [facility] Record Number:  [id number]  Place of Service where encounter took place:  [facility] () [[id number]]    HPI:    Sukhi Johnson  is a 72 year old  (1950), who is being seen today for an annual comprehensive visit. HPI information obtained from: facility chart records, facility staff and [facility] chart review.   1. Right-sided nontraumatic intracerebral hemorrhage, unspecified cerebral location (H)    2. Left hemiplegia (H)    3. Aphagia    4. Metabolic encephalopathy    5. Contracture of hand joint, left    6. Hypertension, unspecified type      Patient seen with above Hx/Dx, appears oriented and smiles, post ICH in 2021 with hemiplegia, unable to speak, unknown confusion/cognitive state, L hand contracted without therapeutic device, SBP's in the 110-130 range, appears healthy, long visit with questions and is able to answer with thumb up or down effectively, answers appear to correlate with current status, full ADL assist, is able to feed self, no distress.      ALLERGIES: Patient has no known allergies.  PAST MEDICAL HISTORY: No past medical history on file.   PAST SURGICAL HISTORY:  has a past surgical history that includes IR Gastro Jejunostomy Tube Placement (1/5/2022) and IR Gastro Jejunostomy Tube Change (3/9/2022).  IMMUNIZATIONS:    There is no immunization history on file for this patient.  Above immunizations pulled from [facility]. [facility] and facility records also reconciled. Outstanding information sent to  to update [facility].  Future immunizations needed:  yearly influenza per facility protocol  no history of any immunizations anywhere      Current Outpatient Medications:      acetaminophen (TYLENOL) 325 MG/10.15ML solution, 20.3 mLs (650 mg) by Oral or Feeding Tube route every 4 hours as needed for mild pain or fever, Disp: , Rfl:       acetaminophen (TYLENOL) 650 MG suppository, Place 1 suppository (650 mg) rectally every 4 hours as needed for mild pain, Disp: , Rfl:      Cranberry-Vitamin C (AZO CRANBERRY URINARY TRACT PO), Take 1 tablet by mouth 2 times daily, Disp: , Rfl:      diclofenac (VOLTAREN) 1 % topical gel, Apply to left shoulder BID, Disp: 50 g, Rfl: 1     docusate (COLACE) 50 MG/5ML liquid, 10 mLs (100 mg) by Oral or J tube route daily as needed for constipation, Disp: , Rfl:      enalapril (VASOTEC) 5 MG tablet, 1 tablet (5 mg) by Per Feeding Tube route At Bedtime, Disp: , Rfl:      Heparin Sodium, Porcine, (HEPARIN ANTICOAGULANT) 5000 UNIT/0.5ML injection, Inject 0.5 mLs (5,000 Units) Subcutaneous every 12 hours, Disp: , Rfl:      hydrALAZINE (APRESOLINE) 50 MG tablet, 1 tablet (50 mg) by Per Feeding Tube route every 8 hours, Disp: , Rfl:      metoprolol tartrate (LOPRESSOR) 50 MG tablet, 1 tablet (50 mg) by Per Feeding Tube route 2 times daily, Disp: , Rfl:      ondansetron (ZOFRAN-ODT) 4 MG ODT tab, Take 1 tablet (4 mg) by mouth every 6 hours as needed for nausea or vomiting, Disp: , Rfl:      pantoprazole (PROTONIX) 2 mg/mL SUSP suspension, 20 mLs (40 mg) by Per Feeding Tube route 2 times daily, Disp: , Rfl:      polyethylene glycol (MIRALAX) 17 g packet, 17 g by Oral or Feeding Tube route 2 times daily as needed (constipation), Disp: , Rfl:      sennosides (SENOKOT) 8.8 MG/5ML syrup, 5 mLs by Oral or Feeding Tube route 2 times daily as needed for constipation, Disp: , Rfl:      Case Management:  I have reviewed the facility/SNF care plan/MDS, including the falls risk, nutrition and pain screening. I also reviewed the current immunizations, and preventive care.. Future cancer screening is not clinically indicated secondary to age/goals of care Patient's desire to return to the community is present, but is not able due to care needs . Current Level of Care is appropriate.    Advance Directive Discussion:    I reviewed the  "current advanced directives as reflected in EPIC, the POLST and the facility chart, and verified the congruency of orders.   I did not due to cognitive impairment review the advance directives with the resident. Patient's goal is pain control and comfort.    Team Discussion:  I communicated with the appropriate disciplines involved with the Plan of Care:   Nursing    Information reviewed:  Medications, vital signs, orders, and nursing notes.    ROS:  Unobtainable secondary to aphasia.     Vitals:  /59   Pulse 85   Temp 98.1  F (36.7  C)   Resp 18   Ht 1.727 m (5' 8\")   Wt 64.9 kg (143 lb)   SpO2 93%   BMI 21.74 kg/m   Body mass index is 21.74 kg/m .  Exam:  GENERAL APPEARANCE:  in no distress, appears healthy  ENT:  Mouth and posterior oropharynx normal, moist mucous membranes, normal hearing acuity  RESP:  lungs clear to auscultation , no respiratory distress  CV:  no edema, rate-normal  ABDOMEN:  no guarding or rebound, bowel sounds normal  M/S:   Gait and station abnormal transfer assist, WC mobility  SKIN:  Inspection of skin and subcutaneous tissue baseline, Palpation of skin and subcutaneous tissue baseline  NEURO:   Cranial nerves 2-12 are normal tested and grossly at patient's baseline, Examination of sensation by touch normal  PSYCH:  oriented X 3, affect and mood normal     Lab/Diagnostic data:   Labs done in SNF are in [facility]. Please refer to them using [employer]/Care Everywhere.    ASSESSMENT/PLAN  (I61.9) Right-sided nontraumatic intracerebral hemorrhage, unspecified cerebral location (H)  (primary encounter diagnosis)  (G81.94) Left hemiplegia (H)  (R13.0) Aphagia  (G93.41) Metabolic encephalopathy  Comment: appears healthy 71yo, post ICH changed status, full ADL assist, limited ability to make needs known  Plan: continue without any antiplatelets, only BP control medications  -staff to support as indicated  -appt with Dr.Aggarwal camilo on 8/15 with pre-clinic MRI scheduled on " 8/10      (M24.542) Contracture of hand joint, left  Comment: L hand contracted, no device to keep open  Plan: OT to eval for device to maintain extension and keep skin from macerating    (I10) Hypertension, unspecified type  Comment: SBP's in the 100-120 range  Plan: maintain low BP for bleed risk, non ambulatory and not falls risk  -continue enalapril, hydralazine and metoprolol  -staff to check VS as scheduled        Electronically signed by:  KALI Ramirez CNP

## 2022-06-10 NOTE — LETTER
6/10/2022        RE: Sukhi Johnson  [address]  [place] [address]        [facility] GERIATRICS  Chief Complaint   Patient presents with     Annual Comprehensive Nursing Home     [place] Medical Record Number:  [id number]  Place of Service where encounter took place:  [facility] () [[id number]]    HPI:    Sukhi Johnson  is a 72 year old  (1950), who is being seen today for an annual comprehensive visit. HPI information obtained from: facility chart records, facility staff and [facility] chart review.   1. Right-sided nontraumatic intracerebral hemorrhage, unspecified cerebral location (H)    2. Left hemiplegia (H)    3. Aphagia    4. Metabolic encephalopathy    5. Contracture of hand joint, left    6. Hypertension, unspecified type      Patient seen with above Hx/Dx, appears oriented and smiles, post ICH in 2021 with hemiplegia, unable to speak, unknown confusion/cognitive state, L hand contracted without therapeutic device, SBP's in the 110-130 range, appears healthy, long visit with questions and is able to answer with thumb up or down effectively, answers appear to correlate with current status, full ADL assist, is able to feed self, no distress.      ALLERGIES: Patient has no known allergies.  PAST MEDICAL HISTORY: No past medical history on file.   PAST SURGICAL HISTORY:  has a past surgical history that includes IR Gastro Jejunostomy Tube Placement (1/5/2022) and IR Gastro Jejunostomy Tube Change (3/9/2022).  IMMUNIZATIONS:    There is no immunization history on file for this patient.  Above immunizations pulled from [facility]. [facility] and facility records also reconciled. Outstanding information sent to  to update [facility].  Future immunizations needed:  yearly influenza per facility protocol  no history of any immunizations anywhere      Current Outpatient Medications:      acetaminophen (TYLENOL) 325 MG/10.15ML solution, 20.3 mLs (650 mg) by Oral  or Feeding Tube route every 4 hours as needed for mild pain or fever, Disp: , Rfl:      acetaminophen (TYLENOL) 650 MG suppository, Place 1 suppository (650 mg) rectally every 4 hours as needed for mild pain, Disp: , Rfl:      Cranberry-Vitamin C (AZO CRANBERRY URINARY TRACT PO), Take 1 tablet by mouth 2 times daily, Disp: , Rfl:      diclofenac (VOLTAREN) 1 % topical gel, Apply to left shoulder BID, Disp: 50 g, Rfl: 1     docusate (COLACE) 50 MG/5ML liquid, 10 mLs (100 mg) by Oral or J tube route daily as needed for constipation, Disp: , Rfl:      enalapril (VASOTEC) 5 MG tablet, 1 tablet (5 mg) by Per Feeding Tube route At Bedtime, Disp: , Rfl:      Heparin Sodium, Porcine, (HEPARIN ANTICOAGULANT) 5000 UNIT/0.5ML injection, Inject 0.5 mLs (5,000 Units) Subcutaneous every 12 hours, Disp: , Rfl:      hydrALAZINE (APRESOLINE) 50 MG tablet, 1 tablet (50 mg) by Per Feeding Tube route every 8 hours, Disp: , Rfl:      metoprolol tartrate (LOPRESSOR) 50 MG tablet, 1 tablet (50 mg) by Per Feeding Tube route 2 times daily, Disp: , Rfl:      ondansetron (ZOFRAN-ODT) 4 MG ODT tab, Take 1 tablet (4 mg) by mouth every 6 hours as needed for nausea or vomiting, Disp: , Rfl:      pantoprazole (PROTONIX) 2 mg/mL SUSP suspension, 20 mLs (40 mg) by Per Feeding Tube route 2 times daily, Disp: , Rfl:      polyethylene glycol (MIRALAX) 17 g packet, 17 g by Oral or Feeding Tube route 2 times daily as needed (constipation), Disp: , Rfl:      sennosides (SENOKOT) 8.8 MG/5ML syrup, 5 mLs by Oral or Feeding Tube route 2 times daily as needed for constipation, Disp: , Rfl:      Case Management:  I have reviewed the facility/SNF care plan/MDS, including the falls risk, nutrition and pain screening. I also reviewed the current immunizations, and preventive care.. Future cancer screening is not clinically indicated secondary to age/goals of care Patient's desire to return to the community is present, but is not able due to care needs . Current  "Level of Care is appropriate.    Advance Directive Discussion:    I reviewed the current advanced directives as reflected in EPIC, the POLST and the facility chart, and verified the congruency of orders.   I did not due to cognitive impairment review the advance directives with the resident. Patient's goal is pain control and comfort.    Team Discussion:  I communicated with the appropriate disciplines involved with the Plan of Care:   Nursing    Information reviewed:  Medications, vital signs, orders, and nursing notes.    ROS:  Unobtainable secondary to aphasia.     Vitals:  /59   Pulse 85   Temp 98.1  F (36.7  C)   Resp 18   Ht 1.727 m (5' 8\")   Wt 64.9 kg (143 lb)   SpO2 93%   BMI 21.74 kg/m   Body mass index is 21.74 kg/m .  Exam:  GENERAL APPEARANCE:  in no distress, appears healthy  ENT:  Mouth and posterior oropharynx normal, moist mucous membranes, normal hearing acuity  RESP:  lungs clear to auscultation , no respiratory distress  CV:  no edema, rate-normal  ABDOMEN:  no guarding or rebound, bowel sounds normal  M/S:   Gait and station abnormal transfer assist, WC mobility  SKIN:  Inspection of skin and subcutaneous tissue baseline, Palpation of skin and subcutaneous tissue baseline  NEURO:   Cranial nerves 2-12 are normal tested and grossly at patient's baseline, Examination of sensation by touch normal  PSYCH:  oriented X 3, affect and mood normal     Lab/Diagnostic data:   Labs done in SNF are in [facility]. Please refer to them using [facility]/Care Everywhere.    ASSESSMENT/PLAN  (I61.9) Right-sided nontraumatic intracerebral hemorrhage, unspecified cerebral location (H)  (primary encounter diagnosis)  (G81.94) Left hemiplegia (H)  (R13.0) Aphagia  (G93.41) Metabolic encephalopathy  Comment: appears healthy 71yo, post ICH changed status, full ADL assist, limited ability to make needs known  Plan: continue without any antiplatelets, only BP control medications  -staff to support as " indicated  -appt with  neuro on 8/15 with pre-clinic MRI scheduled on 8/10      (M24.542) Contracture of hand joint, left  Comment: L hand contracted, no device to keep open  Plan: OT to eval for device to maintain extension and keep skin from macerating    (I10) Hypertension, unspecified type  Comment: SBP's in the 100-120 range  Plan: maintain low BP for bleed risk, non ambulatory and not falls risk  -continue enalapril, hydralazine and metoprolol  -staff to check VS as scheduled        Electronically signed by:  KALI Ramirez CNP             Sincerely,        KALI Ramirez CNP

## 2022-06-20 NOTE — PROGRESS NOTES
"[facility] GERIATRICS  [place] Medical Record Number: [id number]  Chief Complaint   Patient presents with     RECHECK     HPI: Sukhi Johnson is a 72 year old (1950), who is being seen today for an episodic care visit at: [facility] () [[id number]]. Today's concern is:   1. Right-sided nontraumatic intracerebral hemorrhage, unspecified cerebral location (H)    2. Left hemiplegia (H)    3. Dysarthria and anarthria    4. Aphagia      Patient seen today for follow up, also needs F2F for communication device, appears healthy, unable to make needs known due to aphagia, will motion, grunt and groan in effort to make needs known, full ADL assist with mara transfer and WC, feeding support, skin intact, L hand contracture and therapy has been informed to evaluate for potential wrist brace, no distress.    Allergies and PMH/PSH reviewed in EPIC today.    REVIEW OF SYSTEMS:  Unobtainable secondary to aphagia.     Objective:   /72   Pulse 74   Temp 97.9  F (36.6  C)   Resp 20   Ht 1.727 m (5' 8\")   Wt 64 kg (141 lb 3.2 oz)   SpO2 94%   BMI 21.47 kg/m    Exam:  GENERAL APPEARANCE:  in no distress, appears healthy  ENT:  Mouth and posterior oropharynx normal, moist mucous membranes  RESP:  lungs clear to auscultation   CV:  no edema, rate-normal  ABDOMEN:  bowel sounds normal  M/S:   Gait and station abnormal ADL assist  SKIN:  Inspection of skin and subcutaneous tissue baseline  NEURO:   Examination of sensation by touch normal  PSYCH:  oriented X 3    Labs:   Labs done in SNF are in [facility]. Please refer to them using [facility]/Care Everywhere.    Assessment/Plan:  (I61.9) Right-sided nontraumatic intracerebral hemorrhage, unspecified cerebral location (H)  (primary encounter diagnosis)  (G81.94) Left hemiplegia (H)  (R47.1) Dysarthria and anarthria  (R13.0) Aphagia  Comment: appears healthy, moderate physical limitations, unable to verbally make needs known  Plan: staff to support as " indicated  -mara transfer  -OT eval for L hand contracture  -Gtube feeding  -control blood pressures  -follow up Dr.Aggarwal Baer on 8/15  -ordering communication device      Electronically signed by: KALI Ramirez CNP         Documentation of Face-to-Face and Certification for DME     Patient: Sukhi Johnson   YOB: 1950  MR Number: [id number]  Today's Date: 6/21/2022    I certify that patient: Sukhi Johnson is under my care and that I, or a nurse practitioner or physician's assistant working with me, had a face-to-face encounter that meets the physician face-to-face encounter requirements with this patient on: 6/21/2022.    This encounter with the patient was in whole, or in part, for the following medical condition, which is the primary reason for home health care:   1. Right-sided nontraumatic intracerebral hemorrhage, unspecified cerebral location (H)    2. Left hemiplegia (H)    3. Dysarthria and anarthria    4. Aphagia        I certify that, based on my findings, the following DME is medically necessary;  Speech generative device    My clinical findings support the need for the above DME because:   1) has been evaluated and treated for a condition that supports the need for speech degenerative device    The patient is under my care, and I have initiated the establishment of the plan of care.  This patient will be followed by a physician who will periodically review the plan of care.  Physician/Provider to provide follow up care: Chris Maria [phone]  C [phone]  F [phone]    Responsible Medicare certified P[employer]OS Physician: Chris Maria  Electronic Physician Signature  Date: 6/21/2022

## 2022-06-21 ENCOUNTER — NURSING HOME VISIT (OUTPATIENT)
Dept: GERIATRICS | Facility: CLINIC | Age: 72
End: 2022-06-21
Payer: COMMERCIAL

## 2022-06-21 VITALS
BODY MASS INDEX: 21.4 KG/M2 | RESPIRATION RATE: 20 BRPM | SYSTOLIC BLOOD PRESSURE: 118 MMHG | DIASTOLIC BLOOD PRESSURE: 72 MMHG | HEIGHT: 68 IN | OXYGEN SATURATION: 94 % | HEART RATE: 74 BPM | WEIGHT: 141.2 LBS | TEMPERATURE: 97.9 F

## 2022-06-21 DIAGNOSIS — G81.94 LEFT HEMIPLEGIA (H): ICD-10-CM

## 2022-06-21 DIAGNOSIS — I61.9 RIGHT-SIDED NONTRAUMATIC INTRACEREBRAL HEMORRHAGE, UNSPECIFIED CEREBRAL LOCATION (H): Primary | ICD-10-CM

## 2022-06-21 DIAGNOSIS — R13.0 APHAGIA: ICD-10-CM

## 2022-06-21 DIAGNOSIS — R47.1 DYSARTHRIA AND ANARTHRIA: ICD-10-CM

## 2022-06-21 PROCEDURE — 99309 SBSQ NF CARE MODERATE MDM 30: CPT | Performed by: NURSE PRACTITIONER

## 2022-06-21 NOTE — LETTER
"    6/21/2022        RE: Sukhi Johnson  [address]  [place] [address]        [facility] GERIATRICS  [place] Medical Record Number: [id number]  Chief Complaint   Patient presents with     RECHECK     HPI: Sukhi Johnson is a 72 year old (1950), who is being seen today for an episodic care visit at: [facility] () [[id number]]. Today's concern is:   1. Right-sided nontraumatic intracerebral hemorrhage, unspecified cerebral location (H)    2. Left hemiplegia (H)    3. Dysarthria and anarthria    4. Aphagia      Patient seen today for follow up, also needs F2F for communication device, appears healthy, unable to make needs known due to aphagia, will motion, grunt and groan in effort to make needs known, full ADL assist with mara transfer and WC, feeding support, skin intact, L hand contracture and therapy has been informed to evaluate for potential wrist brace, no distress.    Allergies and PMH/PSH reviewed in EPIC today.    REVIEW OF SYSTEMS:  Unobtainable secondary to aphagia.     Objective:   /72   Pulse 74   Temp 97.9  F (36.6  C)   Resp 20   Ht 1.727 m (5' 8\")   Wt 64 kg (141 lb 3.2 oz)   SpO2 94%   BMI 21.47 kg/m    Exam:  GENERAL APPEARANCE:  in no distress, appears healthy  ENT:  Mouth and posterior oropharynx normal, moist mucous membranes  RESP:  lungs clear to auscultation   CV:  no edema, rate-normal  ABDOMEN:  bowel sounds normal  M/S:   Gait and station abnormal ADL assist  SKIN:  Inspection of skin and subcutaneous tissue baseline  NEURO:   Examination of sensation by touch normal  PSYCH:  oriented X 3    Labs:   Labs done in SNF are in [facility]. Please refer to them using [facility]/Care Everywhere.    Assessment/Plan:  (I61.9) Right-sided nontraumatic intracerebral hemorrhage, unspecified cerebral location (H)  (primary encounter diagnosis)  (G81.94) Left hemiplegia (H)  (R47.1) Dysarthria and anarthria  (R13.0) Aphagia  Comment: appears healthy, moderate " physical limitations, unable to verbally make needs known  Plan: staff to support as indicated  -mara transfer  -OT eval for L hand contracture  -Gtube feeding  -control blood pressures  -follow up Dr.Aggarwal Baer on 8/15  -ordering communication device      Electronically signed by: KALI Ramirez CNP         Documentation of Face-to-Face and Certification for DME     Patient: Sukhi Johnson   YOB: 1950  MR Number: [id number]  Today's Date: 6/21/2022    I certify that patient: Sukhi Johnson is under my care and that I, or a nurse practitioner or physician's assistant working with me, had a face-to-face encounter that meets the physician face-to-face encounter requirements with this patient on: 6/21/2022.    This encounter with the patient was in whole, or in part, for the following medical condition, which is the primary reason for home health care:   1. Right-sided nontraumatic intracerebral hemorrhage, unspecified cerebral location (H)    2. Left hemiplegia (H)    3. Dysarthria and anarthria    4. Aphagia        I certify that, based on my findings, the following DME is medically necessary;  Speech generative device    My clinical findings support the need for the above DME because:   1) has been evaluated and treated for a condition that supports the need for speech degenerative device    The patient is under my care, and I have initiated the establishment of the plan of care.  This patient will be followed by a physician who will periodically review the plan of care.  Physician/Provider to provide follow up care: Chris Maria  [facility] [phone]  C [phone]  F [phone]    Responsible Medicare certified PECOS Physician: Chris Maria  Electronic Physician Signature  Date: 6/21/2022          Sincerely,        KALI Ramirez CNP

## 2022-06-21 NOTE — LETTER
"    6/21/2022        RE: Sukhi Johnson  [address]  [place] [address]        [facility] GERIATRICS  [place] Medical Record Number: [id number]  Chief Complaint   Patient presents with     RECHECK     HPI: Sukhi Johnson is a 72 year old (1950), who is being seen today for an episodic care visit at: [facility] () [[id number]]. Today's concern is:   1. Right-sided nontraumatic intracerebral hemorrhage, unspecified cerebral location (H)    2. Left hemiplegia (H)    3. Dysarthria and anarthria    4. Aphagia      Patient seen today for follow up, also needs F2F for communication device, appears healthy, unable to make needs known due to aphagia, will motion, grunt and groan in effort to make needs known, full ADL assist with mara transfer and WC, feeding support, skin intact, L hand contracture and therapy has been informed to evaluate for potential wrist brace, no distress.    Allergies and PMH/PSH reviewed in EPIC today.    REVIEW OF SYSTEMS:  Unobtainable secondary to aphagia.     Objective:   /72   Pulse 74   Temp 97.9  F (36.6  C)   Resp 20   Ht 1.727 m (5' 8\")   Wt 64 kg (141 lb 3.2 oz)   SpO2 94%   BMI 21.47 kg/m    Exam:  GENERAL APPEARANCE:  in no distress, appears healthy  ENT:  Mouth and posterior oropharynx normal, moist mucous membranes  RESP:  lungs clear to auscultation   CV:  no edema, rate-normal  ABDOMEN:  bowel sounds normal  M/S:   Gait and station abnormal ADL assist  SKIN:  Inspection of skin and subcutaneous tissue baseline  NEURO:   Examination of sensation by touch normal  PSYCH:  oriented X 3    Labs:   Labs done in SNF are in [facility]. Please refer to them using [employer]/Care Everywhere.    Assessment/Plan:  (I61.9) Right-sided nontraumatic intracerebral hemorrhage, unspecified cerebral location (H)  (primary encounter diagnosis)  (G81.94) Left hemiplegia (H)  (R47.1) Dysarthria and anarthria  (R13.0) Aphagia  Comment: appears healthy, moderate " physical limitations, unable to verbally make needs known  Plan: staff to support as indicated  -mara transfer  -OT eval for L hand contracture  -Gtube feeding  -control blood pressures  -follow up Dr.Aggarwal Baer on 8/15  -ordering communication device      Electronically signed by: KALI Ramirez CNP         Documentation of Face-to-Face and Certification for DME     Patient: Sukhi Johnson   YOB: 1950  MR Number: [id number]  Today's Date: 6/21/2022    I certify that patient: Sukhi Johnson is under my care and that I, or a nurse practitioner or physician's assistant working with me, had a face-to-face encounter that meets the physician face-to-face encounter requirements with this patient on: 6/21/2022.    This encounter with the patient was in whole, or in part, for the following medical condition, which is the primary reason for home health care:   1. Right-sided nontraumatic intracerebral hemorrhage, unspecified cerebral location (H)    2. Left hemiplegia (H)    3. Dysarthria and anarthria    4. Aphagia        I certify that, based on my findings, the following DME is medically necessary;  Speech generative device    My clinical findings support the need for the above DME because:   1) has been evaluated and treated for a condition that supports the need for speech degenerative device    The patient is under my care, and I have initiated the establishment of the plan of care.  This patient will be followed by a physician who will periodically review the plan of care.  Physician/Provider to provide follow up care: Chris Maria  [facility] [phone]  C [phone]  F [phone]    Responsible Medicare certified P[employer]OS Physician: Chris Maria  Electronic Physician Signature  Date: 6/21/2022          Sincerely,        KALI Ramirez CNP

## 2022-06-30 ENCOUNTER — TELEPHONE (OUTPATIENT)
Dept: GERIATRICS | Facility: CLINIC | Age: 72
End: 2022-06-30

## 2022-07-14 ENCOUNTER — PATIENT OUTREACH (OUTPATIENT)
Dept: GERIATRIC MEDICINE | Facility: CLINIC | Age: 72
End: 2022-07-14

## 2022-07-14 NOTE — PROGRESS NOTES
[facility] Care Coordination Contact    Member became effective with  Ramón on 07/01/22 with [employer].  Previous Health Plan: [employer]  Previous Care System: [facility]  [facility] received: No: Requested on 07/14/22     Sun Sprague  Care Management Specialist Manager  [facility]  [phone]

## 2022-07-14 NOTE — LETTER
July 14, 2022    Important [employer] Information    MONE DORAN  c/o Andressa [facility]  [address]  [place] [address]    Your New Care Coordinator  Dear Mone,  My name is OLGA Simon [facility] and I am your new Care Coordinator. You may reach me by calling [phone]. I will be in touch with you shortly to address any questions you may have.   I have also been in contact with your previous [employer] care coordinator, to ensure a smooth transition.  Questions?  Call me at [phone] Monday-Friday between 8am and 5pm. TTY: 711. I look forward to working with you as a [employer]  member.  Sincerely,    OLGA Simon [facility]    E-mail: [email or web address]  Phone: [phone]      [employer]    cc: member records                                                                                                                        CB5 ([facility] [place] [place]) (5-2020)    Civil Rights Notice  Discrimination is against the law. [employer] does not discriminate on the basis of any of the following:    Race    Color    National Origin    Creed    Quaker    Age    Public Assistance Status    Receipt of Health Care Services    Disability (including physical or mental impairment)    Sex (including sex stereotypes and gender identity)    Marital Status    Political Beliefs    Medical Condition    Genetic Information    Sexual Orientation    Claims Experience    Medical History    Health Status    Auxiliary Aids and Services:  [employer] provides auxiliary aids and services, like qualified interpreters or information in accessible formats, free of charge and in a timely manner, to ensure an equal opportunity to participate in our health care programs. Contact [employer] at [employer]/contact medicaid or call [phone] (toll free); TTY:711 or at [employer]/contactmedicaid.    Language Assistance Services:  [employer] provides translated documents and spoken language interpreting, free of charge and in a  timely manner, when language assistance services are necessary to ensure limited English speakers have meaningful access to our information and services. Contact [employer] at [phone] (toll free); TTY: 711 or [employer]/contactmedicaid.     Civil Rights Complaints  You have the right to file a discrimination complaint if you believe you were treated in a discriminatory way by [employer]. You may contact any of the following four agencies directly to file a discrimination complaint.        U.S. Department of Health and Human Services  Office for Civil Rights (OCR)  You have the right to file a complaint with the OCR, a federal agency, if you believe you have been discriminated against because of any of the following:    Race    Disability    Color    Sex    National Origin    Age    Evangelical (in some cases)    Contact the OCR directly to file a complaint:         Director         U.S. Department of Health and Human Services  Office for Civil Rights         [address] [place] [address]         Customer Response Center: Toll-free: [phone]          TDD: [phone]         Email: [email or web address]    [place] Department of Human Rights (MDHR)  In [place], you have the right to file a complaint with the MDHR if you believe you have been discriminated against because of any of the following:      Race    Color    National Origin    Evangelical    Creed    Sex    Sexual Orientation    Marital Status    Public Assistance Status    Disability    Contact the MDHR directly to file a complaint:         [place] Department of Human Rights         [address] [place] [address]         [phone] (voice)          [phone] (toll free)         711 or [phone] ([place] Relay)         [phone] (Fax)         [email or web address] (Email)     [place] Department of Human Services (DHS)  You have the right to file a  complaint with [facility] if you believe you have been discriminated against in our health care programs because of any of the following:    Race    Color    National Origin    Creed    Pentecostal    Age    Public Assistance Status    Receipt of Health Care Services    Disability (including physical or mental impairment)    Sex (including sex stereotypes and gender identity)    Marital Status    Political Beliefs    Medical Condition    Genetic Information    Sexual Orientation    Claims Experience    Medical History    Health Status    Complaints must be in writing and filed within 180 days of the date you discovered the alleged discrimination. The complaint must contain your name and address and describe the discrimination you are complaining about. After we get your complaint, we will review it and notify you in writing about whether we have authority to investigate. If we do, we will investigate the complaint.      [facility] will notify you in writing of the investigation s outcome. You have a right to appeal the outcome if you disagree with the decision. To appeal, you must send a written request to have [facility] review the investigation outcome. Be brief and state why you disagree with the decision. Include additional information you think is important.      If you file a complaint in this way, the people who work for the agency named in the complaint cannot retaliate against you. This means they cannot punish you in any way for filing a complaint. Filing a complaint in this way does not stop you from seeking out other legal or administration actions.     Contact [facility] directly to file a discrimination complaint:        Civil Rights Coordinator        [place] Department of Human Services        Equal Opportunity and Access Division        [address] [address]        [place] [address] [phone] (voice) or use your preferred relay service     [employer] Complaint Notice   You have the right to file a complaint with [employer] if  you believe you have been discriminated against because of any of the following:       Medical condition    Health status    Receipt of health care services    Claims experience    Medical history    Genetic information    Disability (including mental or physical impairment)    Marital status    Age    Sex (including sex stereotypes and gender identity)    Sexual orientation    National origin    Race    Color    Taoism    Creed    Public assistance status    Political beliefs    You can file a complaint and ask for help in filing a complaint in person or by mail, phone, fax, or email at:     [employer] Civil Rights Coordinator  [employer] [facility]  [address], [place] [address] [phone] (voice and fax) or TTZ:271  Email: [email or web address]    American Indians can begin or continue to use [facility] and [facility] ([facility]) clinics. We will not require prior approval or impose any conditions for you to get services at these clinics. For elders age 65 years and older this includes Elderly Waiver (EW) services accessed through the [place]. If a doctor or other provider in a [facility] or [facility] clinic refers you to a provider in our network, we will not require you to see your primary care provider prior to the referral.

## 2022-07-18 NOTE — PROGRESS NOTES
"[facility] GERIATRICS  [place] Medical Record Number: [id number]  Chief Complaint   Patient presents with     RECHECK     FVP Care Coordination - Health Plan or Product Change     HPI: Sukhi Johnson is a 72 year old (1950), who is being seen today for an episodic care visit at: [facility] AT [facility] () [[id number]]. Today's concern is:   1. Right-sided nontraumatic intracerebral hemorrhage, unspecified cerebral location (H)    2. Left hemiplegia (H)    3. Dysarthria and anarthria      Patient seen today for follow up, post ICH in Dec 2021, now with L hemiplegia and aphasia, followed by  neurology, has some ability to feed self with R hand/arm, limited intake without feeding supervision, weights stable, limited verbal but does respond somewhat appropriately at times, working with st to obtain a speech generating device to improve communication, appears healthy, no distress.    Allergies and PMH/PSH [facility] in [facility] today.    REVIEW OF SYSTEMS:  Unobtainable secondary to aphasia.     Objective:   /65   Pulse 55   Temp 97.7  F (36.5  C)   Resp 17   Ht 1.727 m (5' 8\")   Wt 65 kg (143 lb 3.2 oz)   SpO2 97%   BMI 21.77 kg/m    Exam:  GENERAL APPEARANCE:  in no distress, appears healthy  ENT:  Mouth and posterior oropharynx normal, moist mucous membranes  RESP:  lungs clear to auscultation   CV:  no edema  ABDOMEN:  bowel sounds normal  M/S:   Gait and station abnormal ADL assist  SKIN:  Inspection of skin and subcutaneous tissue baseline  NEURO:   Examination of sensation by touch normal  PSYCH:  oriented to unknown    Labs:   Recent labs in [facility] reviewed by me today.     [facility] [employer]:  The health plan new enrollment has happened. I have reviewed the MDS, the preventative needs, and facility care plan. The level of care is appropriate. I have reviewed the code status/advanced directives.    Assessment/Plan:  (I61.9) Right-sided nontraumatic intracerebral hemorrhage, " "unspecified cerebral location (H)  (primary encounter diagnosis)  (G81.94) Left hemiplegia (H)  (R47.1) Dysarthria and anarthria  Comment: limited verbal, ADL assist, fll L hemiplegia  Plan:   -continue heparin, enalapril  -follow up  neuro, MRI on 8/10, consult 8/15  -OT completed L hand eval for prosthesis  -ST following  -pending approval of speech generating device, information already faxed to \"[employer]\"  -plan to follow up RE: status and plan in 1-2 months    Electronically signed by: KALI Ramirez CNP  "

## 2022-07-19 ENCOUNTER — NURSING HOME VISIT (OUTPATIENT)
Dept: GERIATRICS | Facility: CLINIC | Age: 72
End: 2022-07-19
Payer: COMMERCIAL

## 2022-07-19 VITALS
HEART RATE: 55 BPM | BODY MASS INDEX: 21.7 KG/M2 | RESPIRATION RATE: 17 BRPM | WEIGHT: 143.2 LBS | OXYGEN SATURATION: 97 % | TEMPERATURE: 97.7 F | SYSTOLIC BLOOD PRESSURE: 101 MMHG | DIASTOLIC BLOOD PRESSURE: 65 MMHG | HEIGHT: 68 IN

## 2022-07-19 DIAGNOSIS — G81.94 LEFT HEMIPLEGIA (H): ICD-10-CM

## 2022-07-19 DIAGNOSIS — I61.9 RIGHT-SIDED NONTRAUMATIC INTRACEREBRAL HEMORRHAGE, UNSPECIFIED CEREBRAL LOCATION (H): Primary | ICD-10-CM

## 2022-07-19 DIAGNOSIS — R47.1 DYSARTHRIA AND ANARTHRIA: ICD-10-CM

## 2022-07-19 PROCEDURE — 99309 SBSQ NF CARE MODERATE MDM 30: CPT | Performed by: NURSE PRACTITIONER

## 2022-07-19 NOTE — LETTER
"    7/19/2022        RE: Sukhi Johnson  [facility]  [address] [address]        [facility] GERIATRICS  [place] Medical Record Number: [id number]  Chief Complaint   Patient presents with     RECHECK     FVP Care Coordination - Health Plan or Product Change     HPI: Sukhi Johnson is a 72 year old (1950), who is being seen today for an episodic care visit at: [facility] AT [facility] () [[id number]]. Today's concern is:   1. Right-sided nontraumatic intracerebral hemorrhage, unspecified cerebral location (H)    2. Left hemiplegia (H)    3. Dysarthria and anarthria      Patient seen today for follow up, post ICH in Dec 2021, now with L hemiplegia and aphasia, followed by  neurology, has some ability to feed self with R hand/arm, limited intake without feeding supervision, weights stable, limited verbal but does respond somewhat appropriately at times, working with st to obtain a speech generating device to improve communication, appears healthy, no distress.    Allergies and PMH/PSH reviewed in [facility] today.    REVIEW OF SYSTEMS:  Unobtainable secondary to aphasia.     Objective:   /65   Pulse 55   Temp 97.7  F (36.5  C)   Resp 17   Ht 1.727 m (5' 8\")   Wt 65 kg (143 lb 3.2 oz)   SpO2 97%   BMI 21.77 kg/m    Exam:  GENERAL APPEARANCE:  in no distress, appears healthy  ENT:  Mouth and posterior oropharynx normal, moist mucous membranes  RESP:  lungs clear to auscultation   CV:  no edema  ABDOMEN:  bowel sounds normal  M/S:   Gait and station abnormal ADL assist  SKIN:  Inspection of skin and subcutaneous tissue baseline  NEURO:   Examination of sensation by touch normal  PSYCH:  oriented to unknown    Labs:   Recent labs in [facility] reviewed by me today.     [facility] [employer]:  The health plan new enrollment has happened. I have reviewed the MDS, the preventative needs, and facility care plan. The level of care is appropriate. I have reviewed " "the code status/advanced directives.    Assessment/Plan:  (I61.9) Right-sided nontraumatic intracerebral hemorrhage, unspecified cerebral location (H)  (primary encounter diagnosis)  (G81.94) Left hemiplegia (H)  (R47.1) Dysarthria and anarthria  Comment: limited verbal, ADL assist, fll L hemiplegia  Plan:   -continue heparin, enalapril  -follow up  neuro, MRI on 8/10, consult 8/15  -OT completed L hand eval for prosthesis  -ST following  -pending approval of speech generating device, information already faxed to \"[employer]\"  -plan to follow up RE: status and plan in 1-2 months    Electronically signed by: KALI Ramirez CNP        Sincerely,        KALI Ramirez CNP    "

## 2022-07-27 ENCOUNTER — PATIENT OUTREACH (OUTPATIENT)
Dept: GERIATRIC MEDICINE | Facility: CLINIC | Age: 72
End: 2022-07-27

## 2022-08-02 ENCOUNTER — NURSING HOME VISIT (OUTPATIENT)
Dept: GERIATRICS | Facility: CLINIC | Age: 72
End: 2022-08-02
Payer: COMMERCIAL

## 2022-08-02 VITALS
DIASTOLIC BLOOD PRESSURE: 50 MMHG | SYSTOLIC BLOOD PRESSURE: 85 MMHG | BODY MASS INDEX: 21.7 KG/M2 | OXYGEN SATURATION: 94 % | RESPIRATION RATE: 18 BRPM | HEART RATE: 68 BPM | TEMPERATURE: 97.7 F | WEIGHT: 143.2 LBS | HEIGHT: 68 IN

## 2022-08-02 DIAGNOSIS — I61.9 RIGHT-SIDED NONTRAUMATIC INTRACEREBRAL HEMORRHAGE, UNSPECIFIED CEREBRAL LOCATION (H): Primary | ICD-10-CM

## 2022-08-02 DIAGNOSIS — Z93.1 G TUBE FEEDINGS (H): ICD-10-CM

## 2022-08-02 DIAGNOSIS — G81.94 LEFT HEMIPLEGIA (H): ICD-10-CM

## 2022-08-02 PROCEDURE — 99309 SBSQ NF CARE MODERATE MDM 30: CPT | Performed by: NURSE PRACTITIONER

## 2022-08-02 ASSESSMENT — ACTIVITIES OF DAILY LIVING (ADL)
DEPENDENT_IADLS:: CLEANING;COOKING;LAUNDRY;SHOPPING;MEAL PREPARATION;INCONTINENCE;TRANSPORTATION;MONEY MANAGEMENT;MEDICATION MANAGEMENT

## 2022-08-02 NOTE — LETTER
"    8/2/2022        RE: Sukhi Johnson  [facility]  [address] [address]        [facility] GERIATRICS    Chief Complaint   Patient presents with     Nursing Home Acute     HPI:  Sukhi Johnson is a 72 year old  (1950), who is being seen today for an episodic care visit at: [facility] AT [facility] () [[id number]]. Today's concern is:   1. Right-sided nontraumatic intracerebral hemorrhage, unspecified cerebral location (H)    2. Left hemiplegia (H)    3. G tube feedings (H)      Patient seen for follow up, also had message from significant other Miladis regarding wanting to change POLST to DNR, post hemorrhage with moderate to severe hemiplegia, able to use R side effectively for feeding, pending communication device as has difficulty word finding, Gtube in place and no longer being used as able to replace Kcals with oral intake, overall appears healthy, no distress.    Allergies, and PMH/PSH reviewed in [facility] today.  REVIEW OF SYSTEMS:  Limited secondary to cognitive impairment but today pt reports I'm OK    Objective:   BP (!) 85/50   Pulse 68   Temp 97.7  F (36.5  C)   Resp 18   Ht 1.727 m (5' 8\")   Wt 65 kg (143 lb 3.2 oz)   SpO2 94%   BMI 21.77 kg/m    GENERAL APPEARANCE:  in no distress, appears healthy  ENT:  Mouth and posterior oropharynx normal, moist mucous membranes  RESP:  lungs clear to auscultation   CV:  no edema  ABDOMEN:  bowel sounds normal  M/S:   Gait and station abnormal transfer assist  SKIN:  Inspection of skin and subcutaneous tissue baseline  NEURO:   Examination of sensation by touch normal  PSYCH:  affect and mood normal    Labs done in SNF are in [facility]. Please refer to them using [facility]/Care Everywhere.    Assessment/Plan:  (I61.9) Right-sided nontraumatic intracerebral hemorrhage, unspecified cerebral location (H)  (primary encounter diagnosis)  (G81.94) Left hemiplegia (H)  (Z93.1) G tube feedings (H)  Comment: appears " healthy, moderate physical and cognitive limitations, pleasant  Plan:   -continue current medication regimen  -OK remove tube feed, no longer using  -follow up Rodney Santos neurology PRN, next appt MRI on 8/10 then Md on 8/15  -dietary to follow weights  -follow up  8/10 and 8/15          Electronically signed by: KALI Ramirez CNP             Sincerely,        KALI Ramirez CNP

## 2022-08-02 NOTE — PROGRESS NOTES
"[facility] GERIATRICS    Chief Complaint   Patient presents with     Nursing Home Acute     HPI:  Sukhi Johnson is a 72 year old  (1950), who is being seen today for an episodic care visit at: [facility] AT [facility] () [[id number]]. Today's concern is:   1. Right-sided nontraumatic intracerebral hemorrhage, unspecified cerebral location (H)    2. Left hemiplegia (H)    3. G tube feedings (H)      Patient seen for follow up, also had message from significant other Miladis regarding wanting to change POLST to DNR, post hemorrhage with moderate to severe hemiplegia, able to use R side effectively for feeding, pending communication device as has difficulty word finding, Gtube in place and no longer being used as able to replace Kcals with oral intake, overall appears healthy, no distress.    Allergies, and PMH/PSH reviewed in [facility] today.  REVIEW OF SYSTEMS:  Limited secondary to cognitive impairment but today pt reports I'm OK    Objective:   BP (!) 85/50   Pulse 68   Temp 97.7  F (36.5  C)   Resp 18   Ht 1.727 m (5' 8\")   Wt 65 kg (143 lb 3.2 oz)   SpO2 94%   BMI 21.77 kg/m    GENERAL APPEARANCE:  in no distress, appears healthy  ENT:  Mouth and posterior oropharynx normal, moist mucous membranes  RESP:  lungs clear to auscultation   CV:  no edema  ABDOMEN:  bowel sounds normal  M/S:   Gait and station abnormal transfer assist  SKIN:  Inspection of skin and subcutaneous tissue baseline  NEURO:   Examination of sensation by touch normal  PSYCH:  affect and mood normal    Labs done in SNF are in [facility]. Please refer to them using [email or web address]/Care Everywhere.    Assessment/Plan:  (I61.9) Right-sided nontraumatic intracerebral hemorrhage, unspecified cerebral location (H)  (primary encounter diagnosis)  (G81.94) Left hemiplegia (H)  (Z93.1) G tube feedings (H)  Comment: appears healthy, moderate physical and cognitive limitations, pleasant  Plan:   -continue current medication regimen  -OK remove tube " feed, no longer using  -follow up Rodney Santos neurology PRN, next appt MRI on 8/10 then Md on 8/15  -dietary to follow weights  -follow up  8/10 and 8/15          Electronically signed by: KALI Ramirez CNP

## 2022-08-03 NOTE — PROGRESS NOTES
[facility] Care Coordination Contact  [facility] Institutional Assessment     Institutional Assessment for Health Risk Assessment with Sukhi Johnson completed on July 27th, 2022 at [facility].    Type of residence:: Nursing home  Current living arrangement:: I live in a nursing home     Assessment completed with:: Care Team Member, JORGE Gaffney    Mental/Behavioral Health   Depression Screening: No concerns  Mental health DX:: No        Falls Assessment:   Fallen 2 or more times in the past year?: No   Any fall with injury in the past year?: No    ADL/IADL Dependencies:   Dependent ADLs:: Bathing, Dressing, Grooming, Incontinence, Transfers, Toileting  Dependent IADLs:: Cleaning, Cooking, Laundry, Shopping, Meal Preparation, Incontinence, Transportation, Money Management, Medication Management    Care Plan & Recommendations: CC met with Sukhi in his room on this date, he was sleeping and did not wake.  Met with  Gulshan, who said Sukhi has been stable.  His significant other Miladis is very involved, CC will follow up with her and include her in assessment care plan and care conferences.  Not appropriate or wishing to move back to the community at this time.  Discussed options/opportunities for transitions.    See Institutional Care Plan for detailed assessment information.    Obtained a copy of the facility care plan and MDS from facility electronic records. Requested of [facility]iary social worker to put this care coordinator on care conference attendee list.    Placed the Health Plan facility face sheet in the member's facility chart.    Follow-Up Plan: Member informed of future contact, plan to f/u with member with a 6 month assessment, attend 1 care conference annually, and will follow any hospitalizations or transitions. Care Coordinator contact information shared with member/family and facility, and encouraged to call this care coordinator with any questions or concerns at any  time.     [facility] care continuum providers: Please see Snapshot and Care Management Flowsheets for Specific details of care plan.    This CC note routed to PCP.    OLGA Simon, [facility]  [phone]

## 2022-08-10 ENCOUNTER — HOSPITAL ENCOUNTER (OUTPATIENT)
Dept: MRI IMAGING | Facility: HOSPITAL | Age: 72
Discharge: HOME | End: 2022-08-10
Attending: PSYCHIATRY & NEUROLOGY | Admitting: PSYCHIATRY & NEUROLOGY
Payer: COMMERCIAL

## 2022-08-10 DIAGNOSIS — I61.9 RIGHT-SIDED NONTRAUMATIC INTRACEREBRAL HEMORRHAGE, UNSPECIFIED CEREBRAL LOCATION (H): ICD-10-CM

## 2022-08-10 PROCEDURE — 70553 MRI BRAIN STEM W/O & W/DYE: CPT

## 2022-08-10 PROCEDURE — 255N000002 HC RX 255 OP 636: Performed by: PSYCHIATRY & NEUROLOGY

## 2022-08-10 PROCEDURE — A9585 GADOBUTROL INJECTION: HCPCS | Performed by: PSYCHIATRY & NEUROLOGY

## 2022-08-10 RX ORDER — GADOBUTROL 604.72 MG/ML
0.1 INJECTION INTRAVENOUS ONCE
Status: COMPLETED | OUTPATIENT
Start: 2022-08-10 | End: 2022-08-10

## 2022-08-10 RX ADMIN — GADOBUTROL 6 ML: 604.72 INJECTION INTRAVENOUS at 12:54

## 2022-08-12 ENCOUNTER — NURSING HOME VISIT (OUTPATIENT)
Dept: GERIATRICS | Facility: CLINIC | Age: 72
End: 2022-08-12
Payer: COMMERCIAL

## 2022-08-12 VITALS
DIASTOLIC BLOOD PRESSURE: 73 MMHG | HEART RATE: 76 BPM | SYSTOLIC BLOOD PRESSURE: 144 MMHG | RESPIRATION RATE: 16 BRPM | TEMPERATURE: 97.6 F | BODY MASS INDEX: 21.52 KG/M2 | HEIGHT: 68 IN | OXYGEN SATURATION: 95 % | WEIGHT: 142 LBS

## 2022-08-12 DIAGNOSIS — G81.94 LEFT HEMIPLEGIA (H): ICD-10-CM

## 2022-08-12 DIAGNOSIS — I61.9 RIGHT-SIDED NONTRAUMATIC INTRACEREBRAL HEMORRHAGE, UNSPECIFIED CEREBRAL LOCATION (H): Primary | ICD-10-CM

## 2022-08-12 DIAGNOSIS — Z71.89 ACP (ADVANCE CARE PLANNING): ICD-10-CM

## 2022-08-12 PROCEDURE — 99310 SBSQ NF CARE HIGH MDM 45: CPT | Performed by: NURSE PRACTITIONER

## 2022-08-12 PROCEDURE — 99497 ADVNCD CARE PLAN 30 MIN: CPT | Performed by: NURSE PRACTITIONER

## 2022-08-12 NOTE — LETTER
8/12/2022        RE: Sukhi Johnson  [facility]  [address] [address]        [facility] GERIATRICS    Chief Complaint   Patient presents with     Nursing Home Acute     HPI:  Sukhi Johnson is a 72 year old  (1950), who is being seen today for an episodic care visit at: [facility] AT [facility] () [[id number]]. Today's concern is:   1. Right-sided nontraumatic intracerebral hemorrhage, unspecified cerebral location (H)    2. Left hemiplegia (H)    3. ACP (advance care planning)      Patient seen today for follow up, also called Miladis regarding POLST and plan and does not want to have CPR, is post ICH with L hemiplegia and also aphasia, pleasant with difficulty word finding at times, believed to be AOx3, has sense of humor, random ability to make sentence, is able to feed self, dysphagia diet, L hand orthotic, pending speech generating device, overall appears healthy.    Advance Care Planning Goals of Care Discussion 8/12/2022  Goals of care discussed with Sukhi Johnson on 8/12. Present at discussion: patient, significant other Miladis prepared paperwork. Questions discussed and patient response:  What is your understanding now of where you are with your illness?: good. How much information about what is likely to be ahead with your illness would you like to have?: all. As the clinician I communicated the following to the patient regarding their prognosis: stable. If your health situation worsens, what are your most important goals?: NA. What are your biggest fears and worries about the future with your health?: NA. Unacceptable function : NA. What abilities are so critical to your life that you cannot imagine living without them?: NA. Pt does NOT want to: die yet. If you become sicker, how much are you willing to go through for the possibility of gaining more time?: maybe. Would this change if these were permanent states, if they did not get better?:  "maybe. How much does your agent and/or family know about your priorities and wishes?: everything. Added by KALI Ramirez CNP        Allergies, and PMH/PSH reviewed in [facility] today.  REVIEW OF SYSTEMS:  Limited secondary to aphasia impairment but today pt reports I'm OK, are you?    Objective:   BP (!) 144/73   Pulse 76   Temp 97.6  F (36.4  C)   Resp 16   Ht 1.727 m (5' 8\")   Wt 64.4 kg (142 lb)   SpO2 95%   BMI 21.59 kg/m    GENERAL APPEARANCE:  in no distress, appears healthy  ENT:  Mouth and posterior oropharynx normal, moist mucous membranes  RESP:  lungs clear to auscultation   CV:  no edema, rate-normal  ABDOMEN:  bowel sounds normal  M/S:   Gait and station abnormal ADL assist  SKIN:  Inspection of skin and subcutaneous tissue baseline  NEURO:   Examination of sensation by touch normal  PSYCH:  oriented X 3    Labs done in SNF are in [facility]. Please refer to them using [facility]/Care Everywhere.    Assessment/Plan:  (I61.9) Right-sided nontraumatic intracerebral hemorrhage, unspecified cerebral location (H)  (primary encounter diagnosis)  (G81.94) Left hemiplegia (H)  Comment: previously independent, now ADL assist with L hemiplegia, tube feed removed on 7/29  Plan: staff to support as indicated  -follow up  neurology appt on 8/15  -continue HTN control meds  -change code status to DNR      (Z71.89) ACP (advance care planning)  Comment: POLST with code change to DNR  Plan: [place] ADVANCE CARE PLANNING FIRST 30 MINS          I spent 16 minutes F2F with patient in addition to todays visit signing off on POLST provided by significant other Miladis, approved by patient.      Electronically signed by: KALI Ramirez CNP             Sincerely,        KALI Ramirez CNP      "

## 2022-08-12 NOTE — PROGRESS NOTES
[facility]    Chief Complaint   Patient presents with     Nursing Home Acute     HPI:  Sukhi Johnson is a 72 year old  (1950), who is being seen today for an episodic care visit at: [facility] AT [facility] () [[id number]]. Today's concern is:   1. Right-sided nontraumatic intracerebral hemorrhage, unspecified cerebral location (H)    2. Left hemiplegia (H)    3. ACP (advance care planning)      Patient seen today for follow up, also called Miladis regarding POLST and plan and does not want to have CPR, is post ICH with L hemiplegia and also aphasia, pleasant with difficulty word finding at times, believed to be AOx3, has sense of humor, random ability to make sentence, is able to feed self, dysphagia diet, L hand orthotic, pending speech generating device, overall appears healthy.    Advance Care Planning Goals of Care Discussion 8/12/2022  Goals of care discussed with Sukhi Johnson on 8/12. Present at discussion: patient, significant other Miladis prepared paperwork. Questions discussed and patient response:  What is your understanding now of where you are with your illness?: good. How much information about what is likely to be ahead with your illness would you like to have?: all. As the clinician I communicated the following to the patient regarding their prognosis: stable. If your health situation worsens, what are your most important goals?: NA. What are your biggest fears and worries about the future with your health?: NA. Unacceptable function : NA. What abilities are so critical to your life that you cannot imagine living without them?: NA. Pt does NOT want to: die yet. If you become sicker, how much are you willing to go through for the possibility of gaining more time?: maybe. Would this change if these were permanent states, if they did not get better?: maybe. How much does your agent and/or family know about your priorities and wishes?: everything. Added by Chris Sheridan  "KALI Maria CNP        Allergies, and PMH/PSH reviewed in EPIC today.  REVIEW OF SYSTEMS:  Limited secondary to aphasia impairment but today pt reports I'm OK, are you?    Objective:   BP (!) 144/73   Pulse 76   Temp 97.6  F (36.4  C)   Resp 16   Ht 1.727 m (5' 8\")   Wt 64.4 kg (142 lb)   SpO2 95%   BMI 21.59 kg/m    GENERAL APPEARANCE:  in no distress, appears healthy  ENT:  Mouth and posterior oropharynx normal, moist mucous membranes  RESP:  lungs clear to auscultation   CV:  no edema, rate-normal  ABDOMEN:  bowel sounds normal  M/S:   Gait and station abnormal ADL assist  SKIN:  Inspection of skin and subcutaneous tissue baseline  NEURO:   Examination of sensation by touch normal  PSYCH:  oriented X 3    [facility]. Please refer to them using [employer] Everywhere.    Assessment/Plan:  (I61.9) Right-sided nontraumatic intracerebral hemorrhage, unspecified cerebral location (H)  (primary encounter diagnosis)  (G81.94) Left hemiplegia (H)  Comment: previously independent, now ADL assist with L hemiplegia, tube feed removed on 7/29  Plan: staff to support as indicated  -follow up  neurology appt on 8/15  -continue HTN control meds  -change code status to DNR      (Z71.89) ACP (advance care planning)  Comment: POLST with code change to DNR  Plan: CO ADVANCE CARE PLANNING FIRST 30 MINS          I spent 16 minutes F2F with patient in addition to todays visit signing off on POLST provided by significant other Miladis, approved by patient.      Electronically signed by: KALI Ramirez CNP       "

## 2022-08-15 ENCOUNTER — OFFICE VISIT (OUTPATIENT)
Dept: NEUROLOGY | Facility: CLINIC | Age: 72
End: 2022-08-15
Payer: COMMERCIAL

## 2022-08-15 VITALS — SYSTOLIC BLOOD PRESSURE: 112 MMHG | RESPIRATION RATE: 16 BRPM | DIASTOLIC BLOOD PRESSURE: 64 MMHG | HEART RATE: 80 BPM

## 2022-08-15 DIAGNOSIS — R93.89 ABNORMAL MRI: ICD-10-CM

## 2022-08-15 DIAGNOSIS — I61.9 RIGHT-SIDED NONTRAUMATIC INTRACEREBRAL HEMORRHAGE, UNSPECIFIED CEREBRAL LOCATION (H): Primary | ICD-10-CM

## 2022-08-15 PROCEDURE — 99214 OFFICE O/P EST MOD 30 MIN: CPT | Performed by: PSYCHIATRY & NEUROLOGY

## 2022-08-15 NOTE — LETTER
8/15/2022         [facility]  [address] [address]        Dear Colleague,    Thank you for referring your patient, Sukhi Johnson, to the [facility] [place]. Please see a copy of my visit note below.    NEUROLOGY OUTPATIENT PROGRESS NOTE   Aug 15, 2022     CHIEF COMPLAINT/REASON FOR VISIT/REASON FOR CONSULT  Patient presents with:  Follow Up: MRI review     REASON FOR CONSULTATION- Stroke, abnormal MRI    REFERRAL SOURCE  Dr. Zaina Schneider  CC Dr. Zaina Schneider    HISTORY OF PRESENT ILLNESS  Sukhi Johnson is a 72 year old male seen for evaluation of an abnormal MRI.  Patient initially around late December 2021 had a spell where he fell down due to left-sided weakness.  It is unclear if he fell down first and then the left-sided weakness came on all he had the left-sided weakness first and then fell down because of that.  He was taken to the hospital and was found to have a right parietal/frontal intraparenchymal hemorrhage.  This was thought to be related to cerebral amyloid angiopathy or hypertension.  Patient was on Adderall for chronic Lyme's disease which was thought to be the cause of his hypertension.  This has not been stopped.  He did therapies and has been involved with speech therapy/Occupational Therapy/physical therapy and has made some improvement though remains wheelchair-bound with left-sided weakness.    Repeat 3 months can was done to evaluate for causes of hemorrhage and no underlying lesion was identified.  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  Patient reports no previous history of cancer or any autoimmune disease.  No recent infectious exposures.  No weight loss or any other new symptoms.    5/10/22  Patient returns today.  Continues to have left-sided weakness.  Doing physical therapy and has made minimal improvement.   Is remaining in the assisted living.  Family is requesting a one-to-one physical therapy.  Reports no headaches or new neurological symptoms.  No seizure-like activity.  No spasticity or any limitations for him to do physical therapy.    8/15/22  He returns today.  He has been continuing to work with physical therapy and has been making improvements.  Family would like the physical therapy to be renewed.  He has been working with speech therapy and is making some improvement the symptoms do seem to fluctuate.  He is getting a iPad like device so he can communicate better with the family.  Currently he is communicating mainly with gestures.  He has been able to feed himself and get help from other people.  G-tube has been replaced.  Is getting supplementation for nutrition.  Also on heparin for DVT prophylaxis.  No other new symptoms reported.  Family still resistant to the LP.    Previous history is reviewed and this is unchanged.    PAST MEDICAL/SURGICAL HISTORY  History reviewed. No pertinent past medical history.  Patient Active Problem List   Diagnosis     Right-sided nontraumatic intracerebral hemorrhage, unspecified cerebral location (H)     Hypertension, unspecified type     Contracture of hand joint, left     Metabolic encephalopathy     Aphagia     Left hemiplegia (H)     Dysarthria and anarthria   Reviewed and otherwise noncontributory    FAMILY HISTORY  History reviewed. No pertinent family history.   Stroke in mother at age 90    SOCIAL HISTORY  Social History     Tobacco Use     Smoking status: Never Smoker     Smokeless tobacco: Never Used   Substance Use Topics     Alcohol use: Never     Drug use: Never       SYSTEMS REVIEW  Twelve-system ROS was done and other than the HPI this was negative.  Pertinent positives noted in the HPI.  No new concerns reported today.    MEDICATIONS  acetaminophen (TYLENOL) 325 MG/10.15ML solution, 20.3 mLs (650 mg) by Oral or Feeding Tube route every 4 hours as needed  for mild pain or fever  acetaminophen (TYLENOL) 650 MG suppository, Place 1 suppository (650 mg) rectally every 4 hours as needed for mild pain  Cranberry-Vitamin C (AZO CRANBERRY URINARY TRACT PO), Take 1 tablet by mouth 2 times daily  docusate (COLACE) 50 MG/5ML liquid, 10 mLs (100 mg) by Oral or J tube route daily as needed for constipation  enalapril (VASOTEC) 5 MG tablet, 1 tablet (5 mg) by Per Feeding Tube route At Bedtime  Heparin Sodium, Porcine, (HEPARIN ANTICOAGULANT) 5000 UNIT/0.5ML injection, Inject 0.5 mLs (5,000 Units) Subcutaneous every 12 hours  hydrALAZINE (APRESOLINE) 50 MG tablet, 1 tablet (50 mg) by Per Feeding Tube route every 8 hours  metoprolol tartrate (LOPRESSOR) 50 MG tablet, 1 tablet (50 mg) by Per Feeding Tube route 2 times daily  ondansetron (ZOFRAN-ODT) 4 MG ODT tab, Take 1 tablet (4 mg) by mouth every 6 hours as needed for nausea or vomiting  pantoprazole (PROTONIX) 2 mg/mL SUSP suspension, 20 mLs (40 mg) by Per Feeding Tube route 2 times daily  polyethylene glycol (MIRALAX) 17 g packet, 17 g by Oral or Feeding Tube route 2 times daily as needed (constipation)  sennosides (SENOKOT) 8.8 MG/5ML syrup, 5 mLs by Oral or Feeding Tube route 2 times daily as needed for constipation  diclofenac (VOLTAREN) 1 % topical gel, Apply to left shoulder BID    No current facility-administered medications on file prior to visit.       PHYSICAL EXAMINATION  VITALS: /64   Pulse 80   Resp 16   GENERAL: Healthy appearing, alert, no acute distress, normal habitus.  CARDIOVASCULAR: Extremities warm and well perfused. Pulses present.   NEUROLOGICAL:  Patient is awake and grossly oriented to self, place and time.  Attention span is slightly decreased.  Memory is grossly intact.  Language is limited to few words.  Can follow some commands. Appropriate fund of knowledge. Cranial nerves 2-12 are intact with left-sided facial droop.  There might be left visual field cut though unclear. There is no pronator  drift.  Motor exam shows 5/5 strength in right arm and leg.  Left arm is 2 out of 5.  Left leg is 3 out of 5.  Tone is normal in right arm and leg.  Left arm and leg do show evidence of spasticity.  (Previously reflexes are 2+ in upper extremities and lower extremities the brisker on the left side. Sensory exam is grossly intact to light touch, pin prick and vibration but subjectively being decreased on the left side.)  Finger to nose is normal on the right side.  Unable to do on the left side due to weakness.  Unable to do heel-to-shin bilaterally.  Gait cannot be tested safely due to ongoing weakness.  He is unable to ambulate due to weakness.  Exam similar to before.    DIAGNOSTICS  MRI 3/22/22-images reviewed.  Pachymeningeal enhancement noted.  Previous subacute right parietal frontal hemorrhage noted  IMPRESSION:  1. Expected signal changes of now late subacute right frontoparietal  intraparenchymal hemorrhage.   2. Extensive microhemorrhages on susceptibility weighted images;  sequelae of hypertensive microangiopathy and/or possibly amyloid  angiopathy and chronic small vessel ischemic disease.  3. New symmetric pachymeningeal enhancement. Differential includes  idiopathic intracranial hypotension, neoplasm, metastatic disease or  granulomatous disease.    CT 12/29/21.  IMPRESSION:   1. Stable parenchymal hematoma centered at the frontoparietal junction  of the right cerebral hemisphere resulting in minimal leftward midline  shift again noted without change. No definite evidence for new or  increasing intracranial hemorrhage.  2. Diffuse cerebral volume loss and cerebral white matter changes  consistent with chronic small vessel ischemic disease.    MRI 12/25/21 -images reviewed.  Right peripheral internal hemorrhage noted.                                                         IMPRESSION:  1.  Right frontoparietal acute intracranial hemorrhage with associated regional mass effect resulting in 2 mm leftward  midline shift, not significantly changed since previous exam allowing for differences in technique. No associated abnormal enhancement   to suggest underlying tumor.  2.  Mild to moderate chronic small vessel ischemic disease and mild generalized brain parenchymal volume loss.  3.  Tiny focus of chronic infarction within the left posterior cerebral artery territory.  4.  Numerous chronic microhemorrhages throughout the cerebral hemispheres, deep gray nuclei and posterior fossa, possibly representing sequelae of hypertensive or amyloid angiopathy.    IMPRESSION:  This EEG recording during waking was abnormal due to generalized delta-theta slowing and to greater delta-theta slowing over the right hemisphere.  No interictal epileptiform abnormalities and no electrographic seizures were recorded.   These findings indicate mild-moderate electrographic encephalopathy and greater right hemispheric dysfunction; the history of hemorrhage may account for this asymmetric dysfunction.  Clinical correlation is recommended.  Vince Iglesias M.D., Professor of Neurology     OUTSIDE RECORDS  Outside referral notes and chart notes were reviewed and pertinent information has been summarized (in addition to the HPI):-        MRA images reviewed.  IMPRESSION:  1.  No central arterial stenosis/occlusion, aneurysm, or high flow vascular malformation identified.  2.  Continued decrease in size of the previously demonstrated parenchymal hematoma centered in the right frontoparietal region.    MRV-images reviewed.  HEAD MRV:   1.  No dural venous sinus thrombosis or significant stenosis.    LABS  Component      Latest Ref Rng & Units 4/11/2022   WBC      4.0 - 11.0 10e3/uL 5.4   RBC Count      4.40 - 5.90 10e6/uL 4.76   Hemoglobin      13.3 - 17.7 g/dL 14.8   Hematocrit      40.0 - 53.0 % 44.7   MCV      78 - 100 fL 94   MCH      26.5 - 33.0 pg 31.1   MCHC      31.5 - 36.5 g/dL 33.1   RDW      10.0 - 15.0 % 12.6   Platelet Count      150 -  450 10e3/uL 292   % Neutrophils      % 66   % Lymphocytes      % 25   % Monocytes      % 6   % Eosinophils      % 1   % Basophils      % 1   % Immature Granulocytes      % 1   NRBCs per 100 WBC      <1 /100 0   Absolute Neutrophils      1.6 - 8.3 10e3/uL 3.6   Absolute Lymphocytes      0.8 - 5.3 10e3/uL 1.4   Absolute Monocytes      0.0 - 1.3 10e3/uL 0.3   Absolute Eosinophils      0.0 - 0.7 10e3/uL 0.0   Absolute Basophils      0.0 - 0.2 10e3/uL 0.0   Absolute Immature Granulocytes      <=0.4 10e3/uL 0.0   Absolute NRBCs      10e3/uL 0.0   Interpretive Comments          Amphiphysin Ab, S      <1:240 titer    AGNA-1, S      <1:240 titer    VIRGINIA-1, S      <1:240 titer    VIRGINIA-2, S      <1:240 titer    VIRGINIA-3, S      <1:240 titer    CRMP-5-IgG, S      <1:240 titer    Neuronal (V-G) K+ Channel Ab, S      <=0.02 nmol/L    P/Q-Type Calcium Channel AB      <=0.02 nmol/L    PCA-1, S      <1:240 titer    PCA-2, S      <1:240 titer    PCA-Tr, S      <1:240 titer    Reflex Added          AL interpretation      Negative Borderline Positive (A)   AL pattern 1       Speckled   AL titer 1       1:80   Neutrophil Cytoplasmic Antibody      <1:10 <1:10   Neutrophil Cytoplasmic Antibody Pattern       The ANCA IFA is <1:10.  No further testing will be performed.   T4 Free      0.70 - 1.80 ng/dL 1.50   Rheumatoid Factor      0 - 30 IU/mL <15   Lyme Disease Antibodies Serum      <0.90 0.17   Sed Rate      0 - 15 mm/hr 8   Methylmalonic Acid      0.00 - 0.40 umol/L 0.29   Vitamin B12      213 - 816 pg/mL 443     Component      Latest Ref Rng & Units 4/15/2022   WBC      4.0 - 11.0 10e3/uL    RBC Count      4.40 - 5.90 10e6/uL    Hemoglobin      13.3 - 17.7 g/dL    Hematocrit      40.0 - 53.0 %    MCV      78 - 100 fL    MCH      26.5 - 33.0 pg    MCHC      31.5 - 36.5 g/dL    RDW      10.0 - 15.0 %    Platelet Count      150 - 450 10e3/uL    % Neutrophils      %    % Lymphocytes      %    % Monocytes      %    % Eosinophils      %     % Basophils      %    % Immature Granulocytes      %    NRBCs per 100 WBC      <1 /100    Absolute Neutrophils      1.6 - 8.3 10e3/uL    Absolute Lymphocytes      0.8 - 5.3 10e3/uL    Absolute Monocytes      0.0 - 1.3 10e3/uL    Absolute Eosinophils      0.0 - 0.7 10e3/uL    Absolute Basophils      0.0 - 0.2 10e3/uL    Absolute Immature Granulocytes      <=0.4 10e3/uL    Absolute NRBCs      10e3/uL    Interpretive Comments       SEE NOTE   Amphiphysin Ab, S      <1:240 titer Negative   AGNA-1, S      <1:240 titer Negative   VIRGINIA-1, S      <1:240 titer Negative   VIRGINIA-2, S      <1:240 titer Negative   VIRGINIA-3, S      <1:240 titer Negative   CRMP-5-IgG, S      <1:240 titer Negative   Neuronal (V-G) K+ Channel Ab, S      <=0.02 nmol/L 0.00   P/Q-Type Calcium Channel AB      <=0.02 nmol/L 0.00   PCA-1, S      <1:240 titer Negative   PCA-2, S      <1:240 titer Negative   PCA-Tr, S      <1:240 titer Negative   Reflex Added       None.   AL interpretation      Negative    AL pattern 1          AL titer 1          Neutrophil Cytoplasmic Antibody      <1:10    Neutrophil Cytoplasmic Antibody Pattern          T4 Free      0.70 - 1.80 ng/dL    Rheumatoid Factor      0 - 30 IU/mL    Lyme Disease Antibodies Serum      <0.90    Sed Rate      0 - 15 mm/hr    Methylmalonic Acid      0.00 - 0.40 umol/L    Vitamin B12      213 - 816 pg/mL      MRI brain-images reviewed.  IMPRESSION:  1.  Interval evolution of known right frontoparietal parenchymal hemorrhage as above. While no definitive enhancement is demonstrated at the site of hemorrhage, an additional 4 month follow-up MRI could be considered as a precautionary measure given   slight limitations of this study.  2.  No new intracranial hemorrhage demonstrated.  3.  Decreased conspicuity of previously demonstrated diffuse pachymeningeal thickening and enhancement. There is mild residual dural thickening and enhancement along the right frontoparietal convexity, which may be  reactive.  4.  Redemonstrated pattern of scattered punctate chronic microhemorrhages. As before, this may represent hypertensive microangiopathy and/or cerebral amyloid angiopathy.  5.  No evidence of additional superimposed acute intracranial abnormality.      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  Patient's hypertension is thought to be related to Adderall use which he stopped.  Repeat MRI has not shown any progression of the intraparenchymal hemorrhage.  No underlying lesion has been identified.    Patient is currently doing physical therapy, speech therapy, occupational therapy, music therapy for recovery from the stroke.  Discussed prognosis with the family.  It will take a lot of time but he should keep on improving.  MRI does show some improvement in the hemorrhage area.  Did show images to the family.  Radiology is recommending another MRI and we will try to do it in May since it is hard for the patient to do it in the wintertime.  We will renew the physical therapy prescription.    For the pachymeningeal enhancement/MRA/MRV was negative.  Blood work was negative except for positive AL which I suspect is a false positive.  Could consider rheumatology referral if he had more joint symptoms/lupus symptoms though he does not complain of anything today.    Lumbar puncture was discussed extensively.  Family is not interested in doing this at this point.  It would be appropriate to hold off on this given intraparenchymal hemorrhage/intracranial lesion.  We will continue to hold off again today.  Discussed lumbar puncture again today  with improvement of the MRI we will hold off.    I can see him back in 9 months.    -     MR Brain w/o & w Contrast; Future  -     Physical Therapy Referral; Future    Return in about 9 months (around 5/15/2023) for In-Clinic Visit (must), After testing.    Over 33 minutes were spent coordinating the care for the patient on the day of the encounter.  This includes previsit, during visit and post visit activities as documented above.  Counseling patient.  Reviewing MRI images with the patient.  MRI ordered.  Discussion of when to do the MRI.  Discussion of lumbar puncture.  (Activities include but not inclusive of reviewing chart, reviewing outside records, reviewing labs and imaging study results as well as the images, patient visit time including getting history and exam,  use if applicable, review of test results with the patient and coming up with a plan in a shared model, counseling patient and family, education and answering patient questions, EMR , EMR diagnosis entry and problem list management, medication reconciliation and prescription management if applicable, paperwork if applicable, printing documents and documentation of the visit activities.)    Orlando Santos MD  Neurologist  [facility]  Tel:- [phone]    This note was dictated using voice recognition software.  Any grammatical or context distortions are unintentional and inherent to the software.        Again, thank you for allowing me to participate in the care of your patient.        Sincerely,        Orlando Santos MD

## 2022-08-15 NOTE — NURSING NOTE
Chief Complaint   Patient presents with     Follow Up     MRI review     Shiloh Yarbrough MA,CMA,2:36 PM

## 2022-08-15 NOTE — PROGRESS NOTES
NEUROLOGY OUTPATIENT PROGRESS NOTE   Aug 15, 2022     CHIEF COMPLAINT/REASON FOR VISIT/REASON FOR CONSULT  Patient presents with:  Follow Up: MRI review     REASON FOR CONSULTATION- Stroke, abnormal MRI    REFERRAL SOURCE  Dr. Zaina Schneider  CC Dr. Zaina Schneider    HISTORY OF PRESENT ILLNESS  Sukhi Johnson is a 72 year old male seen for evaluation of an abnormal MRI.  Patient initially around late December 2021 had a spell where he fell down due to left-sided weakness.  It is unclear if he fell down first and then the left-sided weakness came on all he had the left-sided weakness first and then fell down because of that.  He was taken to the hospital and was found to have a right parietal/frontal intraparenchymal hemorrhage.  This was thought to be related to cerebral amyloid angiopathy or hypertension.  Patient was on Adderall for chronic Lyme's disease which was thought to be the cause of his hypertension.  This has not been stopped.  He did therapies and has been involved with speech therapy/Occupational Therapy/physical therapy and has made some improvement though remains wheelchair-bound with left-sided weakness.    Repeat 3 months can was done to evaluate for causes of hemorrhage and no underlying lesion was identified.  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  Patient reports no previous history of cancer or any autoimmune disease.  No recent infectious exposures.  No weight loss or any other new symptoms.    5/10/22  Patient returns today.  Continues to have left-sided weakness.  Doing physical therapy and has made minimal improvement.  Is remaining in the assisted living.  Family is requesting a one-to-one physical therapy.  Reports no headaches or new neurological symptoms.  No seizure-like activity.  No spasticity or any limitations for him to do physical therapy.    8/15/22  He returns today.  He has been continuing to work  with physical therapy and has been making improvements.  Family would like the physical therapy to be renewed.  He has been working with speech therapy and is making some improvement the symptoms do seem to fluctuate.  He is getting a iPad like device so he can communicate better with the family.  Currently he is communicating mainly with gestures.  He has been able to feed himself and get help from other people.  G-tube has been replaced.  Is getting supplementation for nutrition.  Also on heparin for DVT prophylaxis.  No other new symptoms reported.  Family still resistant to the LP.    Previous history is reviewed and this is unchanged.    PAST MEDICAL/SURGICAL HISTORY  History reviewed. No pertinent past medical history.  Patient Active Problem List   Diagnosis     Right-sided nontraumatic intracerebral hemorrhage, unspecified cerebral location (H)     Hypertension, unspecified type     Contracture of hand joint, left     Metabolic encephalopathy     Aphagia     Left hemiplegia (H)     Dysarthria and anarthria   Reviewed and otherwise noncontributory    FAMILY HISTORY  History reviewed. No pertinent family history.   Stroke in mother at age 90    SOCIAL HISTORY  Social History     Tobacco Use     Smoking status: Never Smoker     Smokeless tobacco: Never Used   Substance Use Topics     Alcohol use: Never     Drug use: Never       SYSTEMS REVIEW  Twelve-system ROS was done and other than the HPI this was negative.  Pertinent positives noted in the HPI.  No new concerns reported today.    MEDICATIONS  acetaminophen (TYLENOL) 325 MG/10.15ML solution, 20.3 mLs (650 mg) by Oral or Feeding Tube route every 4 hours as needed for mild pain or fever  acetaminophen (TYLENOL) 650 MG suppository, Place 1 suppository (650 mg) rectally every 4 hours as needed for mild pain  Cranberry-Vitamin C (AZO CRANBERRY URINARY TRACT PO), Take 1 tablet by mouth 2 times daily  docusate (COLACE) 50 MG/5ML liquid, 10 mLs (100 mg) by Oral or  J tube route daily as needed for constipation  enalapril (VASOTEC) 5 MG tablet, 1 tablet (5 mg) by Per Feeding Tube route At Bedtime  Heparin Sodium, Porcine, (HEPARIN ANTICOAGULANT) 5000 UNIT/0.5ML injection, Inject 0.5 mLs (5,000 Units) Subcutaneous every 12 hours  hydrALAZINE (APRESOLINE) 50 MG tablet, 1 tablet (50 mg) by Per Feeding Tube route every 8 hours  metoprolol tartrate (LOPRESSOR) 50 MG tablet, 1 tablet (50 mg) by Per Feeding Tube route 2 times daily  ondansetron (ZOFRAN-ODT) 4 MG ODT tab, Take 1 tablet (4 mg) by mouth every 6 hours as needed for nausea or vomiting  pantoprazole (PROTONIX) 2 mg/mL SUSP suspension, 20 mLs (40 mg) by Per Feeding Tube route 2 times daily  polyethylene glycol (MIRALAX) 17 g packet, 17 g by Oral or Feeding Tube route 2 times daily as needed (constipation)  sennosides (SENOKOT) 8.8 MG/5ML syrup, 5 mLs by Oral or Feeding Tube route 2 times daily as needed for constipation  diclofenac (VOLTAREN) 1 % topical gel, Apply to left shoulder BID    No current facility-administered medications on file prior to visit.       PHYSICAL EXAMINATION  VITALS: /64   Pulse 80   Resp 16   GENERAL: Healthy appearing, alert, no acute distress, normal habitus.  CARDIOVASCULAR: Extremities warm and well perfused. Pulses present.   NEUROLOGICAL:  Patient is awake and grossly oriented to self, place and time.  Attention span is slightly decreased.  Memory is grossly intact.  Language is limited to few words.  Can follow some commands. Appropriate fund of knowledge. Cranial nerves 2-12 are intact with left-sided facial droop.  There might be left visual field cut though unclear. There is no pronator drift.  Motor exam shows 5/5 strength in right arm and leg.  Left arm is 2 out of 5.  Left leg is 3 out of 5.  Tone is normal in right arm and leg.  Left arm and leg do show evidence of spasticity.  (Previously reflexes are 2+ in upper extremities and lower extremities the brisker on the left side.  Sensory exam is grossly intact to light touch, pin prick and vibration but subjectively being decreased on the left side.)  Finger to nose is normal on the right side.  Unable to do on the left side due to weakness.  Unable to do heel-to-shin bilaterally.  Gait cannot be tested safely due to ongoing weakness.  He is unable to ambulate due to weakness.  Exam similar to before.    DIAGNOSTICS  MRI 3/22/22-images reviewed.  Pachymeningeal enhancement noted.  Previous subacute right parietal frontal hemorrhage noted  IMPRESSION:  1. Expected signal changes of now late subacute right frontoparietal  intraparenchymal hemorrhage.   2. Extensive microhemorrhages on susceptibility weighted images;  sequelae of hypertensive microangiopathy and/or possibly amyloid  angiopathy and chronic small vessel ischemic disease.  3. New symmetric pachymeningeal enhancement. Differential includes  idiopathic intracranial hypotension, neoplasm, metastatic disease or  granulomatous disease.    CT 12/29/21.  IMPRESSION:   1. Stable parenchymal hematoma centered at the frontoparietal junction  of the right cerebral hemisphere resulting in minimal leftward midline  shift again noted without change. No definite evidence for new or  increasing intracranial hemorrhage.  2. Diffuse cerebral volume loss and cerebral white matter changes  consistent with chronic small vessel ischemic disease.    MRI 12/25/21 -images reviewed.  Right peripheral internal hemorrhage noted.                                                         IMPRESSION:  1.  Right frontoparietal acute intracranial hemorrhage with associated regional mass effect resulting in 2 mm leftward midline shift, not significantly changed since previous exam allowing for differences in technique. No associated abnormal enhancement   to suggest underlying tumor.  2.  Mild to moderate chronic small vessel ischemic disease and mild generalized brain parenchymal volume loss.  3.  Tiny focus of  chronic infarction within the left posterior cerebral artery territory.  4.  Numerous chronic microhemorrhages throughout the cerebral hemispheres, deep gray nuclei and posterior fossa, possibly representing sequelae of hypertensive or amyloid angiopathy.    IMPRESSION:  This EEG recording during waking was abnormal due to generalized delta-theta slowing and to greater delta-theta slowing over the right hemisphere.  No interictal epileptiform abnormalities and no electrographic seizures were recorded.   These findings indicate mild-moderate electrographic encephalopathy and greater right hemispheric dysfunction; the history of hemorrhage may account for this asymmetric dysfunction.  Clinical correlation is recommended.  Vince Iglesias M.D., Professor of Neurology     OUTSIDE RECORDS  Outside referral notes and chart notes were reviewed and pertinent information has been summarized (in addition to the HPI):-        MRA images reviewed.  IMPRESSION:  1.  No central arterial stenosis/occlusion, aneurysm, or high flow vascular malformation identified.  2.  Continued decrease in size of the previously demonstrated parenchymal hematoma centered in the right frontoparietal region.    MRV-images reviewed.  HEAD MRV:   1.  No dural venous sinus thrombosis or significant stenosis.    LABS  Component      Latest Ref Rng & Units 4/11/2022   WBC      4.0 - 11.0 10e3/uL 5.4   RBC Count      4.40 - 5.90 10e6/uL 4.76   Hemoglobin      13.3 - 17.7 g/dL 14.8   Hematocrit      40.0 - 53.0 % 44.7   MCV      78 - 100 fL 94   MCH      26.5 - 33.0 pg 31.1   MCHC      31.5 - 36.5 g/dL 33.1   RDW      10.0 - 15.0 % 12.6   Platelet Count      150 - 450 10e3/uL 292   % Neutrophils      % 66   % Lymphocytes      % 25   % Monocytes      % 6   % Eosinophils      % 1   % Basophils      % 1   % Immature Granulocytes      % 1   NRBCs per 100 WBC      <1 /100 0   Absolute Neutrophils      1.6 - 8.3 10e3/uL 3.6   Absolute Lymphocytes      0.8 - 5.3  10e3/uL 1.4   Absolute Monocytes      0.0 - 1.3 10e3/uL 0.3   Absolute Eosinophils      0.0 - 0.7 10e3/uL 0.0   Absolute Basophils      0.0 - 0.2 10e3/uL 0.0   Absolute Immature Granulocytes      <=0.4 10e3/uL 0.0   Absolute NRBCs      10e3/uL 0.0   Interpretive Comments          Amphiphysin Ab, S      <1:240 titer    AGNA-1, S      <1:240 titer    VIRGINIA-1, S      <1:240 titer    VIRGINIA-2, S      <1:240 titer    VIRGINIA-3, S      <1:240 titer    CRMP-5-IgG, S      <1:240 titer    Neuronal (V-G) K+ Channel Ab, S      <=0.02 nmol/L    P/Q-Type Calcium Channel AB      <=0.02 nmol/L    PCA-1, S      <1:240 titer    PCA-2, S      <1:240 titer    PCA-Tr, S      <1:240 titer    Reflex Added          AL interpretation      Negative Borderline Positive (A)   AL pattern 1       Speckled   AL titer 1       1:80   Neutrophil Cytoplasmic Antibody      <1:10 <1:10   Neutrophil Cytoplasmic Antibody Pattern       The ANCA IFA is <1:10.  No further testing will be performed.   T4 Free      0.70 - 1.80 ng/dL 1.50   Rheumatoid Factor      0 - 30 IU/mL <15   Lyme Disease Antibodies Serum      <0.90 0.17   Sed Rate      0 - 15 mm/hr 8   Methylmalonic Acid      0.00 - 0.40 umol/L 0.29   Vitamin B12      213 - 816 pg/mL 443     Component      Latest Ref Rng & Units 4/15/2022   WBC      4.0 - 11.0 10e3/uL    RBC Count      4.40 - 5.90 10e6/uL    Hemoglobin      13.3 - 17.7 g/dL    Hematocrit      40.0 - 53.0 %    MCV      78 - 100 fL    MCH      26.5 - 33.0 pg    MCHC      31.5 - 36.5 g/dL    RDW      10.0 - 15.0 %    Platelet Count      150 - 450 10e3/uL    % Neutrophils      %    % Lymphocytes      %    % Monocytes      %    % Eosinophils      %    % Basophils      %    % Immature Granulocytes      %    NRBCs per 100 WBC      <1 /100    Absolute Neutrophils      1.6 - 8.3 10e3/uL    Absolute Lymphocytes      0.8 - 5.3 10e3/uL    Absolute Monocytes      0.0 - 1.3 10e3/uL    Absolute Eosinophils      0.0 - 0.7 10e3/uL    Absolute Basophils       0.0 - 0.2 10e3/uL    Absolute Immature Granulocytes      <=0.4 10e3/uL    Absolute NRBCs      10e3/uL    Interpretive Comments       SEE NOTE   Amphiphysin Ab, S      <1:240 titer Negative   AGNA-1, S      <1:240 titer Negative   VIRGINIA-1, S      <1:240 titer Negative   VIRGINIA-2, S      <1:240 titer Negative   VIRGINIA-3, S      <1:240 titer Negative   CRMP-5-IgG, S      <1:240 titer Negative   Neuronal (V-G) K+ Channel Ab, S      <=0.02 nmol/L 0.00   P/Q-Type Calcium Channel AB      <=0.02 nmol/L 0.00   PCA-1, S      <1:240 titer Negative   PCA-2, S      <1:240 titer Negative   PCA-Tr, S      <1:240 titer Negative   Reflex Added       None.   AL interpretation      Negative    AL pattern 1          AL titer 1          Neutrophil Cytoplasmic Antibody      <1:10    Neutrophil Cytoplasmic Antibody Pattern          T4 Free      0.70 - 1.80 ng/dL    Rheumatoid Factor      0 - 30 IU/mL    Lyme Disease Antibodies Serum      <0.90    Sed Rate      0 - 15 mm/hr    Methylmalonic Acid      0.00 - 0.40 umol/L    Vitamin B12      213 - 816 pg/mL      MRI brain-images reviewed.  IMPRESSION:  1.  Interval evolution of known right frontoparietal parenchymal hemorrhage as above. While no definitive enhancement is demonstrated at the site of hemorrhage, an additional 4 month follow-up MRI could be considered as a precautionary measure given   slight limitations of this study.  2.  No new intracranial hemorrhage demonstrated.  3.  Decreased conspicuity of previously demonstrated diffuse pachymeningeal thickening and enhancement. There is mild residual dural thickening and enhancement along the right frontoparietal convexity, which may be reactive.  4.  Redemonstrated pattern of scattered punctate chronic microhemorrhages. As before, this may represent hypertensive microangiopathy and/or cerebral amyloid angiopathy.  5.  No evidence of additional superimposed acute intracranial abnormality.      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  Patient's hypertension is thought to be related to Adderall use which he stopped.  Repeat MRI has not shown any progression of the intraparenchymal hemorrhage.  No underlying lesion has been identified.    Patient is currently doing physical therapy, speech therapy, occupational therapy, music therapy for recovery from the stroke.  Discussed prognosis with the family.  It will take a lot of time but he should keep on improving.  MRI does show some improvement in the hemorrhage area.  Did show images to the family.  Radiology is recommending another MRI and we will try to do it in May since it is hard for the patient to do it in the wintertime.  We will renew the physical therapy prescription.    For the pachymeningeal enhancement/MRA/MRV was negative.  Blood work was negative except for positive AL which I suspect is a false positive.  Could consider rheumatology referral if he had more joint symptoms/lupus symptoms though he does not complain of anything today.    Lumbar puncture was discussed extensively.  Family is not interested in doing this at this point.  It would be appropriate to hold off on this given intraparenchymal hemorrhage/intracranial lesion.  We will continue to hold off again today.  Discussed lumbar puncture again today with improvement of the MRI we will hold off.    I can see him back in 9 months.    -     MR Brain w/o & w Contrast; Future  -     Physical Therapy Referral; Future    Return in about 9 months (around 5/15/2023) for In-Clinic Visit (must), After testing.    Over 33 minutes were spent coordinating  the care for the patient on the day of the encounter.  This includes previsit, during visit and post visit activities as documented above.  Counseling patient.  Reviewing MRI images with the patient.  MRI ordered.  Discussion of when to do the MRI.  Discussion of lumbar puncture.  (Activities include but not inclusive of reviewing chart, reviewing outside records, reviewing labs and imaging study results as well as the images, patient visit time including getting history and exam,  use if applicable, review of test results with the patient and coming up with a plan in a shared model, counseling patient and family, education and answering patient questions, EMR , EMR diagnosis entry and problem list management, medication reconciliation and prescription management if applicable, paperwork if applicable, printing documents and documentation of the visit activities.)    Orlando Santos MD  Neurologist  [facility]  Tel:- [phone]    This note was dictated using voice recognition software.  Any grammatical or context distortions are unintentional and inherent to the software.

## 2022-08-18 ENCOUNTER — PATIENT OUTREACH (OUTPATIENT)
Dept: GERIATRIC MEDICINE | Facility: CLINIC | Age: 72
End: 2022-08-18

## 2022-08-19 NOTE — PROGRESS NOTES
No outreach completed.  CC updated program tasks and targets for [facility] launch.    OLGA Simon, [facility]  [phone]

## 2022-08-22 ENCOUNTER — NURSING HOME VISIT (OUTPATIENT)
Dept: GERIATRICS | Facility: CLINIC | Age: 72
End: 2022-08-22
Payer: COMMERCIAL

## 2022-08-22 VITALS
HEART RATE: 76 BPM | TEMPERATURE: 97.4 F | OXYGEN SATURATION: 97 % | BODY MASS INDEX: 19.82 KG/M2 | SYSTOLIC BLOOD PRESSURE: 103 MMHG | HEIGHT: 68 IN | RESPIRATION RATE: 17 BRPM | DIASTOLIC BLOOD PRESSURE: 64 MMHG | WEIGHT: 130.8 LBS

## 2022-08-22 DIAGNOSIS — G81.94 LEFT HEMIPLEGIA (H): Primary | ICD-10-CM

## 2022-08-22 DIAGNOSIS — E44.0 MODERATE PROTEIN-CALORIE MALNUTRITION (H): ICD-10-CM

## 2022-08-22 DIAGNOSIS — I61.9 RIGHT-SIDED NONTRAUMATIC INTRACEREBRAL HEMORRHAGE, UNSPECIFIED CEREBRAL LOCATION (H): ICD-10-CM

## 2022-08-22 DIAGNOSIS — G93.32 CHRONIC FATIGUE SYNDROME: ICD-10-CM

## 2022-08-22 DIAGNOSIS — I69.322 DYSARTHRIA AS LATE EFFECT OF STROKE: ICD-10-CM

## 2022-08-22 DIAGNOSIS — I10 PRIMARY HYPERTENSION: ICD-10-CM

## 2022-08-22 DIAGNOSIS — M24.542 CONTRACTURE OF HAND JOINT, LEFT: ICD-10-CM

## 2022-08-22 PROCEDURE — 99310 SBSQ NF CARE HIGH MDM 45: CPT | Performed by: FAMILY MEDICINE

## 2022-08-22 NOTE — PROGRESS NOTES
[facility]  Chief Complaint   Patient presents with     longterm Regulatory     [facility] Medical Record Number:  [id number]  Place of Service where encounter took place:  [facility] () [[id number]]    HPI:    Sukhi Johnson  is 72 year old (1950), who is being seen today for a federally mandated E/M visit.  HPI information obtained from: facility chart records, facility staff, patient report, [facility] chart review and family/first contact wife report. T    today's concerns are:  - - Resident seen and examined, chart reviewed and discussed with the nursing staff.   - Resident reports saw neurology. reports therapy is going on .   - Dysphagia: wife reports it is going well.   - PCM: wife reports now is taking more portion, and the weight has slightly increased.   - DNP and RN have no concern today.     --------------------------------  - Past Medical, social, family histories, medications, and allergies reviewed and updated  - Medications reviewed: in the chart and EHR.   - Case Management:   I have reviewed the care plan and MDS and do agree with the plan. Patient's desire to return to the community is not present.  Information reviewed:  Medications, vital signs, orders, and nursing notes.    MEDICATIONS:  Current Outpatient Medications   Medication Sig Dispense Refill     acetaminophen (TYLENOL) 325 MG/10.15ML solution 20.3 mLs (650 mg) by Oral or Feeding Tube route every 4 hours as needed for mild pain or fever       acetaminophen (TYLENOL) 650 MG suppository Place 1 suppository (650 mg) rectally every 4 hours as needed for mild pain       Cranberry-Vitamin C (AZO CRANBERRY URINARY TRACT PO) Take 1 tablet by mouth 2 times daily       diclofenac (VOLTAREN) 1 % topical gel Apply to left shoulder BID 50 g 1     docusate (COLACE) 50 MG/5ML liquid 10 mLs (100 mg) by Oral or J tube route daily as needed for constipation       enalapril (VASOTEC) 5 MG tablet 1 tablet (5 mg) by  "Per Feeding Tube route At Bedtime       Heparin Sodium, Porcine, (HEPARIN ANTICOAGULANT) 5000 UNIT/0.5ML injection Inject 0.5 mLs (5,000 Units) Subcutaneous every 12 hours       hydrALAZINE (APRESOLINE) 50 MG tablet 1 tablet (50 mg) by Per Feeding Tube route every 8 hours       metoprolol tartrate (LOPRESSOR) 50 MG tablet 1 tablet (50 mg) by Per Feeding Tube route 2 times daily       ondansetron (ZOFRAN-ODT) 4 MG ODT tab Take 1 tablet (4 mg) by mouth every 6 hours as needed for nausea or vomiting       pantoprazole (PROTONIX) 2 mg/mL SUSP suspension 20 mLs (40 mg) by Per Feeding Tube route 2 times daily       polyethylene glycol (MIRALAX) 17 g packet 17 g by Oral or Feeding Tube route 2 times daily as needed (constipation)       sennosides (SENOKOT) 8.8 MG/5ML syrup 5 mLs by Oral or Feeding Tube route 2 times daily as needed for constipation       ROS: 4 point ROS including Respiratory, CV, GI and , other than that noted in the HPI,  is negative    Vitals:  /64   Pulse 76   Temp 97.4  F (36.3  C)   Resp 17   Ht 1.727 m (5' 8\")   Wt 59.3 kg (130 lb 12.8 oz)   SpO2 97%   BMI 19.89 kg/m    Body mass index is 19.89 kg/m .     Exam:  GENERAL APPEARANCE:  in no distress, cooperative  RESP:  lungs clear to auscultation   CV:  S1S2 audible, regular HR, no murmur appreciated.   ABDOMEN:  soft, NT/ND, BS audible. no mass appreciated on palpation.   M/S:   Muscles atrophy right hand. SKIN:  No rash.   NEURO:  Difficulty expressing words, ms strenght: LUE 0/5, LLL 1/5, Rt hand  4/5 and RLE 4/5. Ptosis right eye.   PSYCH:  affect and mood normal    Lab/Diagnostic data: Reviewed in the chart and EHR.        ASSESSMENT/PLAN  ----------------------------  Right-sided nontraumatic intracerebral hemorrhage, unspecified cerebral location (H)   Left sided hemiparesis  Dysarthria as a late effect of stroke  - saw neurology 8/15, MRI showed 15% of the blood remains. Next MRI next May [id number]. Appreciate " help      Dysphagia, unspecified type  Protein calorie malnutrition, moderated  - 7/29: FT removed.  tolerating po intake  - Body mass index is 19.89 kg/m . BMI less than 22 in frail elderly increase mortality risk. Gaining weight slowly. Dietician on the case.       Essential HTN:  BP controlled. On enalapril, hydralazine, metoprolol. Continued.     Hx of GIB with acute blood loss anemia:   - Dec 2021. No scopes was done, Hb dropped down to 12.7 gm/dl.  Now 14.8. No concern.  -  on Protonix, consider Protonix frequency reduction from bid to once daily, with close monitoring clinically and lab    Chronic fatigue syndrome 2/2 Lyme disease:  - Significant  Deficits requiring NH placement. Requiring extensive assistance from nursing. Up for meals only o/w spends the day resting in bed      Electronically signed by:  Joel Blanchard MD

## 2022-08-22 NOTE — LETTER
8/22/2022        RE: Sukhi Gomez [facility]  [address] [facility] [address] [facility] [address]        [place] [facility]  Chief Complaint   Patient presents with     [facility]     [place] Medical Record Number:  [id number]  Place of Service where encounter took place:  [facility] () [[id number]]    HPI:    Sukhi Johnson  is 72 year old (1950), who is being seen today for a federally mandated E/M visit.  HPI information obtained from: facility chart records, facility staff, patient report, [facility] chart review and family/first contact wife report. T    today's concerns are:  - - Resident seen and examined, chart reviewed and discussed with the nursing staff.   - Resident reports saw neurology. reports therapy is going on .   - Dysphagia: wife reports it is going well.   - PCM: wife reports now is taking more portion, and the weight has slightly increased.   - DNP and RN have no concern today.     --------------------------------  - Past Medical, social, family histories, medications, and allergies reviewed and updated  - Medications reviewed: in the chart and EHR.   - Case Management:   I have reviewed the care plan and MDS and do agree with the plan. Patient's desire to return to the community is not present.  Information reviewed:  Medications, vital signs, orders, and nursing notes.    MEDICATIONS:  Current Outpatient Medications   Medication Sig Dispense Refill     acetaminophen (TYLENOL) 325 MG/10.15ML solution 20.3 mLs (650 mg) by Oral or Feeding Tube route every 4 hours as needed for mild pain or fever       acetaminophen (TYLENOL) 650 MG suppository Place 1 suppository (650 mg) rectally every 4 hours as needed for mild pain       Cranberry-Vitamin C (AZO CRANBERRY URINARY TRACT PO) Take 1 tablet by mouth 2 times daily       diclofenac (VOLTAREN) 1 % topical gel Apply to left shoulder BID 50 g 1     docusate (COLACE) 50 MG/5ML liquid 10  "mLs (100 mg) by Oral or J tube route daily as needed for constipation       enalapril (VASOTEC) 5 MG tablet 1 tablet (5 mg) by Per Feeding Tube route At Bedtime       Heparin Sodium, Porcine, (HEPARIN ANTICOAGULANT) 5000 UNIT/0.5ML injection Inject 0.5 mLs (5,000 Units) Subcutaneous every 12 hours       hydrALAZINE (APRESOLINE) 50 MG tablet 1 tablet (50 mg) by Per Feeding Tube route every 8 hours       metoprolol tartrate (LOPRESSOR) 50 MG tablet 1 tablet (50 mg) by Per Feeding Tube route 2 times daily       ondansetron (ZOFRAN-ODT) 4 MG ODT tab Take 1 tablet (4 mg) by mouth every 6 hours as needed for nausea or vomiting       pantoprazole (PROTONIX) 2 mg/mL SUSP suspension 20 mLs (40 mg) by Per Feeding Tube route 2 times daily       polyethylene glycol (MIRALAX) 17 g packet 17 g by Oral or Feeding Tube route 2 times daily as needed (constipation)       sennosides (SENOKOT) 8.8 MG/5ML syrup 5 mLs by Oral or Feeding Tube route 2 times daily as needed for constipation       ROS: 4 point ROS including Respiratory, CV, GI and , other than that noted in the HPI,  is negative    Vitals:  /64   Pulse 76   Temp 97.4  F (36.3  C)   Resp 17   Ht 1.727 m (5' 8\")   Wt 59.3 kg (130 lb 12.8 oz)   SpO2 97%   BMI 19.89 kg/m    Body mass index is 19.89 kg/m .     Exam:  GENERAL APPEARANCE:  in no distress, cooperative  RESP:  lungs clear to auscultation   CV:  S1S2 audible, regular HR, no murmur appreciated.   ABDOMEN:  soft, NT/ND, BS audible. no mass appreciated on palpation.   M/S:   Muscles atrophy right hand. SKIN:  No rash.   NEURO:  Difficulty expressing words, ms strenght: LUE 0/5, LLL 1/5, Rt hand  4/5 and RLE 4/5. Ptosis right eye.   PSYCH:  affect and mood normal    Lab/Diagnostic data: Reviewed in the chart and EHR.        ASSESSMENT/PLAN  ----------------------------  Right-sided nontraumatic intracerebral hemorrhage, unspecified cerebral location (H)   Left sided hemiparesis  Dysarthria as a late " effect of stroke  - saw neurology 8/15, MRI showed 15% of the blood remains. Next MRI next May 41278. Appreciate help      Dysphagia, unspecified type  Protein calorie malnutrition, moderated  - 7/29: FT removed.  tolerating po intake  - Body mass index is 19.89 kg/m . BMI less than 22 in frail elderly increase mortality risk. Gaining weight slowly. Dietician on the case.       Essential HTN:  BP controlled. On enalapril, hydralazine, metoprolol. Continued.     Hx of GIB with acute blood loss anemia:   - Dec 2021. No scopes was done, Hb dropped down to 12.7 gm/dl.  Now 14.8. No concern.  -  on Protonix, consider Protonix frequency reduction from bid to once daily, with close monitoring clinically and lab    Chronic fatigue syndrome 2/2 Lyme disease:  - Significant  Deficits requiring NH placement. Requiring extensive assistance from nursing. Up for meals only o/w spends the day resting in bed      Electronically signed by:  Joel Blanchard MD        Sincerely,        Joel Blanchard MD

## 2022-08-31 PROBLEM — E46 PROTEIN-CALORIE MALNUTRITION (H): Status: ACTIVE | Noted: 2022-08-31

## 2022-10-18 ENCOUNTER — NURSING HOME VISIT (OUTPATIENT)
Dept: GERIATRICS | Facility: CLINIC | Age: 72
End: 2022-10-18
Payer: COMMERCIAL

## 2022-10-18 VITALS
BODY MASS INDEX: 19.79 KG/M2 | TEMPERATURE: 97.6 F | SYSTOLIC BLOOD PRESSURE: 118 MMHG | HEIGHT: 68 IN | WEIGHT: 130.6 LBS | OXYGEN SATURATION: 95 % | RESPIRATION RATE: 14 BRPM | DIASTOLIC BLOOD PRESSURE: 71 MMHG | HEART RATE: 84 BPM

## 2022-10-18 DIAGNOSIS — M24.542 CONTRACTURE OF HAND JOINT, LEFT: ICD-10-CM

## 2022-10-18 DIAGNOSIS — E46 PROTEIN-CALORIE MALNUTRITION, UNSPECIFIED SEVERITY (H): ICD-10-CM

## 2022-10-18 DIAGNOSIS — I61.9 RIGHT-SIDED NONTRAUMATIC INTRACEREBRAL HEMORRHAGE, UNSPECIFIED CEREBRAL LOCATION (H): Primary | ICD-10-CM

## 2022-10-18 PROCEDURE — 99309 SBSQ NF CARE MODERATE MDM 30: CPT | Performed by: NURSE PRACTITIONER

## 2022-10-18 NOTE — PROGRESS NOTES
[facility] GERIATRICS  Chief Complaint   Patient presents with     [facility] Medical Record Number:  [id number]  [facility] of [facility] where encounter took place[facility] () [[id number]    HPI:    Sukhi Johnson  is 72 year old (1950), who is being seen today for a federally mandated E/M visit. Today's concerns are:     Right-sided nontraumatic intracerebral hemorrhage, unspecified cerebral location (H)  Contracture of hand joint, left  Protein-calorie malnutrition, unspecified severity (H)     Patient seen for follow up, appears healthy, non-verbal, will use mouth/hands to aid with communication, unknown level of cognition, L hand in brace to reduce contraction, does not appear to have worsened, BMI 19.86, limited intake but adequate appetite, no distress.    ALLERGIES:Patient has no known allergies.  PAST MEDICAL HISTORY: No past medical history on file.  PAST SURGICAL HISTORY:   has a past surgical history that includes IR Gastro Jejunostomy Tube Placement (1/5/2022) and IR Gastro Jejunostomy Tube Change (3/9/2022).  FAMILY HISTORY: family history is not on file.  SOCIAL HISTORY:  reports that he has never smoked. He has never used smokeless tobacco. He reports that he does not drink alcohol and does not use drugs.    MEDICATIONS:    Post Medication Reconciliation Status: Medication reconciliation previously completed during another office visit           Review of your medicines          Accurate as of October 18, 2022  5:53 PM. If you have any questions, ask your nurse or doctor.            CONTINUE these medicines which have NOT CHANGED      Dose / Directions   * acetaminophen 325 MG/10.15ML solution  Commonly known as: TYLENOL  Used for: Right-sided nontraumatic intracerebral hemorrhage, unspecified cerebral location (H)      Dose: 650 mg  20.3 mLs (650 mg) by Oral or Feeding Tube route every 4 hours as needed for mild pain or fever  Refills: 0     *  acetaminophen 650 MG suppository  Commonly known as: TYLENOL  Used for: Right-sided nontraumatic intracerebral hemorrhage, unspecified cerebral location (H)      Dose: 650 mg  Place 1 suppository (650 mg) rectally every 4 hours as needed for mild pain  Refills: 0     AZO CRANBERRY URINARY TRACT PO      Dose: 1 tablet  Take 1 tablet by mouth 2 times daily  Refills: 0     diclofenac 1 % topical gel  Commonly known as: VOLTAREN  Used for: Acute pain of left shoulder      Apply to left shoulder BID  Quantity: 50 g  Refills: 1     docusate 50 MG/5ML liquid  Commonly known as: COLACE  Used for: Right-sided nontraumatic intracerebral hemorrhage, unspecified cerebral location (H)      Dose: 100 mg  10 mLs (100 mg) by Oral or J tube route daily as needed for constipation  Refills: 0     enalapril 5 MG tablet  Commonly known as: VASOTEC  Used for: Right-sided nontraumatic intracerebral hemorrhage, unspecified cerebral location (H)      Dose: 5 mg  1 tablet (5 mg) by Per Feeding Tube route At Bedtime  Refills: 0     heparin ANTICOAGULANT 5000 UNIT/0.5ML injection  Used for: Right-sided nontraumatic intracerebral hemorrhage, unspecified cerebral location (H)      Dose: 5,000 Units  Inject 0.5 mLs (5,000 Units) Subcutaneous every 12 hours  Refills: 0     hydrALAZINE 50 MG tablet  Commonly known as: APRESOLINE  Used for: Right-sided nontraumatic intracerebral hemorrhage, unspecified cerebral location (H)      Dose: 50 mg  1 tablet (50 mg) by Per Feeding Tube route every 8 hours  Refills: 0     metoprolol tartrate 50 MG tablet  Commonly known as: LOPRESSOR  Used for: Right-sided nontraumatic intracerebral hemorrhage, unspecified cerebral location (H)      Dose: 50 mg  1 tablet (50 mg) by Per Feeding Tube route 2 times daily  Refills: 0     ondansetron 4 MG ODT tab  Commonly known as: ZOFRAN ODT  Used for: Right-sided nontraumatic intracerebral hemorrhage, unspecified cerebral location (H)      Dose: 4 mg  Take 1 tablet (4 mg) by  "mouth every 6 hours as needed for nausea or vomiting  Refills: 0     pantoprazole 2 mg/mL Susp suspension  Commonly known as: PROTONIX  Used for: Right-sided nontraumatic intracerebral hemorrhage, unspecified cerebral location (H)      Dose: 40 mg  20 mLs (40 mg) by Per Feeding Tube route 2 times daily  Refills: 0     polyethylene glycol 17 g packet  Commonly known as: MIRALAX  Used for: Right-sided nontraumatic intracerebral hemorrhage, unspecified cerebral location (H)      Dose: 17 g  17 g by Oral or Feeding Tube route 2 times daily as needed (constipation)  Refills: 0     sennosides 8.8 MG/5ML syrup  Commonly known as: SENOKOT  Used for: Right-sided nontraumatic intracerebral hemorrhage, unspecified cerebral location (H)      Dose: 5 mL  5 mLs by Oral or Feeding Tube route 2 times daily as needed for constipation  Refills: 0         * This list has 2 medication(s) that are the same as other medications prescribed for you. Read the directions carefully, and ask your doctor or other care provider to review them with you.                 Case Management:  I have reviewed the care plan and MDS and do agree with the plan. Patient's desire to return to the community is present, but is not able due to care needs . Information reviewed:  Medications, vital signs, orders, and nursing notes.    ROS:  Unobtainable secondary to aphasia.     Vitals:  /71   Pulse 84   Temp 97.6  F (36.4  C)   Resp 14   Ht 1.727 m (5' 8\")   Wt 59.2 kg (130 lb 9.6 oz)   SpO2 95%   BMI 19.86 kg/m    Body mass index is 19.86 kg/m .  Exam:  GENERAL APPEARANCE:  in no distress, appears healthy  ENT:  Mouth and posterior oropharynx normal, moist mucous membranes  RESP:  lungs clear to auscultation   CV:  no edema  ABDOMEN:  bowel sounds normal  M/S:   Gait and station abnormal full ADL assist  SKIN:  Inspection of skin and subcutaneous tissue baseline  NEURO:   Examination of sensation by touch normal  PSYCH:  affect and mood " normal    Lab/Diagnostic data:   Labs done in SNF are in [place] EPIC. Please refer to them using EPIC/Care Everywhere.    ASSESSMENT/PLAN  (I61.9) Right-sided nontraumatic intracerebral hemorrhage, unspecified cerebral location (H)  (primary encounter diagnosis)  Comment: non-verbal, full ADL assist  Plan: staff to support as indicated  -continue heparin  -no other medication intervention indicated      (M24.542) Contracture of hand joint, left  Comment: current brace appears to be effective with limiting contracture  Plan: refer to OT if status declines    (E46) Protein-calorie malnutrition, unspecified severity (H)  Comment: BMI 19.86  Plan: staff to encourage intake  -dietary to follow  -supplements PRN if tolerates      Electronically signed by:  KALI Ramirez CNP

## 2022-10-18 NOTE — LETTER
10/18/2022        RE: Sukhi Talleyctani [facility]  [address] [address]        [facility] GERIATRICS  Chief Complaint   Patient presents with     [facility] Medical Record Number:  [id number]  Place of Service where encounter took place:  [facility] () [[id number]]    HPI:    Sukhi Johnson  is 72 year old (1950), who is being seen today for a federally mandated E/M visit. Today's concerns are:     Right-sided nontraumatic intracerebral hemorrhage, unspecified cerebral location (H)  Contracture of hand joint, left  Protein-calorie malnutrition, unspecified severity (H)     Patient seen for follow up, appears healthy, non-verbal, will use mouth/hands to aid with communication, unknown level of cognition, L hand in brace to reduce contraction, does not appear to have worsened, BMI 19.86, limited intake but adequate appetite, no distress.    ALLERGIES:Patient has no known allergies.  PAST MEDICAL HISTORY: No past medical history on file.  PAST SURGICAL HISTORY:   has a past surgical history that includes IR Gastro Jejunostomy Tube Placement (1/5/2022) and IR Gastro Jejunostomy Tube Change (3/9/2022).  FAMILY HISTORY: family history is not on file.  SOCIAL HISTORY:  reports that he has never smoked. He has never used smokeless tobacco. He reports that he does not drink alcohol and does not use drugs.    MEDICATIONS:    Post Medication Reconciliation Status: Medication reconciliation previously completed during another office visit           Review of your medicines          Accurate as of October 18, 2022  5:53 PM. If you have any questions, ask your nurse or doctor.            CONTINUE these medicines which have NOT CHANGED      Dose / Directions   * acetaminophen 325 MG/10.15ML solution  Commonly known as: TYLENOL  Used for: Right-sided nontraumatic intracerebral hemorrhage, unspecified cerebral location (H)      Dose:  650 mg  20.3 mLs (650 mg) by Oral or Feeding Tube route every 4 hours as needed for mild pain or fever  Refills: 0     * acetaminophen 650 MG suppository  Commonly known as: TYLENOL  Used for: Right-sided nontraumatic intracerebral hemorrhage, unspecified cerebral location (H)      Dose: 650 mg  Place 1 suppository (650 mg) rectally every 4 hours as needed for mild pain  Refills: 0     AZO CRANBERRY URINARY TRACT PO      Dose: 1 tablet  Take 1 tablet by mouth 2 times daily  Refills: 0     diclofenac 1 % topical gel  Commonly known as: VOLTAREN  Used for: Acute pain of left shoulder      Apply to left shoulder BID  Quantity: 50 g  Refills: 1     docusate 50 MG/5ML liquid  Commonly known as: COLACE  Used for: Right-sided nontraumatic intracerebral hemorrhage, unspecified cerebral location (H)      Dose: 100 mg  10 mLs (100 mg) by Oral or J tube route daily as needed for constipation  Refills: 0     enalapril 5 MG tablet  Commonly known as: VASOTEC  Used for: Right-sided nontraumatic intracerebral hemorrhage, unspecified cerebral location (H)      Dose: 5 mg  1 tablet (5 mg) by Per Feeding Tube route At Bedtime  Refills: 0     heparin ANTICOAGULANT 5000 UNIT/0.5ML injection  Used for: Right-sided nontraumatic intracerebral hemorrhage, unspecified cerebral location (H)      Dose: 5,000 Units  Inject 0.5 mLs (5,000 Units) Subcutaneous every 12 hours  Refills: 0     hydrALAZINE 50 MG tablet  Commonly known as: APRESOLINE  Used for: Right-sided nontraumatic intracerebral hemorrhage, unspecified cerebral location (H)      Dose: 50 mg  1 tablet (50 mg) by Per Feeding Tube route every 8 hours  Refills: 0     metoprolol tartrate 50 MG tablet  Commonly known as: LOPRESSOR  Used for: Right-sided nontraumatic intracerebral hemorrhage, unspecified cerebral location (H)      Dose: 50 mg  1 tablet (50 mg) by Per Feeding Tube route 2 times daily  Refills: 0     ondansetron 4 MG ODT tab  Commonly known as: ZOFRAN ODT  Used for:  "Right-sided nontraumatic intracerebral hemorrhage, unspecified cerebral location (H)      Dose: 4 mg  Take 1 tablet (4 mg) by mouth every 6 hours as needed for nausea or vomiting  Refills: 0     pantoprazole 2 mg/mL Susp suspension  Commonly known as: PROTONIX  Used for: Right-sided nontraumatic intracerebral hemorrhage, unspecified cerebral location (H)      Dose: 40 mg  20 mLs (40 mg) by Per Feeding Tube route 2 times daily  Refills: 0     polyethylene glycol 17 g packet  Commonly known as: MIRALAX  Used for: Right-sided nontraumatic intracerebral hemorrhage, unspecified cerebral location (H)      Dose: 17 g  17 g by Oral or Feeding Tube route 2 times daily as needed (constipation)  Refills: 0     sennosides 8.8 MG/5ML syrup  Commonly known as: SENOKOT  Used for: Right-sided nontraumatic intracerebral hemorrhage, unspecified cerebral location (H)      Dose: 5 mL  5 mLs by Oral or Feeding Tube route 2 times daily as needed for constipation  Refills: 0         * This list has 2 medication(s) that are the same as other medications prescribed for you. Read the directions carefully, and ask your doctor or other care provider to review them with you.                 Case Management:  I have reviewed the care plan and MDS and do agree with the plan. Patient's desire to return to the community is present, but is not able due to care needs . Information reviewed:  Medications, vital signs, orders, and nursing notes.    ROS:  Unobtainable secondary to aphasia.     Vitals:  /71   Pulse 84   Temp 97.6  F (36.4  C)   Resp 14   Ht 1.727 m (5' 8\")   Wt 59.2 kg (130 lb 9.6 oz)   SpO2 95%   BMI 19.86 kg/m    Body mass index is 19.86 kg/m .  Exam:  GENERAL APPEARANCE:  in no distress, appears healthy  ENT:  Mouth and posterior oropharynx normal, moist mucous membranes  RESP:  lungs clear to auscultation   CV:  no edema  ABDOMEN:  bowel sounds normal  M/S:   Gait and station abnormal full ADL assist  SKIN:  Inspection " of skin and subcutaneous tissue baseline  NEURO:   Examination of sensation by touch normal  PSYCH:  affect and mood normal    Lab/Diagnostic data:   Labs done in SNF are in [place] EPIC. Please refer to them using [employer]/Care Everywhere.    ASSESSMENT/PLAN  (I61.9) Right-sided nontraumatic intracerebral hemorrhage, unspecified cerebral location (H)  (primary encounter diagnosis)  Comment: non-verbal, full ADL assist  Plan: staff to support as indicated  -continue heparin  -no other medication intervention indicated      (M24.542) Contracture of hand joint, left  Comment: current brace appears to be effective with limiting contracture  Plan: refer to OT if status declines    (E46) Protein-calorie malnutrition, unspecified severity (H)  Comment: BMI 19.86  Plan: staff to encourage intake  -dietary to follow  -supplements PRN if tolerates      Electronically signed by:  KALI Ramirez CNP            Sincerely,        KALI Ramirez CNP

## 2022-11-01 ENCOUNTER — NURSING HOME VISIT (OUTPATIENT)
Dept: GERIATRICS | Facility: CLINIC | Age: 72
End: 2022-11-01
Payer: COMMERCIAL

## 2022-11-01 VITALS
WEIGHT: 144 LBS | DIASTOLIC BLOOD PRESSURE: 63 MMHG | BODY MASS INDEX: 21.82 KG/M2 | TEMPERATURE: 96.6 F | SYSTOLIC BLOOD PRESSURE: 107 MMHG | RESPIRATION RATE: 14 BRPM | HEART RATE: 67 BPM | OXYGEN SATURATION: 97 % | HEIGHT: 68 IN

## 2022-11-01 DIAGNOSIS — R13.0 APHAGIA: ICD-10-CM

## 2022-11-01 DIAGNOSIS — I61.9 RIGHT-SIDED NONTRAUMATIC INTRACEREBRAL HEMORRHAGE, UNSPECIFIED CEREBRAL LOCATION (H): Primary | ICD-10-CM

## 2022-11-01 DIAGNOSIS — I10 HYPERTENSION, UNSPECIFIED TYPE: ICD-10-CM

## 2022-11-01 PROCEDURE — 99309 SBSQ NF CARE MODERATE MDM 30: CPT | Performed by: NURSE PRACTITIONER

## 2022-11-01 NOTE — PROGRESS NOTES
"[facility] GERIATRICS    Chief Complaint   Patient presents with     RECHECK     HPI:  Sukhi Johnson is a 72 year old  (1950), who is being seen today for an episodic care visit at: [facility] AT [facility] () [[id number]]. Today's concern is:   1. Right-sided nontraumatic intracerebral hemorrhage, unspecified cerebral location (H)    2. Aphagia    3. Hypertension, unspecified type      Patient seen on request, post ICH with L hemiplegia, intermittent ability to express self verbally at times otherwise like today unable to make needs known, has speech generating Ipad and ST is working with use based on abilities, having some difficulty pushing right 'buttons', SBP's have been low in the 100-130 range, HR's in the 60-80 range, overall appears healthy.    Allergies, and PMH/PSH reviewed in EPIC today.  REVIEW OF SYSTEMS:  Unobtainable secondary to aphasia.     Objective:   /63   Pulse 67   Temp (!) 96.6  F (35.9  C)   Resp 14   Ht 1.727 m (5' 8\")   Wt 65.3 kg (144 lb)   SpO2 97%   BMI 21.90 kg/m    GENERAL APPEARANCE:  in no distress, appears healthy  ENT:  Mouth and posterior oropharynx normal, moist mucous membranes  RESP:  lungs clear to auscultation   CV:  no edema  ABDOMEN:  bowel sounds normal  M/S:   Gait and station abnormal ADL assist  SKIN:  Inspection of skin and subcutaneous tissue baseline  NEURO:   Examination of sensation by touch normal  PSYCH:  affect and mood normal    Labs done in SNF are in [facility]. Please refer to them using [facility]/Care Everywhere.    Assessment/Plan:  (I61.9) Right-sided nontraumatic intracerebral hemorrhage, unspecified cerebral location (H)  (primary encounter diagnosis)  (R13.0) Aphagia  Comment: ICH in 2021, L hemiplegia, limited verbal abilities depending on day/status  Plan: staff to support as indicated  -ST working with speech device  -continue heparin BID  -follow up neurology PRN    (I10) Hypertension, unspecified type  Comment: SBP's low " in the 100-130 range  Plan: discontinue enalapril 5mg  -place parameters on metoprolol hold SBP <110, HR <60  -staff to check VS daily  -plan to follow up on abnormal VS as staff informs    MED REC REQUIRED  Post Medication Reconciliation Status: medication reconcilation previously completed during another office visit    Electronically signed by: KALI Ramirez CNP

## 2022-11-01 NOTE — LETTER
"    11/1/2022        RE: Sukhi Johnson  [facility]  [address] [address]        [facility] GERIATRICS    Chief Complaint   Patient presents with     RECHECK     HPI:  Sukhi Johnson is a 72 year old  (1950), who is being seen today for an episodic care visit at: [facility] () [[id number]]. Today's concern is:   1. Right-sided nontraumatic intracerebral hemorrhage, unspecified cerebral location (H)    2. Aphagia    3. Hypertension, unspecified type      Patient seen on request, post ICH with L hemiplegia, intermittent ability to express self verbally at times otherwise like today unable to make needs known, has speech generating Ipad and ST is working with use based on abilities, having some difficulty pushing right 'buttons', SBP's have been low in the 100-130 range, HR's in the 60-80 range, overall appears healthy.    Allergies, and PMH/PSH reviewed in [facility] today.  REVIEW OF SYSTEMS:  Unobtainable secondary to aphasia.     Objective:   /63   Pulse 67   Temp (!) 96.6  F (35.9  C)   Resp 14   Ht 1.727 m (5' 8\")   Wt 65.3 kg (144 lb)   SpO2 97%   BMI 21.90 kg/m    GENERAL APPEARANCE:  in no distress, appears healthy  ENT:  Mouth and posterior oropharynx normal, moist mucous membranes  RESP:  lungs clear to auscultation   CV:  no edema  ABDOMEN:  bowel sounds normal  M/S:   Gait and station abnormal ADL assist  SKIN:  Inspection of skin and subcutaneous tissue baseline  NEURO:   Examination of sensation by touch normal  PSYCH:  affect and mood normal    Labs done in SNF are in [facility]. Please refer to them using EPIC/Care Everywhere.    Assessment/Plan:  (I61.9) Right-sided nontraumatic intracerebral hemorrhage, unspecified cerebral location (H)  (primary encounter diagnosis)  (R13.0) Aphagia  Comment: ICH in 2021, L hemiplegia, limited verbal abilities depending on day/status  Plan: staff to support as indicated  -ST working with " speech device  -continue heparin BID  -follow up neurology PRN    (I10) Hypertension, unspecified type  Comment: SBP's low in the 100-130 range  Plan: discontinue enalapril 5mg  -place parameters on metoprolol hold SBP <110, HR <60  -staff to check VS daily  -plan to follow up on abnormal VS as staff informs    MED REC REQUIRED  Post Medication Reconciliation Status: medication reconcilation previously completed during another office visit    Electronically signed by: KALI Ramirez CNP             Sincerely,        KALI Ramirez CNP

## 2022-12-05 ENCOUNTER — TELEPHONE (OUTPATIENT)
Dept: GERIATRICS | Facility: CLINIC | Age: 72
End: 2022-12-05

## 2022-12-05 RX ORDER — PANTOPRAZOLE SODIUM 40 MG/1
20 TABLET, DELAYED RELEASE ORAL DAILY
COMMUNITY
Start: 2022-12-05

## 2022-12-05 NOTE — TELEPHONE ENCOUNTER
[facility] Triage Nurse Telephone Encounter    Provider: KALI Sams CNP   Facility: [facility] Facility Type:  [facility]    Caller: Christiane  Call Back Number: [phone]    Allergies:  No Known Allergies     Reason for call: Nurse is reporting that patient has orders for liquid Protonix per G-tube, however patient no longer has the G-tube and wants to take it in pill form.      Verbal Order/Direction given by Provider: Discontinue liquid Protonix.  Protonix tablet 40mg PO BID.        Provider giving Order:  KALI Sams CNP     Verbal Order given to: Christiane Padilla RN

## 2022-12-12 NOTE — PROGRESS NOTES
[facility]  Chief Complaint   Patient presents with     jail Regulatory     [facility] Medical Record Number:  [id number]  Place of Service where encounter took place:  [facility] () [[id number]]    HPI:    Sukhi Johnson  is 72 year old (1950), who is being seen today for a federally mandated E/M visit.  HPI information obtained from: facility chart records, facility staff, patient report, [facility] chart review and family/first contact wife report. T    today's concerns are:  - - Resident seen and examined, chart reviewed and discussed with the nursing staff.   - reports no change in the weakness. Endorses tolerating current diet. Denies HA or CP   - DNP and RN have no concern today.     --------------------------------  - Past Medical, social, family histories, medications, and allergies reviewed and updated  - Medications reviewed: in the chart and EHR.   - Case Management:   I have reviewed the care plan and MDS and do agree with the plan. Patient's desire to return to the community is not present.  Information reviewed:  Medications, vital signs, orders, and nursing notes.    MEDICATIONS:  Current Outpatient Medications   Medication Sig Dispense Refill     acetaminophen (TYLENOL) 325 MG/10.15ML solution 20.3 mLs (650 mg) by Oral or Feeding Tube route every 4 hours as needed for mild pain or fever       acetaminophen (TYLENOL) 650 MG suppository Place 1 suppository (650 mg) rectally every 4 hours as needed for mild pain       Cranberry-Vitamin C (AZO CRANBERRY URINARY TRACT PO) Take 1 tablet by mouth 2 times daily       diclofenac (VOLTAREN) 1 % topical gel Apply to left shoulder BID 50 g 1     docusate (COLACE) 50 MG/5ML liquid 10 mLs (100 mg) by Oral or J tube route daily as needed for constipation       Heparin Sodium, Porcine, (HEPARIN ANTICOAGULANT) 5000 UNIT/0.5ML injection Inject 0.5 mLs (5,000 Units) Subcutaneous every 12 hours       hydrALAZINE (APRESOLINE)  "50 MG tablet 1 tablet (50 mg) by Per Feeding Tube route every 8 hours       metoprolol tartrate (LOPRESSOR) 50 MG tablet 1 tablet (50 mg) by Per Feeding Tube route 2 times daily       ondansetron (ZOFRAN-ODT) 4 MG ODT tab Take 1 tablet (4 mg) by mouth every 6 hours as needed for nausea or vomiting       pantoprazole (PROTONIX) 40 MG EC tablet Take 40 mg by mouth 2 times daily       polyethylene glycol (MIRALAX) 17 g packet 17 g by Oral or Feeding Tube route 2 times daily as needed (constipation)       sennosides (SENOKOT) 8.8 MG/5ML syrup 5 mLs by Oral or Feeding Tube route 2 times daily as needed for constipation       ROS: 4 point ROS including Respiratory, CV, GI and , other than that noted in the HPI,  is negative    Vitals:  /63   Pulse 65   Temp 98  F (36.7  C)   Resp 17   Ht 1.727 m (5' 8\")   Wt 58.9 kg (129 lb 12.8 oz)   SpO2 94%   BMI 19.74 kg/m    Body mass index is 19.74 kg/m .     Exam: done on 12/13/22  GENERAL APPEARANCE:  in no distress, cooperative  RESP:  lungs clear to auscultation   CV:  S1S2 audible, regular HR, no murmur appreciated.   ABDOMEN:  soft, NT/ND, BS audible. no mass appreciated on palpation.   M/S:   Muscles atrophy right hand. SKIN:  No rash.   NEURO:  Difficulty expressing words, ms strenght: LUE 0/5, LLL 1/5, Rt hand  4/5 and RLE 4/5. Ptosis right eye.   PSYCH:  affect and mood normal    Lab/Diagnostic data: Reviewed in the chart and EHR.        ASSESSMENT/PLAN  ----------------------------  Right-sided nontraumatic intracerebral hemorrhage, unspecified cerebral location (H)   Left sided hemiparesis  Dysarthria as a late effect of stroke  - followed by Neurology team. At baseline. Next MRI next May 2023. Appreciate help      Dysphagia, unspecified type  Protein calorie malnutrition, moderated  - 7/29: FT removed.  tolerating po intake  - Body mass index is 19.74 kg/m . from 19.89 kg/m . Stable. BMI less than 22 in frail elderly increase mortality risk. Gaining " weight slowly. Dietician on the case.       Essential HTN:  BP controlled. Off enalapril. On hydralazine and metoprolol.     Hx of GIB with acute blood loss anemia:   - Dec 2021. No scopes was done, Hb dropped down to 12.7 gm/dl.  Now 14.8. No concern.  -  on Protonix, consider Protonix frequency reduction from bid to once daily, with close monitoring clinically and lab    Chronic fatigue syndrome 2/2 Lyme disease:  - Significant  Deficits requiring NH placement. Requiring extensive assistance from nursing. Up for meals only o/w spends the day resting in bed      Electronically signed by:  Joel Blanchard MD

## 2022-12-13 ENCOUNTER — NURSING HOME VISIT (OUTPATIENT)
Dept: GERIATRICS | Facility: CLINIC | Age: 72
End: 2022-12-13
Payer: COMMERCIAL

## 2022-12-13 VITALS
HEIGHT: 68 IN | WEIGHT: 129.8 LBS | TEMPERATURE: 98 F | DIASTOLIC BLOOD PRESSURE: 63 MMHG | OXYGEN SATURATION: 94 % | RESPIRATION RATE: 17 BRPM | SYSTOLIC BLOOD PRESSURE: 108 MMHG | HEART RATE: 65 BPM | BODY MASS INDEX: 19.67 KG/M2

## 2022-12-13 DIAGNOSIS — E46 PROTEIN-CALORIE MALNUTRITION, UNSPECIFIED SEVERITY (H): ICD-10-CM

## 2022-12-13 DIAGNOSIS — G93.32 CHRONIC FATIGUE SYNDROME: ICD-10-CM

## 2022-12-13 DIAGNOSIS — I10 PRIMARY HYPERTENSION: ICD-10-CM

## 2022-12-13 DIAGNOSIS — E44.0 MODERATE PROTEIN-CALORIE MALNUTRITION (H): ICD-10-CM

## 2022-12-13 DIAGNOSIS — I69.322 DYSARTHRIA AS LATE EFFECT OF STROKE: ICD-10-CM

## 2022-12-13 DIAGNOSIS — G81.94 LEFT HEMIPLEGIA (H): ICD-10-CM

## 2022-12-13 DIAGNOSIS — I61.9 RIGHT-SIDED NONTRAUMATIC INTRACEREBRAL HEMORRHAGE, UNSPECIFIED CEREBRAL LOCATION (H): Primary | ICD-10-CM

## 2022-12-13 PROCEDURE — 99310 SBSQ NF CARE HIGH MDM 45: CPT | Performed by: FAMILY MEDICINE

## 2022-12-13 NOTE — LETTER
12/13/2022        RE: Sukhi [facility]leyct[facility]  [address]  [place] [address]        [facility]  Chief Complaint   Patient presents with     alf Regulatory     [facility] Medical Record Number:  [id number]  Place of Service where encounter took place:  [facility] () [[id number]]    HPI:    Sukhi Johnson  is 72 year old (1950), who is being seen today for a federally mandated E/M visit.  HPI information obtained from: facility chart records, facility staff, patient report, [facility] chart review and family/first contact wife report. T    today's concerns are:  - - Resident seen and examined, chart reviewed and discussed with the nursing staff.   - reports no change in the weakness. Endorses tolerating current diet. Denies HA or CP   - DNP and RN have no concern today.     --------------------------------  - Past Medical, social, family histories, medications, and allergies reviewed and updated  - Medications reviewed: in the chart and EHR.   - Case Management:   I have reviewed the care plan and MDS and do agree with the plan. Patient's desire to return to the community is not present.  Information reviewed:  Medications, vital signs, orders, and nursing notes.    MEDICATIONS:  Current Outpatient Medications   Medication Sig Dispense Refill     acetaminophen (TYLENOL) 325 MG/10.15ML solution 20.3 mLs (650 mg) by Oral or Feeding Tube route every 4 hours as needed for mild pain or fever       acetaminophen (TYLENOL) 650 MG suppository Place 1 suppository (650 mg) rectally every 4 hours as needed for mild pain       Cranberry-Vitamin C (AZO CRANBERRY URINARY TRACT PO) Take 1 tablet by mouth 2 times daily       diclofenac (VOLTAREN) 1 % topical gel Apply to left shoulder BID 50 g 1     docusate (COLACE) 50 MG/5ML liquid 10 mLs (100 mg) by Oral or J tube route daily as needed for constipation       Heparin Sodium, Porcine, (HEPARIN  "ANTICOAGULANT) 5000 UNIT/0.5ML injection Inject 0.5 mLs (5,000 Units) Subcutaneous every 12 hours       hydrALAZINE (APRESOLINE) 50 MG tablet 1 tablet (50 mg) by Per Feeding Tube route every 8 hours       metoprolol tartrate (LOPRESSOR) 50 MG tablet 1 tablet (50 mg) by Per Feeding Tube route 2 times daily       ondansetron (ZOFRAN-ODT) 4 MG ODT tab Take 1 tablet (4 mg) by mouth every 6 hours as needed for nausea or vomiting       pantoprazole (PROTONIX) 40 MG EC tablet Take 40 mg by mouth 2 times daily       polyethylene glycol (MIRALAX) 17 g packet 17 g by Oral or Feeding Tube route 2 times daily as needed (constipation)       sennosides (SENOKOT) 8.8 MG/5ML syrup 5 mLs by Oral or Feeding Tube route 2 times daily as needed for constipation       ROS: 4 point ROS including Respiratory, CV, GI and , other than that noted in the HPI,  is negative    Vitals:  /63   Pulse 65   Temp 98  F (36.7  C)   Resp 17   Ht 1.727 m (5' 8\")   Wt 58.9 kg (129 lb 12.8 oz)   SpO2 94%   BMI 19.74 kg/m    Body mass index is 19.74 kg/m .     Exam: done on 12/13/22  GENERAL APPEARANCE:  in no distress, cooperative  RESP:  lungs clear to auscultation   CV:  S1S2 audible, regular HR, no murmur appreciated.   ABDOMEN:  soft, NT/ND, BS audible. no mass appreciated on palpation.   M/S:   Muscles atrophy right hand. SKIN:  No rash.   NEURO:  Difficulty expressing words, ms strenght: LUE 0/5, LLL 1/5, Rt hand  4/5 and RLE 4/5. Ptosis right eye.   PSYCH:  affect and mood normal    Lab/Diagnostic data: Reviewed in the chart and EHR.        ASSESSMENT/PLAN  ----------------------------  Right-sided nontraumatic intracerebral hemorrhage, unspecified cerebral location (H)   Left sided hemiparesis  Dysarthria as a late effect of stroke  - followed by Neurology team. At baseline. Next MRI next May 2023. Appreciate help      Dysphagia, unspecified type  Protein calorie malnutrition, moderated  - 7/29: FT removed.  tolerating po " intake  - Body mass index is 19.74 kg/m . from 19.89 kg/m . Stable. BMI less than 22 in frail elderly increase mortality risk. Gaining weight slowly. Dietician on the case.       Essential HTN:  BP controlled. Off enalapril. On hydralazine and metoprolol.     Hx of GIB with acute blood loss anemia:   - Dec 2021. No scopes was done, Hb dropped down to 12.7 gm/dl.  Now 14.8. No concern.  -  on Protonix, consider Protonix frequency reduction from bid to once daily, with close monitoring clinically and lab    Chronic fatigue syndrome 2/2 Lyme disease:  - Significant  Deficits requiring NH placement. Requiring extensive assistance from nursing. Up for meals only o/w spends the day resting in bed      Electronically signed by:  Joel Blanchard MD        Sincerely,        Joel Blanchard MD

## 2023-01-09 ENCOUNTER — CLINICAL UPDATE (OUTPATIENT)
Dept: PHARMACY | Facility: CLINIC | Age: 73
End: 2023-01-09
Payer: COMMERCIAL

## 2023-01-09 DIAGNOSIS — I10 HYPERTENSION, UNSPECIFIED TYPE: ICD-10-CM

## 2023-01-09 DIAGNOSIS — I61.9 RIGHT-SIDED NONTRAUMATIC INTRACEREBRAL HEMORRHAGE, UNSPECIFIED CEREBRAL LOCATION (H): ICD-10-CM

## 2023-01-09 DIAGNOSIS — K59.01 SLOW TRANSIT CONSTIPATION: ICD-10-CM

## 2023-01-09 DIAGNOSIS — E46 PROTEIN-CALORIE MALNUTRITION, UNSPECIFIED SEVERITY (H): Primary | ICD-10-CM

## 2023-01-09 DIAGNOSIS — K92.2 GASTROINTESTINAL HEMORRHAGE, UNSPECIFIED GASTROINTESTINAL HEMORRHAGE TYPE: ICD-10-CM

## 2023-01-09 DIAGNOSIS — Z29.89 NEED FOR PROPHYLAXIS AGAINST URINARY TRACT INFECTION: ICD-10-CM

## 2023-01-09 PROCEDURE — 99207 PR NO CHARGE LOS: CPT | Performed by: PHARMACIST

## 2023-01-09 NOTE — PROGRESS NOTES
This patient's medication list and chart were reviewed as part of the service provided by [facility].    Assessment/Recommendations:    Noted neurology follows.  Per chart review, is on heparin for DVT prophylaxis per neurology.  Unclear what duration is supposed to be - please consider checking with neurology regarding this.  Agree with MD - may consider reduction in pantoprazole to 40mg daily and monitor closely for signs/symptoms of bleeding, periodic CBC.    Consider d'c prn docusate - minimally effective med.  Consider d'c prn Miralax - often requires consecutive dosing for benefit.  Patient has prn senna available.  Unclear benefit of cranberry-vit C combo.  May consider d'c and monitor.      Yessi Carrion, Pharm.D.,[facility]  Board Certified Geriatric Pharmacist  Medication Therapy Management Pharmacist  [phone]

## 2023-01-26 ENCOUNTER — PATIENT OUTREACH (OUTPATIENT)
Dept: GERIATRIC MEDICINE | Facility: CLINIC | Age: 73
End: 2023-01-26
Payer: COMMERCIAL

## 2023-01-27 NOTE — PROGRESS NOTES
[facility] Care Coordination Contact    [facility] Six-Month Assessment    6 month assessment completed on 1/26/23 with JORGE Amaya at [facility].  Per Gulshan, hospice will be considered at some point.  Supportive family, significant other Miladis continues to be very involved in his care.    ER visits: No  Hospitalizations: No  TCU stays: No  Significant health status changes: No significant changes, in speech therapy.  Falls/Injuries: No  ADL/IADL changes: No    Reviewed Institutional Assessment and updated as needed.     Will see member in 6 months for an annual health risk assessment.     OLGA Simon, [facility]  [phone]

## 2023-02-03 ENCOUNTER — NURSING HOME VISIT (OUTPATIENT)
Dept: GERIATRICS | Facility: CLINIC | Age: 73
End: 2023-02-03
Payer: COMMERCIAL

## 2023-02-03 VITALS
RESPIRATION RATE: 14 BRPM | SYSTOLIC BLOOD PRESSURE: 107 MMHG | HEART RATE: 66 BPM | DIASTOLIC BLOOD PRESSURE: 66 MMHG | WEIGHT: 124.9 LBS | OXYGEN SATURATION: 97 % | BODY MASS INDEX: 18.99 KG/M2 | TEMPERATURE: 97.8 F

## 2023-02-03 DIAGNOSIS — R13.10 DYSPHAGIA, UNSPECIFIED TYPE: ICD-10-CM

## 2023-02-03 DIAGNOSIS — E44.0 MODERATE PROTEIN-CALORIE MALNUTRITION (H): Primary | ICD-10-CM

## 2023-02-03 DIAGNOSIS — G81.94 LEFT HEMIPLEGIA (H): ICD-10-CM

## 2023-02-03 DIAGNOSIS — T17.320D CHOKING DUE TO FOOD IN LARYNX, SUBSEQUENT ENCOUNTER: ICD-10-CM
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
## 2023-02-03 PROCEDURE — 99309 SBSQ NF CARE MODERATE MDM 30: CPT | Performed by: NURSE PRACTITIONER

## 2023-02-03 NOTE — PROGRESS NOTES
[facility]    Chief Complaint   Patient presents with     RECHECK     HPI:  Sukhi Johnson is a 72 year old  (1950), who is being seen today for an episodic care visit at: [facility] AT [facility] () [[id number]]. Today's concern is:   1. Moderate protein-calorie malnutrition (H)    2. Left hemiplegia (H)    3. Dysphagia, unspecified type    4. Choking due to food in larynx, subsequent encounter      Patient post ICH in 2021, severe L hemiplegia not improving, BMI 18.99, thin habitus, newer onset choking episode, during meal on 1/30 in dining room and was choking, heimlich administered with large chunk of hot cereal (cream of wheat) coughed out, no residual issues, possible aspiration, CXR on 1/31 with no acute process, breathing well today, had take a sip of water and swallowed, about 20 seconds later started coughing, spoke with ST regarding plan moving forward.  Allergies, and PMH/PSH reviewed in [facility] today.  REVIEW OF SYSTEMS:  Unobtainable secondary to aphasia.     Objective:   /66   Pulse 66   Temp 97.8  F (36.6  C)   Resp 14   Wt 56.7 kg (124 lb 14.4 oz)   SpO2 97%   BMI 18.99 kg/m    GENERAL APPEARANCE:  in no distress, appears healthy  ENT:  Mouth and posterior oropharynx normal, moist mucous membranes  RESP:  lungs clear to auscultation   CV:  no edema  ABDOMEN:  bowel sounds normal  M/S:   Gait and station abnormal transfer assist  SKIN:  Inspection of skin and subcutaneous tissue baseline  NEURO:   Examination of sensation by touch normal  PSYCH:  affect and mood normal    Labs done in SNF are in [facility]. Please refer to them using [facility]/Care Everywhere.    Assessment/Plan:  (E44.0) Moderate protein-calorie malnutrition (H)  (primary encounter diagnosis)  Comment: BMI 18.99  Plan: staff to encourage intake as possible  -dietary to follow weights, supplemental intake    (G81.94) Left hemiplegia (H)  Comment: post ICH in 2021  Plan: therapies completed  -has ipad  with leydi for communication   -therapy to follow up PRN with utensil review and other DME support    (R13.10) Dysphagia, unspecified type  (T17.320D) Choking due to food in larynx, subsequent encounter  Comment: choke/heimlich on 1/30, dysphagia, aspiration risk, CXR 1/31 clean  Plan: ST eval and treat  -ordered FEES study to be completed    MED REC REQUIRED  Post Medication Reconciliation Status: medication reconcilation previously completed during another office visit        Electronically signed by: KALI Ramirez CNP

## 2023-02-03 NOTE — LETTER
2/3/2023        RE: Sukhi Johnson  [facility]  [address]  [place] [address]        [facility]    Chief Complaint   Patient presents with     RECHECK     HPI:  Sukhi Johnson is a 72 year old  (1950), who is being seen today for an episodic care visit at: [facility] AT [facility] () [[id number]]. Today's concern is:   1. Moderate protein-calorie malnutrition (H)    2. Left hemiplegia (H)    3. Dysphagia, unspecified type    4. Choking due to food in larynx, subsequent encounter      Patient post ICH in 2021, severe L hemiplegia not improving, BMI 18.99, thin habitus, newer onset choking episode, during meal on 1/30 in dining room and was choking, heimlich administered with large chunk of hot cereal (cream of wheat) coughed out, no residual issues, possible aspiration, CXR on 1/31 with no acute process, breathing well today, had take a sip of water and swallowed, about 20 seconds later started coughing, spoke with ST regarding plan moving forward.  Allergies, and PMH/PSH reviewed in [employer] today.  REVIEW OF SYSTEMS:  Unobtainable secondary to aphasia.     Objective:   /66   Pulse 66   Temp 97.8  F (36.6  C)   Resp 14   Wt 56.7 kg (124 lb 14.4 oz)   SpO2 97%   BMI 18.99 kg/m    GENERAL APPEARANCE:  in no distress, appears healthy  ENT:  Mouth and posterior oropharynx normal, moist mucous membranes  RESP:  lungs clear to auscultation   CV:  no edema  ABDOMEN:  bowel sounds normal  M/S:   Gait and station abnormal transfer assist  SKIN:  Inspection of skin and subcutaneous tissue baseline  NEURO:   Examination of sensation by touch normal  PSYCH:  affect and mood normal    Labs done in SNF are in [facility]. Please refer to them using [employer]/Care Everywhere.    Assessment/Plan:  (E44.0) Moderate protein-calorie malnutrition (H)  (primary encounter diagnosis)  Comment: BMI 18.99  Plan: staff to encourage intake as possible  -dietary to follow weights, supplemental  intake    (G81.94) Left hemiplegia (H)  Comment: post ICH in 2021  Plan: therapies completed  -has ipad with leydi for communication   -therapy to follow up PRN with utensil review and other DME support    (R13.10) Dysphagia, unspecified type  (T17.320D) Choking due to food in larynx, subsequent encounter  Comment: choke/heimlich on 1/30, dysphagia, aspiration risk, CXR 1/31 clean  Plan: ST eval and treat  -ordered FEES study to be completed    MED REC REQUIRED  Post Medication Reconciliation Status: medication reconcilation previously completed during another office visit        Electronically signed by: KALI Ramirez CNP           Sincerely,        KALI Ramirez CNP

## 2023-02-10 ENCOUNTER — NURSING HOME VISIT (OUTPATIENT)
Dept: GERIATRICS | Facility: CLINIC | Age: 73
End: 2023-02-10
Payer: COMMERCIAL

## 2023-02-10 VITALS
OXYGEN SATURATION: 96 % | SYSTOLIC BLOOD PRESSURE: 117 MMHG | TEMPERATURE: 98.4 F | HEART RATE: 64 BPM | RESPIRATION RATE: 17 BRPM | DIASTOLIC BLOOD PRESSURE: 68 MMHG

## 2023-02-10 DIAGNOSIS — R13.10 DYSPHAGIA, UNSPECIFIED TYPE: ICD-10-CM

## 2023-02-10 DIAGNOSIS — T17.320D CHOKING DUE TO FOOD IN LARYNX, SUBSEQUENT ENCOUNTER: ICD-10-CM

## 2023-02-10 DIAGNOSIS — I61.9 RIGHT-SIDED NONTRAUMATIC INTRACEREBRAL HEMORRHAGE, UNSPECIFIED CEREBRAL LOCATION (H): Primary | ICD-10-CM
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
## 2023-02-10 PROCEDURE — 99309 SBSQ NF CARE MODERATE MDM 30: CPT | Performed by: NURSE PRACTITIONER

## 2023-02-10 NOTE — LETTER
2/10/2023        RE: Sukhi Johnson  C[facility]  [address]  [place] [address]        [facility]  Chief Complaint   Patient presents with     [facility] [facility] [place]:  [id number]  Place of Service where encounter took place:  [facility] () [[id number]]    HPI:    Sukhi Johnson  is 72 year old (1950), who is being seen today for a federally mandated E/M visit. Today's concerns are:     Right-sided nontraumatic intracerebral hemorrhage, unspecified cerebral location (H)  Dysphagia, unspecified type  Choking due to food in larynx, subsequent encounter     Patient seen for follow up, pleasantly confused, moderate ADL assist, L hemiplegia, newer onset dysphagia with choking episiode and heimlich done 1/30 with coughing out glob of cream of wheat, CXR checked and clean, no repeat swallowing issues since, overall appears healthy, no distress.    ALLERGIES:Patient has no known allergies.  PAST MEDICAL HISTORY: No past medical history on file.  PAST SURGICAL HISTORY:   has a past surgical history that includes IR Gastro Jejunostomy Tube Placement (1/5/2022) and IR Gastro Jejunostomy Tube Change (3/9/2022).  FAMILY HISTORY: family history is not on file.  SOCIAL HISTORY:  reports that he has never smoked. He has never used smokeless tobacco. He reports that he does not drink alcohol and does not use drugs.    MEDICATIONS:  MED REC REQUIRED  Post Medication Reconciliation Status: medication reconcilation previously completed during another office visit         Review of your medicines          Accurate as of February 10, 2023  5:28 PM. If you have any questions, ask your nurse or doctor.            CONTINUE these medicines which have NOT CHANGED      Dose / Directions   * acetaminophen 325 MG/10.15ML solution  Commonly known as: TYLENOL  Used for: Right-sided nontraumatic intracerebral hemorrhage, unspecified cerebral location (H)      Dose:  650 mg  20.3 mLs (650 mg) by Oral or Feeding Tube route every 4 hours as needed for mild pain or fever  Refills: 0     * acetaminophen 650 MG suppository  Commonly known as: TYLENOL  Used for: Right-sided nontraumatic intracerebral hemorrhage, unspecified cerebral location (H)      Dose: 650 mg  Place 1 suppository (650 mg) rectally every 4 hours as needed for mild pain  Refills: 0     AZO CRANBERRY URINARY TRACT PO      Dose: 1 tablet  Take 1 tablet by mouth 2 times daily  Refills: 0     diclofenac 1 % topical gel  Commonly known as: VOLTAREN  Used for: Acute pain of left shoulder      Apply to left shoulder BID  Quantity: 50 g  Refills: 1     docusate 50 MG/5ML liquid  Commonly known as: COLACE  Used for: Right-sided nontraumatic intracerebral hemorrhage, unspecified cerebral location (H)      Dose: 100 mg  10 mLs (100 mg) by Oral or J tube route daily as needed for constipation  Refills: 0     heparin ANTICOAGULANT 5000 UNIT/0.5ML injection  Used for: Right-sided nontraumatic intracerebral hemorrhage, unspecified cerebral location (H)      Dose: 5,000 Units  Inject 0.5 mLs (5,000 Units) Subcutaneous every 12 hours  Refills: 0     hydrALAZINE 50 MG tablet  Commonly known as: APRESOLINE  Used for: Right-sided nontraumatic intracerebral hemorrhage, unspecified cerebral location (H)      Dose: 50 mg  1 tablet (50 mg) by Per Feeding Tube route every 8 hours  Refills: 0     metoprolol tartrate 50 MG tablet  Commonly known as: LOPRESSOR  Used for: Right-sided nontraumatic intracerebral hemorrhage, unspecified cerebral location (H)      Dose: 50 mg  1 tablet (50 mg) by Per Feeding Tube route 2 times daily  Refills: 0     ondansetron 4 MG ODT tab  Commonly known as: ZOFRAN ODT  Used for: Right-sided nontraumatic intracerebral hemorrhage, unspecified cerebral location (H)      Dose: 4 mg  Take 1 tablet (4 mg) by mouth every 6 hours as needed for nausea or vomiting  Refills: 0     pantoprazole 40 MG EC tablet  Commonly  known as: PROTONIX      Dose: 40 mg  Take 40 mg by mouth 2 times daily  Refills: 0     polyethylene glycol 17 g packet  Commonly known as: MIRALAX  Used for: Right-sided nontraumatic intracerebral hemorrhage, unspecified cerebral location (H)      Dose: 17 g  17 g by Oral or Feeding Tube route 2 times daily as needed (constipation)  Refills: 0     sennosides 8.8 MG/5ML syrup  Commonly known as: SENOKOT  Used for: Right-sided nontraumatic intracerebral hemorrhage, unspecified cerebral location (H)      Dose: 5 mL  5 mLs by Oral or Feeding Tube route 2 times daily as needed for constipation  Refills: 0         * This list has 2 medication(s) that are the same as other medications prescribed for you. Read the directions carefully, and ask your doctor or other care provider to review them with you.                 Case Management:  I have reviewed the care plan and MDS and do agree with the plan. Patient's desire to return to the community is present, but is not able due to care needs . Information reviewed:  Medications, vital signs, orders, and nursing notes.    ROS:  Unobtainable secondary to aphasia.     Vitals:  /68   Pulse 64   Temp 98.4  F (36.9  C)   Resp 17   SpO2 96%   There is no height or weight on file to calculate BMI.  Exam:  GENERAL APPEARANCE:  in no distress, appears healthy  ENT:  Mouth and posterior oropharynx normal, moist mucous membranes  RESP:  lungs clear to auscultation   CV:  peripheral edema scant+ in lower legs  ABDOMEN:  bowel sounds normal  M/S:   Gait and station abnormal WC mobility  SKIN:  Inspection of skin and subcutaneous tissue baseline  NEURO:   Examination of sensation by touch normal  PSYCH:  affect and mood normal    Lab/Diagnostic data:   Labs done in SNF are in [facility]. Please refer to them using [employer]/Care Everywhere.    ASSESSMENT/PLAN  (I61.9) Right-sided nontraumatic intracerebral hemorrhage, unspecified cerebral location (H)  (primary encounter  diagnosis)  (R13.10) Dysphagia, unspecified type  (T17.320D) Choking due to food in larynx, subsequent encounter  Comment: moderate L hemiplegia, has speech generating device still learning in progress for patient and staff, CXR clear, certain level of dysphagia  Plan:   -ST working with swallowing issues/choking  -ordered FEES study to be completed regarding dysphagia testing and potential need for alternative diet  -continue BP control with hydralazine and metoprolol  -staff to support as indicated        Electronically signed by:  KALI Ramirez CNP            Sincerely,        KALI Ramirez CNP

## 2023-02-10 NOTE — PROGRESS NOTES
[facility] GERIATRICS  Chief Complaint   Patient presents with     [facility] Medical Record Number:  [id number]  Place of Service where encounter took place:  [facility] () [[id number]]    HPI:    Sukhi Johnson  is 72 year old (1950), who is being seen today for a federally mandated E/M visit. Today's concerns are:     Right-sided nontraumatic intracerebral hemorrhage, unspecified cerebral location (H)  Dysphagia, unspecified type  Choking due to food in larynx, subsequent encounter     Patient seen for follow up, pleasantly confused, moderate ADL assist, L hemiplegia, newer onset dysphagia with choking episiode and heimlich done 1/30 with coughing out glob of cream of wheat, CXR checked and clean, no repeat swallowing issues since, overall appears healthy, no distress.    ALLERGIES:Patient has no known allergies.  PAST MEDICAL HISTORY: No past medical history on file.  PAST SURGICAL HISTORY:   has a past surgical history that includes IR Gastro Jejunostomy Tube Placement (1/5/2022) and IR Gastro Jejunostomy Tube Change (3/9/2022).  FAMILY HISTORY: family history is not on file.  SOCIAL HISTORY:  reports that he has never smoked. He has never used smokeless tobacco. He reports that he does not drink alcohol and does not use drugs.    MEDICATIONS:  MED REC REQUIRED  Post Medication Reconciliation Status: medication reconcilation previously completed during another office visit         Review of your medicines          Accurate as of February 10, 2023  5:28 PM. If you have any questions, ask your nurse or doctor.            CONTINUE these medicines which have NOT CHANGED      Dose / Directions   * acetaminophen 325 MG/10.15ML solution  Commonly known as: TYLENOL  Used for: Right-sided nontraumatic intracerebral hemorrhage, unspecified cerebral location (H)      Dose: 650 mg  20.3 mLs (650 mg) by Oral or Feeding Tube route every 4 hours as needed for mild pain or  fever  Refills: 0     * acetaminophen 650 MG suppository  Commonly known as: TYLENOL  Used for: Right-sided nontraumatic intracerebral hemorrhage, unspecified cerebral location (H)      Dose: 650 mg  Place 1 suppository (650 mg) rectally every 4 hours as needed for mild pain  Refills: 0     AZO CRANBERRY URINARY TRACT PO      Dose: 1 tablet  Take 1 tablet by mouth 2 times daily  Refills: 0     diclofenac 1 % topical gel  Commonly known as: VOLTAREN  Used for: Acute pain of left shoulder      Apply to left shoulder BID  Quantity: 50 g  Refills: 1     docusate 50 MG/5ML liquid  Commonly known as: COLACE  Used for: Right-sided nontraumatic intracerebral hemorrhage, unspecified cerebral location (H)      Dose: 100 mg  10 mLs (100 mg) by Oral or J tube route daily as needed for constipation  Refills: 0     heparin ANTICOAGULANT 5000 UNIT/0.5ML injection  Used for: Right-sided nontraumatic intracerebral hemorrhage, unspecified cerebral location (H)      Dose: 5,000 Units  Inject 0.5 mLs (5,000 Units) Subcutaneous every 12 hours  Refills: 0     hydrALAZINE 50 MG tablet  Commonly known as: APRESOLINE  Used for: Right-sided nontraumatic intracerebral hemorrhage, unspecified cerebral location (H)      Dose: 50 mg  1 tablet (50 mg) by Per Feeding Tube route every 8 hours  Refills: 0     metoprolol tartrate 50 MG tablet  Commonly known as: LOPRESSOR  Used for: Right-sided nontraumatic intracerebral hemorrhage, unspecified cerebral location (H)      Dose: 50 mg  1 tablet (50 mg) by Per Feeding Tube route 2 times daily  Refills: 0     ondansetron 4 MG ODT tab  Commonly known as: ZOFRAN ODT  Used for: Right-sided nontraumatic intracerebral hemorrhage, unspecified cerebral location (H)      Dose: 4 mg  Take 1 tablet (4 mg) by mouth every 6 hours as needed for nausea or vomiting  Refills: 0     pantoprazole 40 MG EC tablet  Commonly known as: PROTONIX      Dose: 40 mg  Take 40 mg by mouth 2 times daily  Refills: 0     polyethylene  glycol 17 g packet  Commonly known as: MIRALAX  Used for: Right-sided nontraumatic intracerebral hemorrhage, unspecified cerebral location (H)      Dose: 17 g  17 g by Oral or Feeding Tube route 2 times daily as needed (constipation)  Refills: 0     sennosides 8.8 MG/5ML syrup  Commonly known as: SENOKOT  Used for: Right-sided nontraumatic intracerebral hemorrhage, unspecified cerebral location (H)      Dose: 5 mL  5 mLs by Oral or Feeding Tube route 2 times daily as needed for constipation  Refills: 0         * This list has 2 medication(s) that are the same as other medications prescribed for you. Read the directions carefully, and ask your doctor or other care provider to review them with you.                 Case Management:  I have reviewed the care plan and MDS and do agree with the plan. Patient's desire to return to the community is present, but is not able due to care needs . Information reviewed:  Medications, vital signs, orders, and nursing notes.    ROS:  Unobtainable secondary to aphasia.     Vitals:  /68   Pulse 64   Temp 98.4  F (36.9  C)   Resp 17   SpO2 96%   There is no height or weight on file to calculate BMI.  Exam:  GENERAL APPEARANCE:  in no distress, appears healthy  ENT:  Mouth and posterior oropharynx normal, moist mucous membranes  RESP:  lungs clear to auscultation   CV:  peripheral edema scant+ in lower legs  ABDOMEN:  bowel sounds normal  M/S:   Gait and station abnormal WC mobility  SKIN:  Inspection of skin and subcutaneous tissue baseline  NEURO:   Examination of sensation by touch normal  PSYCH:  affect and mood normal    Lab/Diagnostic data:   Labs done in SNF are in [facility]. Please refer to them using [employer]/Care Everywhere.    ASSESSMENT/PLAN  (I61.9) Right-sided nontraumatic intracerebral hemorrhage, unspecified cerebral location (H)  (primary encounter diagnosis)  (R13.10) Dysphagia, unspecified type  (T17.320D) Choking due to food in larynx, subsequent  encounter  Comment: moderate L hemiplegia, has speech generating device still learning in progress for patient and staff, CXR clear, certain level of dysphagia  Plan:   -ST working with swallowing issues/choking  -ordered FEES study to be completed regarding dysphagia testing and potential need for alternative diet  -continue BP control with hydralazine and metoprolol  -staff to support as indicated        Electronically signed by:  KALI Ramirez CNP

## 2023-03-07 ENCOUNTER — NURSING HOME VISIT (OUTPATIENT)
Dept: GERIATRICS | Facility: CLINIC | Age: 73
End: 2023-03-07
Payer: COMMERCIAL

## 2023-03-07 VITALS
DIASTOLIC BLOOD PRESSURE: 70 MMHG | BODY MASS INDEX: 19.64 KG/M2 | WEIGHT: 129.6 LBS | HEIGHT: 68 IN | RESPIRATION RATE: 17 BRPM | HEART RATE: 60 BPM | TEMPERATURE: 97.8 F | OXYGEN SATURATION: 96 % | SYSTOLIC BLOOD PRESSURE: 124 MMHG

## 2023-03-07 DIAGNOSIS — I61.9 RIGHT-SIDED NONTRAUMATIC INTRACEREBRAL HEMORRHAGE, UNSPECIFIED CEREBRAL LOCATION (H): Primary | ICD-10-CM

## 2023-03-07 DIAGNOSIS — R13.10 DYSPHAGIA, UNSPECIFIED TYPE: ICD-10-CM

## 2023-03-07 DIAGNOSIS — I10 HYPERTENSION, UNSPECIFIED TYPE: ICD-10-CM

## 2023-03-07 PROCEDURE — 99309 SBSQ NF CARE MODERATE MDM 30: CPT | Performed by: NURSE PRACTITIONER

## 2023-03-07 NOTE — PROGRESS NOTES
"[facility] GERIATRICS    Chief Complaint   Patient presents with     RECHECK     HPI:  Sukhi Johnson is a 72 year old  (1950), who is being seen today for an episodic care visit at: [facility] AT [facility] () [[id number]]. Today's concern is:   1. Right-sided nontraumatic intracerebral hemorrhage, unspecified cerebral location (H)    2. Hypertension, unspecified type    3. Dysphagia, unspecified type      Patient seen on request, also met with significant other Miladis today to review status and plan, post ICH with moderate cognitive and physical limitations, ADL assist including feeding, SBP's in the  range in past weeks, no gut disturbances and swallowing well, Miladis fed at rapid pace today and was swallowing well with some buildup of food in mouth at times, overall appears healthy, limited verbal, meds reviewed.    Allergies, and PMH/PSH reviewed in EPIC today.  REVIEW OF SYSTEMS:  Unobtainable secondary to cognitive impairment.     Objective:   /70   Pulse 60   Temp 97.8  F (36.6  C)   Resp 17   Ht 1.727 m (5' 8\")   Wt 58.8 kg (129 lb 9.6 oz)   SpO2 96%   BMI 19.71 kg/m    GENERAL APPEARANCE:  in no distress, appears healthy  ENT:  Mouth and posterior oropharynx normal, moist mucous membranes  RESP:  lungs clear to auscultation   CV:  no edema, rate-normal  ABDOMEN:  bowel sounds normal  M/S:   Gait and station abnormal ADL assist  SKIN:  Inspection of skin and subcutaneous tissue baseline  NEURO:   Examination of sensation by touch normal  PSYCH:  affect and mood normal    Labs done in SNF are in [facility]. Please refer to them using [email or web address]/Care Everywhere.    Assessment/Plan:  (I61.9) Right-sided nontraumatic intracerebral hemorrhage, unspecified cerebral location (H)  (primary encounter diagnosis)  (I10) Hypertension, unspecified type  (R13.10) Dysphagia, unspecified type  Comment: post ICH, moderate deficits, SBP's in the  range, no gut issues  Plan:   -discontinue " hydralazine 50mg TID  -discontinue docusate, miralax not using  -change pantoprazole from BID to daily  -debrox bilateral ears for cerumen buildup, water irrigate post debrox  -staff to check VS PRN  -appt with  neuro in May 2023, consider changing heparin to oral anticoagulant if indicated    MED REC REQUIRED  Post Medication Reconciliation Status: medication reconcilation previously completed during another office visit        Electronically signed by: KALI Ramirez CNP

## 2023-03-07 NOTE — LETTER
"    3/7/2023        RE: Sukhi Johnson  [facility]  [address]  [place] [address]        [facility] GERIATRICS    Chief Complaint   Patient presents with     RECHECK     HPI:  Sukhi Johnson is a 72 year old  (1950), who is being seen today for an episodic care visit at: [facility] AT [facility] () [[id number]]. Today's concern is:   1. Right-sided nontraumatic intracerebral hemorrhage, unspecified cerebral location (H)    2. Hypertension, unspecified type    3. Dysphagia, unspecified type      Patient seen on request, also met with significant other Miladis today to review status and plan, post ICH with moderate cognitive and physical limitations, ADL assist including feeding, SBP's in the  range in past weeks, no gut disturbances and swallowing well, Miladis fed at rapid pace today and was swallowing well with some buildup of food in mouth at times, overall appears healthy, limited verbal, meds reviewed.    Allergies, and PMH/PSH reviewed in EPIC today.  REVIEW OF SYSTEMS:  Unobtainable secondary to cognitive impairment.     Objective:   /70   Pulse 60   Temp 97.8  F (36.6  C)   Resp 17   Ht 1.727 m (5' 8\")   Wt 58.8 kg (129 lb 9.6 oz)   SpO2 96%   BMI 19.71 kg/m    GENERAL APPEARANCE:  in no distress, appears healthy  ENT:  Mouth and posterior oropharynx normal, moist mucous membranes  RESP:  lungs clear to auscultation   CV:  no edema, rate-normal  ABDOMEN:  bowel sounds normal  M/S:   Gait and station abnormal ADL assist  SKIN:  Inspection of skin and subcutaneous tissue baseline  NEURO:   Examination of sensation by touch normal  PSYCH:  affect and mood normal    Labs done in SNF are in [facility]. Please refer to them using [facility]/Care Everywhere.    Assessment/Plan:  (I61.9) Right-sided nontraumatic intracerebral hemorrhage, unspecified cerebral location (H)  (primary encounter diagnosis)  (I10) Hypertension, unspecified type  (R13.10) Dysphagia, unspecified " type  Comment: post ICH, moderate deficits, SBP's in the  range, no gut issues  Plan:   -discontinue hydralazine 50mg TID  -discontinue docusate, miralax not using  -change pantoprazole from BID to daily  -debrox bilateral ears for cerumen buildup, water irrigate post debrox  -staff to check VS PRN  -appt with  neuro in May 2023, consider changing heparin to oral anticoagulant if indicated    MED REC REQUIRED  Post Medication Reconciliation Status: medication reconcilation previously completed during another office visit        Electronically signed by: KALI Ramirez CNP           Sincerely,        KALI Ramirez CNP

## 2023-03-24 NOTE — PROGRESS NOTES
[facility]  Chief Complaint   Patient presents with     USP Regulatory     [place] Medical Record Number:  [id number]  Place of Service where encounter took place:  [facility]  AT [facility] () [[id number]]    HPI:    Sukhi Johnson  is 73 year old (1950), who is being seen today for a federally mandated E/M visit.  HPI information obtained from: facility chart records, facility staff, patient report, [facility] chart review and family/first contact wife report. T    Recent updates:  3/7: soft BP, hydralazine stopped, docusate and miralax stopped for not using. Protonix changed from bid to daily.     today's concerns are:  - - Resident seen and examined, chart reviewed and discussed with the nursing staff.   - wife reports  Sukhi is tolerating dysphagia diet, no more choking. Can help himself.   - followed by SLP. Now has a  of tabloid where he can type words and uses quick sentences.   - DNP and RN have no concern today.     --------------------------------  - Past Medical, social, family histories, medications, and allergies reviewed and updated  - Medications reviewed: in the chart and EHR.   - Case Management:   I have reviewed the care plan and MDS and do agree with the plan. Patient's desire to return to the community is not present.  Information reviewed:  Medications, vital signs, orders, and nursing notes.    MEDICATIONS:  Current Outpatient Medications   Medication Sig Dispense Refill     acetaminophen (TYLENOL) 325 MG/10.15ML solution 20.3 mLs (650 mg) by Oral or Feeding Tube route every 4 hours as needed for mild pain or fever       acetaminophen (TYLENOL) 650 MG suppository Place 1 suppository (650 mg) rectally every 4 hours as needed for mild pain       Cranberry-Vitamin C (AZO CRANBERRY URINARY TRACT PO) Take 1 tablet by mouth 2 times daily       diclofenac (VOLTAREN) 1 % topical gel Apply to left shoulder BID 50 g 1     Heparin Sodium, Porcine, (HEPARIN  "ANTICOAGULANT) 5000 UNIT/0.5ML injection Inject 0.5 mLs (5,000 Units) Subcutaneous every 12 hours       metoprolol tartrate (LOPRESSOR) 50 MG tablet 1 tablet (50 mg) by Per Feeding Tube route 2 times daily       ondansetron (ZOFRAN-ODT) 4 MG ODT tab Take 1 tablet (4 mg) by mouth every 6 hours as needed for nausea or vomiting       pantoprazole (PROTONIX) 40 MG EC tablet Take 40 mg by mouth daily       sennosides (SENOKOT) 8.8 MG/5ML syrup 5 mLs by Oral or Feeding Tube route 2 times daily as needed for constipation       ROS: 4 point ROS including Respiratory, CV, GI and , other than that noted in the HPI,  is negative    Vitals:  /65   Pulse 70   Temp 97.5  F (36.4  C)   Resp 17   Ht 1.727 m (5' 8\")   Wt 59.4 kg (130 lb 14.4 oz)   SpO2 97%   BMI 19.90 kg/m    Body mass index is 19.9 kg/m .     Exam:  GENERAL APPEARANCE:  in no distress, cooperative, sitting up in the WC.   RESP:  lungs clear to auscultation   CV:  S1S2 audible, regular HR, no murmur appreciated.   ABDOMEN:  soft, NT/ND, BS audible. no mass appreciated on palpation.   M/S:   Muscles atrophy right hand.   SKIN:  No rash.   NEURO:  Difficulty expressing words, ms strenght: LUE 0/5, LLL 1/5, Rt hand  4/5 and RLE 4/5. Ptosis right eye.   PSYCH:  affect and mood normal    Lab/Diagnostic data: Reviewed in the chart and EHR.        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 At baseline. Next MRI next May 2023. Appreciate help  - on heparin bid since Dec 2021, then started by neurology       Dysphagia, unspecified type  Protein calorie malnutrition, moderated  - 7/29/22: FT removed.  tolerating po intake  -Body mass index is 19.9 kg/m . from 19.74 kg/m . from 19.89 kg/m . Stable. BMI less than 22 in frail elderly " increase mortality risk. Gaining weight slowly. Dietician on the case.       Essential HTN:  BP controlled,. Off enalapril and hydralazine due to soft BP, on metoprolol tartrate. Continue.       Hx of GIB with acute blood loss anemia:   - Dec 2021. No scopes was done, Hb dropped down to 12.7 gm/dl.  Hb level 14.8 (4/2022  -  Protonix reduced from 40 mg bid to once daily (Dec 2022). Tolerating well, consider further reduction to 20 mg.       Chronic fatigue syndrome 2/2 Lyme disease:  - Significant  Deficits requiring NH placement. Requiring extensive assistance from nursing. Up for meals only o/w spends the day resting in bed      Electronically signed by:  Joel Blanchard MD

## 2023-03-27 ENCOUNTER — NURSING HOME VISIT (OUTPATIENT)
Dept: GERIATRICS | Facility: CLINIC | Age: 73
End: 2023-03-27
Payer: COMMERCIAL

## 2023-03-27 VITALS
HEART RATE: 70 BPM | BODY MASS INDEX: 19.84 KG/M2 | WEIGHT: 130.9 LBS | SYSTOLIC BLOOD PRESSURE: 104 MMHG | HEIGHT: 68 IN | DIASTOLIC BLOOD PRESSURE: 65 MMHG | RESPIRATION RATE: 17 BRPM | OXYGEN SATURATION: 97 % | TEMPERATURE: 97.5 F

## 2023-03-27 DIAGNOSIS — G93.32 CHRONIC FATIGUE SYNDROME: ICD-10-CM

## 2023-03-27 DIAGNOSIS — I69.322 DYSARTHRIA AS LATE EFFECT OF STROKE: ICD-10-CM

## 2023-03-27 DIAGNOSIS — I10 PRIMARY HYPERTENSION: ICD-10-CM

## 2023-03-27 DIAGNOSIS — E44.0 MODERATE PROTEIN-CALORIE MALNUTRITION (H): ICD-10-CM

## 2023-03-27 DIAGNOSIS — Z87.19 HISTORY OF GASTROINTESTINAL BLEEDING: ICD-10-CM

## 2023-03-27 DIAGNOSIS — G81.94 LEFT HEMIPLEGIA (H): Primary | ICD-10-CM

## 2023-03-27 PROCEDURE — 99309 SBSQ NF CARE MODERATE MDM 30: CPT | Performed by: FAMILY MEDICINE

## 2023-03-27 NOTE — LETTER
3/27/2023        RE: Sukhi Johnson  [facility]  [address]  [place] [address]        [facility]IATRIC [facility]  Chief Complaint   Patient presents with     FDC Regulatory     [place] Medical Record Number:  [id number]  Place of Service where encounter took place:  [facility]  AT [facility] () [[id number]]    HPI:    Sukhi Johnson  is 73 year old (1950), who is being seen today for a federally mandated E/M visit.  HPI information obtained from: facility chart records, facility staff, patient report, [facility] chart review and family/first contact wife report. T    Recent updates:  3/7: soft BP, hydralazine stopped, docusate and miralax stopped for not using. Protonix changed from bid to daily.     today's concerns are:  - - Resident seen and examined, chart reviewed and discussed with the nursing staff.   - wife reports  Sukhi is tolerating dysphagia diet, no more choking. Can help himself.   - followed by SLP. Now has a  of tabloid where he can type words and uses quick sentences.   - DNP and RN have no concern today.     --------------------------------  - Past Medical, social, family histories, medications, and allergies reviewed and updated  - Medications reviewed: in the chart and EHR.   - Case Management:   I have reviewed the care plan and MDS and do agree with the plan. Patient's desire to return to the community is not present.  Information reviewed:  Medications, vital signs, orders, and nursing notes.    MEDICATIONS:  Current Outpatient Medications   Medication Sig Dispense Refill     acetaminophen (TYLENOL) 325 MG/10.15ML solution 20.3 mLs (650 mg) by Oral or Feeding Tube route every 4 hours as needed for mild pain or fever       acetaminophen (TYLENOL) 650 MG suppository Place 1 suppository (650 mg) rectally every 4 hours as needed for mild pain       Cranberry-Vitamin C (AZO CRANBERRY URINARY TRACT PO) Take 1 tablet by mouth 2 times daily       diclofenac (VOLTAREN) 1  "% topical gel Apply to left shoulder BID 50 g 1     Heparin Sodium, Porcine, (HEPARIN ANTICOAGULANT) 5000 UNIT/0.5ML injection Inject 0.5 mLs (5,000 Units) Subcutaneous every 12 hours       metoprolol tartrate (LOPRESSOR) 50 MG tablet 1 tablet (50 mg) by Per Feeding Tube route 2 times daily       ondansetron (ZOFRAN-ODT) 4 MG ODT tab Take 1 tablet (4 mg) by mouth every 6 hours as needed for nausea or vomiting       pantoprazole (PROTONIX) 40 MG EC tablet Take 40 mg by mouth daily       sennosides (SENOKOT) 8.8 MG/5ML syrup 5 mLs by Oral or Feeding Tube route 2 times daily as needed for constipation       ROS: 4 point ROS including Respiratory, CV, GI and , other than that noted in the HPI,  is negative    Vitals:  /65   Pulse 70   Temp 97.5  F (36.4  C)   Resp 17   Ht 1.727 m (5' 8\")   Wt 59.4 kg (130 lb 14.4 oz)   SpO2 97%   BMI 19.90 kg/m    Body mass index is 19.9 kg/m .     Exam:  GENERAL APPEARANCE:  in no distress, cooperative, sitting up in the WC.   RESP:  lungs clear to auscultation   CV:  S1S2 audible, regular HR, no murmur appreciated.   ABDOMEN:  soft, NT/ND, BS audible. no mass appreciated on palpation.   M/S:   Muscles atrophy right hand.   SKIN:  No rash.   NEURO:  Difficulty expressing words, ms strenght: LUE 0/5, LLL 1/5, Rt hand  4/5 and RLE 4/5. Ptosis right eye.   PSYCH:  affect and mood normal    Lab/Diagnostic data: Reviewed in the chart and EHR.        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 At baseline. Next MRI next May 2023. Appreciate help  - on heparin bid since Dec 2021, then started by neurology       Dysphagia, unspecified type  Protein calorie malnutrition, moderated  - 7/29/22: FT removed.  tolerating po intake  -Body mass index is " 19.9 kg/m . from 19.74 kg/m . from 19.89 kg/m . Stable. BMI less than 22 in frail elderly increase mortality risk. Gaining weight slowly. Dietician on the case.       Essential HTN:  BP controlled,. Off enalapril and hydralazine due to soft BP, on metoprolol tartrate. Continue.       Hx of GIB with acute blood loss anemia:   - Dec 2021. No scopes was done, Hb dropped down to 12.7 gm/dl.  Hb level 14.8 (4/2022  -  Protonix reduced from 40 mg bid to once daily (Dec 2022). Tolerating well, consider further reduction to 20 mg.       Chronic fatigue syndrome 2/2 Lyme disease:  - Significant  Deficits requiring NH placement. Requiring extensive assistance from nursing. Up for meals only o/w spends the day resting in bed      Electronically signed by:  Joel Blanchard MD        Sincerely,        Joel Blanchard MD

## 2023-03-29 ENCOUNTER — TELEPHONE (OUTPATIENT)
Dept: NEUROLOGY | Facility: CLINIC | Age: 73
End: 2023-03-29
Payer: COMMERCIAL

## 2023-03-29 NOTE — TELEPHONE ENCOUNTER
Called and spoke with patients wife. She states that the Doctor at the facility is recommending re-evaluation to get off of the heparin. They stated that he is doing better. Wife is going to try and get patient in for a sooner MRI.

## 2023-03-29 NOTE — TELEPHONE ENCOUNTER
[facility]    Phone Message    May a detailed message be left on voicemail: yes     Reason for Call: Other: Pt spouse is calling because Pt is set to have an MRI done on 5/8 and was suppose to have a follow up appt a week later. Writer does not see an appt set up for a week later. Pt spouse is wondering if there is someone else that can read the MRI sooner than Dr. Santos's first available, If not she is wanting to reschedule her imaging. Pt spouse mentions that the care facility that the Pt is at is questioning the Pt heparin, The care facility told spouse that he is doing much better and does not need to be on this. Pt spouse is wanting a call to talk about this     Action Taken: Message routed to:  Other: [facility] neurology    Travel Screening: Not Applicable

## 2023-04-02 PROBLEM — I10 HYPERTENSION, UNSPECIFIED TYPE: Status: RESOLVED | Noted: 2022-06-10 | Resolved: 2023-04-02

## 2023-04-02 PROBLEM — G93.41 METABOLIC ENCEPHALOPATHY: Status: RESOLVED | Noted: 2022-06-10 | Resolved: 2023-04-02
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
## 2023-04-02 PROBLEM — R13.10 DYSPHAGIA, UNSPECIFIED TYPE: Status: RESOLVED | Noted: 2023-02-03 | Resolved: 2023-04-02

## 2023-04-02 PROBLEM — R13.0 APHAGIA: Status: RESOLVED | Noted: 2022-06-10 | Resolved: 2023-04-02

## 2023-04-02 PROBLEM — T17.320D: Status: RESOLVED | Noted: 2023-02-03 | Resolved: 2023-04-02

## 2023-04-04 ENCOUNTER — NURSING HOME VISIT (OUTPATIENT)
Dept: GERIATRICS | Facility: CLINIC | Age: 73
End: 2023-04-04
Payer: COMMERCIAL

## 2023-04-04 VITALS
HEART RATE: 66 BPM | OXYGEN SATURATION: 94 % | TEMPERATURE: 98.2 F | WEIGHT: 132.1 LBS | DIASTOLIC BLOOD PRESSURE: 72 MMHG | RESPIRATION RATE: 16 BRPM | BODY MASS INDEX: 20.02 KG/M2 | HEIGHT: 68 IN | SYSTOLIC BLOOD PRESSURE: 107 MMHG

## 2023-04-04 DIAGNOSIS — I10 HYPERTENSION, UNSPECIFIED TYPE: ICD-10-CM

## 2023-04-04 DIAGNOSIS — E46 PROTEIN-CALORIE MALNUTRITION, UNSPECIFIED SEVERITY (H): ICD-10-CM

## 2023-04-04 DIAGNOSIS — I61.9 RIGHT-SIDED NONTRAUMATIC INTRACEREBRAL HEMORRHAGE, UNSPECIFIED CEREBRAL LOCATION (H): Primary | ICD-10-CM

## 2023-04-04 PROCEDURE — 99309 SBSQ NF CARE MODERATE MDM 30: CPT | Performed by: NURSE PRACTITIONER

## 2023-04-04 NOTE — PROGRESS NOTES
"[facility] GERIATRICS    Chief Complaint   Patient presents with     RECHECK     HPI:  Sukhi Johnson is a 73 year old  (1950), who is being seen today for an episodic care visit at: [facility] AT [facility] () [[id number]]. Today's concern is:   1. Right-sided nontraumatic intracerebral hemorrhage, unspecified cerebral location (H)    2. Protein-calorie malnutrition, unspecified severity (H)    3. Hypertension, unspecified type      Patient seen for follow up, post ICH in 2021 with L hemiplegia, limited verbal capacity at times, cognitively intact, BMI 20.09, limited appetite, no issues with gut, I&O adequate, also SBP's in the 100-110 range, slightly on low end but not fall risk due to ADL and transfer assist, safety aware, overall appears healthy, no distress.    Allergies, and PMH/PSH reviewed in EPIC today.  REVIEW OF SYSTEMS:  Limited secondary to cognitive impairment but today pt reports thumbs up    Objective:   /72   Pulse 66   Temp 98.2  F (36.8  C)   Resp 16   Ht 1.727 m (5' 8\")   Wt 59.9 kg (132 lb 1.6 oz)   SpO2 94%   BMI 20.09 kg/m    GENERAL APPEARANCE:  in no distress, appears healthy  ENT:  Mouth and posterior oropharynx normal, moist mucous membranes  RESP:  lungs clear to auscultation   CV:  no edema, rate-normal  ABDOMEN:  bowel sounds normal  M/S:   Gait and station abnormal ADL assist  SKIN:  Inspection of skin and subcutaneous tissue baseline  NEURO:   Examination of sensation by touch normal  PSYCH:  affect and mood normal    Labs done in SNF are in [facility]. Please refer to them using [facility]/Care Everywhere.    Assessment/Plan:  (I61.9) Right-sided nontraumatic intracerebral hemorrhage, unspecified cerebral location (H)  (primary encounter diagnosis)  (E46) Protein-calorie malnutrition, unspecified severity (H)  Comment: limited appetite, BMI 20.09, cognitive intact, verbal and physical limitations  Plan:   -decrease pantoprazole from 40 to 20mg daily  -continue " heparin injections BID, consider changing to oral anticoagulant  -follow up neurology  May 2023  -dietary to follow weights  -supplements PRN     (I10) Hypertension, unspecified type  Comment: SBP's in the 100-110 range, not a fall risk  Plan:   -continue metoprolol  -staff to check VS as scheduled  -consider weaning metoprolol if SBP's <100    MED REC REQUIRED  Post Medication Reconciliation Status: medication reconcilation previously completed during another office visit        Electronically signed by: KALI Ramirez CNP

## 2023-04-04 NOTE — TELEPHONE ENCOUNTER
Called and spoke with patients wife.   She states that the patient is having an MRI on 4/6/23. She is hoping you could take a look at the results and decide if the patient needs to continue with the heparin or not. And if he does need to be on a blood thinner if there is a different one that he can do instead of the injection daily.  The doctor that originally put him on it is no longer providing care at the facility. So they are requesting your input/decision.   Would prefer to not need an appointment for transportation purposes for patient, but will figure it out if its a must.

## 2023-04-04 NOTE — LETTER
"    4/4/2023        RE: Sukhi Johnson  C/o [facility]  [address]  [place] [address]        [facility] GERIATRICS    Chief Complaint   Patient presents with     RECHECK     HPI:  Sukhi Johnson is a 73 year old  (1950), who is being seen today for an episodic care visit at: [facility] AT [facility] () [[id number]]. Today's concern is:   1. Right-sided nontraumatic intracerebral hemorrhage, unspecified cerebral location (H)    2. Protein-calorie malnutrition, unspecified severity (H)    3. Hypertension, unspecified type      Patient seen for follow up, post ICH in 2021 with L hemiplegia, limited verbal capacity at times, cognitively intact, BMI 20.09, limited appetite, no issues with gut, I&O adequate, also SBP's in the 100-110 range, slightly on low end but not fall risk due to ADL and transfer assist, safety aware, overall appears healthy, no distress.    Allergies, and PMH/PSH reviewed in EPIC today.  REVIEW OF SYSTEMS:  Limited secondary to cognitive impairment but today pt reports thumbs up    Objective:   /72   Pulse 66   Temp 98.2  F (36.8  C)   Resp 16   Ht 1.727 m (5' 8\")   Wt 59.9 kg (132 lb 1.6 oz)   SpO2 94%   BMI 20.09 kg/m    GENERAL APPEARANCE:  in no distress, appears healthy  ENT:  Mouth and posterior oropharynx normal, moist mucous membranes  RESP:  lungs clear to auscultation   CV:  no edema, rate-normal  ABDOMEN:  bowel sounds normal  M/S:   Gait and station abnormal ADL assist  SKIN:  Inspection of skin and subcutaneous tissue baseline  NEURO:   Examination of sensation by touch normal  PSYCH:  affect and mood normal    Labs done in SNF are in [facility]. Please refer to them using [employer]/Care Everywhere.    Assessment/Plan:  (I61.9) Right-sided nontraumatic intracerebral hemorrhage, unspecified cerebral location (H)  (primary encounter diagnosis)  (E46) Protein-calorie malnutrition, unspecified severity (H)  Comment: limited appetite, BMI 20.09, cognitive intact, " verbal and physical limitations  Plan:   -decrease pantoprazole from 40 to 20mg daily  -continue heparin injections BID, consider changing to oral anticoagulant  -follow up neurology  May 2023  -dietary to follow weights  -supplements PRN     (I10) Hypertension, unspecified type  Comment: SBP's in the 100-110 range, not a fall risk  Plan:   -continue metoprolol  -staff to check VS as scheduled  -consider weaning metoprolol if SBP's <100    MED REC REQUIRED  Post Medication Reconciliation Status: medication reconcilation previously completed during another office visit        Electronically signed by: KALI Ramirez CNP           Sincerely,        KALI Ramirez CNP

## 2023-04-05 NOTE — TELEPHONE ENCOUNTER
The heparin I believe was started for DVT.  I do not think it has anything to do with the MRI brain.  Could you clarify what the patient wants?  The DVT and anticoagulation should be managed through primary care.

## 2023-04-05 NOTE — TELEPHONE ENCOUNTER
[facility] Call Center    Phone Message    May a detailed message be left on voicemail: yes     Reason for Call: Other: Pt's spouse Andressa returning call to [facility]. Please contact back when available.     Action Taken: Message routed to:  Other: [facility]    Travel Screening: Not Applicable

## 2023-04-05 NOTE — TELEPHONE ENCOUNTER
Also the MRI has not been done from what I see in the chart.  I can send her the results once it has been done.

## 2023-04-06 NOTE — TELEPHONE ENCOUNTER
Called and spoke with patients wife and gave her the providers message. The heparin is to prevent DVT from patient being immobile. Does not relate to the Brain MRI. Patients spouse verbalized understanding.

## 2023-04-11 ENCOUNTER — NURSING HOME VISIT (OUTPATIENT)
Dept: GERIATRICS | Facility: CLINIC | Age: 73
End: 2023-04-11
Payer: COMMERCIAL

## 2023-04-11 VITALS
TEMPERATURE: 97.2 F | HEIGHT: 68 IN | WEIGHT: 131.9 LBS | RESPIRATION RATE: 17 BRPM | HEART RATE: 60 BPM | SYSTOLIC BLOOD PRESSURE: 142 MMHG | BODY MASS INDEX: 19.99 KG/M2 | OXYGEN SATURATION: 98 % | DIASTOLIC BLOOD PRESSURE: 72 MMHG

## 2023-04-11 DIAGNOSIS — I61.9 RIGHT-SIDED NONTRAUMATIC INTRACEREBRAL HEMORRHAGE, UNSPECIFIED CEREBRAL LOCATION (H): Primary | ICD-10-CM

## 2023-04-11 DIAGNOSIS — Z79.899 ON DEEP VEIN THROMBOSIS (DVT) PROPHYLAXIS: ICD-10-CM

## 2023-04-11 DIAGNOSIS — I10 HYPERTENSION, UNSPECIFIED TYPE: ICD-10-CM

## 2023-04-11 PROCEDURE — 99309 SBSQ NF CARE MODERATE MDM 30: CPT | Performed by: NURSE PRACTITIONER

## 2023-04-11 NOTE — PROGRESS NOTES
"[facility] GERIATRICS    Chief Complaint   Patient presents with     RECHECK     HPI:  Sukhi Johnson is a 73 year old  (1950), who is being seen today for an episodic care visit at: [facility] AT [facility] () [[id number]]. Today's concern is:   1. Right-sided nontraumatic intracerebral hemorrhage, unspecified cerebral location (H)    2. Hypertension, unspecified type    3. On deep vein thrombosis (DVT) prophylaxis      Patient seen today on request, also contacted Miladis regarding status and plan, post ICH and doing well, full ADL support including feeding, SBP's in the 140 range, appears healthy, also on heparin injections BID since 12/27/21 for DVT prophylaxis, per Milaids today patient is getting better, gaining weight and overall improving, that she has spoken with another MD and patient needs to stay on heparin, Miladis will not change to oral anticoagulant at this juncture even thought patient dislikes needle sticks and could take an oral instead.    Allergies, and PMH/PSH reviewed in [facility] today.  REVIEW OF SYSTEMS:  Unobtainable secondary to cognitive impairment.     Objective:   BP (!) 142/72   Pulse 60   Temp 97.2  F (36.2  C)   Resp 17   Ht 1.727 m (5' 8\")   Wt 59.8 kg (131 lb 14.4 oz)   SpO2 98%   BMI 20.06 kg/m    GENERAL APPEARANCE:  in no distress, appears healthy  ENT:  Mouth and posterior oropharynx normal, moist mucous membranes  RESP:  lungs clear to auscultation   CV:  no edema  ABDOMEN:  bowel sounds normal  M/S:   Gait and station abnormal full ADL assist  SKIN:  Inspection of skin and subcutaneous tissue baseline  NEURO:   Examination of sensation by touch normal  PSYCH:  affect and mood normal    Labs done in SNF are in [facility]. Please refer to them using [employer]/Care Everywhere.    Assessment/Plan:  (I61.9) Right-sided nontraumatic intracerebral hemorrhage, unspecified cerebral location (H)  (primary encounter diagnosis)  Comment: moderate cognitive limitations, " appears healthy, good support as facility  Plan: staff to support as indicated  -follow up neurology PRN  -MRI scheduled for 5/9/23, dermatology on 5/4/23    (I10) Hypertension, unspecified type  Comment: SBP's in the 140 range  Plan: continue metoprolol  -staff to check VS as scheduled    (Z79.899) On deep vein thrombosis (DVT) prophylaxis  Comment: started heparin on 12/27/2021 for DVT prophylaxis  Plan:   -spoke with Miladis today, does not want to alter plan at this juncture  -consider change to eliquis or apixaban (or even ASA) in future is granted permission    MED REC REQUIRED  Post Medication Reconciliation Status: medication reconcilation previously completed during another office visit        Electronically signed by: KALI Ramirez CNP

## 2023-04-11 NOTE — LETTER
"    4/11/2023        RE: Sukhi Johnson  [facility]  [address]  [place] [address]        [facility] GERIATRICS    Chief Complaint   Patient presents with     RECHECK     HPI:  Sukhi Johnson is a 73 year old  (1950), who is being seen today for an episodic care visit at: [facility] AT [facility] () [[id number]]. Today's concern is:   1. Right-sided nontraumatic intracerebral hemorrhage, unspecified cerebral location (H)    2. Hypertension, unspecified type    3. On deep vein thrombosis (DVT) prophylaxis      Patient seen today on request, also contacted Miladis regarding status and plan, post ICH and doing well, full ADL support including feeding, SBP's in the 140 range, appears healthy, also on heparin injections BID since 12/27/21 for DVT prophylaxis, per Miladis today patient is getting better, gaining weight and overall improving, that she has spoken with another MD and patient needs to stay on heparin, Miladis will not change to oral anticoagulant at this juncture even thought patient dislikes needle sticks and could take an oral instead.    Allergies, and PMH/PSH reviewed in EPIC today.  REVIEW OF SYSTEMS:  Unobtainable secondary to cognitive impairment.     Objective:   BP (!) 142/72   Pulse 60   Temp 97.2  F (36.2  C)   Resp 17   Ht 1.727 m (5' 8\")   Wt 59.8 kg (131 lb 14.4 oz)   SpO2 98%   BMI 20.06 kg/m    GENERAL APPEARANCE:  in no distress, appears healthy  ENT:  Mouth and posterior oropharynx normal, moist mucous membranes  RESP:  lungs clear to auscultation   CV:  no edema  ABDOMEN:  bowel sounds normal  M/S:   Gait and station abnormal full ADL assist  SKIN:  Inspection of skin and subcutaneous tissue baseline  NEURO:   Examination of sensation by touch normal  PSYCH:  affect and mood normal    Labs done in SNF are in [facility]. Please refer to them using [employer]/Care Everywhere.    Assessment/Plan:  (I61.9) Right-sided nontraumatic intracerebral hemorrhage, unspecified " cerebral location (H)  (primary encounter diagnosis)  Comment: moderate cognitive limitations, appears healthy, good support as facility  Plan: staff to support as indicated  -follow up neurology PRN  -MRI scheduled for 5/9/23, dermatology on 5/4/23    (I10) Hypertension, unspecified type  Comment: SBP's in the 140 range  Plan: continue metoprolol  -staff to check VS as scheduled    (Z79.899) On deep vein thrombosis (DVT) prophylaxis  Comment: started heparin on 12/27/2021 for DVT prophylaxis  Plan:   -spoke with Miladis today, does not want to alter plan at this juncture  -consider change to eliquis or apixaban (or even ASA) in future is granted permission    MED REC REQUIRED  Post Medication Reconciliation Status: medication reconcilation previously completed during another office visit        Electronically signed by: KALI Ramirez CNP           Sincerely,        KALI Ramirez CNP

## 2023-05-09 ENCOUNTER — NURSING HOME VISIT (OUTPATIENT)
Dept: GERIATRICS | Facility: CLINIC | Age: 73
End: 2023-05-09
Payer: COMMERCIAL

## 2023-05-09 VITALS
HEIGHT: 68 IN | RESPIRATION RATE: 17 BRPM | DIASTOLIC BLOOD PRESSURE: 65 MMHG | TEMPERATURE: 97.4 F | SYSTOLIC BLOOD PRESSURE: 104 MMHG | BODY MASS INDEX: 21.02 KG/M2 | OXYGEN SATURATION: 95 % | HEART RATE: 68 BPM | WEIGHT: 138.7 LBS

## 2023-05-09 DIAGNOSIS — I61.9 RIGHT-SIDED NONTRAUMATIC INTRACEREBRAL HEMORRHAGE, UNSPECIFIED CEREBRAL LOCATION (H): Primary | ICD-10-CM

## 2023-05-09 DIAGNOSIS — K21.9 GASTROESOPHAGEAL REFLUX DISEASE WITHOUT ESOPHAGITIS: ICD-10-CM

## 2023-05-09 DIAGNOSIS — G81.94 LEFT HEMIPLEGIA (H): ICD-10-CM

## 2023-05-09 PROCEDURE — 99309 SBSQ NF CARE MODERATE MDM 30: CPT | Performed by: NURSE PRACTITIONER

## 2023-05-09 NOTE — LETTER
5/9/2023        RE: Sukhi Johnson  C/o [facility]  [address]  [place] [address]        [facility] GERIATRICS  Chief Complaint   Patient presents with     senior living Regulatory     [facility] Record Number:  [id number]  Place of Service where encounter took place:  [facility]  AT [facility] () [[id number]]    HPI:    Sukhi Johnson  is 73 year old (1950), who is being seen today for a federally mandated E/M visit. Today's concerns are:     Right-sided nontraumatic intracerebral hemorrhage, unspecified cerebral location (H)  Left hemiplegia (H)  Gastroesophageal reflux disease without esophagitis     Patient seen for follow up, ICH in 2021 with moderate L hemiplegia, appears healthy, BMI 21.09, limited verbal, difficult to ascertain verbal communication and thereby cognitive status, will laugh and smile, no distress, I&O adequate, requires feeding assist, significant other inquiring about is EWC would be indicated, patient does not have the ability to wheel a motorized WC effectively and would not improve quality of life.    ALLERGIES:Patient has no known allergies.  PAST MEDICAL HISTORY: No past medical history on file.  PAST SURGICAL HISTORY:   has a past surgical history that includes IR Gastro Jejunostomy Tube Placement (1/5/2022) and IR Gastro Jejunostomy Tube Change (3/9/2022).  FAMILY HISTORY: family history is not on file.  SOCIAL HISTORY:  reports that he has never smoked. He has never used smokeless tobacco. He reports that he does not drink alcohol and does not use drugs.    MEDICATIONS:  MED REC REQUIRED  Post Medication Reconciliation Status: medication reconcilation previously completed during another office visit         Review of your medicines          Accurate as of May 9, 2023  5:25 PM. If you have any questions, ask your nurse or doctor.            CONTINUE these medicines which have NOT CHANGED      Dose / Directions   * acetaminophen 325 MG/10.15ML  solution  Commonly known as: TYLENOL  Used for: Right-sided nontraumatic intracerebral hemorrhage, unspecified cerebral location (H)      Dose: 650 mg  20.3 mLs (650 mg) by Oral or Feeding Tube route every 4 hours as needed for mild pain or fever  Refills: 0     * acetaminophen 650 MG suppository  Commonly known as: TYLENOL  Used for: Right-sided nontraumatic intracerebral hemorrhage, unspecified cerebral location (H)      Dose: 650 mg  Place 1 suppository (650 mg) rectally every 4 hours as needed for mild pain  Refills: 0     AZO CRANBERRY URINARY TRACT PO      Dose: 1 tablet  Take 1 tablet by mouth 2 times daily  Refills: 0     diclofenac 1 % topical gel  Commonly known as: VOLTAREN  Used for: Acute pain of left shoulder      Apply to left shoulder BID  Quantity: 50 g  Refills: 1     heparin ANTICOAGULANT 5000 UNIT/0.5ML injection  Used for: Right-sided nontraumatic intracerebral hemorrhage, unspecified cerebral location (H)      Dose: 5,000 Units  Inject 0.5 mLs (5,000 Units) Subcutaneous every 12 hours  Refills: 0     metoprolol tartrate 50 MG tablet  Commonly known as: LOPRESSOR  Used for: Right-sided nontraumatic intracerebral hemorrhage, unspecified cerebral location (H)      Dose: 50 mg  1 tablet (50 mg) by Per Feeding Tube route 2 times daily  Refills: 0     ondansetron 4 MG ODT tab  Commonly known as: ZOFRAN ODT  Used for: Right-sided nontraumatic intracerebral hemorrhage, unspecified cerebral location (H)      Dose: 4 mg  Take 1 tablet (4 mg) by mouth every 6 hours as needed for nausea or vomiting  Refills: 0     pantoprazole 40 MG EC tablet  Commonly known as: PROTONIX      Dose: 20 mg  Take 20 mg by mouth daily  Refills: 0     sennosides 8.8 MG/5ML syrup  Commonly known as: SENOKOT  Used for: Right-sided nontraumatic intracerebral hemorrhage, unspecified cerebral location (H)      Dose: 5 mL  5 mLs by Oral or Feeding Tube route 2 times daily as needed for constipation  Refills: 0         * This list has  "2 medication(s) that are the same as other medications prescribed for you. Read the directions carefully, and ask your doctor or other care provider to review them with you.                 Case Management:  I have reviewed the care plan and MDS and do agree with the plan. Patient's desire to return to the community is present, but is not able due to care needs . Information reviewed:  Medications, vital signs, orders, and nursing notes.    ROS:  4 point ROS including Respiratory, CV, GI and , other than that noted in the HPI,  is negative    Vitals:  /65   Pulse 68   Temp 97.4  F (36.3  C)   Resp 17   Ht 1.727 m (5' 8\")   Wt 62.9 kg (138 lb 11.2 oz)   SpO2 95%   BMI 21.09 kg/m    Body mass index is 21.09 kg/m .  Exam:  GENERAL APPEARANCE:  in no distress, appears healthy  ENT:  Mouth and posterior oropharynx normal, moist mucous membranes  RESP:  lungs clear to auscultation , no respiratory distress  CV:  no edema, rate-normal  ABDOMEN:  bowel sounds normal  M/S:   Gait and station abnormal ADL assist  SKIN:  Inspection of skin and subcutaneous tissue baseline  NEURO:   Examination of sensation by touch normal  PSYCH:  affect and mood normal    Lab/Diagnostic data:   Labs done in SNF are in [facility]. Please refer to them using [employer]/Care Everywhere.    ASSESSMENT/PLAN  (I61.9) Right-sided nontraumatic intracerebral hemorrhage, unspecified cerebral location (H)  (primary encounter diagnosis)  (G81.94) Left hemiplegia (H)  Comment: ICH 2021, progressive, moderate L hemiplegia, difficulty communicating   Plan: staff to support as indicated  -continue heparin for now  -follow up neurology PRN    (K21.9) Gastroesophageal reflux disease without esophagitis  Comment: no s/s gut issues  Plan: recently reduced pantoprazole to 20mg daily, consider another reduction if asymptomatic        Electronically signed by:  Chris Maria, APRN CNP            Sincerely,        Chris Maria, " APRN CNP

## 2023-05-09 NOTE — PROGRESS NOTES
[facility] GERIATRICS  Chief Complaint   Patient presents with     [facility] Medical Record Number:  [id number]  Place of Service where encounter took place:  [facility] () [[id number]]    HPI:    Sukhi Johnson  is 73 year old (1950), who is being seen today for a federally mandated E/M visit. Today's concerns are:     Right-sided nontraumatic intracerebral hemorrhage, unspecified cerebral location (H)  Left hemiplegia (H)  Gastroesophageal reflux disease without esophagitis     Patient seen for follow up, ICH in 2021 with moderate L hemiplegia, appears healthy, BMI 21.09, limited verbal, difficult to ascertain verbal communication and thereby cognitive status, will laugh and smile, no distress, I&O adequate, requires feeding assist, significant other inquiring about is EWC would be indicated, patient does not have the ability to wheel a motorized WC effectively and would not improve quality of life.    ALLERGIES:Patient has no known allergies.  PAST MEDICAL HISTORY: No past medical history on file.  PAST SURGICAL HISTORY:   has a past surgical history that includes IR Gastro Jejunostomy Tube Placement (1/5/2022) and IR Gastro Jejunostomy Tube Change (3/9/2022).  FAMILY HISTORY: family history is not on file.  SOCIAL HISTORY:  reports that he has never smoked. He has never used smokeless tobacco. He reports that he does not drink alcohol and does not use drugs.    MEDICATIONS:  MED REC REQUIRED  Post Medication Reconciliation Status: medication reconcilation previously completed during another office visit         Review of your medicines          Accurate as of May 9, 2023  5:25 PM. If you have any questions, ask your nurse or doctor.            CONTINUE these medicines which have NOT CHANGED      Dose / Directions   * acetaminophen 325 MG/10.15ML solution  Commonly known as: TYLENOL  Used for: Right-sided nontraumatic intracerebral hemorrhage, unspecified  cerebral location (H)      Dose: 650 mg  20.3 mLs (650 mg) by Oral or Feeding Tube route every 4 hours as needed for mild pain or fever  Refills: 0     * acetaminophen 650 MG suppository  Commonly known as: TYLENOL  Used for: Right-sided nontraumatic intracerebral hemorrhage, unspecified cerebral location (H)      Dose: 650 mg  Place 1 suppository (650 mg) rectally every 4 hours as needed for mild pain  Refills: 0     AZO CRANBERRY URINARY TRACT PO      Dose: 1 tablet  Take 1 tablet by mouth 2 times daily  Refills: 0     diclofenac 1 % topical gel  Commonly known as: VOLTAREN  Used for: Acute pain of left shoulder      Apply to left shoulder BID  Quantity: 50 g  Refills: 1     heparin ANTICOAGULANT 5000 UNIT/0.5ML injection  Used for: Right-sided nontraumatic intracerebral hemorrhage, unspecified cerebral location (H)      Dose: 5,000 Units  Inject 0.5 mLs (5,000 Units) Subcutaneous every 12 hours  Refills: 0     metoprolol tartrate 50 MG tablet  Commonly known as: LOPRESSOR  Used for: Right-sided nontraumatic intracerebral hemorrhage, unspecified cerebral location (H)      Dose: 50 mg  1 tablet (50 mg) by Per Feeding Tube route 2 times daily  Refills: 0     ondansetron 4 MG ODT tab  Commonly known as: ZOFRAN ODT  Used for: Right-sided nontraumatic intracerebral hemorrhage, unspecified cerebral location (H)      Dose: 4 mg  Take 1 tablet (4 mg) by mouth every 6 hours as needed for nausea or vomiting  Refills: 0     pantoprazole 40 MG EC tablet  Commonly known as: PROTONIX      Dose: 20 mg  Take 20 mg by mouth daily  Refills: 0     sennosides 8.8 MG/5ML syrup  Commonly known as: SENOKOT  Used for: Right-sided nontraumatic intracerebral hemorrhage, unspecified cerebral location (H)      Dose: 5 mL  5 mLs by Oral or Feeding Tube route 2 times daily as needed for constipation  Refills: 0         * This list has 2 medication(s) that are the same as other medications prescribed for you. Read the directions carefully, and  "ask your doctor or other care provider to review them with you.                 Case Management:  I have reviewed the care plan and MDS and do agree with the plan. Patient's desire to return to the community is present, but is not able due to care needs . Information reviewed:  Medications, vital signs, orders, and nursing notes.    ROS:  4 point ROS including Respiratory, CV, GI and , other than that noted in the HPI,  is negative    Vitals:  /65   Pulse 68   Temp 97.4  F (36.3  C)   Resp 17   Ht 1.727 m (5' 8\")   Wt 62.9 kg (138 lb 11.2 oz)   SpO2 95%   BMI 21.09 kg/m    Body mass index is 21.09 kg/m .  Exam:  GENERAL APPEARANCE:  in no distress, appears healthy  ENT:  Mouth and posterior oropharynx normal, moist mucous membranes  RESP:  lungs clear to auscultation , no respiratory distress  CV:  no edema, rate-normal  ABDOMEN:  bowel sounds normal  M/S:   Gait and station abnormal ADL assist  SKIN:  Inspection of skin and subcutaneous tissue baseline  NEURO:   Examination of sensation by touch normal  PSYCH:  affect and mood normal    Lab/Diagnostic data:   Labs done in SNF are in [facility]. Please refer to them using [employer] Everywhere.    ASSESSMENT/PLAN  (I61.9) Right-sided nontraumatic intracerebral hemorrhage, unspecified cerebral location (H)  (primary encounter diagnosis)  (G81.94) Left hemiplegia (H)  Comment: ICH 2021, progressive, moderate L hemiplegia, difficulty communicating   Plan: staff to support as indicated  -continue heparin for now  -follow up neurology PRN    (K21.9) Gastroesophageal reflux disease without esophagitis  Comment: no s/s gut issues  Plan: recently reduced pantoprazole to 20mg daily, consider another reduction if asymptomatic        Electronically signed by:  Chris Maria, APRN CNP      "

## 2023-06-13 ENCOUNTER — PATIENT OUTREACH (OUTPATIENT)
Dept: GERIATRIC MEDICINE | Facility: CLINIC | Age: 73
End: 2023-06-13
Payer: COMMERCIAL

## 2023-06-14 ENCOUNTER — PATIENT OUTREACH (OUTPATIENT)
Dept: GERIATRIC MEDICINE | Facility: CLINIC | Age: 73
End: 2023-06-14
Payer: COMMERCIAL

## 2023-06-14 NOTE — PROGRESS NOTES
[facility] Care Coordination Contact    Will see on 6/14/23.    OLGA Simon, [facility]  [phone]

## 2023-06-14 NOTE — Clinical Note
Thanks for update on him that you started new supplement.  I tried to visit with him a bit, he appeared content and happy when I saw him.  Thanks!  OLGA Simon, [facility] [phone]

## 2023-06-18 NOTE — PROGRESS NOTES
[facility] Institutional Assessment     Institutional Assessment for Health Risk Assessment with Sukhi L Alex completed on June 14, 2023 at [facility]    Type of residence:: Nursing home  Current living arrangement:: I live in a nursing home     Assessment completed with:: Care Team Member, Patient      Mental/Behavioral Health   Depression Screening: Unable to answer, per staff and NP no concerns.    Mental health DX:: No        Falls Assessment:   Fallen 2 or more times in the past year?: No   Any fall with injury in the past year?: No    ADL/IADL Dependencies:   Dependent ADLs:: Bathing, Dressing, Grooming, Incontinence, Transfers, Toileting, Wheelchair-with assist  Dependent IADLs:: Cleaning, Cooking, Laundry, Shopping, Meal Preparation, Incontinence, Transportation, Money Management, Medication Management      Care Plan & Recommendations: CC met with Sukhi in his room on this date.  He is barely verbal, but was able to communicate with some sounds and hand gestures.  He appeared to be pleasant and content, shook CC's hand and smiled.  Per NP Chris Maria, Sukhi just started a new holistic supplement, CC will continue to monitor to assess how this works.  No falls, ER visits or hospitalizations recently.  Per staff has been stable.  Discussed options/opportunities for transitions.    See Institutional Care Plan for detailed assessment information.    Obtained a copy of the facility care plan and MDS from facility electronic records. Requested of skilled nursing social worker to put this care coordinator on care conference attendee list.    Placed the Health Plan facility face sheet in the member's facility chart.    Follow-Up Plan: Member informed of future contact, plan to f/u with member with a 6 month assessment, attend 1 care conference annually, and will follow any hospitalizations or transitions. Care Coordinator contact information shared with member/family and facility, and  encouraged to call this care coordinator with any questions or concerns at any time.     [place] care continuum providers: Please see Snapshot and Care Management Flowsheets for Specific details of care plan.    This CC note routed to PCP.    OLGA Simon, [facility]  [phone]

## 2023-07-14 ENCOUNTER — TELEPHONE (OUTPATIENT)
Dept: NEUROLOGY | Facility: CLINIC | Age: 73
End: 2023-07-14
Payer: COMMERCIAL

## 2023-07-14 DIAGNOSIS — R93.89 ABNORMAL MRI: ICD-10-CM

## 2023-07-14 DIAGNOSIS — I61.9 RIGHT-SIDED NONTRAUMATIC INTRACEREBRAL HEMORRHAGE, UNSPECIFIED CEREBRAL LOCATION (H): Primary | ICD-10-CM

## 2023-07-14 NOTE — TELEPHONE ENCOUNTER
[facility] Call Center    Phone Message    May a detailed message be left on voicemail: yes     Reason for Call: Other: Pt's spouse Andressa is calling because Dr. Santos ordered an MRI for pt but it expires 08/15. Pt has an appt with Dr. Santos in October to go over the MRI . Andressa would like Dr. Santos to extend the date to the end of the year. Andressa wants pt to be able to do the MRI closer to the date of the follow up wih Dr. Santos. Please call Andressa once the order has been extended.     Action Taken: Message routed to:  Other: [facility]    Travel Screening: Not Applicable

## 2023-07-14 NOTE — TELEPHONE ENCOUNTER
Called and informed [facility] of new order per provider.    Med Ocampo, RN, BSN  [facility] Neurology

## 2023-07-20 NOTE — PROGRESS NOTES
[facility]  Chief Complaint   Patient presents with     care home Regulatory     [place] Medical Record Number:  [id number]  Place of Service where encounter took place:  [facility] () [[id number]]    HPI:    Sukhi Johnson  is 73 year old (1950), who is being seen today for a federally mandated E/M visit.  HPI information obtained from: facility chart records, facility staff, patient report, [facility] chart review and family/first contact wife report. T    today's concerns are:  - - Resident seen and examined, chart reviewed and discussed with the nursing staff.   -  Pt reports feeling fine, had lunch and no concern with swallowing. Denies cough with food or drink.   - DNP and RN have no concern today.     --------------------------------  - Past Medical, social, family histories, medications, and allergies reviewed and updated    MEDICATIONS:  Current Outpatient Medications   Medication Sig Dispense Refill     acetaminophen (TYLENOL) 325 MG/10.15ML solution 20.3 mLs (650 mg) by Oral or Feeding Tube route every 4 hours as needed for mild pain or fever       acetaminophen (TYLENOL) 650 MG suppository Place 1 suppository (650 mg) rectally every 4 hours as needed for mild pain       Cranberry-Vitamin C (AZO CRANBERRY URINARY TRACT PO) Take 1 tablet by mouth 2 times daily       diclofenac (VOLTAREN) 1 % topical gel Apply to left shoulder BID 50 g 1     Heparin Sodium, Porcine, (HEPARIN ANTICOAGULANT) 5000 UNIT/0.5ML injection Inject 0.5 mLs (5,000 Units) Subcutaneous every 12 hours       metoprolol tartrate (LOPRESSOR) 50 MG tablet 1 tablet (50 mg) by Per Feeding Tube route 2 times daily       ondansetron (ZOFRAN-ODT) 4 MG ODT tab Take 1 tablet (4 mg) by mouth every 6 hours as needed for nausea or vomiting       pantoprazole (PROTONIX) 40 MG EC tablet Take 20 mg by mouth daily       sennosides (SENOKOT) 8.8 MG/5ML syrup 5 mLs by Oral or Feeding Tube route 2 times daily as  "needed for constipation       ROS: limited due to dysarthria      Vitals:  /74   Pulse 67   Temp 97.7  F (36.5  C)   Resp 18   Ht 1.727 m (5' 8\")   Wt 63.5 kg (139 lb 14.4 oz)   SpO2 94%   BMI 21.27 kg/m    Body mass index is 21.27 kg/m .     Exam:   GENERAL APPEARANCE:  in no distress, cooperative, sitting up in the WC.   RESP:  lungs clear to auscultation   CV:  S1S2 audible, regular HR, no murmur appreciated.   ABDOMEN:  soft, NT/ND, BS audible. no mass appreciated on palpation.   M/S:   Muscles atrophy right hand.   SKIN:  No rash.   NEURO:  Difficulty expressing words, ms strenght: LUE 0/5, LLL 1/5, Rt hand  4/5 and RLE 4/5. Ptosis right eye.   PSYCH:  affect and mood normal    Lab/Diagnostic data: Reviewed in the chart and EHR.        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 At baseline. Next MRI next May 2023. Appreciate help  - on heparin bid since Dec 2021, then started by neurology       Dysphagia, unspecified type  Protein calorie malnutrition, moderate  - 7/29/22: FT removed.  tolerating po intake  -Body mass index is 21.27 kg/m . from 19.9 kg/m . Improving.   - Stable. BMI less than 22 in frail elderly increase mortality risk. Gaining weight slowly. Dietician on the case.       Essential HTN:    BP Readings from Last 3 Encounters:   07/24/23 117/74   05/09/23 104/65   04/11/23 (!) 142/72   - BP controlled,. Off enalapril and hydralazine due to soft BP, on metoprolol tartrate. Continue.       Hx of GIB with acute blood loss anemia:   - Dec 2021. No scopes was done, Hb dropped down to 12.7 gm/dl.  Hb level 14.8 (4/2022  -  On Protonix 20 mg.       Chronic fatigue syndrome 2/2 Lyme disease:  - Significant  Deficits requiring NH placement. Requiring extensive " assistance from nursing. Up for meals only o/w spends the day resting in bed      Electronically signed by:  Joel Blanchard MD

## 2023-07-24 ENCOUNTER — NURSING HOME VISIT (OUTPATIENT)
Dept: GERIATRICS | Facility: CLINIC | Age: 73
End: 2023-07-24
Payer: COMMERCIAL

## 2023-07-24 VITALS
OXYGEN SATURATION: 94 % | DIASTOLIC BLOOD PRESSURE: 74 MMHG | TEMPERATURE: 97.7 F | BODY MASS INDEX: 21.2 KG/M2 | RESPIRATION RATE: 18 BRPM | WEIGHT: 139.9 LBS | HEIGHT: 68 IN | HEART RATE: 67 BPM | SYSTOLIC BLOOD PRESSURE: 117 MMHG

## 2023-07-24 DIAGNOSIS — I10 PRIMARY HYPERTENSION: ICD-10-CM

## 2023-07-24 DIAGNOSIS — I61.9 RIGHT-SIDED NONTRAUMATIC INTRACEREBRAL HEMORRHAGE, UNSPECIFIED CEREBRAL LOCATION (H): Primary | ICD-10-CM

## 2023-07-24 DIAGNOSIS — I69.322 DYSARTHRIA AS LATE EFFECT OF STROKE: ICD-10-CM

## 2023-07-24 DIAGNOSIS — Z87.19 HISTORY OF GASTROINTESTINAL BLEEDING: ICD-10-CM

## 2023-07-24 DIAGNOSIS — R13.10 DYSPHAGIA, UNSPECIFIED TYPE: ICD-10-CM

## 2023-07-24 DIAGNOSIS — G93.32 CHRONIC FATIGUE SYNDROME: ICD-10-CM

## 2023-07-24 DIAGNOSIS — G81.94 LEFT HEMIPLEGIA (H): ICD-10-CM

## 2023-07-24 DIAGNOSIS — E44.0 MODERATE PROTEIN-CALORIE MALNUTRITION (H): ICD-10-CM

## 2023-07-24 PROCEDURE — 99309 SBSQ NF CARE MODERATE MDM 30: CPT | Performed by: FAMILY MEDICINE

## 2023-07-24 NOTE — LETTER
7/24/2023        RE: Sukhi Johnson  C/o [facility]  [address]  [place] [address]        [facility]  Chief Complaint   Patient presents with     group home Regulatory     [place] Medical Record Number:  [id number]  Place of Service where encounter took place:  [facility] () [[id number]]    HPI:    Sukhi Johnson  is 73 year old (1950), who is being seen today for a federally mandated E/M visit.  HPI information obtained from: facility chart records, facility staff, patient report, [facility] chart review and family/first contact wife report. T    today's concerns are:  - - Resident seen and examined, chart reviewed and discussed with the nursing staff.   -  Pt reports feeling fine, had lunch and no concern with swallowing. Denies cough with food or drink.   - DNP and RN have no concern today.     --------------------------------  - Past Medical, social, family histories, medications, and allergies reviewed and updated    MEDICATIONS:  Current Outpatient Medications   Medication Sig Dispense Refill     acetaminophen (TYLENOL) 325 MG/10.15ML solution 20.3 mLs (650 mg) by Oral or Feeding Tube route every 4 hours as needed for mild pain or fever       acetaminophen (TYLENOL) 650 MG suppository Place 1 suppository (650 mg) rectally every 4 hours as needed for mild pain       Cranberry-Vitamin C (AZO CRANBERRY URINARY TRACT PO) Take 1 tablet by mouth 2 times daily       diclofenac (VOLTAREN) 1 % topical gel Apply to left shoulder BID 50 g 1     Heparin Sodium, Porcine, (HEPARIN ANTICOAGULANT) 5000 UNIT/0.5ML injection Inject 0.5 mLs (5,000 Units) Subcutaneous every 12 hours       metoprolol tartrate (LOPRESSOR) 50 MG tablet 1 tablet (50 mg) by Per Feeding Tube route 2 times daily       ondansetron (ZOFRAN-ODT) 4 MG ODT tab Take 1 tablet (4 mg) by mouth every 6 hours as needed for nausea or vomiting       pantoprazole (PROTONIX) 40 MG EC tablet Take 20 mg by mouth daily   "     sennosides (SENOKOT) 8.8 MG/5ML syrup 5 mLs by Oral or Feeding Tube route 2 times daily as needed for constipation       ROS: limited due to dysarthria      Vitals:  /74   Pulse 67   Temp 97.7  F (36.5  C)   Resp 18   Ht 1.727 m (5' 8\")   Wt 63.5 kg (139 lb 14.4 oz)   SpO2 94%   BMI 21.27 kg/m    Body mass index is 21.27 kg/m .     Exam:   GENERAL APPEARANCE:  in no distress, cooperative, sitting up in the WC.   RESP:  lungs clear to auscultation   CV:  S1S2 audible, regular HR, no murmur appreciated.   ABDOMEN:  soft, NT/ND, BS audible. no mass appreciated on palpation.   M/S:   Muscles atrophy right hand.   SKIN:  No rash.   NEURO:  Difficulty expressing words, ms strenght: LUE 0/5, LLL 1/5, Rt hand  4/5 and RLE 4/5. Ptosis right eye.   PSYCH:  affect and mood normal    Lab/Diagnostic data: Reviewed in the chart and EHR.        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 At baseline. Next MRI next May 2023. Appreciate help  - on heparin bid since Dec 2021, then started by neurology       Dysphagia, unspecified type  Protein calorie malnutrition, moderate  - 7/29/22: FT removed.  tolerating po intake  -Body mass index is 21.27 kg/m . from 19.9 kg/m . Improving.   - Stable. BMI less than 22 in frail elderly increase mortality risk. Gaining weight slowly. Dietician on the case.       Essential HTN:    BP Readings from Last 3 Encounters:   07/24/23 117/74   05/09/23 104/65   04/11/23 (!) 142/72   - BP controlled,. Off enalapril and hydralazine due to soft BP, on metoprolol tartrate. Continue.       Hx of GIB with acute blood loss anemia:   - Dec 2021. No scopes was done, Hb dropped down to 12.7 gm/dl.  Hb level 14.8 (4/2022  -  On Protonix 20 mg.       Chronic fatigue " syndrome 2/2 Lyme disease:  - Significant  Deficits requiring NH placement. Requiring extensive assistance from nursing. Up for meals only o/w spends the day resting in bed      Electronically signed by:  Joel Blanchard MD              Sincerely,        Joel Blanchard MD

## 2023-09-19 ENCOUNTER — NURSING HOME VISIT (OUTPATIENT)
Dept: GERIATRICS | Facility: CLINIC | Age: 73
End: 2023-09-19
Payer: COMMERCIAL

## 2023-09-19 VITALS
DIASTOLIC BLOOD PRESSURE: 71 MMHG | WEIGHT: 140.1 LBS | RESPIRATION RATE: 18 BRPM | OXYGEN SATURATION: 95 % | HEART RATE: 71 BPM | BODY MASS INDEX: 21.23 KG/M2 | TEMPERATURE: 98.6 F | HEIGHT: 68 IN | SYSTOLIC BLOOD PRESSURE: 116 MMHG

## 2023-09-19 DIAGNOSIS — G81.94 LEFT HEMIPLEGIA (H): ICD-10-CM

## 2023-09-19 DIAGNOSIS — E46 PROTEIN-CALORIE MALNUTRITION, UNSPECIFIED SEVERITY (H): ICD-10-CM

## 2023-09-19 DIAGNOSIS — I61.9 RIGHT-SIDED NONTRAUMATIC INTRACEREBRAL HEMORRHAGE, UNSPECIFIED CEREBRAL LOCATION (H): Primary | ICD-10-CM

## 2023-09-19 DIAGNOSIS — I10 HYPERTENSION, UNSPECIFIED TYPE: ICD-10-CM

## 2023-09-19 DIAGNOSIS — M24.542 CONTRACTURE OF HAND JOINT, LEFT: ICD-10-CM

## 2023-09-19 PROCEDURE — 99309 SBSQ NF CARE MODERATE MDM 30: CPT | Performed by: NURSE PRACTITIONER

## 2023-09-19 NOTE — LETTER
9/19/2023        RE: Sukhi Johnson  C/o [facility]  [address]  [place] [address]        [facility]IATR[facility]  Chief Complaint   Patient presents with     Annual Comprehensive Nursing Home     [place] Medical Record Number:  [id number]  Place of Service where encounter took place:  [facility] () [[id number]]    HPI:    Sukhi Johnson  is a 73 year old  (1950), who is being seen today for an annual comprehensive visit. HPI information obtained from: facility chart records, facility staff, and [facility] chart review.   1. Right-sided nontraumatic intracerebral hemorrhage, unspecified cerebral location (H)    2. Left hemiplegia (H)    3. Contracture of hand joint, left    4. Protein-calorie malnutrition, unspecified severity (H)    5. Hypertension, unspecified type      Patient seen for follow up, post ICH with moderate L hemiplegia, limited verbal, has communication device but not very effective without time/support, L hand curled with intact skin not macerated, BMI 21.3, appears thin albeit healthy, SBP's in the 110-120 range, overall appears healthy.    ALLERGIES: Patient has no known allergies.  PAST MEDICAL HISTORY: No past medical history on file.   PAST SURGICAL HISTORY:  has a past surgical history that includes IR Gastro Jejunostomy Tube Placement (1/5/2022) and IR Gastro Jejunostomy Tube Change (3/9/2022).      Current Outpatient Medications:      acetaminophen (TYLENOL) 325 MG/10.15ML solution, 20.3 mLs (650 mg) by Oral or Feeding Tube route every 4 hours as needed for mild pain or fever, Disp: , Rfl:      acetaminophen (TYLENOL) 650 MG suppository, Place 1 suppository (650 mg) rectally every 4 hours as needed for mild pain, Disp: , Rfl:      Cranberry-Vitamin C (AZO CRANBERRY URINARY TRACT PO), Take 1 tablet by mouth 2 times daily, Disp: , Rfl:      diclofenac (VOLTAREN) 1 % topical gel, Apply to left shoulder BID, Disp: 50 g, Rfl: 1     Heparin Sodium, Porcine,  "(HEPARIN ANTICOAGULANT) 5000 UNIT/0.5ML injection, Inject 0.5 mLs (5,000 Units) Subcutaneous every 12 hours, Disp: , Rfl:      metoprolol tartrate (LOPRESSOR) 50 MG tablet, 1 tablet (50 mg) by Per Feeding Tube route 2 times daily, Disp: , Rfl:      ondansetron (ZOFRAN-ODT) 4 MG ODT tab, Take 1 tablet (4 mg) by mouth every 6 hours as needed for nausea or vomiting, Disp: , Rfl:      pantoprazole (PROTONIX) 40 MG EC tablet, Take 20 mg by mouth daily, Disp: , Rfl:      sennosides (SENOKOT) 8.8 MG/5ML syrup, 5 mLs by Oral or Feeding Tube route 2 times daily as needed for constipation, Disp: , Rfl:      MED REC REQUIRED  Post Medication Reconciliation Status: medication reconcilation previously completed during another office visit      Case Management:  I have reviewed the facility/SNF care plan/MDS, including the falls risk, nutrition and pain screening. I also reviewed the current immunizations, and preventive care.. Future cancer screening is not clinically indicated secondary to age/goals of care. Patient's desire to return to the community is present, but is not able due to care needs . Current Level of Care is appropriate.    Advance Directive Discussion:    I reviewed the current advanced directives as reflected in EPIC, the POLST and the facility chart, and verified the congruency of orders.  I did not due to cognitive impairment review the advance directives with the resident.     Team Discussion:  I communicated with the appropriate disciplines involved with the Plan of Care: Nursing  .   Patient's goal is: pain control and comfort.  Information reviewed: Medications, vital signs, orders, and nursing notes.    ROS:  Unobtainable secondary to aphasia.     Vitals:  /71   Pulse 71   Temp 98.6  F (37  C)   Resp 18   Ht 1.727 m (5' 8\")   Wt 63.5 kg (140 lb 1.6 oz)   SpO2 95%   BMI 21.30 kg/m   Body mass index is 21.3 kg/m .  Exam:  GENERAL APPEARANCE:  in no distress, appears healthy  ENT:  Mouth and " posterior oropharynx normal, moist mucous membranes  RESP:  lungs clear to auscultation , no respiratory distress  CV:  regular rate and rhythm, no murmur, rub, or gallop, no edema  ABDOMEN:  no guarding or rebound, bowel sounds normal  M/S:   Gait and station abnormal transfer assist, WC mobility  SKIN:  Inspection of skin and subcutaneous tissue baseline, Palpation of skin and subcutaneous tissue baseline  NEURO:   Cranial nerves 2-12 are normal tested and grossly at patient's baseline, Examination of sensation by touch normal  PSYCH:  normal insight, judgement and memory, affect and mood normal     Lab/Diagnostic data:   Labs done in SNF are in [facility] EPIC. Please refer to them using [employer] Everywhere.    ASSESSMENT/PLAN  (I61.9) Right-sided nontraumatic intracerebral hemorrhage, unspecified cerebral location (H)  (primary encounter diagnosis)  (G81.94) Left hemiplegia (H)  Comment: moderate cognitive limitations along with L hemiplegia  Plan: staff to support as indicated  -having OT eval for adaptive utensils  -use communication device as possible    (M24.542) Contracture of hand joint, left  Comment: moderate contracture  Plan: continue brace as indicated    (E46) Protein-calorie malnutrition, unspecified severity (H)  Comment: BMI 21.3  Plan: dietary to follow weights and intake  -supplements PRN    (I10) Hypertension, unspecified type  Comment: SBP's in the 110-120 range  Plan: continue metoprolol, protonix  -staff to check VS as scheduled        Electronically signed by:  KALI Ramirez CNP          Sincerely,        KALI Raimrez CNP

## 2023-09-19 NOTE — PROGRESS NOTES
[facility] GERIATRICS  Chief Complaint   Patient presents with    Annual Comprehensive Nursing Home     [place] Medical Record Number:  [id number]  Place of Service where encounter took place:  [facility] () [[id number]]    HPI:    Sukhi Johnson  is a 73 year old  (1950), who is being seen today for an annual comprehensive visit. HPI information obtained from: facility chart records, facility staff, and [facility] chart review.   1. Right-sided nontraumatic intracerebral hemorrhage, unspecified cerebral location (H)    2. Left hemiplegia (H)    3. Contracture of hand joint, left    4. Protein-calorie malnutrition, unspecified severity (H)    5. Hypertension, unspecified type      Patient seen for follow up, post ICH with moderate L hemiplegia, limited verbal, has communication device but not very effective without time/support, L hand curled with intact skin not macerated, BMI 21.3, appears thin albeit healthy, SBP's in the 110-120 range, overall appears healthy.    ALLERGIES: Patient has no known allergies.  PAST MEDICAL HISTORY: No past medical history on file.   PAST SURGICAL HISTORY:  has a past surgical history that includes IR Gastro Jejunostomy Tube Placement (1/5/2022) and IR Gastro Jejunostomy Tube Change (3/9/2022).      Current Outpatient Medications:     acetaminophen (TYLENOL) 325 MG/10.15ML solution, 20.3 mLs (650 mg) by Oral or Feeding Tube route every 4 hours as needed for mild pain or fever, Disp: , Rfl:     acetaminophen (TYLENOL) 650 MG suppository, Place 1 suppository (650 mg) rectally every 4 hours as needed for mild pain, Disp: , Rfl:     Cranberry-Vitamin C (AZO CRANBERRY URINARY TRACT PO), Take 1 tablet by mouth 2 times daily, Disp: , Rfl:     diclofenac (VOLTAREN) 1 % topical gel, Apply to left shoulder BID, Disp: 50 g, Rfl: 1    Heparin Sodium, Porcine, (HEPARIN ANTICOAGULANT) 5000 UNIT/0.5ML injection, Inject 0.5 mLs (5,000 Units) Subcutaneous every 12 hours,  "Disp: , Rfl:     metoprolol tartrate (LOPRESSOR) 50 MG tablet, 1 tablet (50 mg) by Per Feeding Tube route 2 times daily, Disp: , Rfl:     ondansetron (ZOFRAN-ODT) 4 MG ODT tab, Take 1 tablet (4 mg) by mouth every 6 hours as needed for nausea or vomiting, Disp: , Rfl:     pantoprazole (PROTONIX) 40 MG EC tablet, Take 20 mg by mouth daily, Disp: , Rfl:     sennosides (SENOKOT) 8.8 MG/5ML syrup, 5 mLs by Oral or Feeding Tube route 2 times daily as needed for constipation, Disp: , Rfl:      MED REC REQUIRED  Post Medication Reconciliation Status: medication reconcilation previously completed during another office visit      Case Management:  I have reviewed the facility/SNF care plan/MDS, including the falls risk, nutrition and pain screening. I also reviewed the current immunizations, and preventive care.. Future cancer screening is not clinically indicated secondary to age/goals of care. Patient's desire to return to the community is present, but is not able due to care needs . Current Level of Care is appropriate.    Advance Directive Discussion:    I reviewed the current advanced directives as reflected in EPIC, the POLST and the facility chart, and verified the congruency of orders.  I did not due to cognitive impairment review the advance directives with the resident.     Team Discussion:  I communicated with the appropriate disciplines involved with the Plan of Care: Nursing  .   Patient's goal is: pain control and comfort.  Information reviewed: Medications, vital signs, orders, and nursing notes.    ROS:  Unobtainable secondary to aphasia.     Vitals:  /71   Pulse 71   Temp 98.6  F (37  C)   Resp 18   Ht 1.727 m (5' 8\")   Wt 63.5 kg (140 lb 1.6 oz)   SpO2 95%   BMI 21.30 kg/m   Body mass index is 21.3 kg/m .  Exam:  GENERAL APPEARANCE:  in no distress, appears healthy  ENT:  Mouth and posterior oropharynx normal, moist mucous membranes  RESP:  lungs clear to auscultation , no respiratory " distress  CV:  regular rate and rhythm, no murmur, rub, or gallop, no edema  ABDOMEN:  no guarding or rebound, bowel sounds normal  M/S:   Gait and station abnormal transfer assist, WC mobility  SKIN:  Inspection of skin and subcutaneous tissue baseline, Palpation of skin and subcutaneous tissue baseline  NEURO:   Cranial nerves 2-12 are normal tested and grossly at patient's baseline, Examination of sensation by touch normal  PSYCH:  normal insight, judgement and memory, affect and mood normal     Lab/Diagnostic data:   Labs done in SNF are in [facility] EPIC. Please refer to them using [employer] Everywhere.    ASSESSMENT/PLAN  (I61.9) Right-sided nontraumatic intracerebral hemorrhage, unspecified cerebral location (H)  (primary encounter diagnosis)  (G81.94) Left hemiplegia (H)  Comment: moderate cognitive limitations along with L hemiplegia  Plan: staff to support as indicated  -having OT eval for adaptive utensils  -use communication device as possible    (M24.542) Contracture of hand joint, left  Comment: moderate contracture  Plan: continue brace as indicated    (E46) Protein-calorie malnutrition, unspecified severity (H)  Comment: BMI 21.3  Plan: dietary to follow weights and intake  -supplements PRN    (I10) Hypertension, unspecified type  Comment: SBP's in the 110-120 range  Plan: continue metoprolol, protonix  -staff to check VS as scheduled        Electronically signed by:  KALI Ramirez CNP

## 2023-10-13 ENCOUNTER — HOSPITAL ENCOUNTER (OUTPATIENT)
Dept: MRI IMAGING | Facility: HOSPITAL | Age: 73
Discharge: HOME | End: 2023-10-13
Attending: PSYCHIATRY & NEUROLOGY | Admitting: PSYCHIATRY & NEUROLOGY
Payer: COMMERCIAL

## 2023-10-13 DIAGNOSIS — R93.89 ABNORMAL MRI: ICD-10-CM

## 2023-10-13 DIAGNOSIS — I61.9 RIGHT-SIDED NONTRAUMATIC INTRACEREBRAL HEMORRHAGE, UNSPECIFIED CEREBRAL LOCATION (H): ICD-10-CM

## 2023-10-13 PROCEDURE — A9585 GADOBUTROL INJECTION: HCPCS | Mod: JZ | Performed by: PSYCHIATRY & NEUROLOGY

## 2023-10-13 PROCEDURE — 70553 MRI BRAIN STEM W/O & W/DYE: CPT

## 2023-10-13 PROCEDURE — 255N000002 HC RX 255 OP 636: Mod: JZ | Performed by: PSYCHIATRY & NEUROLOGY

## 2023-10-13 RX ORDER — GADOBUTROL 604.72 MG/ML
6.5 INJECTION INTRAVENOUS ONCE
Status: COMPLETED | OUTPATIENT
Start: 2023-10-13 | End: 2023-10-13

## 2023-10-13 RX ADMIN — GADOBUTROL 6.5 ML: 604.72 INJECTION INTRAVENOUS at 14:02

## 2023-10-23 ENCOUNTER — OFFICE VISIT (OUTPATIENT)
Dept: NEUROLOGY | Facility: CLINIC | Age: 73
End: 2023-10-23
Payer: COMMERCIAL

## 2023-10-23 ENCOUNTER — TELEPHONE (OUTPATIENT)
Dept: NEUROLOGY | Facility: CLINIC | Age: 73
End: 2023-10-23

## 2023-10-23 VITALS
HEART RATE: 62 BPM | WEIGHT: 138 LBS | BODY MASS INDEX: 20.98 KG/M2 | SYSTOLIC BLOOD PRESSURE: 114 MMHG | DIASTOLIC BLOOD PRESSURE: 72 MMHG | RESPIRATION RATE: 16 BRPM

## 2023-10-23 DIAGNOSIS — I61.9 RIGHT-SIDED NONTRAUMATIC INTRACEREBRAL HEMORRHAGE, UNSPECIFIED CEREBRAL LOCATION (H): Primary | ICD-10-CM

## 2023-10-23 DIAGNOSIS — R93.89 ABNORMAL MRI: ICD-10-CM

## 2023-10-23 PROCEDURE — 99214 OFFICE O/P EST MOD 30 MIN: CPT | Performed by: PSYCHIATRY & NEUROLOGY

## 2023-10-23 NOTE — LETTER
October 23, 2023      Sukhi Johnson  [facility]  [address]  [place] [address]        To Whom It May Concern:    Sukhi Johnson was seen in our clinic. Please allow him a quiet place so he can sleep better. The TV noise from his roommate is bothersome to him. Please, if possible, have his roommate wear headphones when they are watching TV.      Sincerely,        Orlando Santos MD

## 2023-10-23 NOTE — TELEPHONE ENCOUNTER
Per pt wife, Sukhi wants to know why [place] MRI is faster and MRI down here is not.    Please call pt at [phone]

## 2023-10-23 NOTE — TELEPHONE ENCOUNTER
[facility] Call Center    Phone Message    May a detailed message be left on voicemail: yes     Reason for Call: Other: Andressa vieira missed call in regards to previous questions from patient.      Please reach out to further advise at   # [phone]    Action Taken: Other: mpnu     Travel Screening: Not Applicable

## 2023-10-23 NOTE — PROGRESS NOTES
NEUROLOGY OUTPATIENT PROGRESS NOTE   Oct 23, 2023     CHIEF COMPLAINT/REASON FOR VISIT/REASON FOR CONSULT  Patient presents with:  Follow Up    REASON FOR CONSULTATION- Stroke, abnormal MRI    REFERRAL SOURCE  Dr. Zaina Schneider  CC Dr. Zaina Schneider    HISTORY OF PRESENT ILLNESS  Sukhi Johnson is a 73 year old male seen for evaluation of an abnormal MRI.  Patient initially around late December 2021 had a spell where he fell down due to left-sided weakness.  It is unclear if he fell down first and then the left-sided weakness came on all he had the left-sided weakness first and then fell down because of that.  He was taken to the hospital and was found to have a right parietal/frontal intraparenchymal hemorrhage.  This was thought to be related to cerebral amyloid angiopathy or hypertension.  Patient was on Adderall for chronic Lyme's disease which was thought to be the cause of his hypertension.  This has not been stopped.  He did therapies and has been involved with speech therapy/Occupational Therapy/physical therapy and has made some improvement though remains wheelchair-bound with left-sided weakness.    Repeat 3 months can was done to evaluate for causes of hemorrhage and no underlying lesion was identified.  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  Patient reports no previous history of cancer or any autoimmune disease.  No recent infectious exposures.  No weight loss or any other new symptoms.    5/10/22  Patient returns today.  Continues to have left-sided weakness.  Doing physical therapy and has made minimal improvement.  Is remaining in the assisted living.  Family is requesting a one-to-one physical therapy.  Reports no headaches or new neurological symptoms.  No seizure-like activity.  No spasticity or any limitations for him to do physical therapy.    8/15/22  He returns today.  He has been continuing to work with physical  therapy and has been making improvements.  Family would like the physical therapy to be renewed.  He has been working with speech therapy and is making some improvement the symptoms do seem to fluctuate.  He is getting a iPad like device so he can communicate better with the family.  Currently he is communicating mainly with gestures.  He has been able to feed himself and get help from other people.  G-tube has been replaced.  Is getting supplementation for nutrition.  Also on heparin for DVT prophylaxis.  No other new symptoms reported.  Family still resistant to the LP.    10/23/23  Patient returns today.  Slowly making progress and is able to move his left arm and leg.  Remains somewhat aphasic and has difficulty communicating.  Has been having difficulty with sleep as his roommate will keep the television on really loud.  He wants a letter so his group home will turn the television down or his roommate would use headphones so he can sleep better.  No other new symptoms/issues.  Tolerating his medications.    Previous history is reviewed and this is unchanged.    PAST MEDICAL/SURGICAL HISTORY  History reviewed. No pertinent past medical history.  Patient Active Problem List   Diagnosis    Right-sided nontraumatic intracerebral hemorrhage, unspecified cerebral location (H)    Hypertension, unspecified type    Contracture of hand joint, left    Left hemiplegia (H)    Dysarthria and anarthria    Protein-calorie malnutrition (H24)    On deep vein thrombosis (DVT) prophylaxis   Reviewed and otherwise noncontributory    FAMILY HISTORY  History reviewed. No pertinent family history.   Stroke in mother at age 90    SOCIAL HISTORY  Social History     Tobacco Use    Smoking status: Never    Smokeless tobacco: Never   Vaping Use    Vaping Use: Never used   Substance Use Topics    Alcohol use: Never    Drug use: Never       SYSTEMS REVIEW  Twelve-system ROS was done and other than the HPI this was negative.  Pertinent  positives noted in the HPI.  No new symptoms/issues.    MEDICATIONS  acetaminophen (TYLENOL) 325 MG/10.15ML solution, 20.3 mLs (650 mg) by Oral or Feeding Tube route every 4 hours as needed for mild pain or fever  acetaminophen (TYLENOL) 650 MG suppository, Place 1 suppository (650 mg) rectally every 4 hours as needed for mild pain  Cranberry-Vitamin C (AZO CRANBERRY URINARY TRACT PO), Take 1 tablet by mouth 2 times daily  diclofenac (VOLTAREN) 1 % topical gel, Apply to left shoulder BID  Heparin Sodium, Porcine, (HEPARIN ANTICOAGULANT) 5000 UNIT/0.5ML injection, Inject 0.5 mLs (5,000 Units) Subcutaneous every 12 hours  metoprolol tartrate (LOPRESSOR) 50 MG tablet, 1 tablet (50 mg) by Per Feeding Tube route 2 times daily  ondansetron (ZOFRAN-ODT) 4 MG ODT tab, Take 1 tablet (4 mg) by mouth every 6 hours as needed for nausea or vomiting  pantoprazole (PROTONIX) 40 MG EC tablet, Take 20 mg by mouth daily  sennosides (SENOKOT) 8.8 MG/5ML syrup, 5 mLs by Oral or Feeding Tube route 2 times daily as needed for constipation    No current facility-administered medications on file prior to visit.       PHYSICAL EXAMINATION  VITALS: /72   Pulse 62   Resp 16   Wt 62.6 kg (138 lb)   BMI 20.98 kg/m    GENERAL: Healthy appearing, alert, no acute distress, normal habitus.  CARDIOVASCULAR: Extremities warm and well perfused. Pulses present.   NEUROLOGICAL:  Patient is awake and grossly oriented to self, place and time.  Attention span is slightly decreased.  Memory is grossly intact.  Language is limited to few words.  Can follow some commands. Appropriate fund of knowledge. Cranial nerves 2-12 are intact with left-sided facial droop.  There might be left visual field cut though unclear. There is no pronator drift.  Motor exam shows 5/5 strength in right arm and leg.  Left arm is 2 out of 5.  Left leg is 3 out of 5.  Tone is normal in right arm and leg.  Left arm and leg do show evidence of spasticity.  (Previously  reflexes are 2+ in upper extremities and lower extremities the brisker on the left side. Sensory exam is grossly intact to light touch, pin prick and vibration but subjectively being decreased on the left side.)  Finger to nose is normal on the right side.  Unable to do on the left side due to weakness.  Unable to do heel-to-shin bilaterally.  Gait cannot be tested safely due to ongoing weakness.  He is unable to ambulate due to weakness.  Exam stable.  Slightly more improved strength on the left side.    DIAGNOSTICS  MRI 3/22/22-images reviewed.  Pachymeningeal enhancement noted.  Previous subacute right parietal frontal hemorrhage noted  IMPRESSION:  1. Expected signal changes of now late subacute right frontoparietal  intraparenchymal hemorrhage.   2. Extensive microhemorrhages on susceptibility weighted images;  sequelae of hypertensive microangiopathy and/or possibly amyloid  angiopathy and chronic small vessel ischemic disease.  3. New symmetric pachymeningeal enhancement. Differential includes  idiopathic intracranial hypotension, neoplasm, metastatic disease or  granulomatous disease.    CT 12/29/21.  IMPRESSION:   1. Stable parenchymal hematoma centered at the frontoparietal junction  of the right cerebral hemisphere resulting in minimal leftward midline  shift again noted without change. No definite evidence for new or  increasing intracranial hemorrhage.  2. Diffuse cerebral volume loss and cerebral white matter changes  consistent with chronic small vessel ischemic disease.    MRI 12/25/21 -images reviewed.  Right peripheral internal hemorrhage noted.                                                         IMPRESSION:  1.  Right frontoparietal acute intracranial hemorrhage with associated regional mass effect resulting in 2 mm leftward midline shift, not significantly changed since previous exam allowing for differences in technique. No associated abnormal enhancement   to suggest underlying tumor.  2.   Mild to moderate chronic small vessel ischemic disease and mild generalized brain parenchymal volume loss.  3.  Tiny focus of chronic infarction within the left posterior cerebral artery territory.  4.  Numerous chronic microhemorrhages throughout the cerebral hemispheres, deep gray nuclei and posterior fossa, possibly representing sequelae of hypertensive or amyloid angiopathy.    IMPRESSION:  This EEG recording during waking was abnormal due to generalized delta-theta slowing and to greater delta-theta slowing over the right hemisphere.  No interictal epileptiform abnormalities and no electrographic seizures were recorded.   These findings indicate mild-moderate electrographic encephalopathy and greater right hemispheric dysfunction; the history of hemorrhage may account for this asymmetric dysfunction.  Clinical correlation is recommended.  Vince Iglesias M.D., Professor of Neurology     OUTSIDE RECORDS  Outside referral notes and chart notes were reviewed and pertinent information has been summarized (in addition to the HPI):-        MRA images reviewed.  IMPRESSION:  1.  No central arterial stenosis/occlusion, aneurysm, or high flow vascular malformation identified.  2.  Continued decrease in size of the previously demonstrated parenchymal hematoma centered in the right frontoparietal region.      MRV-images reviewed.  HEAD MRV:   1.  No dural venous sinus thrombosis or significant stenosis.    LABS  Component      Latest Ref Rng & Units 4/11/2022   WBC      4.0 - 11.0 10e3/uL 5.4   RBC Count      4.40 - 5.90 10e6/uL 4.76   Hemoglobin      13.3 - 17.7 g/dL 14.8   Hematocrit      40.0 - 53.0 % 44.7   MCV      78 - 100 fL 94   MCH      26.5 - 33.0 pg 31.1   MCHC      31.5 - 36.5 g/dL 33.1   RDW      10.0 - 15.0 % 12.6   Platelet Count      150 - 450 10e3/uL 292   % Neutrophils      % 66   % Lymphocytes      % 25   % Monocytes      % 6   % Eosinophils      % 1   % Basophils      % 1   % Immature Granulocytes       % 1   NRBCs per 100 WBC      <1 /100 0   Absolute Neutrophils      1.6 - 8.3 10e3/uL 3.6   Absolute Lymphocytes      0.8 - 5.3 10e3/uL 1.4   Absolute Monocytes      0.0 - 1.3 10e3/uL 0.3   Absolute Eosinophils      0.0 - 0.7 10e3/uL 0.0   Absolute Basophils      0.0 - 0.2 10e3/uL 0.0   Absolute Immature Granulocytes      <=0.4 10e3/uL 0.0   Absolute NRBCs      10e3/uL 0.0   Interpretive Comments          Amphiphysin Ab, S      <1:240 titer    AGNA-1, S      <1:240 titer    VIRGINIA-1, S      <1:240 titer    VIRGINIA-2, S      <1:240 titer    VIRGINIA-3, S      <1:240 titer    CRMP-5-IgG, S      <1:240 titer    Neuronal (V-G) K+ Channel Ab, S      <=0.02 nmol/L    P/Q-Type Calcium Channel AB      <=0.02 nmol/L    PCA-1, S      <1:240 titer    PCA-2, S      <1:240 titer    PCA-Tr, S      <1:240 titer    Reflex Added          AL interpretation      Negative Borderline Positive (A)   AL pattern 1       Speckled   AL titer 1       1:80   Neutrophil Cytoplasmic Antibody      <1:10 <1:10   Neutrophil Cytoplasmic Antibody Pattern       The ANCA IFA is <1:10.  No further testing will be performed.   T4 Free      0.70 - 1.80 ng/dL 1.50   Rheumatoid Factor      0 - 30 IU/mL <15   Lyme Disease Antibodies Serum      <0.90 0.17   Sed Rate      0 - 15 mm/hr 8   Methylmalonic Acid      0.00 - 0.40 umol/L 0.29   Vitamin B12      213 - 816 pg/mL 443     Component      Latest Ref Rng & Units 4/15/2022   WBC      4.0 - 11.0 10e3/uL    RBC Count      4.40 - 5.90 10e6/uL    Hemoglobin      13.3 - 17.7 g/dL    Hematocrit      40.0 - 53.0 %    MCV      78 - 100 fL    MCH      26.5 - 33.0 pg    MCHC      31.5 - 36.5 g/dL    RDW      10.0 - 15.0 %    Platelet Count      150 - 450 10e3/uL    % Neutrophils      %    % Lymphocytes      %    % Monocytes      %    % Eosinophils      %    % Basophils      %    % Immature Granulocytes      %    NRBCs per 100 WBC      <1 /100    Absolute Neutrophils      1.6 - 8.3 10e3/uL    Absolute Lymphocytes      0.8 -  5.3 10e3/uL    Absolute Monocytes      0.0 - 1.3 10e3/uL    Absolute Eosinophils      0.0 - 0.7 10e3/uL    Absolute Basophils      0.0 - 0.2 10e3/uL    Absolute Immature Granulocytes      <=0.4 10e3/uL    Absolute NRBCs      10e3/uL    Interpretive Comments       SEE NOTE   Amphiphysin Ab, S      <1:240 titer Negative   AGNA-1, S      <1:240 titer Negative   VIRGINIA-1, S      <1:240 titer Negative   VIRGINIA-2, S      <1:240 titer Negative   VIRGINIA-3, S      <1:240 titer Negative   CRMP-5-IgG, S      <1:240 titer Negative   Neuronal (V-G) K+ Channel Ab, S      <=0.02 nmol/L 0.00   P/Q-Type Calcium Channel AB      <=0.02 nmol/L 0.00   PCA-1, S      <1:240 titer Negative   PCA-2, S      <1:240 titer Negative   PCA-Tr, S      <1:240 titer Negative   Reflex Added       None.   AL interpretation      Negative    AL pattern 1          AL titer 1          Neutrophil Cytoplasmic Antibody      <1:10    Neutrophil Cytoplasmic Antibody Pattern          T4 Free      0.70 - 1.80 ng/dL    Rheumatoid Factor      0 - 30 IU/mL    Lyme Disease Antibodies Serum      <0.90    Sed Rate      0 - 15 mm/hr    Methylmalonic Acid      0.00 - 0.40 umol/L    Vitamin B12      213 - 816 pg/mL      MRI brain-images reviewed.  IMPRESSION:  1.  Interval evolution of known right frontoparietal parenchymal hemorrhage as above. While no definitive enhancement is demonstrated at the site of hemorrhage, an additional 4 month follow-up MRI could be considered as a precautionary measure given   slight limitations of this study.  2.  No new intracranial hemorrhage demonstrated.  3.  Decreased conspicuity of previously demonstrated diffuse pachymeningeal thickening and enhancement. There is mild residual dural thickening and enhancement along the right frontoparietal convexity, which may be reactive.  4.  Redemonstrated pattern of scattered punctate chronic microhemorrhages. As before, this may represent hypertensive microangiopathy and/or cerebral amyloid  angiopathy.  5.  No evidence of additional superimposed acute intracranial abnormality.    MRI brain-images reviewed.  IMPRESSION:  1.  Continued evolution of intraparenchymal hemorrhage within the right parietal lobe which is unchanged. Ill-defined linear enhancement along the inferior medial margin of the hemorrhage which is similar in appearance to prior examination, though this   does not appear to be a well-defined lesion or mass.  2.  No new intracranial hemorrhage.  3.  Pachymeningeal/dural thickening throughout the cerebral hemispheres bilaterally, greater than left with more pronounced thickening within the right posterior frontal and parietal lesions, which may be reactive given this is in close proximity to the   involving intracerebral hemorrhage. Recommend attention on follow-up.  4.  Scattered foci of susceptibility throughout the cerebral hemispheres bilaterally which may be due to cerebral amyloid angiopathy or hypertensive associated microhemorrhages.   5.  No other evidence of superimposed intracranial abnormality.        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  Patient's hypertension is thought to be related to Adderall use which he stopped.  Repeat MRI has not shown any progression of the intraparenchymal hemorrhage.  No underlying lesion has been identified.    Patient is made significant improvement since last time though still has significant deficits.  Family is requesting another physical therapy referral which I will place.  Would recommend continuing to stay active.  Discussed long-term prognosis.    For the  pachymeningeal enhancement/MRA/MRV was negative.  Blood work was negative except for positive AL which I suspect is a false positive.  Could consider rheumatology referral if he had more joint symptoms/lupus symptoms though he does not complain of anything today.  Repeat MRI has shown evolution of the hemorrhage.  No underlying lesion was noted.    Lumbar puncture was discussed previously.  Family is not interested in doing this and given stability of the MRI over the last 2 years this might not be necessary.    Return back on as-needed basis.    -     Physical Therapy Referral; Future  - Letter for group home provided    Return if symptoms worsen or fail to improve, for In-Clinic Visit (must).    Over 30 minutes were spent coordinating the care for the patient on the day of the encounter.  This includes previsit, during visit and post visit activities as documented above.  Counseling patient and family.  Reviewing MRI images.  Providing letter.  (Activities include but not inclusive of reviewing chart, reviewing outside records, reviewing labs and imaging study results as well as the images, patient visit time including getting history and exam,  use if applicable, review of test results with the patient and coming up with a plan in a shared model, counseling patient and family, education and answering patient questions, EMR , EMR diagnosis entry and problem list management, medication reconciliation and prescription management if applicable, paperwork if applicable, printing documents and documentation of the visit activities.)    Orlando Santos MD  Neurologist  [facility]  Tel:- [phone]    This note was dictated using voice recognition software.  Any grammatical or context distortions are unintentional and inherent to the software.

## 2023-10-23 NOTE — TELEPHONE ENCOUNTER
Left message for Patient/Wife to return call to clinic.   Not sure what is meant by faster?  Faster test? Or faster to get in?  Could be based on the amount of imaging being taken.

## 2023-10-23 NOTE — LETTER
10/23/2023         RE: Sukhi Johnson  C/o [facility]  [address]  [place] [address]        Dear Colleague,    Thank you for referring your patient, Sukhi Johnson, to the [facility] [place]. Please see a copy of my visit note below.    NEUROLOGY OUTPATIENT PROGRESS NOTE   Oct 23, 2023     CHIEF COMPLAINT/REASON FOR VISIT/REASON FOR CONSULT  Patient presents with:  Follow Up    REASON FOR CONSULTATION- Stroke, abnormal MRI    REFERRAL SOURCE  Dr. Zaina Schneider  CC Dr. Zaina Schneider    HISTORY OF PRESENT ILLNESS  Sukhi Johnson is a 73 year old male seen for evaluation of an abnormal MRI.  Patient initially around late December 2021 had a spell where he fell down due to left-sided weakness.  It is unclear if he fell down first and then the left-sided weakness came on all he had the left-sided weakness first and then fell down because of that.  He was taken to the hospital and was found to have a right parietal/frontal intraparenchymal hemorrhage.  This was thought to be related to cerebral amyloid angiopathy or hypertension.  Patient was on Adderall for chronic Lyme's disease which was thought to be the cause of his hypertension.  This has not been stopped.  He did therapies and has been involved with speech therapy/Occupational Therapy/physical therapy and has made some improvement though remains wheelchair-bound with left-sided weakness.    Repeat 3 months can was done to evaluate for causes of hemorrhage and no underlying lesion was identified.  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  Patient reports no previous history of cancer or any autoimmune disease.  No recent infectious exposures.  No weight loss or any other new symptoms.    5/10/22  Patient returns today.  Continues to have left-sided weakness.  Doing physical therapy and has made minimal improvement.  Is remaining in the assisted  living.  Family is requesting a one-to-one physical therapy.  Reports no headaches or new neurological symptoms.  No seizure-like activity.  No spasticity or any limitations for him to do physical therapy.    8/15/22  He returns today.  He has been continuing to work with physical therapy and has been making improvements.  Family would like the physical therapy to be renewed.  He has been working with speech therapy and is making some improvement the symptoms do seem to fluctuate.  He is getting a iPad like device so he can communicate better with the family.  Currently he is communicating mainly with gestures.  He has been able to feed himself and get help from other people.  G-tube has been replaced.  Is getting supplementation for nutrition.  Also on heparin for DVT prophylaxis.  No other new symptoms reported.  Family still resistant to the LP.    10/23/23  Patient returns today.  Slowly making progress and is able to move his left arm and leg.  Remains somewhat aphasic and has difficulty communicating.  Has been having difficulty with sleep as his roommate will keep the television on really loud.  He wants a letter so his group home will turn the television down or his roommate would use headphones so he can sleep better.  No other new symptoms/issues.  Tolerating his medications.    Previous history is reviewed and this is unchanged.    PAST MEDICAL/SURGICAL HISTORY  History reviewed. No pertinent past medical history.  Patient Active Problem List   Diagnosis     Right-sided nontraumatic intracerebral hemorrhage, unspecified cerebral location (H)     Hypertension, unspecified type     Contracture of hand joint, left     Left hemiplegia (H)     Dysarthria and anarthria     Protein-calorie malnutrition (H24)     On deep vein thrombosis (DVT) prophylaxis   Reviewed and otherwise noncontributory    FAMILY HISTORY  History reviewed. No pertinent family history.   Stroke in mother at age 90    SOCIAL HISTORY  Social  History     Tobacco Use     Smoking status: Never     Smokeless tobacco: Never   Vaping Use     Vaping Use: Never used   Substance Use Topics     Alcohol use: Never     Drug use: Never       SYSTEMS REVIEW  Twelve-system ROS was done and other than the HPI this was negative.  Pertinent positives noted in the HPI.  No new symptoms/issues.    MEDICATIONS  acetaminophen (TYLENOL) 325 MG/10.15ML solution, 20.3 mLs (650 mg) by Oral or Feeding Tube route every 4 hours as needed for mild pain or fever  acetaminophen (TYLENOL) 650 MG suppository, Place 1 suppository (650 mg) rectally every 4 hours as needed for mild pain  Cranberry-Vitamin C (AZO CRANBERRY URINARY TRACT PO), Take 1 tablet by mouth 2 times daily  diclofenac (VOLTAREN) 1 % topical gel, Apply to left shoulder BID  Heparin Sodium, Porcine, (HEPARIN ANTICOAGULANT) 5000 UNIT/0.5ML injection, Inject 0.5 mLs (5,000 Units) Subcutaneous every 12 hours  metoprolol tartrate (LOPRESSOR) 50 MG tablet, 1 tablet (50 mg) by Per Feeding Tube route 2 times daily  ondansetron (ZOFRAN-ODT) 4 MG ODT tab, Take 1 tablet (4 mg) by mouth every 6 hours as needed for nausea or vomiting  pantoprazole (PROTONIX) 40 MG EC tablet, Take 20 mg by mouth daily  sennosides (SENOKOT) 8.8 MG/5ML syrup, 5 mLs by Oral or Feeding Tube route 2 times daily as needed for constipation    No current facility-administered medications on file prior to visit.       PHYSICAL EXAMINATION  VITALS: /72   Pulse 62   Resp 16   Wt 62.6 kg (138 lb)   BMI 20.98 kg/m    GENERAL: Healthy appearing, alert, no acute distress, normal habitus.  CARDIOVASCULAR: Extremities warm and well perfused. Pulses present.   NEUROLOGICAL:  Patient is awake and grossly oriented to self, place and time.  Attention span is slightly decreased.  Memory is grossly intact.  Language is limited to few words.  Can follow some commands. Appropriate fund of knowledge. Cranial nerves 2-12 are intact with left-sided facial droop.   There might be left visual field cut though unclear. There is no pronator drift.  Motor exam shows 5/5 strength in right arm and leg.  Left arm is 2 out of 5.  Left leg is 3 out of 5.  Tone is normal in right arm and leg.  Left arm and leg do show evidence of spasticity.  (Previously reflexes are 2+ in upper extremities and lower extremities the brisker on the left side. Sensory exam is grossly intact to light touch, pin prick and vibration but subjectively being decreased on the left side.)  Finger to nose is normal on the right side.  Unable to do on the left side due to weakness.  Unable to do heel-to-shin bilaterally.  Gait cannot be tested safely due to ongoing weakness.  He is unable to ambulate due to weakness.  Exam stable.  Slightly more improved strength on the left side.    DIAGNOSTICS  MRI 3/22/22-images reviewed.  Pachymeningeal enhancement noted.  Previous subacute right parietal frontal hemorrhage noted  IMPRESSION:  1. Expected signal changes of now late subacute right frontoparietal  intraparenchymal hemorrhage.   2. Extensive microhemorrhages on susceptibility weighted images;  sequelae of hypertensive microangiopathy and/or possibly amyloid  angiopathy and chronic small vessel ischemic disease.  3. New symmetric pachymeningeal enhancement. Differential includes  idiopathic intracranial hypotension, neoplasm, metastatic disease or  granulomatous disease.    CT 12/29/21.  IMPRESSION:   1. Stable parenchymal hematoma centered at the frontoparietal junction  of the right cerebral hemisphere resulting in minimal leftward midline  shift again noted without change. No definite evidence for new or  increasing intracranial hemorrhage.  2. Diffuse cerebral volume loss and cerebral white matter changes  consistent with chronic small vessel ischemic disease.    MRI 12/25/21 -images reviewed.  Right peripheral internal hemorrhage noted.                                                         IMPRESSION:  1.   Right frontoparietal acute intracranial hemorrhage with associated regional mass effect resulting in 2 mm leftward midline shift, not significantly changed since previous exam allowing for differences in technique. No associated abnormal enhancement   to suggest underlying tumor.  2.  Mild to moderate chronic small vessel ischemic disease and mild generalized brain parenchymal volume loss.  3.  Tiny focus of chronic infarction within the left posterior cerebral artery territory.  4.  Numerous chronic microhemorrhages throughout the cerebral hemispheres, deep gray nuclei and posterior fossa, possibly representing sequelae of hypertensive or amyloid angiopathy.    IMPRESSION:  This EEG recording during waking was abnormal due to generalized delta-theta slowing and to greater delta-theta slowing over the right hemisphere.  No interictal epileptiform abnormalities and no electrographic seizures were recorded.   These findings indicate mild-moderate electrographic encephalopathy and greater right hemispheric dysfunction; the history of hemorrhage may account for this asymmetric dysfunction.  Clinical correlation is recommended.  Vince Iglesias M.D., Professor of Neurology     OUTSIDE RECORDS  Outside referral notes and chart notes were reviewed and pertinent information has been summarized (in addition to the HPI):-        MRA images reviewed.  IMPRESSION:  1.  No central arterial stenosis/occlusion, aneurysm, or high flow vascular malformation identified.  2.  Continued decrease in size of the previously demonstrated parenchymal hematoma centered in the right frontoparietal region.      MRV-images reviewed.  HEAD MRV:   1.  No dural venous sinus thrombosis or significant stenosis.    LABS  Component      Latest Ref Rng & Units 4/11/2022   WBC      4.0 - 11.0 10e3/uL 5.4   RBC Count      4.40 - 5.90 10e6/uL 4.76   Hemoglobin      13.3 - 17.7 g/dL 14.8   Hematocrit      40.0 - 53.0 % 44.7   MCV      78 - 100 fL 94    MCH      26.5 - 33.0 pg 31.1   MCHC      31.5 - 36.5 g/dL 33.1   RDW      10.0 - 15.0 % 12.6   Platelet Count      150 - 450 10e3/uL 292   % Neutrophils      % 66   % Lymphocytes      % 25   % Monocytes      % 6   % Eosinophils      % 1   % Basophils      % 1   % Immature Granulocytes      % 1   NRBCs per 100 WBC      <1 /100 0   Absolute Neutrophils      1.6 - 8.3 10e3/uL 3.6   Absolute Lymphocytes      0.8 - 5.3 10e3/uL 1.4   Absolute Monocytes      0.0 - 1.3 10e3/uL 0.3   Absolute Eosinophils      0.0 - 0.7 10e3/uL 0.0   Absolute Basophils      0.0 - 0.2 10e3/uL 0.0   Absolute Immature Granulocytes      <=0.4 10e3/uL 0.0   Absolute NRBCs      10e3/uL 0.0   Interpretive Comments          Amphiphysin Ab, S      <1:240 titer    AGNA-1, S      <1:240 titer    VIRGINIA-1, S      <1:240 titer    VIRGINIA-2, S      <1:240 titer    VIRGINIA-3, S      <1:240 titer    CRMP-5-IgG, S      <1:240 titer    Neuronal (V-G) K+ Channel Ab, S      <=0.02 nmol/L    P/Q-Type Calcium Channel AB      <=0.02 nmol/L    PCA-1, S      <1:240 titer    PCA-2, S      <1:240 titer    PCA-Tr, S      <1:240 titer    Reflex Added          AL interpretation      Negative Borderline Positive (A)   AL pattern 1       Speckled   AL titer 1       1:80   Neutrophil Cytoplasmic Antibody      <1:10 <1:10   Neutrophil Cytoplasmic Antibody Pattern       The ANCA IFA is <1:10.  No further testing will be performed.   T4 Free      0.70 - 1.80 ng/dL 1.50   Rheumatoid Factor      0 - 30 IU/mL <15   Lyme Disease Antibodies Serum      <0.90 0.17   Sed Rate      0 - 15 mm/hr 8   Methylmalonic Acid      0.00 - 0.40 umol/L 0.29   Vitamin B12      213 - 816 pg/mL 443     Component      Latest Ref Rng & Units 4/15/2022   WBC      4.0 - 11.0 10e3/uL    RBC Count      4.40 - 5.90 10e6/uL    Hemoglobin      13.3 - 17.7 g/dL    Hematocrit      40.0 - 53.0 %    MCV      78 - 100 fL    MCH      26.5 - 33.0 pg    MCHC      31.5 - 36.5 g/dL    RDW      10.0 - 15.0 %    Platelet  Count      150 - 450 10e3/uL    % Neutrophils      %    % Lymphocytes      %    % Monocytes      %    % Eosinophils      %    % Basophils      %    % Immature Granulocytes      %    NRBCs per 100 WBC      <1 /100    Absolute Neutrophils      1.6 - 8.3 10e3/uL    Absolute Lymphocytes      0.8 - 5.3 10e3/uL    Absolute Monocytes      0.0 - 1.3 10e3/uL    Absolute Eosinophils      0.0 - 0.7 10e3/uL    Absolute Basophils      0.0 - 0.2 10e3/uL    Absolute Immature Granulocytes      <=0.4 10e3/uL    Absolute NRBCs      10e3/uL    Interpretive Comments       SEE NOTE   Amphiphysin Ab, S      <1:240 titer Negative   AGNA-1, S      <1:240 titer Negative   VIRGINIA-1, S      <1:240 titer Negative   VIRGINIA-2, S      <1:240 titer Negative   VIRGINIA-3, S      <1:240 titer Negative   CRMP-5-IgG, S      <1:240 titer Negative   Neuronal (V-G) K+ Channel Ab, S      <=0.02 nmol/L 0.00   P/Q-Type Calcium Channel AB      <=0.02 nmol/L 0.00   PCA-1, S      <1:240 titer Negative   PCA-2, S      <1:240 titer Negative   PCA-Tr, S      <1:240 titer Negative   Reflex Added       None.   AL interpretation      Negative    AL pattern 1          AL titer 1          Neutrophil Cytoplasmic Antibody      <1:10    Neutrophil Cytoplasmic Antibody Pattern          T4 Free      0.70 - 1.80 ng/dL    Rheumatoid Factor      0 - 30 IU/mL    Lyme Disease Antibodies Serum      <0.90    Sed Rate      0 - 15 mm/hr    Methylmalonic Acid      0.00 - 0.40 umol/L    Vitamin B12      213 - 816 pg/mL      MRI brain-images reviewed.  IMPRESSION:  1.  Interval evolution of known right frontoparietal parenchymal hemorrhage as above. While no definitive enhancement is demonstrated at the site of hemorrhage, an additional 4 month follow-up MRI could be considered as a precautionary measure given   slight limitations of this study.  2.  No new intracranial hemorrhage demonstrated.  3.  Decreased conspicuity of previously demonstrated diffuse pachymeningeal thickening and  enhancement. There is mild residual dural thickening and enhancement along the right frontoparietal convexity, which may be reactive.  4.  Redemonstrated pattern of scattered punctate chronic microhemorrhages. As before, this may represent hypertensive microangiopathy and/or cerebral amyloid angiopathy.  5.  No evidence of additional superimposed acute intracranial abnormality.    MRI brain-images reviewed.  IMPRESSION:  1.  Continued evolution of intraparenchymal hemorrhage within the right parietal lobe which is unchanged. Ill-defined linear enhancement along the inferior medial margin of the hemorrhage which is similar in appearance to prior examination, though this   does not appear to be a well-defined lesion or mass.  2.  No new intracranial hemorrhage.  3.  Pachymeningeal/dural thickening throughout the cerebral hemispheres bilaterally, greater than left with more pronounced thickening within the right posterior frontal and parietal lesions, which may be reactive given this is in close proximity to the   involving intracerebral hemorrhage. Recommend attention on follow-up.  4.  Scattered foci of susceptibility throughout the cerebral hemispheres bilaterally which may be due to cerebral amyloid angiopathy or hypertensive associated microhemorrhages.   5.  No other evidence of superimposed intracranial abnormality.        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  Patient's hypertension is thought to be related to Adderall use which he stopped.  Repeat MRI has not shown any progression of the intraparenchymal hemorrhage.  No underlying  lesion has been identified.    Patient is made significant improvement since last time though still has significant deficits.  Family is requesting another physical therapy referral which I will place.  Would recommend continuing to stay active.  Discussed long-term prognosis.    For the pachymeningeal enhancement/MRA/MRV was negative.  Blood work was negative except for positive AL which I suspect is a false positive.  Could consider rheumatology referral if he had more joint symptoms/lupus symptoms though he does not complain of anything today.  Repeat MRI has shown evolution of the hemorrhage.  No underlying lesion was noted.    Lumbar puncture was discussed previously.  Family is not interested in doing this and given stability of the MRI over the last 2 years this might not be necessary.    Return back on as-needed basis.    -     Physical Therapy Referral; Future  - Letter for group home provided    Return if symptoms worsen or fail to improve, for In-Clinic Visit (must).    Over 30 minutes were spent coordinating the care for the patient on the day of the encounter.  This includes previsit, during visit and post visit activities as documented above.  Counseling patient and family.  Reviewing MRI images.  Providing letter.  (Activities include but not inclusive of reviewing chart, reviewing outside records, reviewing labs and imaging study results as well as the images, patient visit time including getting history and exam,  use if applicable, review of test results with the patient and coming up with a plan in a shared model, counseling patient and family, education and answering patient questions, EMR , EMR diagnosis entry and problem list management, medication reconciliation and prescription management if applicable, paperwork if applicable, printing documents and documentation of the visit activities.)    Orlando Santos MD  Neurologist  [facility] -  [place]  Tel:- [phone]    This note was dictated using voice recognition software.  Any grammatical or context distortions are unintentional and inherent to the software.      Again, thank you for allowing me to participate in the care of your patient.        Sincerely,        Orlando Santos MD

## 2023-11-07 ENCOUNTER — NURSING HOME VISIT (OUTPATIENT)
Dept: GERIATRICS | Facility: CLINIC | Age: 73
End: 2023-11-07
Payer: COMMERCIAL

## 2023-11-07 VITALS
HEART RATE: 76 BPM | TEMPERATURE: 97.2 F | DIASTOLIC BLOOD PRESSURE: 72 MMHG | HEIGHT: 68 IN | SYSTOLIC BLOOD PRESSURE: 126 MMHG | BODY MASS INDEX: 20.88 KG/M2 | WEIGHT: 137.8 LBS | RESPIRATION RATE: 17 BRPM | OXYGEN SATURATION: 94 %

## 2023-11-07 DIAGNOSIS — G81.94 LEFT HEMIPLEGIA (H): ICD-10-CM

## 2023-11-07 DIAGNOSIS — I61.9 RIGHT-SIDED NONTRAUMATIC INTRACEREBRAL HEMORRHAGE, UNSPECIFIED CEREBRAL LOCATION (H): Primary | ICD-10-CM

## 2023-11-07 DIAGNOSIS — M24.542 CONTRACTURE OF HAND JOINT, LEFT: ICD-10-CM

## 2023-11-07 PROCEDURE — 99309 SBSQ NF CARE MODERATE MDM 30: CPT | Performed by: NURSE PRACTITIONER

## 2023-11-07 NOTE — LETTER
11/7/2023        RE: Sukhi Johnson  C/o [facility]  [address]  [place] [address]        [facility]  [place] Medical Record Number: [id number]  Chief Complaint   Patient presents with    RECHECK     HPI: Sukhi Johnson is a 73 year old (1950), who is being seen today for an episodic care visit at: [facility] () [[id number]]. Today's concern is: ***    Allergies and PMH/PSH reviewed in [facility] today.    REVIEW OF SYSTEMS:  {pyortd01:435782}    Objective:   There were no vitals taken for this visit.  Exam:  {halfway physical exam :147065}    Labs:   {fgslab:012271}    Assessment/Plan:  {FGS DX:228475}    MED REC REQUIRED{TIP  Click the link below to document or use med rec list, use list to pull in response :260408}  Post Medication Reconciliation Status: {MED REC LIST:727664}    Orders:  {fgsorders:709177}    Electronically signed by: Gudelia Monroe***      Sincerely,        KALI Ramirez CNP

## 2023-11-07 NOTE — PROGRESS NOTES
"[facility] GERIATRICS  [place] Medical Record Number: [id number]  Chief Complaint   Patient presents with    RECHECK     HPI: Sukhi Johnson is a 73 year old (1950), who is being seen today for an episodic care visit at: [facility] () [[id number]]. Today's concern is:   1. Right-sided nontraumatic intracerebral hemorrhage, unspecified cerebral location (H)    2. Left hemiplegia (H)    3. Contracture of hand joint, left      Patient seen after staff had a care conference with patient and significant other Miladis, is post ICH with moderate L hemiplegia, difficulty making needs known, unknown cognitive status, appears healthy, L hand curled and has brace, recent request for EWC declined due to safety reasons, no distress.    Allergies and PMH/PSH reviewed in [facility] today.    REVIEW OF SYSTEMS:  Unobtainable secondary to cognitive impairment.     Objective:   /72   Pulse 76   Temp 97.2  F (36.2  C)   Resp 17   Ht 1.727 m (5' 8\")   Wt 62.5 kg (137 lb 12.8 oz)   SpO2 94%   BMI 20.95 kg/m    Exam:  GENERAL APPEARANCE:  in no distress, appears healthy  ENT:  Mouth and posterior oropharynx normal, moist mucous membranes  RESP:  lungs clear to auscultation , no respiratory distress  CV:  regular rate and rhythm, no murmur, rub, or gallop, no edema  ABDOMEN:  bowel sounds normal  M/S:   Gait and station abnormal full ADL assist  SKIN:  Inspection of skin and subcutaneous tissue baseline  NEURO:   Examination of sensation by touch normal  PSYCH:  affect and mood normal    Labs:   Labs done in SNF are in [facility]. Please refer to them using [employer]/Care Everywhere.    Assessment/Plan:  (I61.9) Right-sided nontraumatic intracerebral hemorrhage, unspecified cerebral location (H)  (primary encounter diagnosis)  (G81.94) Left hemiplegia (H)  (M24.542) Contracture of hand joint, left  Comment: cognitive limitations, L hemiplegia with hand contracted  Plan:   -OT eval for new WC " cushion  -[employer]well oil roller BID apply to L arm and hand, Miladis reports patient moved hand during neuro appt and believes the L extremity will improve  -[employer] oil roller throat apply BID, improve swallowing?  -OT to evaluate LUE for therapy needs, adaptive utensils  -staff to support as indicated      MED REC REQUIRED  Post Medication Reconciliation Status: medication reconcilation previously completed during another office visit        Electronically signed by: KALI Ramirez CNP

## 2023-11-14 ENCOUNTER — TRANSFERRED RECORDS (OUTPATIENT)
Dept: HEALTH INFORMATION MANAGEMENT | Facility: CLINIC | Age: 73
End: 2023-11-14

## 2023-11-24 NOTE — PROGRESS NOTES
[place] [facility]  Chief Complaint   Patient presents with    [facility] Regulatory     [place] Medical Record Number:  [id number]  Place of Service where encounter took place:  [facility] () [[id number]]    HPI:    Sukhi Johnson  is 73 year old (1950), who is being seen today for a federally mandated E/M visit.  HPI information obtained from: facility chart records, facility staff, patient report, [facility] chart review and family/first contact wife report. T    Updates:  11/7: care conference with family. OT eval for new WC cushion, adaptive utensils.   today's concerns are:  - - Resident seen and examined, chart reviewed and discussed with the nursing staff.   -  Pt reports feeling fine, . Denies cough with food or drink.  denies sob or wheezing.   - appetite is fair.   - DNP and RN have no concern today.     --------------------------------  - Past Medical, social, family histories, medications, and allergies reviewed and updated    MEDICATIONS:  Current Outpatient Medications   Medication Sig Dispense Refill    acetaminophen (TYLENOL) 325 MG/10.15ML solution 20.3 mLs (650 mg) by Oral or Feeding Tube route every 4 hours as needed for mild pain or fever      acetaminophen (TYLENOL) 650 MG suppository Place 1 suppository (650 mg) rectally every 4 hours as needed for mild pain      Cranberry-Vitamin C (AZO CRANBERRY URINARY TRACT PO) Take 1 tablet by mouth 2 times daily      diclofenac (VOLTAREN) 1 % topical gel Apply to left shoulder BID 50 g 1    Heparin Sodium, Porcine, (HEPARIN ANTICOAGULANT) 5000 UNIT/0.5ML injection Inject 0.5 mLs (5,000 Units) Subcutaneous every 12 hours      metoprolol tartrate (LOPRESSOR) 50 MG tablet 1 tablet (50 mg) by Per Feeding Tube route 2 times daily      ondansetron (ZOFRAN-ODT) 4 MG ODT tab Take 1 tablet (4 mg) by mouth every 6 hours as needed for nausea or vomiting      pantoprazole (PROTONIX) 40 MG EC tablet Take 20 mg by mouth daily       "sennosides (SENOKOT) 8.8 MG/5ML syrup 5 mLs by Oral or Feeding Tube route 2 times daily as needed for constipation       ROS: limited due to dysarthria      Vitals:  /78   Pulse 61   Temp 97  F (36.1  C)   Resp 17   Ht 1.727 m (5' 8\")   Wt 61.6 kg (135 lb 11.2 oz)   SpO2 95%   BMI 20.63 kg/m    Body mass index is 20.63 kg/m .     Exam:   GENERAL APPEARANCE:  in no distress, cooperative, sitting up in the WC.   RESP:  lungs clear to auscultation   CV:  S1S2 audible, regular HR, no murmur appreciated.   ABDOMEN:  soft, NT/ND, BS audible. no mass appreciated on palpation.   M/S:   Muscles atrophy right hand.   SKIN:  No rash.   NEURO:  Difficulty expressing words, ms strenght: LUE 0/5, LLL 1/5, Rt hand  4/5 and RLE 4/5. Ptosis right eye.   PSYCH:  affect and mood normal    Lab/Diagnostic data: Reviewed in the chart and EHR.        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 At baseline. . Appreciate help  - on heparin bid since Dec 2021, then started by neurology       Dysphagia, unspecified type  Protein calorie malnutrition, moderate  - 7/29/22: FT removed.  tolerating po intake  -Body mass index is 20.63 kg/m . from 19.9 kg/m . Improving.   - Stable. BMI less than 22 in frail elderly increase mortality risk. Gaining weight slowly. Dietician on the case.       Essential HTN:   - BP controlled,. Off enalapril and hydralazine due to soft BP, on metoprolol tartrate. Continue.       Hx of GIB with acute blood loss anemia:   - Dec 2021. No scopes was done, Hb dropped down to 12.7 gm/dl.  Hb level 14.8 (4/2022)  -  On Protonix 20 mg.   - consider GDR then stop. May repeat Hb/Hct in a couple of months.       Chronic fatigue syndrome 2/2 Lyme disease:  - Significant  Deficits " requiring NH placement. Requiring extensive assistance from nursing. Up for meals only o/w spends the day resting in bed      Electronically signed by:  Joel Blanchard MD

## 2023-11-27 ENCOUNTER — NURSING HOME VISIT (OUTPATIENT)
Dept: GERIATRICS | Facility: CLINIC | Age: 73
End: 2023-11-27
Payer: COMMERCIAL

## 2023-11-27 VITALS
HEART RATE: 61 BPM | DIASTOLIC BLOOD PRESSURE: 78 MMHG | OXYGEN SATURATION: 95 % | HEIGHT: 68 IN | SYSTOLIC BLOOD PRESSURE: 121 MMHG | BODY MASS INDEX: 20.57 KG/M2 | WEIGHT: 135.7 LBS | RESPIRATION RATE: 17 BRPM | TEMPERATURE: 97 F

## 2023-11-27 DIAGNOSIS — G81.94 LEFT HEMIPLEGIA (H): Primary | ICD-10-CM

## 2023-11-27 DIAGNOSIS — G93.32 CHRONIC FATIGUE SYNDROME: ICD-10-CM

## 2023-11-27 DIAGNOSIS — Z87.19 HISTORY OF GASTROINTESTINAL BLEEDING: ICD-10-CM

## 2023-11-27 DIAGNOSIS — E44.0 MODERATE PROTEIN-CALORIE MALNUTRITION (H): ICD-10-CM

## 2023-11-27 DIAGNOSIS — R13.10 DYSPHAGIA, UNSPECIFIED TYPE: ICD-10-CM

## 2023-11-27 DIAGNOSIS — I10 PRIMARY HYPERTENSION: ICD-10-CM

## 2023-11-27 DIAGNOSIS — I69.322 DYSARTHRIA AS LATE EFFECT OF STROKE: ICD-10-CM

## 2023-11-27 PROCEDURE — 99309 SBSQ NF CARE MODERATE MDM 30: CPT | Performed by: FAMILY MEDICINE

## 2023-11-27 NOTE — LETTER
11/27/2023        RE: Sukhi Johnson  C/o Miladis Rehm  [address]  [place] [address]        [place] [facility]  Chief Complaint   Patient presents with     [facility] Regulatory     [place] Medical Record Number:  [id number]  Place of Service where encounter took place:  [facility] () [[id number]]    HPI:    Sukhi Johnson  is 73 year old (1950), who is being seen today for a federally mandated E/M visit.  HPI information obtained from: facility chart records, facility staff, patient report, [facility] chart review and family/first contact wife report. T    Updates:  11/7: care conference with family. OT eval for new WC cushion, adaptive utensils.   today's concerns are:  - - Resident seen and examined, chart reviewed and discussed with the nursing staff.   -  Pt reports feeling fine, . Denies cough with food or drink.  denies sob or wheezing.   - appetite is fair.   - DNP and RN have no concern today.     --------------------------------  - Past Medical, social, family histories, medications, and allergies reviewed and updated    MEDICATIONS:  Current Outpatient Medications   Medication Sig Dispense Refill     acetaminophen (TYLENOL) 325 MG/10.15ML solution 20.3 mLs (650 mg) by Oral or Feeding Tube route every 4 hours as needed for mild pain or fever       acetaminophen (TYLENOL) 650 MG suppository Place 1 suppository (650 mg) rectally every 4 hours as needed for mild pain       Cranberry-Vitamin C (AZO CRANBERRY URINARY TRACT PO) Take 1 tablet by mouth 2 times daily       diclofenac (VOLTAREN) 1 % topical gel Apply to left shoulder BID 50 g 1     Heparin Sodium, Porcine, (HEPARIN ANTICOAGULANT) 5000 UNIT/0.5ML injection Inject 0.5 mLs (5,000 Units) Subcutaneous every 12 hours       metoprolol tartrate (LOPRESSOR) 50 MG tablet 1 tablet (50 mg) by Per Feeding Tube route 2 times daily       ondansetron (ZOFRAN-ODT) 4 MG ODT tab Take 1 tablet (4 mg) by mouth every 6 hours as  "needed for nausea or vomiting       pantoprazole (PROTONIX) 40 MG EC tablet Take 20 mg by mouth daily       sennosides (SENOKOT) 8.8 MG/5ML syrup 5 mLs by Oral or Feeding Tube route 2 times daily as needed for constipation       ROS: limited due to dysarthria      Vitals:  /78   Pulse 61   Temp 97  F (36.1  C)   Resp 17   Ht 1.727 m (5' 8\")   Wt 61.6 kg (135 lb 11.2 oz)   SpO2 95%   BMI 20.63 kg/m    Body mass index is 20.63 kg/m .     Exam:   GENERAL APPEARANCE:  in no distress, cooperative, sitting up in the WC.   RESP:  lungs clear to auscultation   CV:  S1S2 audible, regular HR, no murmur appreciated.   ABDOMEN:  soft, NT/ND, BS audible. no mass appreciated on palpation.   M/S:   Muscles atrophy right hand.   SKIN:  No rash.   NEURO:  Difficulty expressing words, ms strenght: LUE 0/5, LLL 1/5, Rt hand  4/5 and RLE 4/5. Ptosis right eye.   PSYCH:  affect and mood normal    Lab/Diagnostic data: Reviewed in the chart and EHR.        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 At baseline. . Appreciate help  - on heparin bid since Dec 2021, then started by neurology       Dysphagia, unspecified type  Protein calorie malnutrition, moderate  - 7/29/22: FT removed.  tolerating po intake  -Body mass index is 20.63 kg/m . from 19.9 kg/m . Improving.   - Stable. BMI less than 22 in frail elderly increase mortality risk. Gaining weight slowly. Dietician on the case.       Essential HTN:   - BP controlled,. Off enalapril and hydralazine due to soft BP, on metoprolol tartrate. Continue.       Hx of GIB with acute blood loss anemia:   - Dec 2021. No scopes was done, Hb dropped down to 12.7 gm/dl.  Hb level 14.8 (4/2022)  -  On Protonix 20 mg.   - consider GDR then stop. May repeat " Hb/Hct in a couple of months.       Chronic fatigue syndrome 2/2 Lyme disease:  - Significant  Deficits requiring NH placement. Requiring extensive assistance from nursing. Up for meals only o/w spends the day resting in bed      Electronically signed by:  Joel Blanchard MD          Sincerely,        Joel Blanchard MD

## 2023-12-01 ENCOUNTER — LAB REQUISITION (OUTPATIENT)
Dept: LAB | Facility: CLINIC | Age: 73
End: 2023-12-01
Payer: COMMERCIAL

## 2023-12-01 LAB
ANION GAP SERPL CALCULATED.3IONS-SCNC: 14 MMOL/L (ref 7–15)
BUN SERPL-MCNC: 24.2 MG/DL (ref 8–23)
CALCIUM SERPL-MCNC: 9 MG/DL (ref 8.8–10.2)
CHLORIDE SERPL-SCNC: 105 MMOL/L (ref 98–107)
CREAT SERPL-MCNC: 0.64 MG/DL (ref 0.67–1.17)
DEPRECATED HCO3 PLAS-SCNC: 21 MMOL/L (ref 22–29)
EGFRCR SERPLBLD CKD-EPI 2021: >90 ML/MIN/1.73M2
GLUCOSE SERPL-MCNC: 154 MG/DL (ref 70–99)
POTASSIUM SERPL-SCNC: 4.5 MMOL/L (ref 3.4–5.3)
SODIUM SERPL-SCNC: 140 MMOL/L (ref 135–145)

## 2023-12-01 PROCEDURE — 80048 BASIC METABOLIC PNL TOTAL CA: CPT | Mod: ORL | Performed by: FAMILY MEDICINE

## 2023-12-22 ENCOUNTER — PATIENT OUTREACH (OUTPATIENT)
Dept: GERIATRIC MEDICINE | Facility: CLINIC | Age: 73
End: 2023-12-22
Payer: COMMERCIAL

## 2023-12-29 ENCOUNTER — DOCUMENTATION ONLY (OUTPATIENT)
Dept: GERIATRICS | Facility: CLINIC | Age: 73
End: 2023-12-29
Payer: COMMERCIAL

## 2023-12-30 NOTE — PROGRESS NOTES
[facility] Six-Month Assessment    6 month assessment completed on 12/22/23 with Sukhi in his room.  His POA Miladis was present for visit and visiting with Sukhi.  She said he has been doing well, during the visit they were discussing if Sukhi wanted to go to [facility] and he did.    ER visits: No  Hospitalizations: No  TCU stays: No  Significant health status changes: None  Falls/Injuries: No  ADL/IADL changes: No    Reviewed Institutional Assessment and updated as needed.     Will see member in 6 months for an annual health risk assessment.   Encouraged member to call CC with any questions or concerns in the meantime.      OLGA Simon, [facility]  [phone]

## 2024-01-08 ENCOUNTER — CLINICAL UPDATE (OUTPATIENT)
Dept: PHARMACY | Facility: CLINIC | Age: 74
End: 2024-01-08
Payer: COMMERCIAL

## 2024-01-08 DIAGNOSIS — K92.2 GASTROINTESTINAL HEMORRHAGE, UNSPECIFIED GASTROINTESTINAL HEMORRHAGE TYPE: ICD-10-CM

## 2024-01-08 DIAGNOSIS — I10 HYPERTENSION, UNSPECIFIED TYPE: Primary | ICD-10-CM

## 2024-01-08 DIAGNOSIS — I61.9 RIGHT-SIDED NONTRAUMATIC INTRACEREBRAL HEMORRHAGE, UNSPECIFIED CEREBRAL LOCATION (H): ICD-10-CM

## 2024-01-08 DIAGNOSIS — Z79.899 ON DEEP VEIN THROMBOSIS (DVT) PROPHYLAXIS: ICD-10-CM

## 2024-01-08 PROCEDURE — 99207 PR NO CHARGE LOS: CPT | Performed by: PHARMACIST

## 2024-01-08 NOTE — PROGRESS NOTES
This patient's medication list and chart were reviewed as part of the service provided by [facility].    Assessment/Recommendations:    Per chart review, in December 2021, heparin was started while inpatient per neurology for DVT prophylaxis.  This has continued as an oupatient for DVT prophylaxis due to immobility (see 3/29/23 neurology telephone encounter).  Noted NP has discussed with wife potential d'c of heparin, with change to oral anticoag or aspirin, but wife preference has been to continue heparin.  Please note the following per Pharmacist's Letter:        May benefit from readdressing with wife, and consider d'c heparin.  With regards to switching to oral anticoagulant or aspirin 81mg daily, would recommend discussion with neurology on which agent given history of ICH in 2021 and I am not sure of etiology of CVA in 2015, so unclear if anticoagulant or antiplatelet preferred.  If 2015 CVA was not hemorrhagic, and if not cardioembolic CVA, then would recommend aspirin 81mg daily if no other indication for anticoagulant therapy.  If 2015 CVA was also hemorrhagic, then may not require anticoag or antiplatelet (unless another indication present).    History of gi bleed, so while on antiplatelet or anticoagulation, reasonable to continue PPI for GI protection.    Yessi Carrion, Pharm.D.,[facility]  Board Certified Geriatric Pharmacist  Medication Therapy Management Pharmacist  [phone]

## 2024-01-11 VITALS
RESPIRATION RATE: 14 BRPM | SYSTOLIC BLOOD PRESSURE: 110 MMHG | TEMPERATURE: 97.5 F | HEART RATE: 83 BPM | DIASTOLIC BLOOD PRESSURE: 72 MMHG | HEIGHT: 68 IN | WEIGHT: 141 LBS | BODY MASS INDEX: 21.37 KG/M2 | OXYGEN SATURATION: 94 %

## 2024-01-12 ENCOUNTER — NURSING HOME VISIT (OUTPATIENT)
Dept: GERIATRICS | Facility: CLINIC | Age: 74
End: 2024-01-12
Payer: COMMERCIAL

## 2024-01-12 DIAGNOSIS — Z86.73 HISTORY OF CVA (CEREBROVASCULAR ACCIDENT): ICD-10-CM

## 2024-01-12 DIAGNOSIS — E44.0 MODERATE PROTEIN-CALORIE MALNUTRITION (H): ICD-10-CM

## 2024-01-12 DIAGNOSIS — G81.94 LEFT HEMIPLEGIA (H): ICD-10-CM

## 2024-01-12 DIAGNOSIS — I61.9 RIGHT-SIDED NONTRAUMATIC INTRACEREBRAL HEMORRHAGE, UNSPECIFIED CEREBRAL LOCATION (H): Primary | ICD-10-CM

## 2024-01-12 PROCEDURE — 99309 SBSQ NF CARE MODERATE MDM 30: CPT | Performed by: NURSE PRACTITIONER

## 2024-01-12 NOTE — LETTER
1/12/2024        RE: Sukhi Johnson  C/o Miladis [facility]  [address]  [place] [address]        [facility]  Chief Complaint   Patient presents with     [facility] Medical Record Number:  [id number]  Place of Service where encounter took place:  [facility]  AT [facility] () [[id number]]    HPI:    Sukhi Johnson  is 73 year old (1950), who is being seen today for a federally mandated E/M visit. Today's concerns are:     Right-sided nontraumatic intracerebral hemorrhage, unspecified cerebral location (H)  History of CVA (cerebrovascular accident)  Left hemiplegia (H)  Moderate protein-calorie malnutrition (H24)    Patient seen for follow up, also spoke with significant other Miladis today RE: status and plan, above recent Dx/Hx, appears healthy, L hemiplegia, no distress, moderate ADL assist, able to feed self with setup, limited verbal, VS WNL, intermittent coughing with food/fluid intake, appears healthy, still on heparin for DVT prophylaxis due to immobility (neuro note on 3/29/23), POA Miladis OK with changing from heparin to oral but only if  agrees, considering eliquis and/or ASA.    ALLERGIES:Patient has no known allergies.  PAST MEDICAL HISTORY: History reviewed. No pertinent past medical history.  PAST SURGICAL HISTORY:   has a past surgical history that includes IR Gastro Jejunostomy Tube Placement (1/5/2022) and IR Gastro Jejunostomy Tube Change (3/9/2022).  FAMILY HISTORY: family history is not on file.  SOCIAL HISTORY:  reports that he has never smoked. He has never used smokeless tobacco. He reports that he does not drink alcohol and does not use drugs.    MEDICATIONS:  MED REC REQUIRED  Post Medication Reconciliation Status: medication reconcilation previously completed during another office visit         Review of your medicines            Accurate as of January 12, 2024  4:43 PM. If you have any questions, ask your nurse or doctor.                 CONTINUE these medicines which have NOT CHANGED        Dose / Directions   * acetaminophen 325 MG/10.15ML solution  Commonly known as: TYLENOL  Used for: Right-sided nontraumatic intracerebral hemorrhage, unspecified cerebral location (H)      Dose: 650 mg  20.3 mLs (650 mg) by Oral or Feeding Tube route every 4 hours as needed for mild pain or fever  Refills: 0     * acetaminophen 650 MG suppository  Commonly known as: TYLENOL  Used for: Right-sided nontraumatic intracerebral hemorrhage, unspecified cerebral location (H)      Dose: 650 mg  Place 1 suppository (650 mg) rectally every 4 hours as needed for mild pain  Refills: 0     AZO CRANBERRY URINARY TRACT PO      Dose: 1 tablet  Take 1 tablet by mouth 2 times daily  Refills: 0     diclofenac 1 % topical gel  Commonly known as: VOLTAREN  Used for: Acute pain of left shoulder      Apply to left shoulder BID  Quantity: 50 g  Refills: 1     heparin ANTICOAGULANT 5000 UNIT/0.5ML injection  Used for: Right-sided nontraumatic intracerebral hemorrhage, unspecified cerebral location (H)      Dose: 5,000 Units  Inject 0.5 mLs (5,000 Units) Subcutaneous every 12 hours  Refills: 0     metoprolol tartrate 50 MG tablet  Commonly known as: LOPRESSOR  Used for: Right-sided nontraumatic intracerebral hemorrhage, unspecified cerebral location (H)      Dose: 50 mg  1 tablet (50 mg) by Per Feeding Tube route 2 times daily  Refills: 0     ondansetron 4 MG ODT tab  Commonly known as: ZOFRAN ODT  Used for: Right-sided nontraumatic intracerebral hemorrhage, unspecified cerebral location (H)      Dose: 4 mg  Take 1 tablet (4 mg) by mouth every 6 hours as needed for nausea or vomiting  Refills: 0     pantoprazole 40 MG EC tablet  Commonly known as: PROTONIX      Dose: 20 mg  Take 20 mg by mouth daily  Refills: 0     sennosides 8.8 MG/5ML syrup  Commonly known as: SENOKOT  Used for: Right-sided nontraumatic intracerebral hemorrhage, unspecified cerebral location (H)      Dose: 5  "mL  5 mLs by Oral or Feeding Tube route 2 times daily as needed for constipation  Refills: 0           * This list has 2 medication(s) that are the same as other medications prescribed for you. Read the directions carefully, and ask your doctor or other care provider to review them with you.                   Case Management:  I have reviewed the care plan and MDS and do agree with the plan. Patient's desire to return to the community is present, but is not able due to care needs . Information reviewed:  Medications, vital signs, orders, and nursing notes.    ROS:  Unobtainable secondary to aphasia.     Vitals:  /72   Pulse 83   Temp 97.5  F (36.4  C)   Resp 14   Ht 1.727 m (5' 8\")   Wt 64 kg (141 lb)   SpO2 94%   BMI 21.44 kg/m    Body mass index is 21.44 kg/m .  Exam:  GENERAL APPEARANCE:  in no distress, appears healthy  ENT:  Mouth and posterior oropharynx normal, moist mucous membranes  RESP:  lungs clear to auscultation , no respiratory distress  CV:  regular rate and rhythm, no murmur, rub, or gallop, no edema  ABDOMEN:  bowel sounds normal  M/S:   Gait and station abnormal transfer assist  SKIN:  Inspection of skin and subcutaneous tissue baseline  NEURO:   Examination of sensation by touch normal  PSYCH:  affect and mood normal    Lab/Diagnostic data:   Labs done in SNF are in [facility]. Please refer to them using [employer] Everywhere.    ASSESSMENT/PLAN  (I61.9) Right-sided nontraumatic intracerebral hemorrhage, unspecified cerebral location (H)  (primary encounter diagnosis)  (Z86.73) History of CVA (cerebrovascular accident)  (G81.94) Left hemiplegia (H)  Comment: ICH 2021, CVA 2015, moderate ADL assist, WC mobility, Hx GI bleed, POA Miladis OK with changing plan if  approves  Plan:   -staff to support as indicated  -continue heparin 5k BID  -consider alternative oral AC, pending  approval  -continue pantoprazole for previous GI bleed  -ST eval periodically if increased " cough/choke    (E44.0) Moderate protein-calorie malnutrition (H24)  Comment: BMI 21.44  Plan: dietary to follow  -weights weekly  -supplements PRN        Electronically signed by:  KALI Ramirez CNP           Sincerely,        KALI Ramirez CNP

## 2024-01-12 NOTE — PROGRESS NOTES
[facility]  Chief Complaint   Patient presents with    [facility] Medical Record Number:  [id number]  Place of Service where encounter took place:  [facility] () [[id number]]    HPI:    Sukhi Johnson  is 73 year old (1950), who is being seen today for a federally mandated E/M visit. Today's concerns are:     Right-sided nontraumatic intracerebral hemorrhage, unspecified cerebral location (H)  History of CVA (cerebrovascular accident)  Left hemiplegia (H)  Moderate protein-calorie malnutrition (H24)    Patient seen for follow up, also spoke with significant other Miladis today RE: status and plan, above recent Dx/Hx, appears healthy, L hemiplegia, no distress, moderate ADL assist, able to feed self with setup, limited verbal, VS WNL, intermittent coughing with food/fluid intake, appears healthy, still on heparin for DVT prophylaxis due to immobility (neuro note on 3/29/23), POA Miladis OK with changing from heparin to oral but only if  agrees, considering eliquis and/or ASA.    ALLERGIES:Patient has no known allergies.  PAST MEDICAL HISTORY: History reviewed. No pertinent past medical history.  PAST SURGICAL HISTORY:   has a past surgical history that includes IR Gastro Jejunostomy Tube Placement (1/5/2022) and IR Gastro Jejunostomy Tube Change (3/9/2022).  FAMILY HISTORY: family history is not on file.  SOCIAL HISTORY:  reports that he has never smoked. He has never used smokeless tobacco. He reports that he does not drink alcohol and does not use drugs.    MEDICATIONS:  MED REC REQUIRED  Post Medication Reconciliation Status: medication reconcilation previously completed during another office visit         Review of your medicines            Accurate as of January 12, 2024  4:43 PM. If you have any questions, ask your nurse or doctor.                CONTINUE these medicines which have NOT CHANGED        Dose / Directions   * acetaminophen  325 MG/10.15ML solution  Commonly known as: TYLENOL  Used for: Right-sided nontraumatic intracerebral hemorrhage, unspecified cerebral location (H)      Dose: 650 mg  20.3 mLs (650 mg) by Oral or Feeding Tube route every 4 hours as needed for mild pain or fever  Refills: 0     * acetaminophen 650 MG suppository  Commonly known as: TYLENOL  Used for: Right-sided nontraumatic intracerebral hemorrhage, unspecified cerebral location (H)      Dose: 650 mg  Place 1 suppository (650 mg) rectally every 4 hours as needed for mild pain  Refills: 0     AZO CRANBERRY URINARY TRACT PO      Dose: 1 tablet  Take 1 tablet by mouth 2 times daily  Refills: 0     diclofenac 1 % topical gel  Commonly known as: VOLTAREN  Used for: Acute pain of left shoulder      Apply to left shoulder BID  Quantity: 50 g  Refills: 1     heparin ANTICOAGULANT 5000 UNIT/0.5ML injection  Used for: Right-sided nontraumatic intracerebral hemorrhage, unspecified cerebral location (H)      Dose: 5,000 Units  Inject 0.5 mLs (5,000 Units) Subcutaneous every 12 hours  Refills: 0     metoprolol tartrate 50 MG tablet  Commonly known as: LOPRESSOR  Used for: Right-sided nontraumatic intracerebral hemorrhage, unspecified cerebral location (H)      Dose: 50 mg  1 tablet (50 mg) by Per Feeding Tube route 2 times daily  Refills: 0     ondansetron 4 MG ODT tab  Commonly known as: ZOFRAN ODT  Used for: Right-sided nontraumatic intracerebral hemorrhage, unspecified cerebral location (H)      Dose: 4 mg  Take 1 tablet (4 mg) by mouth every 6 hours as needed for nausea or vomiting  Refills: 0     pantoprazole 40 MG EC tablet  Commonly known as: PROTONIX      Dose: 20 mg  Take 20 mg by mouth daily  Refills: 0     sennosides 8.8 MG/5ML syrup  Commonly known as: SENOKOT  Used for: Right-sided nontraumatic intracerebral hemorrhage, unspecified cerebral location (H)      Dose: 5 mL  5 mLs by Oral or Feeding Tube route 2 times daily as needed for constipation  Refills: 0         "   * This list has 2 medication(s) that are the same as other medications prescribed for you. Read the directions carefully, and ask your doctor or other care provider to review them with you.                   Case Management:  I have reviewed the care plan and MDS and do agree with the plan. Patient's desire to return to the community is present, but is not able due to care needs . Information reviewed:  Medications, vital signs, orders, and nursing notes.    ROS:  Unobtainable secondary to aphasia.     Vitals:  /72   Pulse 83   Temp 97.5  F (36.4  C)   Resp 14   Ht 1.727 m (5' 8\")   Wt 64 kg (141 lb)   SpO2 94%   BMI 21.44 kg/m    Body mass index is 21.44 kg/m .  Exam:  GENERAL APPEARANCE:  in no distress, appears healthy  ENT:  Mouth and posterior oropharynx normal, moist mucous membranes  RESP:  lungs clear to auscultation , no respiratory distress  CV:  regular rate and rhythm, no murmur, rub, or gallop, no edema  ABDOMEN:  bowel sounds normal  M/S:   Gait and station abnormal transfer assist  SKIN:  Inspection of skin and subcutaneous tissue baseline  NEURO:   Examination of sensation by touch normal  PSYCH:  affect and mood normal    Lab/Diagnostic data:   Labs done in SNF are in [place] [employer]. Please refer to them using [employer] Everywhere.    ASSESSMENT/PLAN  (I61.9) Right-sided nontraumatic intracerebral hemorrhage, unspecified cerebral location (H)  (primary encounter diagnosis)  (Z86.73) History of CVA (cerebrovascular accident)  (G81.94) Left hemiplegia (H)  Comment: ICH 2021, CVA 2015, moderate ADL assist, WC mobility, Hx GI bleed, POA Miladis OK with changing plan if  approves  Plan:   -staff to support as indicated  -continue heparin 5k BID  -consider alternative oral AC, pending  approval  -continue pantoprazole for previous GI bleed  -ST eval periodically if increased cough/choke    (E44.0) Moderate protein-calorie malnutrition (H24)  Comment: BMI 21.44  Plan: dietary " to follow  -weights weekly  -supplements PRN        Electronically signed by:  KALI Ramirez CNP

## 2024-01-12 NOTE — LETTER
1/12/2024        RE: Sukhi Johnson  C/o Milad[facility]  [address]  [place] [address]        [facility] GERIATRICS  Chief Complaint   Patient presents with     [place] [facility] [place]:  [id number]  Place of Service where encounter took place:  [facility]  AT [facility] () [[id number]]    HPI:    Sukhi Johnson  is 73 year old (1950), who is being seen today for a federally mandated E/M visit. Today's concerns are:     Right-sided nontraumatic intracerebral hemorrhage, unspecified cerebral location (H)  History of CVA (cerebrovascular accident)  Left hemiplegia (H)  Moderate protein-calorie malnutrition (H24)    Patient seen for follow up, also spoke with significant other Miladis today RE: status and plan, above recent Dx/Hx, appears healthy, L hemiplegia, no distress, moderate ADL assist, able to feed self with setup, limited verbal, VS WNL, intermittent coughing with food/fluid intake, appears healthy, still on heparin for DVT prophylaxis due to immobility (neuro note on 3/29/23), POA Miladis OK with changing from heparin to oral but only if  agrees, considering eliquis and/or ASA.    ALLERGIES:Patient has no known allergies.  PAST MEDICAL HISTORY: History reviewed. No pertinent past medical history.  PAST SURGICAL HISTORY:   has a past surgical history that includes IR Gastro Jejunostomy Tube Placement (1/5/2022) and IR Gastro Jejunostomy Tube Change (3/9/2022).  FAMILY HISTORY: family history is not on file.  SOCIAL HISTORY:  reports that he has never smoked. He has never used smokeless tobacco. He reports that he does not drink alcohol and does not use drugs.    MEDICATIONS:  MED REC REQUIRED  Post Medication Reconciliation Status: medication reconcilation previously completed during another office visit         Review of your medicines            Accurate as of January 12, 2024  4:43 PM. If you have any questions, ask your nurse or doctor.                 CONTINUE these medicines which have NOT CHANGED        Dose / Directions   * acetaminophen 325 MG/10.15ML solution  Commonly known as: TYLENOL  Used for: Right-sided nontraumatic intracerebral hemorrhage, unspecified cerebral location (H)      Dose: 650 mg  20.3 mLs (650 mg) by Oral or Feeding Tube route every 4 hours as needed for mild pain or fever  Refills: 0     * acetaminophen 650 MG suppository  Commonly known as: TYLENOL  Used for: Right-sided nontraumatic intracerebral hemorrhage, unspecified cerebral location (H)      Dose: 650 mg  Place 1 suppository (650 mg) rectally every 4 hours as needed for mild pain  Refills: 0     AZO CRANBERRY URINARY TRACT PO      Dose: 1 tablet  Take 1 tablet by mouth 2 times daily  Refills: 0     diclofenac 1 % topical gel  Commonly known as: VOLTAREN  Used for: Acute pain of left shoulder      Apply to left shoulder BID  Quantity: 50 g  Refills: 1     heparin ANTICOAGULANT 5000 UNIT/0.5ML injection  Used for: Right-sided nontraumatic intracerebral hemorrhage, unspecified cerebral location (H)      Dose: 5,000 Units  Inject 0.5 mLs (5,000 Units) Subcutaneous every 12 hours  Refills: 0     metoprolol tartrate 50 MG tablet  Commonly known as: LOPRESSOR  Used for: Right-sided nontraumatic intracerebral hemorrhage, unspecified cerebral location (H)      Dose: 50 mg  1 tablet (50 mg) by Per Feeding Tube route 2 times daily  Refills: 0     ondansetron 4 MG ODT tab  Commonly known as: ZOFRAN ODT  Used for: Right-sided nontraumatic intracerebral hemorrhage, unspecified cerebral location (H)      Dose: 4 mg  Take 1 tablet (4 mg) by mouth every 6 hours as needed for nausea or vomiting  Refills: 0     pantoprazole 40 MG EC tablet  Commonly known as: PROTONIX      Dose: 20 mg  Take 20 mg by mouth daily  Refills: 0     sennosides 8.8 MG/5ML syrup  Commonly known as: SENOKOT  Used for: Right-sided nontraumatic intracerebral hemorrhage, unspecified cerebral location (H)      Dose: 5  "mL  5 mLs by Oral or Feeding Tube route 2 times daily as needed for constipation  Refills: 0           * This list has 2 medication(s) that are the same as other medications prescribed for you. Read the directions carefully, and ask your doctor or other care provider to review them with you.                   Case Management:  I have reviewed the care plan and MDS and do agree with the plan. Patient's desire to return to the community is present, but is not able due to care needs . Information reviewed:  Medications, vital signs, orders, and nursing notes.    ROS:  Unobtainable secondary to aphasia.     Vitals:  /72   Pulse 83   Temp 97.5  F (36.4  C)   Resp 14   Ht 1.727 m (5' 8\")   Wt 64 kg (141 lb)   SpO2 94%   BMI 21.44 kg/m    Body mass index is 21.44 kg/m .  Exam:  GENERAL APPEARANCE:  in no distress, appears healthy  ENT:  Mouth and posterior oropharynx normal, moist mucous membranes  RESP:  lungs clear to auscultation , no respiratory distress  CV:  regular rate and rhythm, no murmur, rub, or gallop, no edema  ABDOMEN:  bowel sounds normal  M/S:   Gait and station abnormal transfer assist  SKIN:  Inspection of skin and subcutaneous tissue baseline  NEURO:   Examination of sensation by touch normal  PSYCH:  affect and mood normal    Lab/Diagnostic data:   Labs done in SNF are in [facility]. Please refer to them using [employer] Everywhere.    ASSESSMENT/PLAN  (I61.9) Right-sided nontraumatic intracerebral hemorrhage, unspecified cerebral location (H)  (primary encounter diagnosis)  (Z86.73) History of CVA (cerebrovascular accident)  (G81.94) Left hemiplegia (H)  Comment: ICH 2021, CVA 2015, moderate ADL assist, WC mobility, Hx GI bleed, POA Miladis OK with changing plan if  approves  Plan:   -staff to support as indicated  -continue heparin 5k BID  -consider alternative oral AC, pending  approval  -continue pantoprazole for previous GI bleed  -ST eval periodically if increased " cough/choke    (E44.0) Moderate protein-calorie malnutrition (H24)  Comment: BMI 21.44  Plan: dietary to follow  -weights weekly  -supplements PRN        Electronically signed by:  KALI Ramirez CNP           Sincerely,        KALI Ramirez CNP

## 2024-03-22 NOTE — PROGRESS NOTES
[place] [facility]  Chief Complaint   Patient presents with    detention Regulatory     [facility] Medical Record Number:  [id number]  Place of Service where encounter took place:  [facility] () [[id number]]    HPI:    Sukhi Johnson  is 74 year old (1950), who is being seen today for a federally mandated E/M visit.  HPI information obtained from: facility chart records, facility staff, patient report, [facility] chart review and family/first contact wife report. T    Today:    - - Resident seen and examined, chart reviewed and discussed with the nursing staff.   - Resident has no concern today. Just finished attending the activities.   - DNP and RN have no concern today.   --------------------------------  - Past Medical, social, family histories, medications, and allergies reviewed and updated    MEDICATIONS:  Current Outpatient Medications   Medication Sig Dispense Refill    acetaminophen (TYLENOL) 325 MG/10.15ML solution 20.3 mLs (650 mg) by Oral or Feeding Tube route every 4 hours as needed for mild pain or fever      acetaminophen (TYLENOL) 650 MG suppository Place 1 suppository (650 mg) rectally every 4 hours as needed for mild pain      Cranberry-Vitamin C (AZO CRANBERRY URINARY TRACT PO) Take 1 tablet by mouth 2 times daily      diclofenac (VOLTAREN) 1 % topical gel Apply to left shoulder BID 50 g 1    Heparin Sodium, Porcine, (HEPARIN ANTICOAGULANT) 5000 UNIT/0.5ML injection Inject 0.5 mLs (5,000 Units) Subcutaneous every 12 hours      metoprolol tartrate (LOPRESSOR) 50 MG tablet 1 tablet (50 mg) by Per Feeding Tube route 2 times daily      ondansetron (ZOFRAN-ODT) 4 MG ODT tab Take 1 tablet (4 mg) by mouth every 6 hours as needed for nausea or vomiting      pantoprazole (PROTONIX) 40 MG EC tablet Take 20 mg by mouth daily      sennosides (SENOKOT) 8.8 MG/5ML syrup 5 mLs by Oral or Feeding Tube route 2 times daily as needed for constipation       ROS: limited due to  "dysarthria      Vitals:  /67   Pulse 80   Temp 98  F (36.7  C)   Resp 14   Ht 1.727 m (5' 8\")   Wt 60.8 kg (134 lb)   SpO2 98%   BMI 20.37 kg/m    Body mass index is 20.37 kg/m .     Exam:   GENERAL APPEARANCE:  in no distress, cooperative, sitting up in the WC.   RESP:  lungs clear to auscultation   CV:  S1S2 audible, regular HR, no murmur appreciated.   ABDOMEN:  soft, NT/ND, BS audible. no mass appreciated on palpation.   M/S:   Muscles atrophy right hand.   SKIN:  No rash.   NEURO:  Difficulty expressing words, ms strenght: LUE 0/5, LLL 1/5, Rt hand  4/5 and RLE 4/5. Ptosis right eye.   PSYCH:  affect and mood normal    Lab/Diagnostic data: Reviewed in the chart and EHR.        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 At baseline. . Appreciate help      Dysphagia, unspecified type  Protein calorie malnutrition, moderate  - 7/29/22: FT removed.  tolerating po intake  -Body mass index is 20.37 kg/m .   - Stable. BMI less than 22 in frail elderly increase mortality risk. Gaining weight slowly. Dietician on the case.   - RD eval/recommendations.     Essential HTN:   - BP controlled,. Off enalapril and hydralazine due to soft BP, on metoprolol tartrate. Continue.       Hx of GIB with acute blood loss anemia:   - Dec 2021. No scopes was done, Hb dropped down to 12.7 gm/dl.  Hb level 14.8 (4/2022)  -  On Protonix 20 mg.     Chronic fatigue syndrome 2/2 Lyme disease:  - Significant  Deficits requiring NH placement. Requiring extensive assistance from nursing. Up for meals only o/w spends the day resting in bed      Electronically signed by:  Joel Blanchard MD    "

## 2024-03-25 ENCOUNTER — NURSING HOME VISIT (OUTPATIENT)
Dept: GERIATRICS | Facility: CLINIC | Age: 74
End: 2024-03-25
Payer: COMMERCIAL

## 2024-03-25 VITALS
DIASTOLIC BLOOD PRESSURE: 67 MMHG | HEIGHT: 68 IN | TEMPERATURE: 98 F | RESPIRATION RATE: 14 BRPM | SYSTOLIC BLOOD PRESSURE: 110 MMHG | BODY MASS INDEX: 20.31 KG/M2 | OXYGEN SATURATION: 98 % | HEART RATE: 80 BPM | WEIGHT: 134 LBS

## 2024-03-25 DIAGNOSIS — I69.322 DYSARTHRIA AS LATE EFFECT OF STROKE: ICD-10-CM

## 2024-03-25 DIAGNOSIS — R13.10 DYSPHAGIA, UNSPECIFIED TYPE: ICD-10-CM

## 2024-03-25 DIAGNOSIS — G93.32 CHRONIC FATIGUE SYNDROME: ICD-10-CM

## 2024-03-25 DIAGNOSIS — G81.94 LEFT HEMIPLEGIA (H): Primary | ICD-10-CM

## 2024-03-25 DIAGNOSIS — Z87.19 HISTORY OF GASTROINTESTINAL BLEEDING: ICD-10-CM

## 2024-03-25 DIAGNOSIS — Z86.73 HISTORY OF CVA (CEREBROVASCULAR ACCIDENT): ICD-10-CM

## 2024-03-25 DIAGNOSIS — E46 PROTEIN-CALORIE MALNUTRITION, UNSPECIFIED SEVERITY (H): ICD-10-CM

## 2024-03-25 DIAGNOSIS — I10 PRIMARY HYPERTENSION: ICD-10-CM

## 2024-03-25 PROCEDURE — 99309 SBSQ NF CARE MODERATE MDM 30: CPT | Performed by: FAMILY MEDICINE

## 2024-03-25 NOTE — LETTER
3/25/2024        RE: Sukhi Johnson  C/o [facility]  [address]  [place] [address]        [place] [facility]IATRIC [facility]  Chief Complaint   Patient presents with     [facility] Regulatory     [place] Medical Record Number:  [id number]  Place of Service where encounter took place:  [facility] () [[id number]]    HPI:    Sukhi Johnson  is 74 year old (1950), who is being seen today for a federally mandated E/M visit.  HPI information obtained from: facility chart records, facility staff, patient report, [facility] chart review and family/first contact wife report. T    Today:    - - Resident seen and examined, chart reviewed and discussed with the nursing staff.   - Resident has no concern today. Just finished attending the activities.   - DNP and RN have no concern today.   --------------------------------  - Past Medical, social, family histories, medications, and allergies reviewed and updated    MEDICATIONS:  Current Outpatient Medications   Medication Sig Dispense Refill     acetaminophen (TYLENOL) 325 MG/10.15ML solution 20.3 mLs (650 mg) by Oral or Feeding Tube route every 4 hours as needed for mild pain or fever       acetaminophen (TYLENOL) 650 MG suppository Place 1 suppository (650 mg) rectally every 4 hours as needed for mild pain       Cranberry-Vitamin C (AZO CRANBERRY URINARY TRACT PO) Take 1 tablet by mouth 2 times daily       diclofenac (VOLTAREN) 1 % topical gel Apply to left shoulder BID 50 g 1     Heparin Sodium, Porcine, (HEPARIN ANTICOAGULANT) 5000 UNIT/0.5ML injection Inject 0.5 mLs (5,000 Units) Subcutaneous every 12 hours       metoprolol tartrate (LOPRESSOR) 50 MG tablet 1 tablet (50 mg) by Per Feeding Tube route 2 times daily       ondansetron (ZOFRAN-ODT) 4 MG ODT tab Take 1 tablet (4 mg) by mouth every 6 hours as needed for nausea or vomiting       pantoprazole (PROTONIX) 40 MG EC tablet Take 20 mg by mouth daily       sennosides (SENOKOT) 8.8 MG/5ML syrup  "5 mLs by Oral or Feeding Tube route 2 times daily as needed for constipation       ROS: limited due to dysarthria      Vitals:  /67   Pulse 80   Temp 98  F (36.7  C)   Resp 14   Ht 1.727 m (5' 8\")   Wt 60.8 kg (134 lb)   SpO2 98%   BMI 20.37 kg/m    Body mass index is 20.37 kg/m .     Exam:   GENERAL APPEARANCE:  in no distress, cooperative, sitting up in the WC.   RESP:  lungs clear to auscultation   CV:  S1S2 audible, regular HR, no murmur appreciated.   ABDOMEN:  soft, NT/ND, BS audible. no mass appreciated on palpation.   M/S:   Muscles atrophy right hand.   SKIN:  No rash.   NEURO:  Difficulty expressing words, ms strenght: LUE 0/5, LLL 1/5, Rt hand  4/5 and RLE 4/5. Ptosis right eye.   PSYCH:  affect and mood normal    Lab/Diagnostic data: Reviewed in the chart and EHR.        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 At baseline. . Appreciate help      Dysphagia, unspecified type  Protein calorie malnutrition, moderate  - 7/29/22: FT removed.  tolerating po intake  -Body mass index is 20.37 kg/m .   - Stable. BMI less than 22 in frail elderly increase mortality risk. Gaining weight slowly. Dietician on the case.   - RD eval/recommendations.     Essential HTN:   - BP controlled,. Off enalapril and hydralazine due to soft BP, on metoprolol tartrate. Continue.       Hx of GIB with acute blood loss anemia:   - Dec 2021. No scopes was done, Hb dropped down to 12.7 gm/dl.  Hb level 14.8 (4/2022)  -  On Protonix 20 mg.     Chronic fatigue syndrome 2/2 Lyme disease:  - Significant  Deficits requiring NH placement. Requiring extensive assistance from nursing. Up for meals only o/w spends the day resting in bed      Electronically signed by:  Joel Blanchard, " MD        Sincerely,        Joel Blanchard MD

## 2024-05-07 ENCOUNTER — NURSING HOME VISIT (OUTPATIENT)
Dept: GERIATRICS | Facility: CLINIC | Age: 74
End: 2024-05-07
Payer: COMMERCIAL

## 2024-05-07 VITALS
BODY MASS INDEX: 20.41 KG/M2 | WEIGHT: 134.2 LBS | SYSTOLIC BLOOD PRESSURE: 120 MMHG | TEMPERATURE: 98.5 F | DIASTOLIC BLOOD PRESSURE: 76 MMHG | HEART RATE: 76 BPM | OXYGEN SATURATION: 93 % | RESPIRATION RATE: 18 BRPM

## 2024-05-07 DIAGNOSIS — E46 PROTEIN-CALORIE MALNUTRITION, UNSPECIFIED SEVERITY (H): ICD-10-CM

## 2024-05-07 DIAGNOSIS — G81.94 LEFT HEMIPLEGIA (H): ICD-10-CM

## 2024-05-07 DIAGNOSIS — Z86.73 HISTORY OF CVA (CEREBROVASCULAR ACCIDENT): Primary | ICD-10-CM

## 2024-05-07 PROCEDURE — 99309 SBSQ NF CARE MODERATE MDM 30: CPT | Performed by: NURSE PRACTITIONER

## 2024-05-07 NOTE — PROGRESS NOTES
[facility] GERIATRICS  Chief Complaint   Patient presents with    [facility] Medical Record Number:  [id number]  Place of Service where encounter took place:  [facility] () [[id number]]    HPI:    Sukhi Johnson  is 74 year old (1950), who is being seen today for a federally mandated E/M visit. Today's concerns are:     History of CVA (cerebrovascular accident)  Left hemiplegia (H)  Protein-calorie malnutrition, unspecified severity (H24)    Patient seen for follow up, pleasant, non-verbal, post CVA, weakness, BMI 20.41, fairly oriented but unable to speak, risk for skin breakdown due to limited mobility, overall appears healthy.    ALLERGIES:Patient has no known allergies.  PAST MEDICAL HISTORY: No past medical history on file.  PAST SURGICAL HISTORY:   has a past surgical history that includes IR Gastro Jejunostomy Tube Placement (1/5/2022) and IR Gastro Jejunostomy Tube Change (3/9/2022).  FAMILY HISTORY: family history is not on file.  SOCIAL HISTORY:  reports that he has never smoked. He has never used smokeless tobacco. He reports that he does not drink alcohol and does not use drugs.    MEDICATIONS:  MED REC REQUIRED  Post Medication Reconciliation Status: medication reconcilation previously completed during another office visit         Review of your medicines            Accurate as of May 7, 2024  4:21 PM. If you have any questions, ask your nurse or doctor.                CONTINUE these medicines which have NOT CHANGED        Dose / Directions   * acetaminophen 325 MG/10.15ML solution  Commonly known as: TYLENOL  Used for: Right-sided nontraumatic intracerebral hemorrhage, unspecified cerebral location (H)      Dose: 650 mg  20.3 mLs (650 mg) by Oral or Feeding Tube route every 4 hours as needed for mild pain or fever  Refills: 0     * acetaminophen 650 MG suppository  Commonly known as: TYLENOL  Used for: Right-sided nontraumatic intracerebral  hemorrhage, unspecified cerebral location (H)      Dose: 650 mg  Place 1 suppository (650 mg) rectally every 4 hours as needed for mild pain  Refills: 0     AZO CRANBERRY URINARY TRACT PO      Dose: 1 tablet  Take 1 tablet by mouth 2 times daily  Refills: 0     diclofenac 1 % topical gel  Commonly known as: VOLTAREN  Used for: Acute pain of left shoulder      Apply to left shoulder BID  Quantity: 50 g  Refills: 1     heparin ANTICOAGULANT 5000 UNIT/0.5ML injection  Used for: Right-sided nontraumatic intracerebral hemorrhage, unspecified cerebral location (H)      Dose: 5,000 Units  Inject 0.5 mLs (5,000 Units) Subcutaneous every 12 hours  Refills: 0     metoprolol tartrate 50 MG tablet  Commonly known as: LOPRESSOR  Used for: Right-sided nontraumatic intracerebral hemorrhage, unspecified cerebral location (H)      Dose: 50 mg  1 tablet (50 mg) by Per Feeding Tube route 2 times daily  Refills: 0     ondansetron 4 MG ODT tab  Commonly known as: ZOFRAN ODT  Used for: Right-sided nontraumatic intracerebral hemorrhage, unspecified cerebral location (H)      Dose: 4 mg  Take 1 tablet (4 mg) by mouth every 6 hours as needed for nausea or vomiting  Refills: 0     pantoprazole 40 MG EC tablet  Commonly known as: PROTONIX      Dose: 20 mg  Take 20 mg by mouth daily  Refills: 0     sennosides 8.8 MG/5ML syrup  Commonly known as: SENOKOT  Used for: Right-sided nontraumatic intracerebral hemorrhage, unspecified cerebral location (H)      Dose: 5 mL  5 mLs by Oral or Feeding Tube route 2 times daily as needed for constipation  Refills: 0           * This list has 2 medication(s) that are the same as other medications prescribed for you. Read the directions carefully, and ask your doctor or other care provider to review them with you.                   Case Management:  I have reviewed the care plan and MDS and do agree with the plan. Patient's desire to return to the community is present, but is not able due to care needs .  Information reviewed:  Medications, vital signs, orders, and nursing notes.    ROS:  Unobtainable secondary to aphasia.     Vitals:  /76   Pulse 76   Temp 98.5  F (36.9  C)   Resp 18   Wt 60.9 kg (134 lb 3.2 oz)   SpO2 93%   BMI 20.41 kg/m    Body mass index is 20.41 kg/m .  Exam:  GENERAL APPEARANCE:  in no distress, appears healthy  ENT:  Mouth and posterior oropharynx normal, moist mucous membranes  RESP:  lungs clear to auscultation , no respiratory distress  CV:  regular rate and rhythm, no murmur, rub, or gallop, no edema  ABDOMEN:  no guarding or rebound  M/S:   Gait and station abnormal transfer assist  SKIN:  Inspection of skin and subcutaneous tissue baseline  NEURO:   Examination of sensation by touch normal  PSYCH:  affect and mood normal    Lab/Diagnostic data:   Labs done in SNF are in [facility]. Please refer to them using [employer] Everywhere.    ASSESSMENT/PLAN  (Z86.73) History of CVA (cerebrovascular accident)  (primary encounter diagnosis)  (G81.94) Left hemiplegia (H)  (E46) Protein-calorie malnutrition, unspecified severity (H24)  Comment: post CVA, hemiplegia, BMI 20.41, appears healthy  Plan:   -discontinue boost; start med pass TID (higher calories)  -dietary to follow weights and intake  -start heel protectors in bed, PU prophylaxis  -continue current med regimen, reviewed as appropriate  -OT eval for adaptive utensils   -OK for movewell/speakwell supplements per JEANMARIE Redding request  -plan to follow up RE: status and plan in 1-2 months        Electronically signed by:  KALI Ramirez CNP

## 2024-05-07 NOTE — LETTER
5/7/2024        RE: Sukhi Johnson  C/o [facility]  [address]  [place] [address]        [facility]  Chief Complaint   Patient presents with     [facility] Medical Record Number:  [id number]  Place of Service where encounter took place:  [facility]  AT [facility] () [[id number]]    HPI:    Sukhi Johnson  is 74 year old (1950), who is being seen today for a federally mandated E/M visit. Today's concerns are:     History of CVA (cerebrovascular accident)  Left hemiplegia (H)  Protein-calorie malnutrition, unspecified severity (H24)    Patient seen for follow up, pleasant, non-verbal, post CVA, weakness, BMI 20.41, fairly oriented but unable to speak, risk for skin breakdown due to limited mobility, overall appears healthy.    ALLERGIES:Patient has no known allergies.  PAST MEDICAL HISTORY: No past medical history on file.  PAST SURGICAL HISTORY:   has a past surgical history that includes IR Gastro Jejunostomy Tube Placement (1/5/2022) and IR Gastro Jejunostomy Tube Change (3/9/2022).  FAMILY HISTORY: family history is not on file.  SOCIAL HISTORY:  reports that he has never smoked. He has never used smokeless tobacco. He reports that he does not drink alcohol and does not use drugs.    MEDICATIONS:  MED REC REQUIRED  Post Medication Reconciliation Status: medication reconcilation previously completed during another office visit         Review of your medicines            Accurate as of May 7, 2024  4:21 PM. If you have any questions, ask your nurse or doctor.                CONTINUE these medicines which have NOT CHANGED        Dose / Directions   * acetaminophen 325 MG/10.15ML solution  Commonly known as: TYLENOL  Used for: Right-sided nontraumatic intracerebral hemorrhage, unspecified cerebral location (H)      Dose: 650 mg  20.3 mLs (650 mg) by Oral or Feeding Tube route every 4 hours as needed for mild pain or fever  Refills: 0     * acetaminophen 650 MG  suppository  Commonly known as: TYLENOL  Used for: Right-sided nontraumatic intracerebral hemorrhage, unspecified cerebral location (H)      Dose: 650 mg  Place 1 suppository (650 mg) rectally every 4 hours as needed for mild pain  Refills: 0     AZO CRANBERRY URINARY TRACT PO      Dose: 1 tablet  Take 1 tablet by mouth 2 times daily  Refills: 0     diclofenac 1 % topical gel  Commonly known as: VOLTAREN  Used for: Acute pain of left shoulder      Apply to left shoulder BID  Quantity: 50 g  Refills: 1     heparin ANTICOAGULANT 5000 UNIT/0.5ML injection  Used for: Right-sided nontraumatic intracerebral hemorrhage, unspecified cerebral location (H)      Dose: 5,000 Units  Inject 0.5 mLs (5,000 Units) Subcutaneous every 12 hours  Refills: 0     metoprolol tartrate 50 MG tablet  Commonly known as: LOPRESSOR  Used for: Right-sided nontraumatic intracerebral hemorrhage, unspecified cerebral location (H)      Dose: 50 mg  1 tablet (50 mg) by Per Feeding Tube route 2 times daily  Refills: 0     ondansetron 4 MG ODT tab  Commonly known as: ZOFRAN ODT  Used for: Right-sided nontraumatic intracerebral hemorrhage, unspecified cerebral location (H)      Dose: 4 mg  Take 1 tablet (4 mg) by mouth every 6 hours as needed for nausea or vomiting  Refills: 0     pantoprazole 40 MG EC tablet  Commonly known as: PROTONIX      Dose: 20 mg  Take 20 mg by mouth daily  Refills: 0     sennosides 8.8 MG/5ML syrup  Commonly known as: SENOKOT  Used for: Right-sided nontraumatic intracerebral hemorrhage, unspecified cerebral location (H)      Dose: 5 mL  5 mLs by Oral or Feeding Tube route 2 times daily as needed for constipation  Refills: 0           * This list has 2 medication(s) that are the same as other medications prescribed for you. Read the directions carefully, and ask your doctor or other care provider to review them with you.                   Case Management:  I have reviewed the care plan and MDS and do agree with the plan.  Patient's desire to return to the community is present, but is not able due to care needs . Information reviewed:  Medications, vital signs, orders, and nursing notes.    ROS:  Unobtainable secondary to aphasia.     Vitals:  /76   Pulse 76   Temp 98.5  F (36.9  C)   Resp 18   Wt 60.9 kg (134 lb 3.2 oz)   SpO2 93%   BMI 20.41 kg/m    Body mass index is 20.41 kg/m .  Exam:  GENERAL APPEARANCE:  in no distress, appears healthy  ENT:  Mouth and posterior oropharynx normal, moist mucous membranes  RESP:  lungs clear to auscultation , no respiratory distress  CV:  regular rate and rhythm, no murmur, rub, or gallop, no edema  ABDOMEN:  no guarding or rebound  M/S:   Gait and station abnormal transfer assist  SKIN:  Inspection of skin and subcutaneous tissue baseline  NEURO:   Examination of sensation by touch normal  PSYCH:  affect and mood normal    Lab/Diagnostic data:   Labs done in SNF are in [facility]. Please refer to them using [facility] Everywhere.    ASSESSMENT/PLAN  (Z86.73) History of CVA (cerebrovascular accident)  (primary encounter diagnosis)  (G81.94) Left hemiplegia (H)  (E46) Protein-calorie malnutrition, unspecified severity (H24)  Comment: post CVA, hemiplegia, BMI 20.41, appears healthy  Plan:   -discontinue boost; start med pass TID (higher calories)  -dietary to follow weights and intake  -start heel protectors in bed, PU prophylaxis  -continue current med regimen, reviewed as appropriate  -OT eval for adaptive utensils   -OK for movewell/speakwell supplements per JEANMARIE Redding request  -plan to follow up RE: status and plan in 1-2 months        Electronically signed by:  KALI Ramirez CNP           Sincerely,        KALI Ramirez CNP

## 2024-05-17 ENCOUNTER — PATIENT OUTREACH (OUTPATIENT)
Dept: GERIATRIC MEDICINE | Facility: CLINIC | Age: 74
End: 2024-05-17
Payer: COMMERCIAL

## 2024-05-17 NOTE — Clinical Note
Aramis Perez, it looks like there isn't an active POLST on record for him?  Is this something you can follow up on?  I can talk to his POA too.  Thanks!  OLGA Simon, [facility] [phone]

## 2024-05-21 NOTE — PROGRESS NOTES
[facility] Care Coordination Contact  [facility] Institutional Assessment     Institutional Assessment for Health Risk Assessment with Sukhi L Alex completed on May 17, 2024 at [facility]    Type of residence:: Nursing home  Current living arrangement:: I live in a nursing home     Assessment completed with:: Care Team Member, Patient    Mental/Behavioral Health   Depression Screening:      Mental health DX:: No        Falls Assessment:   Fallen 2 or more times in the past year?: No   Any fall with injury in the past year?: No    ADL/IADL Dependencies:   Dependent ADLs:: Bathing, Dressing, Grooming, Incontinence, Transfers, Toileting, Wheelchair-with assist  Dependent IADLs:: Cleaning, Cooking, Laundry, Shopping, Meal Preparation, Incontinence, Transportation, Money Management, Medication Management    Care Plan & Recommendations: CC met with Sukhi in the dining room at [facility] on this date.  He does not speak but able to understand CC.  Looks pleasant and content on this date.  Per staff no concerns or changes for Sukhi, will contact significant other/Miladis to complete assessment.  No recent POLST on file, looks like code orders but no POLST.  Included with this note message to PCP to discuss POLST if appropriate.  Discussed options/opportunities for transitions.    See Institutional Care Plan for detailed assessment information.    Obtained a copy of the facility care plan and MDS from facility electronic records. Requested of assisted social worker to put this care coordinator on care conference attendee list.    Placed the Health Plan facility face sheet in the member's facility chart.    Follow-Up Plan: Member informed of future contact, plan to f/u with member with a 6 month assessment, attend 1 care conference annually, and will follow any hospitalizations or transitions. Care Coordinator contact information shared with member/family and facility, and encouraged to call  this care coordinator with any questions or concerns at any time.     [facility]ory care continuum providers: Please see Snapshot and Care Management Flowsheets for Specific details of care plan.    This CC note routed to PCP, Chris Maria.    OLGA Simon, [facility]  [phone]

## 2024-05-24 ENCOUNTER — DOCUMENTATION ONLY (OUTPATIENT)
Dept: OTHER | Facility: CLINIC | Age: 74
End: 2024-05-24

## 2024-05-24 ENCOUNTER — NURSING HOME VISIT (OUTPATIENT)
Dept: GERIATRICS | Facility: CLINIC | Age: 74
End: 2024-05-24
Payer: COMMERCIAL

## 2024-05-24 VITALS
BODY MASS INDEX: 19.99 KG/M2 | RESPIRATION RATE: 17 BRPM | DIASTOLIC BLOOD PRESSURE: 74 MMHG | OXYGEN SATURATION: 97 % | HEART RATE: 83 BPM | TEMPERATURE: 98 F | SYSTOLIC BLOOD PRESSURE: 114 MMHG | WEIGHT: 131.5 LBS

## 2024-05-24 DIAGNOSIS — G81.94 LEFT HEMIPLEGIA (H): ICD-10-CM

## 2024-05-24 DIAGNOSIS — Z86.73 HISTORY OF CVA (CEREBROVASCULAR ACCIDENT): Primary | ICD-10-CM

## 2024-05-24 DIAGNOSIS — E46 PROTEIN-CALORIE MALNUTRITION, UNSPECIFIED SEVERITY (H): ICD-10-CM

## 2024-05-24 PROCEDURE — 99309 SBSQ NF CARE MODERATE MDM 30: CPT | Performed by: NURSE PRACTITIONER

## 2024-05-24 NOTE — PROGRESS NOTES
[facility]ERIATR[facility]    Chief Complaint   Patient presents with    RECHECK     HPI:  Sukhi Johnson is a 74 year old  (1950), who is being seen today for an episodic care visit at: [facility] AT [facility] () [[id number]]. Today's concern is:   1. History of CVA (cerebrovascular accident)    2. Left hemiplegia (H)    3. Protein-calorie malnutrition, unspecified severity (H24)      Patient seen for follow up, post CVA with severe L hemiplegia, moderate ADL assist and able to feed self with support, pleasant, non-verbal but animated to make needs known, BMI 19.99, thin habitus, adequate intake, overall appears healthy, confirmed code status DNR today and faxed POLST in to update.    Allergies, and PMH/PSH reviewed in [facility] today.  REVIEW OF SYSTEMS:  Unobtainable secondary to cognitive impairment.     Objective:   /74   Pulse 83   Temp 98  F (36.7  C)   Resp 17   Wt 59.6 kg (131 lb 8 oz)   SpO2 97%   BMI 19.99 kg/m    GENERAL APPEARANCE:  in no distress, appears healthy  ENT:  Mouth and posterior oropharynx normal, moist mucous membranes  RESP:  lungs clear to auscultation , no respiratory distress  CV:  no edema, rate-normal  ABDOMEN:  bowel sounds normal  M/S:   Gait and station abnormal ADL assist  SKIN:  Inspection of skin and subcutaneous tissue baseline  NEURO:   Examination of sensation by touch normal  PSYCH:  affect and mood normal    Labs done in SNF are in [facility]. Please refer to them using [facility]/Care Everywhere.    Assessment/Plan:  (Z86.73) History of CVA (cerebrovascular accident)  (primary encounter diagnosis)  (G81.94) Left hemiplegia (H)  Comment: full ADL assist, non-verbal, severe L hemiplegia  Plan: staff to support as indicated  -continue heprain indefinitely per  neuro  -ST and therapy to be involved as indicated for swallowing and repositioning/seating    (E46) Protein-calorie malnutrition, unspecified severity (H24)  Comment: BMI 19.99  Plan: dietary to  follow weights and intake  -supplement PRN  -JEANMARIE/Miladis has additional supplements, OK to give as indicated need to run through pharmacy first for potential side effects    MED REC REQUIRED  Post Medication Reconciliation Status: medication reconcilation previously completed during another office visit        Electronically signed by: KALI Ramirez CNP

## 2024-05-24 NOTE — LETTER
5/24/2024        RE: Sukhi Johnson  C/o [facility]  [address]  [place] [address]        [facility] GERIATR[facility]    Chief Complaint   Patient presents with     RECHECK     HPI:  Sukhi Johnson is a 74 year old  (1950), who is being seen today for an episodic care visit at: [facility] AT [facility] () [[id number]]. Today's concern is:   1. History of CVA (cerebrovascular accident)    2. Left hemiplegia (H)    3. Protein-calorie malnutrition, unspecified severity (H24)      Patient seen for follow up, post CVA with severe L hemiplegia, moderate ADL assist and able to feed self with support, pleasant, non-verbal but animated to make needs known, BMI 19.99, thin habitus, adequate intake, overall appears healthy, confirmed code status DNR today and faxed POLST in to update.    Allergies, and PMH/PSH reviewed in EPIC today.  REVIEW OF SYSTEMS:  Unobtainable secondary to cognitive impairment.     Objective:   /74   Pulse 83   Temp 98  F (36.7  C)   Resp 17   Wt 59.6 kg (131 lb 8 oz)   SpO2 97%   BMI 19.99 kg/m    GENERAL APPEARANCE:  in no distress, appears healthy  ENT:  Mouth and posterior oropharynx normal, moist mucous membranes  RESP:  lungs clear to auscultation , no respiratory distress  CV:  no edema, rate-normal  ABDOMEN:  bowel sounds normal  M/S:   Gait and station abnormal ADL assist  SKIN:  Inspection of skin and subcutaneous tissue baseline  NEURO:   Examination of sensation by touch normal  PSYCH:  affect and mood normal    Labs done in SNF are in [facility]. Please refer to them using [employer]/Care Everywhere.    Assessment/Plan:  (Z86.73) History of CVA (cerebrovascular accident)  (primary encounter diagnosis)  (G81.94) Left hemiplegia (H)  Comment: full ADL assist, non-verbal, severe L hemiplegia  Plan: staff to support as indicated  -continue heprain indefinitely per  neuro  -ST and therapy to be involved as indicated for swallowing and  repositioning/seating    (E46) Protein-calorie malnutrition, unspecified severity (H24)  Comment: BMI 19.99  Plan: dietary to follow weights and intake  -supplement PRN  -PODANYA/Miladis has additional supplements, OK to give as indicated need to run through pharmacy first for potential side effects    MED REC REQUIRED  Post Medication Reconciliation Status: medication reconcilation previously completed during another office visit        Electronically signed by: KALI Ramirez CNP          Sincerely,        KALI Ramirez CNP

## 2024-05-29 NOTE — PROGRESS NOTES
[facility] Care Coordination Contact     5/30/24 Spoke with Miladis, they have tried several alternative methods.  Have used essential oils on his throat and this has helped with his chewing and eating.  Think well and speak well therapy have also been used.  Rife therapy is another therapy Miladis is experienced with and Sukhi has been using.  She believes Sukhi is on his way to a full recovery with these alternative treatments and will keep CC updated.    5/29/24 Left message for JEANMARIE Redding on this date informing her of visit and to call back with any questions or concerns she may have for Sukhi.    OLGA Simon, [facility]  [phone]

## 2024-06-04 ENCOUNTER — PATIENT OUTREACH (OUTPATIENT)
Dept: GERIATRIC MEDICINE | Facility: CLINIC | Age: 74
End: 2024-06-04
Payer: COMMERCIAL

## 2024-06-04 NOTE — LETTER
June 4, 2024    Important [employer] Information    MONE DORAN  C/O [facility]  [address]  [place] [address]                                                                              Your Care Plan      Dear Mone,    I am your [employer] Care Coordinator and I visited you at [facility]  on 5/17/24 to complete your [employer] Health Assessment.    [x] I reviewed your Facility Care Plan and assessment and all identified needs have goals present    [] I reviewed your Facility Care Plan and assessment and we identified these additional goal(s):                                                                                                                                                                                                                                                   I will plan to follow up:  [] Once a month  [] Every 3 months   [x] Every 6 months  [] Other      Questions?  If you have any questions or want to discuss your health care needs, call me at [phone] Monday-Friday between 8am and 5pm. TTY: [phone].     Sincerely,    OLGA Simon, [facility]    E-mail: [email or web address]  Phone: [phone]      [facility]      cc: member record, Skilled Nursing Facility    [employer]  is an HMO D-SNP that contracts with both Medicare and the [place] Medical Assistance (Medicaid) program to provide benefits of both programs to enrollees. Enrollment in [employer] depends on contract renewal.  [id number]    2023 [employer]

## 2024-06-04 NOTE — PROGRESS NOTES
[facility] Care Coordination Contact    Received after visit chart from care coordinator.  Completed following tasks: Mailed [employer] Post Visit letter to member/rep and NH facility. Mailed [employer] Leave Behind Letter.     Leila Salazar  Care Management Specialist   [facility]   [phone]

## 2024-06-13 ENCOUNTER — TRANSFERRED RECORDS (OUTPATIENT)
Dept: HEALTH INFORMATION MANAGEMENT | Facility: CLINIC | Age: 74
End: 2024-06-13
Payer: COMMERCIAL

## 2024-06-13 ENCOUNTER — TELEPHONE (OUTPATIENT)
Dept: GERIATRICS | Facility: CLINIC | Age: 74
End: 2024-06-13
Payer: COMMERCIAL

## 2024-06-13 VITALS
RESPIRATION RATE: 18 BRPM | BODY MASS INDEX: 19.8 KG/M2 | WEIGHT: 130.2 LBS | HEART RATE: 97 BPM | DIASTOLIC BLOOD PRESSURE: 65 MMHG | TEMPERATURE: 97.5 F | OXYGEN SATURATION: 95 % | SYSTOLIC BLOOD PRESSURE: 123 MMHG

## 2024-06-13 NOTE — TELEPHONE ENCOUNTER
[facility] Triage Nurse Telephone Encounter    Provider: KALI Sams CNP   Facility: [facility] Facility Type:  [facility]    Caller: Yesi   Call Back Number: [phone]    Allergies:  No Known Allergies     Reason for call: Patient had a fall in his room on 6/10 and was found on the floor in front of his recliner. Originally no c/o pain , no c/o of 8/1 lefthip pain. Nursing is requesting hip xray.   Per nursing the pain is managed with prn tylenol and is stable at this time.     Verbal Order/Direction given by Provider: Okay for left hip Xray 2 views d/t fall with hip pain.     Provider giving Order:  KALI Sams CNP     Verbal Order given to: Yesi Miranda RN

## 2024-06-14 ENCOUNTER — NURSING HOME VISIT (OUTPATIENT)
Dept: GERIATRICS | Facility: CLINIC | Age: 74
End: 2024-06-14
Payer: COMMERCIAL

## 2024-06-14 DIAGNOSIS — G81.94 LEFT HEMIPLEGIA (H): ICD-10-CM
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
## 2024-06-14 DIAGNOSIS — E46 PROTEIN-CALORIE MALNUTRITION, UNSPECIFIED SEVERITY (H): Primary | ICD-10-CM

## 2024-06-14 PROCEDURE — 99309 SBSQ NF CARE MODERATE MDM 30: CPT | Performed by: NURSE PRACTITIONER

## 2024-06-14 NOTE — LETTER
6/14/2024      Sukhi Johnson  C/o [facility]  [address]  [place] [address]        [facility]AT[facility]    Chief Complaint   Patient presents with     RECHECK     HPI:  Sukhi Johnson is a 74 year old  (1950), who is being seen today for an episodic care visit at: [facility] AT [facility] () [[id number]]. Today's concern is:   1. Protein-calorie malnutrition, unspecified severity (H24)    2. Left hemiplegia (H)    3. Fall, initial encounter      Patient seen for follow up, BMI 19.8, thin habitus, post CVA with L hemiplegia, had fall on 6/10, initial thought was no issues but now having pain, OT evaluating, XR L hip on 6/13 no abnormalities, today exam benign, small reduced bruise L hip, no apparent pain with transfer.    Allergies, and PMH/PSH reviewed in EPIC today.  REVIEW OF SYSTEMS:  Unobtainable secondary to aphasia.     Objective:   /65   Pulse 97   Temp 97.5  F (36.4  C)   Resp 18   Wt 59.1 kg (130 lb 3.2 oz)   SpO2 95%   BMI 19.80 kg/m    GENERAL APPEARANCE:  in no distress, appears healthy  ENT:  Mouth and posterior oropharynx normal, moist mucous membranes  RESP:  lungs clear to auscultation , no respiratory distress  CV:  no edema, rate-normal  ABDOMEN:  no guarding or rebound, bowel sounds normal  M/S:   Gait and station abnormal transfer assist  SKIN:  Inspection of skin and subcutaneous tissue baseline  NEURO:   Examination of sensation by touch normal  PSYCH:  affect and mood normal    Labs done in SNF are in [facility]. Please refer to them using [employer]/Care Everywhere.    Assessment/Plan:  (E46) Protein-calorie malnutrition, unspecified severity (H24)  (primary encounter diagnosis)  Comment: BMI 19.8, moderate appetite  Plan: staff to encourage intake, assist feed  -dietary to follow weights and intake  -supplements PRN    (G81.94) Left hemiplegia (H)  (W19.XXXA) Fall, initial encounter  Comment: post CVA, transfer assist, weak, XR negative  Plan: staff to assist  transfers  -maintain in community area when in WC  -fall precautions when in bed with lowering and mat  -APAP PRN for pain  -PT/OT eval    MED REC REQUIRED  Post Medication Reconciliation Status: medication reconcilation previously completed during another office visit        Electronically signed by: KALI Ramirez CNP          Sincerely,        KALI Ramirez CNP

## 2024-06-14 NOTE — PROGRESS NOTES
[facility] GERIATRICS    Chief Complaint   Patient presents with    RECHECK     HPI:  Sukhi Johnson is a 74 year old  (1950), who is being seen today for an episodic care visit at: [facility] AT [facility] () [[id number]]. Today's concern is:   1. Protein-calorie malnutrition, unspecified severity (H24)    2. Left hemiplegia (H)    3. Fall, initial encounter      Patient seen for follow up, BMI 19.8, thin habitus, post CVA with L hemiplegia, had fall on 6/10, initial thought was no issues but now having pain, OT evaluating, XR L hip on 6/13 no abnormalities, today exam benign, small reduced bruise L hip, no apparent pain with transfer.    Allergies, and PMH/PSH reviewed in [facility] today.  REVIEW OF SYSTEMS:  Unobtainable secondary to aphasia.     Objective:   /65   Pulse 97   Temp 97.5  F (36.4  C)   Resp 18   Wt 59.1 kg (130 lb 3.2 oz)   SpO2 95%   BMI 19.80 kg/m    GENERAL APPEARANCE:  in no distress, appears healthy  ENT:  Mouth and posterior oropharynx normal, moist mucous membranes  RESP:  lungs clear to auscultation , no respiratory distress  CV:  no edema, rate-normal  ABDOMEN:  no guarding or rebound, bowel sounds normal  M/S:   Gait and station abnormal transfer assist  SKIN:  Inspection of skin and subcutaneous tissue baseline  NEURO:   Examination of sensation by touch normal  PSYCH:  affect and mood normal    Labs done in SNF are in [facility]. Please refer to them using [facility]/Care Everywhere.    Assessment/Plan:  (E46) Protein-calorie malnutrition, unspecified severity (H24)  (primary encounter diagnosis)  Comment: BMI 19.8, moderate appetite  Plan: staff to encourage intake, assist feed  -dietary to follow weights and intake  -supplements PRN    (G81.94) Left hemiplegia (H)  (W19.XXXA) Fall, initial encounter  Comment: post CVA, transfer assist, weak, XR negative  Plan: staff to assist transfers  -maintain in community area when in WC  -fall precautions when in bed with lowering  and mat  -APAP PRN for pain  -PT/OT eval    MED REC REQUIRED  Post Medication Reconciliation Status: medication reconcilation previously completed during another office visit        Electronically signed by: KALI Ramirez CNP

## 2024-07-18 NOTE — PROGRESS NOTES
[place] [facility]  Chief Complaint   Patient presents with    FCI Regulatory     [place] Medical Record Number:  [id number]  Place of Service where encounter took place:  [facility] () [[id number]]    HPI:    Sukhi Johnson  is 74 year old (1950), who is being seen today for a federally mandated E/M visit.  HPI information obtained from: facility chart records, facility staff, patient report, [facility] chart review and family/first contact wife report. T    Today:   - Resident seen and examined, chart reviewed and discussed with the nursing staff.   - Resident has no concern today. Just finished attending the activities.   - reports po intake is fine. Just came from outside after having ice cream at [place],reports sleep is fine  - GNP and RN have no concern today.   --------------------------------  - Past Medical, social, family histories, medications, and allergies reviewed and updated    MEDICATIONS:  Current Outpatient Medications   Medication Sig Dispense Refill    acetaminophen (TYLENOL) 325 MG/10.15ML solution 20.3 mLs (650 mg) by Oral or Feeding Tube route every 4 hours as needed for mild pain or fever      acetaminophen (TYLENOL) 650 MG suppository Place 1 suppository (650 mg) rectally every 4 hours as needed for mild pain      Cranberry-Vitamin C (AZO CRANBERRY URINARY TRACT PO) Take 1 tablet by mouth 2 times daily      diclofenac (VOLTAREN) 1 % topical gel Apply to left shoulder BID 50 g 1    Heparin Sodium, Porcine, (HEPARIN ANTICOAGULANT) 5000 UNIT/0.5ML injection Inject 0.5 mLs (5,000 Units) Subcutaneous every 12 hours      metoprolol tartrate (LOPRESSOR) 50 MG tablet 1 tablet (50 mg) by Per Feeding Tube route 2 times daily      ondansetron (ZOFRAN-ODT) 4 MG ODT tab Take 1 tablet (4 mg) by mouth every 6 hours as needed for nausea or vomiting      pantoprazole (PROTONIX) 40 MG EC tablet Take 20 mg by mouth daily      sennosides (SENOKOT) 8.8 MG/5ML syrup 5  "mLs by Oral or Feeding Tube route 2 times daily as needed for constipation       ROS: limited due to dysarthria      Vitals:  /63   Pulse 62   Temp 97.9  F (36.6  C)   Resp 16   Ht 1.727 m (5' 8\")   Wt 60.3 kg (133 lb)   SpO2 100%   BMI 20.22 kg/m    Body mass index is 20.22 kg/m .     Exam:    GENERAL APPEARANCE:  in no distress, cooperative, sitting up in the WC.   RESP:  lungs clear to auscultation   CV:  S1S2 audible, regular HR, no murmur appreciated.   ABDOMEN:  soft, NT/ND, BS audible. no mass appreciated on palpation.   M/S:   Muscles atrophy right hand.   SKIN:  No rash.   NEURO:  Difficulty expressing words, ms strenght: LUE 0/5, LLL 1/5, Rt hand  4/5 and RLE 4/5. Ptosis right eye.   PSYCH:  affect and mood normal    Lab/Diagnostic data: Reviewed in the chart and EHR.        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 At baseline. . Appreciate help      Dysphagia, unspecified type  Protein calorie malnutrition, moderate  Hx of FT (removed in 7/2022)   -Body mass index is 20.22 kg/m .   - BMI less than 22 in frail elderly increase mortality risk. Gaining weight slowly. Dietician on the case.   - RD eval/recommendations.     Essential HTN:   BP Readings from Last 3 Encounters:   07/22/24 102/63   06/13/24 123/65   05/24/24 114/74   - BP controlled,.   Off enalapril and hydralazine due to soft BP, on metoprolol tartrate. Continue.       Hx of GIB with acute blood loss anemia:   - Dec 2021. No scopes was done, Hb dropped down to 12.7 gm/dl.  Hb level 14.8 (4/2022)  -  On Protonix 20 mg.     Chronic fatigue syndrome 2/2 Lyme disease:  - Significant  Deficits requiring NH placement. Requiring extensive assistance from nursing. Up for meals only o/w spends the day " resting in bed      Electronically signed by:  Joel Blanchard MD

## 2024-07-22 ENCOUNTER — NURSING HOME VISIT (OUTPATIENT)
Dept: GERIATRICS | Facility: CLINIC | Age: 74
End: 2024-07-22
Payer: COMMERCIAL

## 2024-07-22 VITALS
HEIGHT: 68 IN | WEIGHT: 133 LBS | HEART RATE: 62 BPM | BODY MASS INDEX: 20.16 KG/M2 | TEMPERATURE: 97.9 F | SYSTOLIC BLOOD PRESSURE: 102 MMHG | OXYGEN SATURATION: 100 % | RESPIRATION RATE: 16 BRPM | DIASTOLIC BLOOD PRESSURE: 63 MMHG

## 2024-07-22 DIAGNOSIS — E46 PROTEIN-CALORIE MALNUTRITION, UNSPECIFIED SEVERITY (H): ICD-10-CM

## 2024-07-22 DIAGNOSIS — I10 PRIMARY HYPERTENSION: ICD-10-CM

## 2024-07-22 DIAGNOSIS — G93.32 CHRONIC FATIGUE SYNDROME: ICD-10-CM

## 2024-07-22 DIAGNOSIS — G81.94 LEFT HEMIPLEGIA (H): Primary | ICD-10-CM

## 2024-07-22 DIAGNOSIS — Z86.73 HISTORY OF CVA (CEREBROVASCULAR ACCIDENT): ICD-10-CM

## 2024-07-22 DIAGNOSIS — I69.322 DYSARTHRIA AS LATE EFFECT OF STROKE: ICD-10-CM

## 2024-07-22 DIAGNOSIS — R13.10 DYSPHAGIA, UNSPECIFIED TYPE: ICD-10-CM

## 2024-07-22 PROCEDURE — 99309 SBSQ NF CARE MODERATE MDM 30: CPT | Performed by: FAMILY MEDICINE

## 2024-07-22 NOTE — LETTER
7/22/2024      Sukhi Johnsno  C/o [facility]  [address]  [place] [address]        [place] [facility]  Chief Complaint   Patient presents with     alf Regulatory     [place] Medical Record Number:  [id number]  Place of Service where encounter took place:  [facility] () [[id number]]    HPI:    Sukhi Johnson  is 74 year old (1950), who is being seen today for a federally mandated E/M visit.  HPI information obtained from: facility chart records, facility staff, patient report, [facility] chart review and family/first contact wife report. T    Today:   - Resident seen and examined, chart reviewed and discussed with the nursing staff.   - Resident has no concern today. Just finished attending the activities.   - reports po intake is fine. Just came from outside after having ice cream at [place],reports sleep is fine  - GNP and RN have no concern today.   --------------------------------  - Past Medical, social, family histories, medications, and allergies reviewed and updated    MEDICATIONS:  Current Outpatient Medications   Medication Sig Dispense Refill     acetaminophen (TYLENOL) 325 MG/10.15ML solution 20.3 mLs (650 mg) by Oral or Feeding Tube route every 4 hours as needed for mild pain or fever       acetaminophen (TYLENOL) 650 MG suppository Place 1 suppository (650 mg) rectally every 4 hours as needed for mild pain       Cranberry-Vitamin C (AZO CRANBERRY URINARY TRACT PO) Take 1 tablet by mouth 2 times daily       diclofenac (VOLTAREN) 1 % topical gel Apply to left shoulder BID 50 g 1     Heparin Sodium, Porcine, (HEPARIN ANTICOAGULANT) 5000 UNIT/0.5ML injection Inject 0.5 mLs (5,000 Units) Subcutaneous every 12 hours       metoprolol tartrate (LOPRESSOR) 50 MG tablet 1 tablet (50 mg) by Per Feeding Tube route 2 times daily       ondansetron (ZOFRAN-ODT) 4 MG ODT tab Take 1 tablet (4 mg) by mouth every 6 hours as needed for nausea or vomiting       pantoprazole  "(PROTONIX) 40 MG EC tablet Take 20 mg by mouth daily       sennosides (SENOKOT) 8.8 MG/5ML syrup 5 mLs by Oral or Feeding Tube route 2 times daily as needed for constipation       ROS: limited due to dysarthria      Vitals:  /63   Pulse 62   Temp 97.9  F (36.6  C)   Resp 16   Ht 1.727 m (5' 8\")   Wt 60.3 kg (133 lb)   SpO2 100%   BMI 20.22 kg/m    Body mass index is 20.22 kg/m .     Exam:    GENERAL APPEARANCE:  in no distress, cooperative, sitting up in the WC.   RESP:  lungs clear to auscultation   CV:  S1S2 audible, regular HR, no murmur appreciated.   ABDOMEN:  soft, NT/ND, BS audible. no mass appreciated on palpation.   M/S:   Muscles atrophy right hand.   SKIN:  No rash.   NEURO:  Difficulty expressing words, ms strenght: LUE 0/5, LLL 1/5, Rt hand  4/5 and RLE 4/5. Ptosis right eye.   PSYCH:  affect and mood normal    Lab/Diagnostic data: Reviewed in the chart and EHR.        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 At baseline. . Appreciate help      Dysphagia, unspecified type  Protein calorie malnutrition, moderate  Hx of FT (removed in 7/2022)   -Body mass index is 20.22 kg/m .   - BMI less than 22 in frail elderly increase mortality risk. Gaining weight slowly. Dietician on the case.   - RD eval/recommendations.     Essential HTN:   BP Readings from Last 3 Encounters:   07/22/24 102/63   06/13/24 123/65   05/24/24 114/74   - BP controlled,.   Off enalapril and hydralazine due to soft BP, on metoprolol tartrate. Continue.       Hx of GIB with acute blood loss anemia:   - Dec 2021. No scopes was done, Hb dropped down to 12.7 gm/dl.  Hb level 14.8 (4/2022)  -  On Protonix 20 mg.     Chronic fatigue syndrome 2/2 Lyme disease:  - Significant  Deficits requiring NH " placement. Requiring extensive assistance from nursing. Up for meals only o/w spends the day resting in bed      Electronically signed by:  Joel Blanchard MD      Sincerely,        Joel Blanchard MD

## 2024-09-03 ENCOUNTER — NURSING HOME VISIT (OUTPATIENT)
Dept: GERIATRICS | Facility: CLINIC | Age: 74
End: 2024-09-03
Payer: COMMERCIAL

## 2024-09-03 VITALS
OXYGEN SATURATION: 95 % | RESPIRATION RATE: 12 BRPM | HEART RATE: 88 BPM | SYSTOLIC BLOOD PRESSURE: 120 MMHG | BODY MASS INDEX: 20.24 KG/M2 | TEMPERATURE: 97.7 F | DIASTOLIC BLOOD PRESSURE: 70 MMHG | WEIGHT: 133.1 LBS

## 2024-09-03 DIAGNOSIS — E46 PROTEIN-CALORIE MALNUTRITION, UNSPECIFIED SEVERITY (H): ICD-10-CM

## 2024-09-03 DIAGNOSIS — R13.10 DYSPHAGIA, UNSPECIFIED TYPE: ICD-10-CM

## 2024-09-03 DIAGNOSIS — I61.9 RIGHT-SIDED NONTRAUMATIC INTRACEREBRAL HEMORRHAGE, UNSPECIFIED CEREBRAL LOCATION (H): Primary | ICD-10-CM

## 2024-09-03 PROCEDURE — 99309 SBSQ NF CARE MODERATE MDM 30: CPT | Performed by: NURSE PRACTITIONER

## 2024-09-03 NOTE — LETTER
9/3/2024      Sukhi Johnson  C/[facility]  [address]  [place] [address]        [facility]  Chief Complaint   Patient presents with     [facility] Medical Record Number:  [id number]  Place of Service where encounter took place:  [facility]  AT [facility] () [[id number]]    HPI:    Sukhi Johnson  is 74 year old (1950), who is being seen today for a federally mandated E/M visit. Today's concerns are:     Right-sided nontraumatic intracerebral hemorrhage, unspecified cerebral location (H)  Dysphagia, unspecified type  Protein-calorie malnutrition, unspecified severity (H24)    Patient seen for follow up, ICH in 2021 with chronic L hemiplegia plus difficulty verbalizing, appears healthy, intermittent falls OOB without injury, moderate swallow difficulties, also BMI 20.24 with supplements, no distress.    ALLERGIES:Patient has no known allergies.  PAST MEDICAL HISTORY: No past medical history on file.  PAST SURGICAL HISTORY:   has a past surgical history that includes IR Gastro Jejunostomy Tube Placement (1/5/2022) and IR Gastro Jejunostomy Tube Change (3/9/2022).  FAMILY HISTORY: family history is not on file.  SOCIAL HISTORY:  reports that he has never smoked. He has never used smokeless tobacco. He reports that he does not drink alcohol and does not use drugs.    MEDICATIONS:  MED REC REQUIRED  Post Medication Reconciliation Status: medication reconcilation previously completed during another office visit         Review of your medicines            Accurate as of September 3, 2024  1:57 PM. If you have any questions, ask your nurse or doctor.                CONTINUE these medicines which have NOT CHANGED        Dose / Directions   * acetaminophen 325 MG/10.15ML solution  Commonly known as: TYLENOL  Used for: Right-sided nontraumatic intracerebral hemorrhage, unspecified cerebral location (H)      Dose: 650 mg  20.3 mLs (650 mg) by Oral or Feeding Tube route  every 4 hours as needed for mild pain or fever  Refills: 0     * acetaminophen 650 MG suppository  Commonly known as: TYLENOL  Used for: Right-sided nontraumatic intracerebral hemorrhage, unspecified cerebral location (H)      Dose: 650 mg  Place 1 suppository (650 mg) rectally every 4 hours as needed for mild pain  Refills: 0     AZO CRANBERRY URINARY TRACT PO      Dose: 1 tablet  Take 1 tablet by mouth 2 times daily  Refills: 0     diclofenac 1 % topical gel  Commonly known as: VOLTAREN  Used for: Acute pain of left shoulder      Apply to left shoulder BID  Quantity: 50 g  Refills: 1     heparin ANTICOAGULANT 5000 UNIT/0.5ML injection  Used for: Right-sided nontraumatic intracerebral hemorrhage, unspecified cerebral location (H)      Dose: 5,000 Units  Inject 0.5 mLs (5,000 Units) Subcutaneous every 12 hours  Refills: 0     metoprolol tartrate 50 MG tablet  Commonly known as: LOPRESSOR  Used for: Right-sided nontraumatic intracerebral hemorrhage, unspecified cerebral location (H)      Dose: 50 mg  1 tablet (50 mg) by Per Feeding Tube route 2 times daily  Refills: 0     ondansetron 4 MG ODT tab  Commonly known as: ZOFRAN ODT  Used for: Right-sided nontraumatic intracerebral hemorrhage, unspecified cerebral location (H)      Dose: 4 mg  Take 1 tablet (4 mg) by mouth every 6 hours as needed for nausea or vomiting  Refills: 0     pantoprazole 40 MG EC tablet  Commonly known as: PROTONIX      Dose: 20 mg  Take 20 mg by mouth daily  Refills: 0     sennosides 8.8 MG/5ML syrup  Commonly known as: SENOKOT  Used for: Right-sided nontraumatic intracerebral hemorrhage, unspecified cerebral location (H)      Dose: 5 mL  5 mLs by Oral or Feeding Tube route 2 times daily as needed for constipation  Refills: 0           * This list has 2 medication(s) that are the same as other medications prescribed for you. Read the directions carefully, and ask your doctor or other care provider to review them with you.                   Case  Management:  I have reviewed the care plan and MDS and do agree with the plan. Patient's desire to return to the community is present, but is not able due to care needs . Information reviewed:  Medications, vital signs, orders, and nursing notes.    ROS:  Unobtainable secondary to cognitive impairment.     Vitals:  /70   Pulse 88   Temp 97.7  F (36.5  C)   Resp 12   Wt 60.4 kg (133 lb 1.6 oz)   SpO2 95%   BMI 20.24 kg/m    Body mass index is 20.24 kg/m .  Exam:  GENERAL APPEARANCE:  in no distress, appears healthy  ENT:  Mouth and posterior oropharynx normal, moist mucous membranes  RESP:  lungs clear to auscultation , no respiratory distress  CV:  no edema, rate-normal  ABDOMEN:  bowel sounds normal  M/S:   Gait and station abnormal transfer assist  SKIN:  Inspection of skin and subcutaneous tissue baseline  NEURO:   Examination of sensation by touch normal  PSYCH:  affect and mood normal    Lab/Diagnostic data:   Labs done in SNF are in [facility]. Please refer to them using [employer] Everywhere.    ASSESSMENT/PLAN  (I61.9) Right-sided nontraumatic intracerebral hemorrhage, unspecified cerebral location (H)  (primary encounter diagnosis)  (R13.10) Dysphagia, unspecified type  Comment: L hemiplegia, verbal difficulties  Plan: continue move-well and speak-well supplements  -adaptive utensils   -ST regular evaluations  -maintain altered diet, feed supervision/assist  -continue heparin per  neurology  -follow up neurology PRN    (E46) Protein-calorie malnutrition, unspecified severity (H24)  Comment: BMI 20.24  Plan: dietary to follow weights  -continue supplements PRN  -encourage intake with feeding        Electronically signed by:  KALI Ramirez CNP           Sincerely,        KALI Ramirez CNP

## 2024-09-03 NOTE — PROGRESS NOTES
[facility]  Chief Complaint   [id number]  Place of Service where encounter took place:  [facility] () [[id number]]    HPI:    Sukhi Johnson  is 74 year old (1950), who is being seen today for a federally mandated E/M visit. Today's concerns are:     Right-sided nontraumatic intracerebral hemorrhage, unspecified cerebral location (H)  Dysphagia, unspecified type  Protein-calorie malnutrition, unspecified severity (H24)    Patient seen for follow up, ICH in 2021 with chronic L hemiplegia plus difficulty verbalizing, appears healthy, intermittent falls OOB without injury, moderate swallow difficulties, also BMI 20.24 with supplements, no distress.    ALLERGIES:Patient has no known allergies.  PAST MEDICAL HISTORY: No past medical history on file.  PAST SURGICAL HISTORY:   has a past surgical history that includes IR Gastro Jejunostomy Tube Placement (1/5/2022) and IR Gastro Jejunostomy Tube Change (3/9/2022).  FAMILY HISTORY: family history is not on file.  SOCIAL HISTORY:  reports that he has never smoked. He has never used smokeless tobacco. He reports that he does not drink alcohol and does not use drugs.    MEDICATIONS:  MED REC REQUIRED  Post Medication Reconciliation Status: medication reconcilation previously completed during another office visit         Review of your medicines            Accurate as of September 3, 2024  1:57 PM. If you have any questions, ask your nurse or doctor.                CONTINUE these medicines which have NOT CHANGED        Dose / Directions   * acetaminophen 325 MG/10.15ML solution  Commonly known as: TYLENOL  Used for: Right-sided nontraumatic intracerebral hemorrhage, unspecified cerebral location (H)      Dose: 650 mg  20.3 mLs (650 mg) by Oral or Feeding Tube route every 4 hours as needed for mild pain or fever  Refills: 0     * acetaminophen 650 MG  suppository  Commonly known as: TYLENOL  Used for: Right-sided nontraumatic intracerebral hemorrhage, unspecified cerebral location (H)      Dose: 650 mg  Place 1 suppository (650 mg) rectally every 4 hours as needed for mild pain  Refills: 0     AZO CRANBERRY URINARY TRACT PO      Dose: 1 tablet  Take 1 tablet by mouth 2 times daily  Refills: 0     diclofenac 1 % topical gel  Commonly known as: VOLTAREN  Used for: Acute pain of left shoulder      Apply to left shoulder BID  Quantity: 50 g  Refills: 1     heparin ANTICOAGULANT 5000 UNIT/0.5ML injection  Used for: Right-sided nontraumatic intracerebral hemorrhage, unspecified cerebral location (H)      Dose: 5,000 Units  Inject 0.5 mLs (5,000 Units) Subcutaneous every 12 hours  Refills: 0     metoprolol tartrate 50 MG tablet  Commonly known as: LOPRESSOR  Used for: Right-sided nontraumatic intracerebral hemorrhage, unspecified cerebral location (H)      Dose: 50 mg  1 tablet (50 mg) by Per Feeding Tube route 2 times daily  Refills: 0     ondansetron 4 MG ODT tab  Commonly known as: ZOFRAN ODT  Used for: Right-sided nontraumatic intracerebral hemorrhage, unspecified cerebral location (H)      Dose: 4 mg  Take 1 tablet (4 mg) by mouth every 6 hours as needed for nausea or vomiting  Refills: 0     pantoprazole 40 MG EC tablet  Commonly known as: PROTONIX      Dose: 20 mg  Take 20 mg by mouth daily  Refills: 0     sennosides 8.8 MG/5ML syrup  Commonly known as: SENOKOT  Used for: Right-sided nontraumatic intracerebral hemorrhage, unspecified cerebral location (H)      Dose: 5 mL  5 mLs by Oral or Feeding Tube route 2 times daily as needed for constipation  Refills: 0           * This list has 2 medication(s) that are the same as other medications prescribed for you. Read the directions carefully, and ask your doctor or other care provider to review them with you.                   Case Management:  I have reviewed the care plan and MDS and do agree with the plan.  Patient's desire to return to the community is present, but is not able due to care needs . Information reviewed:  Medications, vital signs, orders, and nursing notes.    ROS:  Unobtainable secondary to cognitive impairment.     Vitals:  /70   Pulse 88   Temp 97.7  F (36.5  C)   Resp 12   Wt 60.4 kg (133 lb 1.6 oz)   SpO2 95%   BMI 20.24 kg/m    Body mass index is 20.24 kg/m .  Exam:  GENERAL APPEARANCE:  in no distress, appears healthy  ENT:  Mouth and posterior oropharynx normal, moist mucous membranes  RESP:  lungs clear to auscultation , no respiratory distress  CV:  no edema, rate-normal  ABDOMEN:  bowel sounds normal  M/S:   Gait and station abnormal transfer assist  SKIN:  Inspection of skin and subcutaneous tissue baseline  NEURO:   Examination of sensation by touch normal  PSYCH:  affect and mood normal    Lab/Diagnostic data:   Labs done in SNF are in [facility]. Please refer to them using [employer] Everywhere.    ASSESSMENT/PLAN  (I61.9) Right-sided nontraumatic intracerebral hemorrhage, unspecified cerebral location (H)  (primary encounter diagnosis)  (R13.10) Dysphagia, unspecified type  Comment: L hemiplegia, verbal difficulties  Plan: continue move-well and speak-well supplements  -adaptive utensils   -ST regular evaluations  -maintain altered diet, feed supervision/assist  -continue heparin per  neurology  -follow up neurology PRN    (E46) Protein-calorie malnutrition, unspecified severity (H24)  Comment: BMI 20.24  Plan: dietary to follow weights  -continue supplements PRN  -encourage intake with feeding        Electronically signed by:  KALI Ramirez CNP

## 2024-09-16 ENCOUNTER — HOSPITAL ENCOUNTER (EMERGENCY)
Facility: CLINIC | Age: 74
Discharge: HOME | End: 2024-09-16
Attending: EMERGENCY MEDICINE | Admitting: EMERGENCY MEDICINE
Payer: COMMERCIAL

## 2024-09-16 ENCOUNTER — APPOINTMENT (OUTPATIENT)
Dept: CT IMAGING | Facility: CLINIC | Age: 74
End: 2024-09-16
Attending: EMERGENCY MEDICINE
Payer: COMMERCIAL

## 2024-09-16 VITALS
SYSTOLIC BLOOD PRESSURE: 107 MMHG | RESPIRATION RATE: 14 BRPM | HEART RATE: 98 BPM | DIASTOLIC BLOOD PRESSURE: 65 MMHG | OXYGEN SATURATION: 95 %

## 2024-09-16 DIAGNOSIS — S09.90XA CLOSED HEAD INJURY, INITIAL ENCOUNTER: ICD-10-CM

## 2024-09-16 DIAGNOSIS — S00.03XA CONTUSION OF SCALP, INITIAL ENCOUNTER: ICD-10-CM

## 2024-09-16 PROCEDURE — 70450 CT HEAD/BRAIN W/O DYE: CPT

## 2024-09-16 PROCEDURE — 72125 CT NECK SPINE W/O DYE: CPT

## 2024-09-16 PROCEDURE — 99284 EMERGENCY DEPT VISIT MOD MDM: CPT | Mod: 25

## 2024-09-16 ASSESSMENT — ACTIVITIES OF DAILY LIVING (ADL)
ADLS_ACUITY_SCORE: 41
ADLS_ACUITY_SCORE: 43
ADLS_ACUITY_SCORE: 43

## 2024-09-16 NOTE — ED PROVIDER NOTES
Emergency Department Note      History of Present Illness     Chief Complaint   Fall      HPI   Sukhi Johsnon is a 74 year old male, anticoagulated (Heparin), with a history of a right sided intracerebral hemorrhage, presenting to the emergency department for evaluation of a fall. The patient is wheel chair bound at baseline, and prior to arrival, the patient's wheelchair broke, causing the patient to fall backwards and hit his head. The patient denies any pain currently. Per EMS, the patient has left sided deficits at baseline as well as anisocoria. History is limited due to the patient's mental condition.      Independent Historian   None    Review of External Notes   I reviewed his discharge summary from July 12 of this year when he had a stroke.    Past Medical History     Medical History and Problem List   Right sided intracerebral hemorrhage   Hypertension    Medications   Voltaren   Lopressor  Heparin anticoagulant   Zofran   Protonix   Senokot     Surgical History   IR gastro jejunostomy placement     Physical Exam     Patient Vitals for the past 24 hrs:   BP Pulse Resp SpO2   09/16/24 1920 115/85 86 18 96 %     Physical Exam   Constitutional: Vital signs reviewed as above  General: Alert, answering yes/no questions.  HEENT: Moist mucous membranes, contusion to the right posterior scalp.  Mild tenderness over the left upper cheek.  Poor dentition.  No obvious periapical abscess.  Eyes: Conjunctiva normal.   Neck: C-collar in place.  No midline tenderness.  Cardiovascular: Regular rate, Regular rhythm and normal heart sounds.  No MRG  Pulmonary/Chest: Effort normal and breath sounds normal. No respiratory distress. Patient has no wheezes. Patient has no rales.   Abdominal: Soft. Positive bowel sounds. No MRG.  Musculoskeletal/Extremities: Left-sided hemiparesis from recent stroke.  Endo: No pitting edema  Neurological: Alert.  Residual left sided hemiparesis from previous stroke.  Pupils were 4 mm on  the right and 3 mm in the left.  This is consistent with previous exams after his stroke.  He does have dysarthria, unchanged per his wife.  Is able to answer questions but there is severe dysarthria.  GCS 15.  Skin: Skin is warm and dry.   Psychiatric: Unable to fully assess      Diagnostics     Lab Results   Labs Ordered and Resulted from Time of ED Arrival to Time of ED Departure - No data to display    Imaging   CT Cervical Spine w/o Contrast   Final Result   IMPRESSION:   1.  No fracture or posttraumatic subluxation.         Head CT w/o contrast   Final Result   IMPRESSION:   1.  No CT evidence for acute intracranial process.   2.  Similar chronic changes as above.          EKG   None     Independent Interpretation   I independently interpreted the patient's non-contrast head CT, noting no evidence for acute bleed.    ED Course      Medications Administered   Medications - No data to display    Procedures   Procedures     Discussion of Management   None    ED Course   ED Course as of 09/16/24 2036   Mon Sep 16, 2024   1919 I obtained history and examined the patient as noted above.        Additional Documentation  None    Medical Decision Making / Diagnosis     CMS Diagnoses: None    MIPS       None    [facility]   Sukhi Johnson is a 74 year old male who presents after having a fall at his facility about 4 hours prior to arrival.  Per report the back of his wheelchair gave out and failed and he struck his head on a metal post.  He did not fall to the ground.  He was complaining of head and neck pain.  He was given Tylenol.  It sounds as though they took him to dinner and ate and then his wife was alerted and suggested they come here.  Neurologically he is at baseline per his wife.  His wife does note that he had some fasciculations of the left side of his face but that is resolved.  Pupils are also back to baseline per her.  Fortunately CT scan of the head and C-spine's were negative.  He does have a contusion on  the back.  He is also complaining of some left upper cheek pain.  He does have poor dentition that they have been working with any recent had a cleaning.  I see no signs of infection.  He was given ice for that as he already had Tylenol.  At this point he will be discharged back to his facility.    Disposition   The patient was discharged.     Diagnosis     ICD-10-CM    1. Closed head injury, initial encounter  S09.90XA       2. Contusion of scalp, initial encounter  S00.03XA            Discharge Medications   New Prescriptions    No medications on file         Scribe Disclosure:  I, Rubio Hodge, am serving as a scribe at 7:27 PM on 9/16/2024 to document services personally performed by Crow Hernández MD based on my observations and the provider's statements to me.        Crow Hernández MD  09/16/24 2036

## 2024-09-17 NOTE — ED TRIAGE NOTES
Patient arrives via EMS. Per report, patient is wheelchair bound at baseline. Wheelchair broke and he fell out backwards out of his chair. Patient use computer to communicate. EMS reports different sized pupils- 2 mm and 3 mm which is NEW. Patient is on Heparin

## 2024-09-20 ENCOUNTER — NURSING HOME VISIT (OUTPATIENT)
Dept: GERIATRICS | Facility: CLINIC | Age: 74
End: 2024-09-20
Payer: COMMERCIAL

## 2024-09-20 VITALS
TEMPERATURE: 97.3 F | WEIGHT: 133 LBS | BODY MASS INDEX: 20.22 KG/M2 | DIASTOLIC BLOOD PRESSURE: 79 MMHG | HEART RATE: 72 BPM | OXYGEN SATURATION: 95 % | SYSTOLIC BLOOD PRESSURE: 141 MMHG | RESPIRATION RATE: 18 BRPM
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
## 2024-09-20 DIAGNOSIS — S00.83XA TRAUMATIC HEMATOMA OF FOREHEAD, INITIAL ENCOUNTER: ICD-10-CM

## 2024-09-20 DIAGNOSIS — I61.9 RIGHT-SIDED NONTRAUMATIC INTRACEREBRAL HEMORRHAGE, UNSPECIFIED CEREBRAL LOCATION (H): Primary | ICD-10-CM

## 2024-09-20 PROCEDURE — 99309 SBSQ NF CARE MODERATE MDM 30: CPT | Performed by: NURSE PRACTITIONER

## 2024-09-20 NOTE — LETTER
9/20/2024      Sukhi Johnson  [address]  [place] [address]        [facility] GERIATRICS    Chief Complaint   Patient presents with     RECHECK     HPI:  Sukhi Johnson is a 74 year old  (1950), who is being seen today for an episodic care visit at: [facility] AT [facility] () [[id number]]. Today's concern is:   1. Right-sided nontraumatic intracerebral hemorrhage, unspecified cerebral location (H)    2. Fall, initial encounter    3. Traumatic hematoma of forehead, initial encounter      Patient seen for follow up, ICH in 2021 and now with L hemiplegia, limited verbal, on 9/16 leaning in WC and fell out bruising forehead, small lump, skin intact, sent to ER and CT negative findings, no apparent pain nor headache, skin intact, overall appears healthy.    Allergies, and PMH/PSH reviewed in EPIC today.  REVIEW OF SYSTEMS:  Unobtainable secondary to aphasia.     Objective:   BP (!) 141/79   Pulse 72   Temp 97.3  F (36.3  C)   Resp 18   Wt 60.3 kg (133 lb)   SpO2 95%   BMI 20.22 kg/m    GENERAL APPEARANCE:  in no distress, appears healthy  ENT:  Mouth and posterior oropharynx normal, moist mucous membranes  RESP:  lungs clear to auscultation , no respiratory distress  CV:  no edema, rate-normal  ABDOMEN:  no guarding or rebound  M/S:   Gait and station abnormal transfer assist  SKIN:  Inspection of skin and subcutaneous tissue baseline  NEURO:   Examination of sensation by touch normal  PSYCH:  affect and mood normal    Labs done in SNF are in [facility]. Please refer to them using [facility]/Care Everywhere.    Assessment/Plan:  (I61.9) Right-sided nontraumatic intracerebral hemorrhage, unspecified cerebral location (H)  (primary encounter diagnosis)  (W19.XXXA) Fall, initial encounter  (S00.83XA) Traumatic hematoma of forehead, initial encounter  Comment: cognitive limitations, no pain, fall with forehead bruise/bump  Plan:   -staff to support as indicated  -continue heparin no end date  per neurology  -APAP for pain  -new WC arrived, recliner with extremity supports  -OT eval for WC seating/pressure  -train staff on WC fitment and recline degree for safety    MED REC REQUIRED  Post Medication Reconciliation Status: medication reconcilation previously completed during another office visit          Electronically signed by: KALI Ramirez CNP          Sincerely,        KALI Ramirez CNP

## 2024-09-20 NOTE — PROGRESS NOTES
[facility] GERIATRICS    Chief Complaint   Patient presents with    RECHECK     HPI:  Sukhi Johnson is a 74 year old  (1950), who is being seen today for an episodic care visit at: [facility] AT [facility] () [[id number]]. Today's concern is:   1. Right-sided nontraumatic intracerebral hemorrhage, unspecified cerebral location (H)    2. Fall, initial encounter    3. Traumatic hematoma of forehead, initial encounter      Patient seen for follow up, ICH in 2021 and now with L hemiplegia, limited verbal, on 9/16 leaning in WC and fell out bruising forehead, small lump, skin intact, sent to ER and CT negative findings, no apparent pain nor headache, skin intact, overall appears healthy.    Allergies, and PMH/PSH reviewed in EPIC today.  REVIEW OF SYSTEMS:  Unobtainable secondary to aphasia.     Objective:   BP (!) 141/79   Pulse 72   Temp 97.3  F (36.3  C)   Resp 18   Wt 60.3 kg (133 lb)   SpO2 95%   BMI 20.22 kg/m    GENERAL APPEARANCE:  in no distress, appears healthy  ENT:  Mouth and posterior oropharynx normal, moist mucous membranes  RESP:  lungs clear to auscultation , no respiratory distress  CV:  no edema, rate-normal  ABDOMEN:  no guarding or rebound  M/S:   Gait and station abnormal transfer assist  SKIN:  Inspection of skin and subcutaneous tissue baseline  NEURO:   Examination of sensation by touch normal  PSYCH:  affect and mood normal    Labs done in SNF are in [facility]. Please refer to them using [facility]/Care Everywhere.    Assessment/Plan:  (I61.9) Right-sided nontraumatic intracerebral hemorrhage, unspecified cerebral location (H)  (primary encounter diagnosis)  (W19.XXXA) Fall, initial encounter  (S00.83XA) Traumatic hematoma of forehead, initial encounter  Comment: cognitive limitations, no pain, fall with forehead bruise/bump  Plan:   -staff to support as indicated  -continue heparin no end date per neurology  -APAP for pain  -new WC arrived, recliner with extremity supports  -OT  eval for WC seating/pressure  -train staff on WC fitment and recline degree for safety    MED REC REQUIRED  Post Medication Reconciliation Status: medication reconcilation previously completed during another office visit          Electronically signed by: KALI Ramirez CNP       n/a

## 2024-10-01 ENCOUNTER — NURSING HOME VISIT (OUTPATIENT)
Dept: GERIATRICS | Facility: CLINIC | Age: 74
End: 2024-10-01
Payer: COMMERCIAL

## 2024-10-01 VITALS
BODY MASS INDEX: 20.45 KG/M2 | TEMPERATURE: 98.2 F | RESPIRATION RATE: 16 BRPM | WEIGHT: 134.5 LBS | SYSTOLIC BLOOD PRESSURE: 123 MMHG | HEART RATE: 65 BPM | DIASTOLIC BLOOD PRESSURE: 68 MMHG | OXYGEN SATURATION: 96 %

## 2024-10-01 DIAGNOSIS — E46 PROTEIN-CALORIE MALNUTRITION, UNSPECIFIED SEVERITY (H): ICD-10-CM

## 2024-10-01 DIAGNOSIS — M25.552 HIP PAIN, LEFT: ICD-10-CM

## 2024-10-01 DIAGNOSIS — I61.9 RIGHT-SIDED NONTRAUMATIC INTRACEREBRAL HEMORRHAGE, UNSPECIFIED CEREBRAL LOCATION (H): Primary | ICD-10-CM

## 2024-10-01 PROCEDURE — 99309 SBSQ NF CARE MODERATE MDM 30: CPT | Performed by: NURSE PRACTITIONER

## 2024-10-01 RX ORDER — ACETAMINOPHEN 500 MG
1000 TABLET ORAL 3 TIMES DAILY
Status: SHIPPED
Start: 2024-10-01

## 2024-10-01 NOTE — LETTER
10/1/2024      Sukhi Johnson  [address]  [place] [address]        [facility] GERIATRICS    Chief Complaint   Patient presents with     RECHECK     HPI:  Sukhi Johnson is a 74 year old  (1950), who is being seen today for an episodic care visit at: [facility] AT [facility] () [[id number]]. Today's concern is:   1. Right-sided nontraumatic intracerebral hemorrhage, unspecified cerebral location (H)    2. Protein-calorie malnutrition, unspecified severity (H)    3. Hip pain, left      Patient seen for follow up, post ICH in 2021, limited verbal but does have times where able to communicate, today non-responsive to interaction/stimuli, just looking at me, no apparent pain with ROM and palpation of L hip area, BMI 20.45, feed assist, overall appears healthy.    Allergies, and PMH/PSH reviewed in EPIC today.  REVIEW OF SYSTEMS:  Unobtainable secondary to cognitive impairment.     Objective:   /68   Pulse 65   Temp 98.2  F (36.8  C)   Resp 16   Wt 61 kg (134 lb 8 oz)   SpO2 96%   BMI 20.45 kg/m    GENERAL APPEARANCE:  in no distress, appears healthy  ENT:  Mouth and posterior oropharynx normal, moist mucous membranes  RESP:  lungs clear to auscultation , no respiratory distress  CV:  no edema, rate-normal  ABDOMEN:  no guarding or rebound  M/S:   Gait and station abnormal transfer assist  SKIN:  Inspection of skin and subcutaneous tissue baseline  NEURO:   Examination of sensation by touch normal  PSYCH:  affect and mood normal    Labs done in SNF are in [facility]. Please refer to them using [facility]/Care Everywhere.    Assessment/Plan:  (I61.9) Right-sided nontraumatic intracerebral hemorrhage, unspecified cerebral location (H)  (primary encounter diagnosis)  (E46) Protein-calorie malnutrition, unspecified severity (H)  (M25.552) Hip pain, left  Comment: cognitive limitations, BMI 20.45, fall 9/16, reports of L hip pain, no pain during exam  Plan:   -start APAP 1000mg TID  scheduled  -encourage intake at meals  -heparin no end date; this is between Dr. Santos neuro and family  -staff to support as indicated  -has new WC, fitment is very good  -follow up neuro PRN  -if L hip exacerbates consider XR again (one done in June was normal hip)    MED REC REQUIRED  Post Medication Reconciliation Status: medication reconcilation previously completed during another office visit        Electronically signed by: KALI Ramirez CNP          Sincerely,        KALI Ramirez CNP

## 2024-10-01 NOTE — PROGRESS NOTES
[facility] GERIATRICS    Chief Complaint   Patient presents with    RECHECK     HPI:  Sukhi Johnson is a 74 year old  (1950), who is being seen today for an episodic care visit at: [facility] AT [facility] () [[id number]]. Today's concern is:   1. Right-sided nontraumatic intracerebral hemorrhage, unspecified cerebral location (H)    2. Protein-calorie malnutrition, unspecified severity (H)    3. Hip pain, left      Patient seen for follow up, post ICH in 2021, limited verbal but does have times where able to communicate, today non-responsive to interaction/stimuli, just looking at me, no apparent pain with ROM and palpation of L hip area, BMI 20.45, feed assist, overall appears healthy.    Allergies, and PMH/PSH reviewed in [facility] today.  REVIEW OF SYSTEMS:  Unobtainable secondary to cognitive impairment.     Objective:   /68   Pulse 65   Temp 98.2  F (36.8  C)   Resp 16   Wt 61 kg (134 lb 8 oz)   SpO2 96%   BMI 20.45 kg/m    GENERAL APPEARANCE:  in no distress, appears healthy  ENT:  Mouth and posterior oropharynx normal, moist mucous membranes  RESP:  lungs clear to auscultation , no respiratory distress  CV:  no edema, rate-normal  ABDOMEN:  no guarding or rebound  M/S:   Gait and station abnormal transfer assist  SKIN:  Inspection of skin and subcutaneous tissue baseline  NEURO:   Examination of sensation by touch normal  PSYCH:  affect and mood normal    Labs done in SNF are in [facility]. Please refer to them using [facility]/Care Everywhere.    Assessment/Plan:  (I61.9) Right-sided nontraumatic intracerebral hemorrhage, unspecified cerebral location (H)  (primary encounter diagnosis)  (E46) Protein-calorie malnutrition, unspecified severity (H)  (M25.552) Hip pain, left  Comment: cognitive limitations, BMI 20.45, fall 9/16, reports of L hip pain, no pain during exam  Plan:   -start APAP 1000mg TID scheduled  -encourage intake at meals  -heparin no end date; this is between Dr. Santos  neuro and family  -staff to support as indicated  -has new WC, fitment is very good  -follow up neuro PRN  -if L hip exacerbates consider XR again (one done in June was normal hip)    MED REC REQUIRED  Post Medication Reconciliation Status: medication reconcilation previously completed during another office visit        Electronically signed by: KALI Ramirez CNP

## 2024-11-18 VITALS
RESPIRATION RATE: 16 BRPM | HEART RATE: 63 BPM | DIASTOLIC BLOOD PRESSURE: 61 MMHG | TEMPERATURE: 98 F | SYSTOLIC BLOOD PRESSURE: 97 MMHG | WEIGHT: 134 LBS | OXYGEN SATURATION: 95 % | BODY MASS INDEX: 20.37 KG/M2

## 2024-11-19 ENCOUNTER — NURSING HOME VISIT (OUTPATIENT)
Dept: GERIATRICS | Facility: CLINIC | Age: 74
End: 2024-11-19
Payer: COMMERCIAL

## 2024-11-19 DIAGNOSIS — I10 PRIMARY HYPERTENSION: ICD-10-CM

## 2024-11-19 DIAGNOSIS — E46 PROTEIN-CALORIE MALNUTRITION, UNSPECIFIED SEVERITY (H): ICD-10-CM

## 2024-11-19 DIAGNOSIS — I61.9 RIGHT-SIDED NONTRAUMATIC INTRACEREBRAL HEMORRHAGE, UNSPECIFIED CEREBRAL LOCATION (H): Primary | ICD-10-CM

## 2024-11-19 PROCEDURE — 99309 SBSQ NF CARE MODERATE MDM 30: CPT | Performed by: NURSE PRACTITIONER

## 2024-11-19 NOTE — LETTER
11/19/2024      Sukhi Johnson  [address]  [place] [address]        [facility] GERIATRICS  Chief Complaint   Patient presents with     [facility] Medical Record Number:  [id number]  Place of Service where encounter took place:  [facility] () [[id number]]    HPI:    Sukhi Johnson  is 74 year old (1950), who is being seen today for a federally mandated E/M visit. Today's concerns are:     Right-sided nontraumatic intracerebral hemorrhage, unspecified cerebral location (H)  Protein-calorie malnutrition, unspecified severity (H)  Primary hypertension    Patient seen for follow up, ICH in 2021, L hemiplegia, intermittent communication, has Ipad for communication but not using, appears healthy, skin intact, BMI 20.37, assist feed, SBP's soft in the 100 range, non-ambulatory and not fall risk, overall no distress.    ALLERGIES:Patient has no known allergies.  PAST MEDICAL HISTORY: No past medical history on file.  PAST SURGICAL HISTORY:   has a past surgical history that includes IR Gastro Jejunostomy Tube Placement (1/5/2022) and IR Gastro Jejunostomy Tube Change (3/9/2022).  FAMILY HISTORY: family history is not on file.  SOCIAL HISTORY:  reports that he has never smoked. He has never used smokeless tobacco. He reports that he does not drink alcohol and does not use drugs.    MEDICATIONS:  MED REC REQUIRED  Post Medication Reconciliation Status: medication reconcilation previously completed during another office visit         Review of your medicines            Accurate as of November 19, 2024  2:49 PM. If you have any questions, ask your nurse or doctor.                CONTINUE these medicines which have NOT CHANGED        Dose / Directions   * acetaminophen 650 MG suppository  Commonly known as: TYLENOL  Used for: Right-sided nontraumatic intracerebral hemorrhage, unspecified cerebral location (H)      Dose: 650 mg  Place 1 suppository (650 mg)  rectally every 4 hours as needed for mild pain  Refills: 0     * acetaminophen 500 MG tablet  Commonly known as: TYLENOL  Used for: Hip pain, left      Dose: 1,000 mg  Take 2 tablets (1,000 mg) by mouth 3 times daily.  Refills: 0     AZO CRANBERRY URINARY TRACT PO      Dose: 1 tablet  Take 1 tablet by mouth 2 times daily  Refills: 0     diclofenac 1 % topical gel  Commonly known as: VOLTAREN  Used for: Acute pain of left shoulder      Apply to left shoulder BID  Quantity: 50 g  Refills: 1     heparin ANTICOAGULANT 5000 UNIT/0.5ML injection  Used for: Right-sided nontraumatic intracerebral hemorrhage, unspecified cerebral location (H)      Dose: 5,000 Units  Inject 0.5 mLs (5,000 Units) Subcutaneous every 12 hours  Refills: 0     metoprolol tartrate 50 MG tablet  Commonly known as: LOPRESSOR  Used for: Right-sided nontraumatic intracerebral hemorrhage, unspecified cerebral location (H)      Dose: 50 mg  1 tablet (50 mg) by Per Feeding Tube route 2 times daily  Refills: 0     pantoprazole 40 MG EC tablet  Commonly known as: PROTONIX      Dose: 20 mg  Take 20 mg by mouth daily  Refills: 0           * This list has 2 medication(s) that are the same as other medications prescribed for you. Read the directions carefully, and ask your doctor or other care provider to review them with you.                   Case Management:  I have reviewed the care plan and MDS and do agree with the plan. Patient's desire to return to the community is present, but is not able due to care needs . Information reviewed:  Medications, vital signs, orders, and nursing notes.    ROS:  Unobtainable secondary to aphasia.     Vitals:  BP 97/61   Pulse 63   Temp 98  F (36.7  C)   Resp 16   Wt 60.8 kg (134 lb)   SpO2 95%   BMI 20.37 kg/m    Body mass index is 20.37 kg/m .  Exam:  GENERAL APPEARANCE:  in no distress, appears healthy  ENT:  Mouth and posterior oropharynx normal, moist mucous membranes  RESP:  lungs clear to auscultation , no  respiratory distress  CV:  no edema, rate-normal  ABDOMEN:  no guarding or rebound  M/S:   Gait and station abnormal ADL assist  SKIN:  Inspection of skin and subcutaneous tissue baseline  NEURO:   Examination of sensation by touch normal  PSYCH:  affect and mood normal    Lab/Diagnostic data:   Labs done in SNF are in [facility]. Please refer to them using EPIC/Care Everywhere.    ASSESSMENT/PLAN  (I61.9) Right-sided nontraumatic intracerebral hemorrhage, unspecified cerebral location (H)  (primary encounter diagnosis)  Comment: ICH 2021, L hemiplegia, no distress  Plan: staff to support as indicated  -med clean up, discontinue: docusate, miralax, senna, zofran (not using)  -continue heparin per , neurology, no plan to stop therapy until seen by neurology again, POA wants to keep on board also    (E46) Protein-calorie malnutrition, unspecified severity (H)  Comment: BMI 20.37  Plan: encourage intake, feeding at meals  -dietary to follow up weights  -supplements PRN    (I10) Primary hypertension  Comment: SBP's in the 100 range  Plan: daily vitals  -no plan to intervene, allow for permissible hypotension        Electronically signed by:  KALI Ramirez CNP           Sincerely,        KALI Ramirez CNP

## 2024-11-19 NOTE — PROGRESS NOTES
[facility] GERIATRICS  Chief Complaint   Patient presents with    [facility] Medical Record Number:  [id number]  Place of Service where encounter took place:  [facility] () [[id number]]    HPI:    Sukhi Johnson  is 74 year old (1950), who is being seen today for a federally mandated E/M visit. Today's concerns are:     Right-sided nontraumatic intracerebral hemorrhage, unspecified cerebral location (H)  Protein-calorie malnutrition, unspecified severity (H)  Primary hypertension    Patient seen for follow up, ICH in 2021, L hemiplegia, intermittent communication, has Ipad for communication but not using, appears healthy, skin intact, BMI 20.37, assist feed, SBP's soft in the 100 range, non-ambulatory and not fall risk, overall no distress.    ALLERGIES:Patient has no known allergies.  PAST MEDICAL HISTORY: No past medical history on file.  PAST SURGICAL HISTORY:   has a past surgical history that includes IR Gastro Jejunostomy Tube Placement (1/5/2022) and IR Gastro Jejunostomy Tube Change (3/9/2022).  FAMILY HISTORY: family history is not on file.  SOCIAL HISTORY:  reports that he has never smoked. He has never used smokeless tobacco. He reports that he does not drink alcohol and does not use drugs.    MEDICATIONS:  MED REC REQUIRED  Post Medication Reconciliation Status: medication reconcilation previously completed during another office visit         Review of your medicines            Accurate as of November 19, 2024  2:49 PM. If you have any questions, ask your nurse or doctor.                CONTINUE these medicines which have NOT CHANGED        Dose / Directions   * acetaminophen 650 MG suppository  Commonly known as: TYLENOL  Used for: Right-sided nontraumatic intracerebral hemorrhage, unspecified cerebral location (H)      Dose: 650 mg  Place 1 suppository (650 mg) rectally every 4 hours as needed for mild pain  Refills: 0     * acetaminophen 500 MG  tablet  Commonly known as: TYLENOL  Used for: Hip pain, left      Dose: 1,000 mg  Take 2 tablets (1,000 mg) by mouth 3 times daily.  Refills: 0     AZO CRANBERRY URINARY TRACT PO      Dose: 1 tablet  Take 1 tablet by mouth 2 times daily  Refills: 0     diclofenac 1 % topical gel  Commonly known as: VOLTAREN  Used for: Acute pain of left shoulder      Apply to left shoulder BID  Quantity: 50 g  Refills: 1     heparin ANTICOAGULANT 5000 UNIT/0.5ML injection  Used for: Right-sided nontraumatic intracerebral hemorrhage, unspecified cerebral location (H)      Dose: 5,000 Units  Inject 0.5 mLs (5,000 Units) Subcutaneous every 12 hours  Refills: 0     metoprolol tartrate 50 MG tablet  Commonly known as: LOPRESSOR  Used for: Right-sided nontraumatic intracerebral hemorrhage, unspecified cerebral location (H)      Dose: 50 mg  1 tablet (50 mg) by Per Feeding Tube route 2 times daily  Refills: 0     pantoprazole 40 MG EC tablet  Commonly known as: PROTONIX      Dose: 20 mg  Take 20 mg by mouth daily  Refills: 0           * This list has 2 medication(s) that are the same as other medications prescribed for you. Read the directions carefully, and ask your doctor or other care provider to review them with you.                   Case Management:  I have reviewed the care plan and MDS and do agree with the plan. Patient's desire to return to the community is present, but is not able due to care needs . Information reviewed:  Medications, vital signs, orders, and nursing notes.    ROS:  Unobtainable secondary to aphasia.     Vitals:  BP 97/61   Pulse 63   Temp 98  F (36.7  C)   Resp 16   Wt 60.8 kg (134 lb)   SpO2 95%   BMI 20.37 kg/m    Body mass index is 20.37 kg/m .  Exam:  GENERAL APPEARANCE:  in no distress, appears healthy  ENT:  Mouth and posterior oropharynx normal, moist mucous membranes  RESP:  lungs clear to auscultation , no respiratory distress  CV:  no edema, rate-normal  ABDOMEN:  no guarding or rebound  M/S:    Gait and station abnormal ADL assist  SKIN:  Inspection of skin and subcutaneous tissue baseline  NEURO:   Examination of sensation by touch normal  PSYCH:  affect and mood normal    Lab/Diagnostic data:   Labs done in SNF are in [place] EPIC. Please refer to them using [employer]/Care Everywhere.    ASSESSMENT/PLAN  (I61.9) Right-sided nontraumatic intracerebral hemorrhage, unspecified cerebral location (H)  (primary encounter diagnosis)  Comment: ICH 2021, L hemiplegia, no distress  Plan: staff to support as indicated  -med clean up, discontinue: docusate, miralax, senna, zofran (not using)  -continue heparin per , neurology, no plan to stop therapy until seen by neurology again, POA wants to keep on board also    (E46) Protein-calorie malnutrition, unspecified severity (H)  Comment: BMI 20.37  Plan: encourage intake, feeding at meals  -dietary to follow up weights  -supplements PRN    (I10) Primary hypertension  Comment: SBP's in the 100 range  Plan: daily vitals  -no plan to intervene, allow for permissible hypotension        Electronically signed by:  KALI Ramirez CNP

## 2024-11-26 ENCOUNTER — NURSING HOME VISIT (OUTPATIENT)
Dept: GERIATRICS | Facility: CLINIC | Age: 74
End: 2024-11-26
Payer: COMMERCIAL

## 2024-11-26 VITALS
DIASTOLIC BLOOD PRESSURE: 70 MMHG | SYSTOLIC BLOOD PRESSURE: 112 MMHG | HEART RATE: 61 BPM | WEIGHT: 136.6 LBS | OXYGEN SATURATION: 98 % | BODY MASS INDEX: 20.77 KG/M2 | TEMPERATURE: 97.5 F | RESPIRATION RATE: 16 BRPM

## 2024-11-26 DIAGNOSIS — G81.94 LEFT HEMIPLEGIA (H): ICD-10-CM

## 2024-11-26 DIAGNOSIS — E46 PROTEIN-CALORIE MALNUTRITION, UNSPECIFIED SEVERITY (H): Primary | ICD-10-CM

## 2024-11-26 DIAGNOSIS — T24.102A SUPERFICIAL BURN OF LEFT LOWER EXTREMITY, INITIAL ENCOUNTER: ICD-10-CM

## 2024-11-26 PROCEDURE — 99309 SBSQ NF CARE MODERATE MDM 30: CPT | Performed by: NURSE PRACTITIONER

## 2024-11-26 RX ORDER — OXYCODONE HYDROCHLORIDE 5 MG/1
2.5 TABLET ORAL 4 TIMES DAILY
Status: SHIPPED
Start: 2024-11-26 | End: 2024-12-01

## 2024-11-26 NOTE — PROGRESS NOTES
[facility] GERIATRICS    Chief Complaint   Patient presents with    RECHECK     HPI:  Sukhi Johnson is a 74 year old  (1950), who is being seen today for an episodic care visit at: [facility] AT [facility] () [[id number]]. Today's concern is:   1. Protein-calorie malnutrition, unspecified severity (H)    2. Left hemiplegia (H)    3. Superficial burn of left lower extremity, initial encounter      Patient seen for follow up, BMI 20.77, thin habitus, good appetite, chronic L hemiplegia post CVA and this am L leg was found on room-heater, pink area approx 10x4cm starting at lateral knee and distal, skin intact except a 1cm area where epidural layer loose, no discharge, mild pain, patient not bothered with exam and palpation, no s/s DTI, overall appears healthy, no concerns with status.    Allergies, and PMH/PSH reviewed in EPIC today.  REVIEW OF SYSTEMS:  Unobtainable secondary to cognitive impairment.     Objective:   /70   Pulse 61   Temp 97.5  F (36.4  C)   Resp 16   Wt 62 kg (136 lb 9.6 oz)   SpO2 98%   BMI 20.77 kg/m    GENERAL APPEARANCE:  in no distress, appears healthy  ENT:  Mouth and posterior oropharynx normal, moist mucous membranes  RESP:  lungs clear to auscultation , no respiratory distress  CV:  no edema, rate-normal  ABDOMEN:  no guarding or rebound  M/S:   Gait and station abnormal transfer assist  SKIN:  Inspection of skin and subcutaneous tissue baseline  NEURO:   Examination of sensation by touch normal  PSYCH:  affect and mood normal    Labs done in SNF are in [facility]. Please refer to them using [facility]/Care Everywhere.    Assessment/Plan:  (E46) Protein-calorie malnutrition, unspecified severity (H)  (primary encounter diagnosis)  Comment: BMI 20.77  Plan: encourage intake at meals  -weekly weights  -dietary to follow intake  -supplements PRN    (G81.94) Left hemiplegia (H)  (T24.102A) Superficial burn of left lower extremity, initial encounter  Comment: chronic L  hemiplegia, new mild burn L lateral leg as above  Plan:   -change APAP to TID scheduled  -start oxycodone 2.5mg QID x5 days then change to QID PRN x5 days then discontinue  -start silvadene cream apply BID to affected area, cover non-stick, wrap kerlix until healed  -[facility] wound nurse to follow  -if overlying skin not viable may need to peel, will re-evaluate later this week     MED REC REQUIRED  Post Medication Reconciliation Status: medication reconcilation previously completed during another office visit      Electronically signed by: KALI Ramirez CNP

## 2024-11-26 NOTE — LETTER
11/26/2024      Sukhi Johnson  [address]  [place] [address]        M [facility] GERIATRICS    Chief Complaint   Patient presents with     RECHECK     HPI:  Sukhi Johnson is a 74 year old  (1950), who is being seen today for an episodic care visit at: [facility] AT [facility] () [[id number]]. Today's concern is:   1. Protein-calorie malnutrition, unspecified severity (H)    2. Left hemiplegia (H)    3. Superficial burn of left lower extremity, initial encounter      Patient seen for follow up, BMI 20.77, thin habitus, good appetite, chronic L hemiplegia post CVA and this am L leg was found on room-heater, pink area approx 10x4cm starting at lateral knee and distal, skin intact except a 1cm area where epidural layer loose, no discharge, mild pain, patient not bothered with exam and palpation, no s/s DTI, overall appears healthy, no concerns with status.    Allergies, and PMH/PSH reviewed in [facility] today.  REVIEW OF SYSTEMS:  Unobtainable secondary to cognitive impairment.     Objective:   /70   Pulse 61   Temp 97.5  F (36.4  C)   Resp 16   Wt 62 kg (136 lb 9.6 oz)   SpO2 98%   BMI 20.77 kg/m    GENERAL APPEARANCE:  in no distress, appears healthy  ENT:  Mouth and posterior oropharynx normal, moist mucous membranes  RESP:  lungs clear to auscultation , no respiratory distress  CV:  no edema, rate-normal  ABDOMEN:  no guarding or rebound  M/S:   Gait and station abnormal transfer assist  SKIN:  Inspection of skin and subcutaneous tissue baseline  NEURO:   Examination of sensation by touch normal  PSYCH:  affect and mood normal    Labs done in SNF are in [facility]. Please refer to them using [employer]/Care Everywhere.    Assessment/Plan:  (E46) Protein-calorie malnutrition, unspecified severity (H)  (primary encounter diagnosis)  Comment: BMI 20.77  Plan: encourage intake at meals  -weekly weights  -dietary to follow intake  -supplements PRN    (G81.94) Left hemiplegia (H)  (T24.102A)  Superficial burn of left lower extremity, initial encounter  Comment: chronic L hemiplegia, new mild burn L lateral leg as above  Plan:   -change APAP to TID scheduled  -start oxycodone 2.5mg QID x5 days then change to QID PRN x5 days then discontinue  -start silvadene cream apply BID to affected area, cover non-stick, wrap kerlix until healed  -[facility] wound nurse to follow  -if overlying skin not viable may need to peel, will re-evaluate later this week     MED REC REQUIRED  Post Medication Reconciliation Status: medication reconcilation previously completed during another office visit      Electronically signed by: KALI Ramirez CNP          Sincerely,        KALI Ramirez CNP

## 2024-12-16 ENCOUNTER — PATIENT OUTREACH (OUTPATIENT)
Dept: GERIATRIC MEDICINE | Facility: CLINIC | Age: 74
End: 2024-12-16
Payer: COMMERCIAL

## 2024-12-16 NOTE — PROGRESS NOTES
[facility] Care Coordination Contact  [facility] Six-Month Assessment    6 month assessment completed on 12/16/24 with significant other Miladis.    ER visits: Yes -  [facility]  Hospitalizations: No  TCU stays: No  Significant health status changes: Since his ED visit when he hit his head Miladis says he has been talking more and working with speech therapy and has been move verbal.  Falls/Injuries: Yes: Fall which resulted in hitting his head in September.  ADL/IADL changes: No    Reviewed Institutional Assessment and updated as needed.     Will see member in 6 months for an annual health risk assessment.   Encouraged member to call CC with any questions or concerns in the meantime.     OLGA Simon, [facility]  [phone]

## 2024-12-23 ENCOUNTER — LAB REQUISITION (OUTPATIENT)
Dept: LAB | Facility: CLINIC | Age: 74
End: 2024-12-23
Payer: COMMERCIAL

## 2024-12-23 DIAGNOSIS — Z29.9 ENCOUNTER FOR PROPHYLACTIC MEASURES, UNSPECIFIED: ICD-10-CM

## 2024-12-23 LAB
ANION GAP SERPL CALCULATED.3IONS-SCNC: 13 MMOL/L (ref 7–15)
BUN SERPL-MCNC: 26.7 MG/DL (ref 8–23)
CALCIUM SERPL-MCNC: 8.9 MG/DL (ref 8.8–10.4)
CHLORIDE SERPL-SCNC: 107 MMOL/L (ref 98–107)
CREAT SERPL-MCNC: 0.61 MG/DL (ref 0.67–1.17)
EGFRCR SERPLBLD CKD-EPI 2021: >90 ML/MIN/1.73M2
GLUCOSE SERPL-MCNC: 131 MG/DL (ref 70–99)
HCO3 SERPL-SCNC: 22 MMOL/L (ref 22–29)
POTASSIUM SERPL-SCNC: 4.1 MMOL/L (ref 3.4–5.3)
SODIUM SERPL-SCNC: 142 MMOL/L (ref 135–145)

## 2024-12-23 PROCEDURE — 80048 BASIC METABOLIC PNL TOTAL CA: CPT | Mod: ORL | Performed by: INTERNAL MEDICINE

## 2025-01-06 ENCOUNTER — CLINICAL UPDATE (OUTPATIENT)
Dept: PHARMACY | Facility: CLINIC | Age: 75
End: 2025-01-06
Payer: COMMERCIAL

## 2025-01-06 DIAGNOSIS — K92.2 GASTROINTESTINAL HEMORRHAGE, UNSPECIFIED GASTROINTESTINAL HEMORRHAGE TYPE: ICD-10-CM

## 2025-01-06 DIAGNOSIS — I10 HYPERTENSION, UNSPECIFIED TYPE: Primary | ICD-10-CM

## 2025-01-06 DIAGNOSIS — I61.9 RIGHT-SIDED NONTRAUMATIC INTRACEREBRAL HEMORRHAGE, UNSPECIFIED CEREBRAL LOCATION (H): ICD-10-CM

## 2025-01-06 DIAGNOSIS — Z79.899 ON DEEP VEIN THROMBOSIS (DVT) PROPHYLAXIS: ICD-10-CM

## 2025-01-06 PROCEDURE — 99207 PR NO CHARGE LOS: CPT | Performed by: PHARMACIST

## 2025-01-06 NOTE — PROGRESS NOTES
This patient's medication list and chart were reviewed as part of the service provided by [facility].    Assessment/Recommendations:    Per chart review, heparin indefinitely per neurology/.    If blood pressures continue to be low, may benefit from reduction in metoprolol to 25mg twice daily and monitor.    Estimated Creatinine Clearance: 93.2 mL/min (A) (based on SCr of 0.61 mg/dL (L)).      Yessi Carrion, Pharm.D.,[facility]  Board Certified Geriatric Pharmacist  Medication Therapy Management Pharmacist  [phone]

## 2025-01-14 ENCOUNTER — NURSING HOME VISIT (OUTPATIENT)
Dept: GERIATRICS | Facility: CLINIC | Age: 75
End: 2025-01-14
Payer: COMMERCIAL

## 2025-01-14 VITALS
TEMPERATURE: 97 F | BODY MASS INDEX: 20.37 KG/M2 | HEART RATE: 98 BPM | OXYGEN SATURATION: 97 % | WEIGHT: 134 LBS | DIASTOLIC BLOOD PRESSURE: 78 MMHG | SYSTOLIC BLOOD PRESSURE: 154 MMHG | RESPIRATION RATE: 16 BRPM

## 2025-01-14 DIAGNOSIS — I69.354 HEMIPLEGIA AND HEMIPARESIS FOLLOWING CEREBRAL INFARCTION AFFECTING LEFT NON-DOMINANT SIDE (H): Primary | ICD-10-CM

## 2025-01-14 DIAGNOSIS — R11.0 NAUSEA: ICD-10-CM

## 2025-01-14 DIAGNOSIS — K08.9 POOR DENTITION: ICD-10-CM

## 2025-01-14 PROCEDURE — 99309 SBSQ NF CARE MODERATE MDM 30: CPT | Performed by: NURSE PRACTITIONER

## 2025-01-14 RX ORDER — ONDANSETRON 4 MG/1
4 TABLET, FILM COATED ORAL EVERY 6 HOURS PRN
Status: SHIPPED
Start: 2025-01-14 | End: 2025-01-19

## 2025-01-14 RX ORDER — LORAZEPAM 0.5 MG/1
0.5 TABLET ORAL 2 TIMES DAILY PRN
Status: SHIPPED
Start: 2025-01-14 | End: 2025-01-16

## 2025-01-14 NOTE — LETTER
1/14/2025      Sukhi Johnson  [address]  [place] [address]        M [facility] GERIATRICS    Chief Complaint   Patient presents with     RECHECK     HPI:  Sukhi Johnson is a 74 year old  (1950), who is being seen today for an episodic care visit at: [facility] AT [facility] () [[id number]]. Today's concern is:   1. Hemiplegia and hemiparesis following cerebral infarction affecting left non-dominant side (H)    2. Nausea    3. Poor dentition      Patient seen for follow up, post CVA with weakness and verbal difficulties, also new nausea with emesis thought to be potential noro virus from facility outbreak, also having teeth cleaned on 1/15 and dental requesting anxiety med to allow for process to come to fruition, overall appears healthy.    Allergies, and PMH/PSH reviewed in [facility] today.  REVIEW OF SYSTEMS:  Unobtainable secondary to cognitive impairment.     Objective:   BP (!) 154/78   Pulse 98   Temp 97  F (36.1  C)   Resp 16   Wt 60.8 kg (134 lb)   SpO2 97%   BMI 20.37 kg/m    GENERAL APPEARANCE:  in no distress, appears healthy  ENT:  Mouth and posterior oropharynx normal, moist mucous membranes  RESP:  lungs clear to auscultation , no respiratory distress  CV:  no edema, rate-normal  ABDOMEN:  no guarding or rebound  M/S:   Gait and station abnormal transfer assist  SKIN:  Inspection of skin and subcutaneous tissue baseline  NEURO:   Examination of sensation by touch normal  PSYCH:  affect and mood normal    Labs done in SNF are in [facility]. Please refer to them using [facility]/Care Everywhere.    Assessment/Plan:  (I69.354) Hemiplegia and hemiparesis following cerebral infarction affecting left non-dominant side (H)  (primary encounter diagnosis)  Comment: post CVA, aphasia, weakness  Plan:   -continue heparin, no end date per   -staff to support as indicated    (R11.0) Nausea  Comment: new onset, possible noro  Plan:   -start zofran PRN  -encourage  fluids    (K08.9) Poor dentition  Comment: tooth cleaning on 1/15  Plan:   -ordered lorazepam 2 tabs, one 1 hour prior to procedure, additional PRN if needed during procedure    MED REC REQUIRED  Post Medication Reconciliation Status: medication reconcilation previously completed during another office visit        Electronically signed by: KALI Ramirez CNP          Sincerely,        KALI Ramirez CNP    Electronically signed

## 2025-01-14 NOTE — PROGRESS NOTES
[facility] GERIATRICS    Chief Complaint   Patient presents with    RECHECK     HPI:  Sukhi Johnson is a 74 year old  (1950), who is being seen today for an episodic care visit at: [facility] AT [facility] () [[id number]]. Today's concern is:   1. Hemiplegia and hemiparesis following cerebral infarction affecting left non-dominant side (H)    2. Nausea    3. Poor dentition      Patient seen for follow up, post CVA with weakness and verbal difficulties, also new nausea with emesis thought to be potential noro virus from facility outbreak, also having teeth cleaned on 1/15 and dental requesting anxiety med to allow for process to come to fruition, overall appears healthy.    Allergies, and PMH/PSH reviewed in [facility] today.  REVIEW OF SYSTEMS:  Unobtainable secondary to cognitive impairment.     Objective:   BP (!) 154/78   Pulse 98   Temp 97  F (36.1  C)   Resp 16   Wt 60.8 kg (134 lb)   SpO2 97%   BMI 20.37 kg/m    GENERAL APPEARANCE:  in no distress, appears healthy  ENT:  Mouth and posterior oropharynx normal, moist mucous membranes  RESP:  lungs clear to auscultation , no respiratory distress  CV:  no edema, rate-normal  ABDOMEN:  no guarding or rebound  M/S:   Gait and station abnormal transfer assist  SKIN:  Inspection of skin and subcutaneous tissue baseline  NEURO:   Examination of sensation by touch normal  PSYCH:  affect and mood normal    Labs done in SNF are in [facility]. Please refer to them using [employer]/Care Everywhere.    Assessment/Plan:  (I69.354) Hemiplegia and hemiparesis following cerebral infarction affecting left non-dominant side (H)  (primary encounter diagnosis)  Comment: post CVA, aphasia, weakness  Plan:   -continue heparin, no end date per   -staff to support as indicated    (R11.0) Nausea  Comment: new onset, possible noro  Plan:   -start zofran PRN  -encourage fluids    (K08.9) Poor dentition  Comment: tooth cleaning on 1/15  Plan:   -ordered lorazepam 2  tabs, one 1 hour prior to procedure, additional PRN if needed during procedure    MED REC REQUIRED  Post Medication Reconciliation Status: medication reconcilation previously completed during another office visit        Electronically signed by: KALI Ramirez CNP

## 2025-01-16 ENCOUNTER — NURSING HOME VISIT (OUTPATIENT)
Dept: GERIATRICS | Facility: CLINIC | Age: 75
End: 2025-01-16
Payer: COMMERCIAL

## 2025-01-16 VITALS
BODY MASS INDEX: 20.37 KG/M2 | TEMPERATURE: 97 F | WEIGHT: 134 LBS | RESPIRATION RATE: 16 BRPM | HEART RATE: 88 BPM | SYSTOLIC BLOOD PRESSURE: 116 MMHG | OXYGEN SATURATION: 97 % | DIASTOLIC BLOOD PRESSURE: 69 MMHG

## 2025-01-16 DIAGNOSIS — I10 HYPERTENSION, UNSPECIFIED TYPE: ICD-10-CM

## 2025-01-16 DIAGNOSIS — R11.2 NAUSEA AND VOMITING, UNSPECIFIED VOMITING TYPE: ICD-10-CM

## 2025-01-16 DIAGNOSIS — R19.5 LOOSE STOOLS: ICD-10-CM

## 2025-01-16 DIAGNOSIS — E44.0 MODERATE PROTEIN-CALORIE MALNUTRITION: Primary | ICD-10-CM

## 2025-01-16 NOTE — PROGRESS NOTES
[facility] GERIATRICS  Chief Complaint   [facility]:  [id number]  Place of Service where encounter took place:  [facility] () [[id number]]    HPI:    Sukhi Johnson  is 74 year old (1950), who is being seen today for a federally mandated E/M visit. Today's concerns are:     Moderate protein-calorie malnutrition  Nausea and vomiting, unspecified vomiting type  Loose stools  Hypertension, unspecified type    Patient seen for follow up, BMI 20.37, full ADL assist including feeding, new nausea with emesis along with loose stool, noro virus making facility rounds and no testing necessary, today no emesis and minimal intake, also SBP's in the 110-120 range, overall appears healthy.    ALLERGIES:Patient has no known allergies.  PAST MEDICAL HISTORY: No past medical history on file.  PAST SURGICAL HISTORY:   has a past surgical history that includes IR Gastro Jejunostomy Tube Placement (1/5/2022) and IR Gastro Jejunostomy Tube Change (3/9/2022).  FAMILY HISTORY: family history is not on file.  SOCIAL HISTORY:  reports that he has never smoked. He has never used smokeless tobacco. He reports that he does not drink alcohol and does not use drugs.    MEDICATIONS:  MED REC REQUIRED  Post Medication Reconciliation Status: medication reconcilation previously completed during another office visit         Review of your medicines            Accurate as of January 16, 2025  4:30 PM. If you have any questions, ask your nurse or doctor.                CONTINUE these medicines which have NOT CHANGED        Dose / Directions   * acetaminophen 650 MG suppository  Commonly known as: TYLENOL  Used for: Right-sided nontraumatic intracerebral hemorrhage, unspecified cerebral location (H)      Dose: 650 mg  Place 1 suppository (650 mg) rectally every 4 hours as needed for mild pain  Refills: 0     * acetaminophen 500 MG tablet  Commonly known  as: TYLENOL  Used for: Hip pain, left      Dose: 1,000 mg  Take 2 tablets (1,000 mg) by mouth 3 times daily.  Refills: 0     AZO CRANBERRY URINARY TRACT PO      Dose: 1 tablet  Take 1 tablet by mouth 2 times daily  Refills: 0     diclofenac 1 % topical gel  Commonly known as: VOLTAREN  Used for: Acute pain of left shoulder      Apply to left shoulder BID  Quantity: 50 g  Refills: 1     heparin ANTICOAGULANT 5000 UNIT/0.5ML injection  Used for: Right-sided nontraumatic intracerebral hemorrhage, unspecified cerebral location (H)      Dose: 5,000 Units  Inject 0.5 mLs (5,000 Units) Subcutaneous every 12 hours  Refills: 0     metoprolol tartrate 50 MG tablet  Commonly known as: LOPRESSOR  Used for: Right-sided nontraumatic intracerebral hemorrhage, unspecified cerebral location (H)      Dose: 50 mg  1 tablet (50 mg) by Per Feeding Tube route 2 times daily  Refills: 0     ondansetron 4 MG tablet  Commonly known as: ZOFRAN  Used for: Nausea      Dose: 4 mg  Take 1 tablet (4 mg) by mouth every 6 hours as needed for nausea.  Refills: 0     pantoprazole 40 MG EC tablet  Commonly known as: PROTONIX      Dose: 20 mg  Take 20 mg by mouth daily  Refills: 0           * This list has 2 medication(s) that are the same as other medications prescribed for you. Read the directions carefully, and ask your doctor or other care provider to review them with you.                   Case Management:  I have reviewed the care plan and MDS and do agree with the plan. Patient's desire to return to the community is present, but is not able due to care needs . Information reviewed:  Medications, vital signs, orders, and nursing notes.    ROS:  Unobtainable secondary to aphasia.     Vitals:  /69   Pulse 88   Temp 97  F (36.1  C)   Resp 16   Wt 60.8 kg (134 lb)   SpO2 97%   BMI 20.37 kg/m    Body mass index is 20.37 kg/m .  Exam:  GENERAL APPEARANCE:  in no distress, appears healthy  ENT:  Mouth and posterior oropharynx normal, moist  mucous membranes  RESP:  lungs clear to auscultation , no respiratory distress  CV:  no edema, rate-normal  ABDOMEN:  no guarding or rebound  M/S:   Gait and station abnormal transfer assist  SKIN:  Inspection of skin and subcutaneous tissue baseline  NEURO:   Examination of sensation by touch normal  PSYCH:  affect and mood normal    Lab/Diagnostic data:   Labs done in SNF are in [place] EPIC. Please refer to them using EPIC/Care Everywhere.    ASSESSMENT/PLAN  (E44.0) Moderate protein-calorie malnutrition  (primary encounter diagnosis)  Comment: BMI 20.37  Plan:   -staff to feed, encourage intake  -dietary to follow  -supplements PRN    (R11.2) Nausea and vomiting, unspecified vomiting type  (R19.5) Loose stools  Comment: probable noro virus, resolving  Plan:   -ST eval for dysphagia  -start zofran PRN  -encourage fluids    (I10) Hypertension, unspecified type  Comment: SBP's in the 110-120 range  Plan:   -continue metoprolol 50mg BID  -check vitals daily/PRN        Electronically signed by:  KALI Ramirez CNP

## 2025-01-16 NOTE — LETTER
1/16/2025      Sukhi Johnson  [address]  [place] [address]        [facility] GERIATRICS  Chief Complaint   Patient presents with     [facility] Medical Record Number:  [id number]  Place of Service where encounter took place:  [facility] () [[id number]]    HPI:    Sukhi Johnson  is 74 year old (1950), who is being seen today for a federally mandated E/M visit. Today's concerns are:     Moderate protein-calorie malnutrition  Nausea and vomiting, unspecified vomiting type  Loose stools  Hypertension, unspecified type    Patient seen for follow up, BMI 20.37, full ADL assist including feeding, new nausea with emesis along with loose stool, noro virus making facility rounds and no testing necessary, today no emesis and minimal intake, also SBP's in the 110-120 range, overall appears healthy.    ALLERGIES:Patient has no known allergies.  PAST MEDICAL HISTORY: No past medical history on file.  PAST SURGICAL HISTORY:   has a past surgical history that includes IR Gastro Jejunostomy Tube Placement (1/5/2022) and IR Gastro Jejunostomy Tube Change (3/9/2022).  FAMILY HISTORY: family history is not on file.  SOCIAL HISTORY:  reports that he has never smoked. He has never used smokeless tobacco. He reports that he does not drink alcohol and does not use drugs.    MEDICATIONS:  MED REC REQUIRED  Post Medication Reconciliation Status: medication reconcilation previously completed during another office visit         Review of your medicines            Accurate as of January 16, 2025  4:30 PM. If you have any questions, ask your nurse or doctor.                CONTINUE these medicines which have NOT CHANGED        Dose / Directions   * acetaminophen 650 MG suppository  Commonly known as: TYLENOL  Used for: Right-sided nontraumatic intracerebral hemorrhage, unspecified cerebral location (H)      Dose: 650 mg  Place 1 suppository (650 mg) rectally every 4 hours as  needed for mild pain  Refills: 0     * acetaminophen 500 MG tablet  Commonly known as: TYLENOL  Used for: Hip pain, left      Dose: 1,000 mg  Take 2 tablets (1,000 mg) by mouth 3 times daily.  Refills: 0     AZO CRANBERRY URINARY TRACT PO      Dose: 1 tablet  Take 1 tablet by mouth 2 times daily  Refills: 0     diclofenac 1 % topical gel  Commonly known as: VOLTAREN  Used for: Acute pain of left shoulder      Apply to left shoulder BID  Quantity: 50 g  Refills: 1     heparin ANTICOAGULANT 5000 UNIT/0.5ML injection  Used for: Right-sided nontraumatic intracerebral hemorrhage, unspecified cerebral location (H)      Dose: 5,000 Units  Inject 0.5 mLs (5,000 Units) Subcutaneous every 12 hours  Refills: 0     metoprolol tartrate 50 MG tablet  Commonly known as: LOPRESSOR  Used for: Right-sided nontraumatic intracerebral hemorrhage, unspecified cerebral location (H)      Dose: 50 mg  1 tablet (50 mg) by Per Feeding Tube route 2 times daily  Refills: 0     ondansetron 4 MG tablet  Commonly known as: ZOFRAN  Used for: Nausea      Dose: 4 mg  Take 1 tablet (4 mg) by mouth every 6 hours as needed for nausea.  Refills: 0     pantoprazole 40 MG EC tablet  Commonly known as: PROTONIX      Dose: 20 mg  Take 20 mg by mouth daily  Refills: 0           * This list has 2 medication(s) that are the same as other medications prescribed for you. Read the directions carefully, and ask your doctor or other care provider to review them with you.                   Case Management:  I have reviewed the care plan and MDS and do agree with the plan. Patient's desire to return to the community is present, but is not able due to care needs . Information reviewed:  Medications, vital signs, orders, and nursing notes.    ROS:  Unobtainable secondary to aphasia.     Vitals:  /69   Pulse 88   Temp 97  F (36.1  C)   Resp 16   Wt 60.8 kg (134 lb)   SpO2 97%   BMI 20.37 kg/m    Body mass index is 20.37 kg/m .  Exam:  GENERAL APPEARANCE:  in  no distress, appears healthy  ENT:  Mouth and posterior oropharynx normal, moist mucous membranes  RESP:  lungs clear to auscultation , no respiratory distress  CV:  no edema, rate-normal  ABDOMEN:  no guarding or rebound  M/S:   Gait and station abnormal transfer assist  SKIN:  Inspection of skin and subcutaneous tissue baseline  NEURO:   Examination of sensation by touch normal  PSYCH:  affect and mood normal    Lab/Diagnostic data:   Labs done in SNF are in [facility]. Please refer to them using [facility]/Care Everywhere.    ASSESSMENT/PLAN  (E44.0) Moderate protein-calorie malnutrition  (primary encounter diagnosis)  Comment: BMI 20.37  Plan:   -staff to feed, encourage intake  -dietary to follow  -supplements PRN    (R11.2) Nausea and vomiting, unspecified vomiting type  (R19.5) Loose stools  Comment: probable noro virus, resolving  Plan:   -ST eval for dysphagia  -start zofran PRN  -encourage fluids    (I10) Hypertension, unspecified type  Comment: SBP's in the 110-120 range  Plan:   -continue metoprolol 50mg BID  -check vitals daily/PRN        Electronically signed by:  KALI Ramirez CNP           Sincerely,        KALI Ramirez CNP    Electronically signed

## 2025-03-13 ENCOUNTER — NURSING HOME VISIT (OUTPATIENT)
Dept: GERIATRICS | Facility: CLINIC | Age: 75
End: 2025-03-13
Payer: COMMERCIAL

## 2025-03-13 VITALS
TEMPERATURE: 97.6 F | RESPIRATION RATE: 17 BRPM | WEIGHT: 134 LBS | SYSTOLIC BLOOD PRESSURE: 104 MMHG | DIASTOLIC BLOOD PRESSURE: 67 MMHG | HEART RATE: 62 BPM | OXYGEN SATURATION: 96 % | BODY MASS INDEX: 20.37 KG/M2

## 2025-03-13 DIAGNOSIS — F03.90 DEMENTIA WITHOUT BEHAVIORAL DISTURBANCE (H): Primary | ICD-10-CM

## 2025-03-13 DIAGNOSIS — M25.552 HIP PAIN, LEFT: ICD-10-CM

## 2025-03-13 DIAGNOSIS — I10 HYPERTENSION, UNSPECIFIED TYPE: ICD-10-CM

## 2025-03-13 DIAGNOSIS — E44.0 MODERATE PROTEIN-CALORIE MALNUTRITION: ICD-10-CM

## 2025-03-13 RX ORDER — ACETAMINOPHEN 500 MG
1000 TABLET ORAL EVERY 8 HOURS PRN
Status: SHIPPED
Start: 2025-03-13

## 2025-03-13 RX ORDER — LORAZEPAM 0.5 MG/1
0.5 TABLET ORAL 2 TIMES DAILY PRN
Status: SHIPPED
Start: 2025-03-13

## 2025-03-13 NOTE — PROGRESS NOTES
[facility] GERIATRICS    Chief Complaint   Patient presents with    RECHECK     HPI:  Sukhi Johnson is a 74 year old  (1950), who is being seen today for an episodic care visit at: [facility] AT [facility] () [[id number]]. Today's concern is:   1. Dementia without behavioral disturbance (H)    2. Moderate protein-calorie malnutrition    3. Hypertension, unspecified type    4. Hip pain, left      Patient seen for follow up, previous CVA that exacerbated dementia, limited verbal, appears healthy, to be seen by dental and dentist requesting medication for mild sedation and anxiety control, also BMI 20.37, thin habitus, moderate appetite, also SBP's in the 100-150 range, asymptomatic, denies pain, overall appears healthy.    Allergies, and PMH/PSH reviewed in [facility] today.  REVIEW OF SYSTEMS:  Unobtainable secondary to cognitive impairment.     Objective:   /67   Pulse 62   Temp 97.6  F (36.4  C)   Resp 17   Wt 60.8 kg (134 lb)   SpO2 96%   BMI 20.37 kg/m    GENERAL APPEARANCE:  in no distress, appears healthy  ENT:  Mouth and posterior oropharynx normal, moist mucous membranes  RESP:  lungs clear to auscultation , no respiratory distress  CV:  no edema, rate-normal  ABDOMEN:  no guarding or rebound  M/S:   Gait and station abnormal transfer assist  SKIN:  Inspection of skin and subcutaneous tissue baseline  NEURO:   Examination of sensation by touch normal  PSYCH:  affect and mood normal    Labs done in SNF are in [facility]. Please refer to them using [facility]/Care Everywhere.    Assessment/Plan:  (F03.90) Dementia without behavioral disturbance (H)  (primary encounter diagnosis)  Comment: cognitive limitations, no distress  Plan:   -start lorazepam 0.5mg BID on dental days  -staff to support as indicated  -no full time medication intervention indicated    (E44.0) Moderate protein-calorie malnutrition  Comment: BMI 20.37  Plan:   -dietary to follow weights  -encourage and assist with intake at  meals  -supplements PRN    (I10) Hypertension, unspecified type  Comment: SBP's 100-150  Plan:   -continue without medication intervention  -daily vitals  -if SBP's >150 plan to start losartan      (M25.552) Hip pain, left  Comment: no apparent pain  Plan:   -change APAP to TID PRN        MED REC REQUIRED  Post Medication Reconciliation Status: medication reconcilation previously completed during another office visit        Electronically signed by: KALI Ramirez CNP

## 2025-03-13 NOTE — LETTER
3/13/2025      Sukhi Johnson  [address]  [place] [address]        [facility] GERIATRICS    Chief Complaint   Patient presents with     RECHECK     HPI:  Sukhi Johnson is a 74 year old  (1950), who is being seen today for an episodic care visit at: [facility] AT [facility] () [[id number]]. Today's concern is:   1. Dementia without behavioral disturbance (H)    2. Moderate protein-calorie malnutrition    3. Hypertension, unspecified type      Patient seen for follow up, previous CVA that exacerbated dementia, limited verbal, appears healthy, to be seen by dental and dentist requesting medication for mild sedation and anxiety control, also BMI 20.37, thin habitus, moderate appetite, also SBP's in the 100-150 range, asymptomatic, overall appears healthy.    Allergies, and PMH/PSH reviewed in EPIC today.  REVIEW OF SYSTEMS:  Unobtainable secondary to cognitive impairment.     Objective:   /67   Pulse 62   Temp 97.6  F (36.4  C)   Resp 17   Wt 60.8 kg (134 lb)   SpO2 96%   BMI 20.37 kg/m    GENERAL APPEARANCE:  in no distress, appears healthy  ENT:  Mouth and posterior oropharynx normal, moist mucous membranes  RESP:  lungs clear to auscultation , no respiratory distress  CV:  no edema, rate-normal  ABDOMEN:  no guarding or rebound  M/S:   Gait and station abnormal transfer assist  SKIN:  Inspection of skin and subcutaneous tissue baseline  NEURO:   Examination of sensation by touch normal  PSYCH:  affect and mood normal    Labs done in SNF are in [facility]. Please refer to them using [facility]/Care Everywhere.    Assessment/Plan:  (F03.90) Dementia without behavioral disturbance (H)  (primary encounter diagnosis)  Comment: cognitive limitations, no distress  Plan:   -start lorazepam 0.5mg BID on dental days  -staff to support as indicated  -no full time medication intervention indicated    (E44.0) Moderate protein-calorie malnutrition  Comment: BMI 20.37  Plan:   -dietary to  follow weights  -encourage and assist with intake at meals  -supplements PRN    (I10) Hypertension, unspecified type  Comment: SBP's 100-150  Plan:   -continue without medication intervention  -daily vitals  -if SBP's >150 plan to start losartan    MED REC REQUIRED  Post Medication Reconciliation Status: medication reconcilation previously completed during another office visit        Electronically signed by: KALI Ramirez CNP          Sincerely,        KALI Ramirez CNP    Electronically signed

## 2025-03-20 NOTE — PROGRESS NOTES
"[facility] GERIATRIC SERVICES  Chief Complaint   Patient presents with    [facility]     [place] Medical Record Number:  [id number]  Place of Service where encounter took place:  [facility] () [[id number]]    HPI:    Sukhi Johnson  is 75 year old (1950), who is being seen today for a federally mandated E/M visit.  HPI information obtained from: facility chart records, facility staff, patient report, [facility] chart review and family/first contact wife report. T    Today:   - Resident seen and examined, chart reviewed and discussed with the nursing staff.   -Patient has no interaction.  Lethargic.  Noted some coughing.  No return of fasciculation over left face fine and frequency,  - GNP and RN have no concern today.   --------------------------------  - Past Medical, social, family histories, medications, and allergies reviewed and updated    MEDICATIONS:  Current Outpatient Medications   Medication Sig Dispense Refill    acetaminophen (TYLENOL) 500 MG tablet Take 2 tablets (1,000 mg) by mouth every 8 hours as needed for mild pain.      acetaminophen (TYLENOL) 650 MG suppository Place 1 suppository (650 mg) rectally every 4 hours as needed for mild pain      Cranberry-Vitamin C (AZO CRANBERRY URINARY TRACT PO) Take 1 tablet by mouth 2 times daily      diclofenac (VOLTAREN) 1 % topical gel Apply to left shoulder BID 50 g 1    Heparin Sodium, Porcine, (HEPARIN ANTICOAGULANT) 5000 UNIT/0.5ML injection Inject 0.5 mLs (5,000 Units) Subcutaneous every 12 hours      LORazepam (ATIVAN) 0.5 MG tablet Take 1 tablet (0.5 mg) by mouth 2 times daily as needed for anxiety. For dental visits      metoprolol tartrate (LOPRESSOR) 50 MG tablet 1 tablet (50 mg) by Per Feeding Tube route 2 times daily      pantoprazole (PROTONIX) 40 MG EC tablet Take 20 mg by mouth daily       ROS: limited due to dysarthria      Vitals:  /64   Pulse 87   Temp 97.2  F (36.2  C)   Resp 17   Ht 1.727 m (5' 8\") "   Wt 61.3 kg (135 lb 3.2 oz)   SpO2 95%   BMI 20.56 kg/m    Body mass index is 20.56 kg/m .     Exam:    GENERAL APPEARANCE:  in no distress, sitting up in the wheelchair comfortably.  No interaction, lethargic.  Fine tremor over the face.  RESP:  lungs clear to auscultation   CV:  S1S2 audible, regular HR, no murmur appreciated.   ABDOMEN:  soft, NT/ND, BS audible. no mass appreciated on palpation.   M/S:   Muscles atrophy right hand.   SKIN:  No rash.   NEURO: Dysphasia ms strenght: LUE 0/5, LLL 1/5, Rt hand  4/5 and RLE 4/5. Ptosis right eye.   PSYCH:  affect and mood normal    Lab/Diagnostic data: Reviewed in the chart and EHR.        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 At baseline. . Appreciate help      Dysphagia, unspecified type  Protein calorie malnutrition, moderate  Hx of FT (removed in 7/2022)   -Body mass index is 20.56 kg/m .   - BMI less than 22 in frail elderly increase mortality risk. Gaining weight slowly. Dietician on the case.   -     Essential HTN:   BP Readings from Last 3 Encounters:   03/24/25 103/64   03/13/25 104/67   01/16/25 116/69   - BP controlled,.   Off enalapril and hydralazine due to soft BP, on metoprolol tartrate. Continue.       Hx of GIB with acute blood loss anemia:   - Dec 2021. No scopes was done, Hb dropped down to 12.7 gm/dl.  Hb level 14.8 (4/2022)  -  On Protonix 20 mg.     Chronic fatigue syndrome 2/2 Lyme disease:  - Significant  Deficits requiring NH placement. Requiring extensive assistance from nursing. Up for meals only o/w spends the day resting in bed      Electronically signed by:  Joel Blanchard MD

## 2025-03-24 ENCOUNTER — NURSING HOME VISIT (OUTPATIENT)
Dept: GERIATRICS | Facility: CLINIC | Age: 75
End: 2025-03-24
Payer: COMMERCIAL

## 2025-03-24 VITALS
TEMPERATURE: 97.2 F | SYSTOLIC BLOOD PRESSURE: 103 MMHG | OXYGEN SATURATION: 95 % | HEART RATE: 87 BPM | RESPIRATION RATE: 17 BRPM | WEIGHT: 135.2 LBS | DIASTOLIC BLOOD PRESSURE: 64 MMHG | HEIGHT: 68 IN | BODY MASS INDEX: 20.49 KG/M2

## 2025-03-24 DIAGNOSIS — Z87.19 HISTORY OF GASTROINTESTINAL BLEEDING: ICD-10-CM

## 2025-03-24 DIAGNOSIS — I10 PRIMARY HYPERTENSION: ICD-10-CM

## 2025-03-24 DIAGNOSIS — E44.0 MODERATE PROTEIN-CALORIE MALNUTRITION: Primary | ICD-10-CM

## 2025-03-24 DIAGNOSIS — G81.94 LEFT HEMIPLEGIA (H): ICD-10-CM

## 2025-03-24 DIAGNOSIS — G93.32 CHRONIC FATIGUE SYNDROME: ICD-10-CM

## 2025-03-24 DIAGNOSIS — I61.9 RIGHT-SIDED NONTRAUMATIC INTRACEREBRAL HEMORRHAGE, UNSPECIFIED CEREBRAL LOCATION (H): ICD-10-CM

## 2025-03-24 DIAGNOSIS — I69.322 DYSARTHRIA AS LATE EFFECT OF STROKE: ICD-10-CM

## 2025-03-24 PROCEDURE — 99309 SBSQ NF CARE MODERATE MDM 30: CPT | Performed by: FAMILY MEDICINE

## 2025-03-24 NOTE — LETTER
3/24/2025      Sukhi Johnson  [address]  [place] [address]        [place] GERIATRIC SERVICES  Chief Complaint   Patient presents with     [facility] Medical Record Number:  [id number]  Place of Service where encounter took place:  [facility] () [[id number]]    HPI:    Sukhi Johnson  is 75 year old (1950), who is being seen today for a federally mandated E/M visit.  HPI information obtained from: facility chart records, facility staff, patient report, [facility] chart review and family/first contact wife report. T    Today:   - Resident seen and examined, chart reviewed and discussed with the nursing staff.   -Patient has no interaction.  Lethargic.  Noted some coughing.  No return of fasciculation over left face fine and frequency,  - GNP and RN have no concern today.   --------------------------------  - Past Medical, social, family histories, medications, and allergies reviewed and updated    MEDICATIONS:  Current Outpatient Medications   Medication Sig Dispense Refill     acetaminophen (TYLENOL) 500 MG tablet Take 2 tablets (1,000 mg) by mouth every 8 hours as needed for mild pain.       acetaminophen (TYLENOL) 650 MG suppository Place 1 suppository (650 mg) rectally every 4 hours as needed for mild pain       Cranberry-Vitamin C (AZO CRANBERRY URINARY TRACT PO) Take 1 tablet by mouth 2 times daily       diclofenac (VOLTAREN) 1 % topical gel Apply to left shoulder BID 50 g 1     Heparin Sodium, Porcine, (HEPARIN ANTICOAGULANT) 5000 UNIT/0.5ML injection Inject 0.5 mLs (5,000 Units) Subcutaneous every 12 hours       LORazepam (ATIVAN) 0.5 MG tablet Take 1 tablet (0.5 mg) by mouth 2 times daily as needed for anxiety. For dental visits       metoprolol tartrate (LOPRESSOR) 50 MG tablet 1 tablet (50 mg) by Per Feeding Tube route 2 times daily       pantoprazole (PROTONIX) 40 MG EC tablet Take 20 mg by mouth daily       ROS: limited due to  "dysarthria      Vitals:  /64   Pulse 87   Temp 97.2  F (36.2  C)   Resp 17   Ht 1.727 m (5' 8\")   Wt 61.3 kg (135 lb 3.2 oz)   SpO2 95%   BMI 20.56 kg/m    Body mass index is 20.56 kg/m .     Exam:    GENERAL APPEARANCE:  in no distress, sitting up in the wheelchair comfortably.  No interaction, lethargic.  Fine tremor over the face.  RESP:  lungs clear to auscultation   CV:  S1S2 audible, regular HR, no murmur appreciated.   ABDOMEN:  soft, NT/ND, BS audible. no mass appreciated on palpation.   M/S:   Muscles atrophy right hand.   SKIN:  No rash.   NEURO: Dysphasia ms strenght: LUE 0/5, LLL 1/5, Rt hand  4/5 and RLE 4/5. Ptosis right eye.   PSYCH:  affect and mood normal    Lab/Diagnostic data: Reviewed in the chart and EHR.        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 At baseline. . Appreciate help      Dysphagia, unspecified type  Protein calorie malnutrition, moderate  Hx of FT (removed in 7/2022)   -Body mass index is 20.56 kg/m .   - BMI less than 22 in frail elderly increase mortality risk. Gaining weight slowly. Dietician on the case.   -     Essential HTN:   BP Readings from Last 3 Encounters:   03/24/25 103/64   03/13/25 104/67   01/16/25 116/69   - BP controlled,.   Off enalapril and hydralazine due to soft BP, on metoprolol tartrate. Continue.       Hx of GIB with acute blood loss anemia:   - Dec 2021. No scopes was done, Hb dropped down to 12.7 gm/dl.  Hb level 14.8 (4/2022)  -  On Protonix 20 mg.     Chronic fatigue syndrome 2/2 Lyme disease:  - Significant  Deficits requiring NH placement. Requiring extensive assistance from nursing. Up for meals only o/w spends the day resting in bed      Electronically signed by:  Joel Blanchard, " MD      Sincerely,        Joel Blanchard MD    Electronically signed

## 2025-03-25 ENCOUNTER — NURSING HOME VISIT (OUTPATIENT)
Dept: GERIATRICS | Facility: CLINIC | Age: 75
End: 2025-03-25
Payer: COMMERCIAL

## 2025-03-25 VITALS
OXYGEN SATURATION: 94 % | WEIGHT: 135 LBS | HEART RATE: 68 BPM | DIASTOLIC BLOOD PRESSURE: 61 MMHG | TEMPERATURE: 97.2 F | RESPIRATION RATE: 17 BRPM | SYSTOLIC BLOOD PRESSURE: 106 MMHG | BODY MASS INDEX: 20.53 KG/M2

## 2025-03-25 DIAGNOSIS — E44.0 MODERATE PROTEIN-CALORIE MALNUTRITION: ICD-10-CM

## 2025-03-25 DIAGNOSIS — I10 PRIMARY HYPERTENSION: ICD-10-CM

## 2025-03-25 DIAGNOSIS — F03.90 DEMENTIA WITHOUT BEHAVIORAL DISTURBANCE (H): Primary | ICD-10-CM

## 2025-03-25 PROCEDURE — 99309 SBSQ NF CARE MODERATE MDM 30: CPT | Performed by: NURSE PRACTITIONER

## 2025-03-25 NOTE — PROGRESS NOTES
[facility] GERIATRICS    Chief Complaint   Patient presents with    RECHECK     HPI:  Sukhi Johnson is a 75 year old  (1950), who is being seen today for an episodic care visit at: [facility] AT [facility] () [[id number]]. Today's concern is:   1. Dementia without behavioral disturbance (H)    2. Moderate protein-calorie malnutrition    3. Primary hypertension      Patient seen for follow up, also spoke with friend/POA Miladis RE: status and plan, cognitive limitations exacerbated by ICH, appears healthy, limited verbal, BMI 20.53, full ADL assist including feeding, SBP's in the 110 range, non-ambulatory, still maintains wanting restorative care including supplements and possible stem cell therapy, Miladis requesting med list signed in order to send to new insurance with hope they will cover the supplements.    Allergies, and PMH/PSH reviewed in EPIC today.  REVIEW OF SYSTEMS:  Unobtainable secondary to cognitive impairment.     Objective:   /61   Pulse 68   Temp 97.2  F (36.2  C)   Resp 17   Wt 61.2 kg (135 lb)   SpO2 94%   BMI 20.53 kg/m    GENERAL APPEARANCE:  in no distress, appears healthy  ENT:  Mouth and posterior oropharynx normal, moist mucous membranes  RESP:  lungs clear to auscultation , no respiratory distress  CV:  no edema, rate-normal  ABDOMEN:  no guarding or rebound  M/S:   Gait and station abnormal transfer assist  SKIN:  Inspection of skin and subcutaneous tissue baseline  NEURO:   Examination of sensation by touch normal  PSYCH:  affect and mood normal    Labs done in SNF are in [facility]. Please refer to them using [facility]/Care Everywhere.    Assessment/Plan:  (F03.90) Dementia without behavioral disturbance (H)  (primary encounter diagnosis)  Comment: cognitive limitations  Plan:   -staff to support as indicated  -OK for family to start/change supplements as indicated  -plans for stem cell therapy if insurance with reimburse    (E44.0) Moderate protein-calorie  malnutrition  Comment: BMI 20.53  Plan:   -staff to assist/encourage intake at meals  -med pass TID  -modified diet  -weekly weights    (I10) Primary hypertension  Comment: SBP's 100-110 range  Plan:   -monitor, OK for permissive hypotension, not fall risk  -if SBP's drop <100 may trial midodrine    MED REC REQUIRED  Post Medication Reconciliation Status: medication reconcilation previously completed during another office visit        Electronically signed by: KALI Ramirez CNP

## 2025-03-25 NOTE — LETTER
3/25/2025      Sukhi Johnson  [address]   [place] [address]        [facility] GERIATRICS    Chief Complaint   Patient presents with     RECHECK     HPI:  Sukhi Johnson is a 75 year old  (1950), who is being seen today for an episodic care visit at: [facility] AT [facility] () [[id number]]. Today's concern is:   1. Dementia without behavioral disturbance (H)    2. Moderate protein-calorie malnutrition    3. Primary hypertension      Patient seen for follow up, also spoke with friend/POA Miladis RE: status and plan, cognitive limitations exacerbated by ICH, appears healthy, limited verbal, BMI 20.53, full ADL assist including feeding, SBP's in the 110 range, non-ambulatory, still maintains wanting restorative care including supplements and possible stem cell therapy, Miladis requesting med list signed in order to send to new insurance with hope they will cover the supplements.    Allergies, and PMH/PSH reviewed in EPIC today.  REVIEW OF SYSTEMS:  Unobtainable secondary to cognitive impairment.     Objective:   /61   Pulse 68   Temp 97.2  F (36.2  C)   Resp 17   Wt 61.2 kg (135 lb)   SpO2 94%   BMI 20.53 kg/m    GENERAL APPEARANCE:  in no distress, appears healthy  ENT:  Mouth and posterior oropharynx normal, moist mucous membranes  RESP:  lungs clear to auscultation , no respiratory distress  CV:  no edema, rate-normal  ABDOMEN:  no guarding or rebound  M/S:   Gait and station abnormal transfer assist  SKIN:  Inspection of skin and subcutaneous tissue baseline  NEURO:   Examination of sensation by touch normal  PSYCH:  affect and mood normal    Labs done in SNF are in [facility]. Please refer to them using [employer]/Care Everywhere.    Assessment/Plan:  (F03.90) Dementia without behavioral disturbance (H)  (primary encounter diagnosis)  Comment: cognitive limitations  Plan:   -staff to support as indicated  -OK for family to start/change supplements as indicated  -plans for stem  cell therapy if insurance with reimburse    (E44.0) Moderate protein-calorie malnutrition  Comment: BMI 20.53  Plan:   -staff to assist/encourage intake at meals  -med pass TID  -modified diet  -weekly weights    (I10) Primary hypertension  Comment: SBP's 100-110 range  Plan:   -monitor, OK for permissive hypotension, not fall risk  -if SBP's drop <100 may trial midodrine    MED REC REQUIRED  Post Medication Reconciliation Status: medication reconcilation previously completed during another office visit        Electronically signed by: KALI Ramirez CNP          Sincerely,        KALI Ramirez CNP    Electronically signed

## 2025-04-07 ENCOUNTER — TELEPHONE (OUTPATIENT)
Dept: GERIATRICS | Facility: CLINIC | Age: 75
End: 2025-04-07
Payer: COMMERCIAL

## 2025-04-07 ENCOUNTER — TELEPHONE (OUTPATIENT)
Dept: NEUROLOGY | Facility: CLINIC | Age: 75
End: 2025-04-07
Payer: COMMERCIAL

## 2025-04-07 DIAGNOSIS — F03.90 DEMENTIA WITHOUT BEHAVIORAL DISTURBANCE (H): ICD-10-CM

## 2025-04-07 RX ORDER — LORAZEPAM 0.5 MG/1
0.5 TABLET ORAL 2 TIMES DAILY PRN
Qty: 2 TABLET | Refills: 0 | Status: SHIPPED | OUTPATIENT
Start: 2025-04-07 | End: 2025-04-09

## 2025-04-07 NOTE — TELEPHONE ENCOUNTER
[facility] Call [facility]    Phone Message    May a detailed message be left on voicemail: yes     Reason for Call: Miladis Pt's significant other is requesting a call back as she would like to discuss some orders she would like provider to request to the Pt's nursing home.     Please contact Miladis to discuss and advise at [phone]    Action Taken: Message routed to:  Other: [facility]     Travel Screening: Not Applicable

## 2025-04-07 NOTE — TELEPHONE ENCOUNTER
Attepted to skye Crisostomo (ctc on file). [facility].     LOV 10/2023 with plan to follow up PRN.     Carmen AYALA RN, BSN  [facility] Neurology

## 2025-04-07 NOTE — TELEPHONE ENCOUNTER
[facility] Call [facility]    Phone Message    May a detailed message be left on voicemail: yes     Reason for Call: Other:       Andressa returning call to clinic. Requesting call back to phone #[phone]     Action Taken: Message routed to:  Other: [facility]    Travel Screening: Not Applicable

## 2025-04-09 ENCOUNTER — NURSING HOME VISIT (OUTPATIENT)
Dept: GERIATRICS | Facility: CLINIC | Age: 75
End: 2025-04-09
Payer: COMMERCIAL

## 2025-04-09 VITALS
WEIGHT: 134 LBS | TEMPERATURE: 98 F | OXYGEN SATURATION: 97 % | RESPIRATION RATE: 16 BRPM | HEART RATE: 70 BPM | SYSTOLIC BLOOD PRESSURE: 118 MMHG | BODY MASS INDEX: 20.37 KG/M2 | DIASTOLIC BLOOD PRESSURE: 74 MMHG

## 2025-04-09 DIAGNOSIS — Z00.00 ENCOUNTER FOR MEDICARE ANNUAL WELLNESS EXAM: ICD-10-CM

## 2025-04-09 DIAGNOSIS — F03.90 DEMENTIA WITHOUT BEHAVIORAL DISTURBANCE (H): Primary | ICD-10-CM

## 2025-04-09 DIAGNOSIS — E44.0 MODERATE PROTEIN-CALORIE MALNUTRITION: ICD-10-CM

## 2025-04-09 DIAGNOSIS — I10 PRIMARY HYPERTENSION: ICD-10-CM

## 2025-04-09 PROCEDURE — G0438 PPPS, INITIAL VISIT: HCPCS | Performed by: NURSE PRACTITIONER

## 2025-04-09 PROCEDURE — 99309 SBSQ NF CARE MODERATE MDM 30: CPT | Mod: 25 | Performed by: NURSE PRACTITIONER

## 2025-04-09 ASSESSMENT — ACTIVITIES OF DAILY LIVING (ADL): CURRENT_FUNCTION: NEEDS ASSISTANCE

## 2025-04-09 NOTE — PROGRESS NOTES
Preventive Care Visit  [facility]  Chris Maria, APRN CNP, Geriatric Medicine  Apr 9, 2025      Assessment & Plan     (F03.90) Dementia without behavioral disturbance (H)  (primary encounter diagnosis)  Comment: cognitive limitations, pleasant, limited verbal  Plan:   -staff to support as indicated  -continue lorazepam PRN for dental, podiatry appts    (E44.0) Moderate protein-calorie malnutrition  Comment: BMI 20.37, thin habitus  Plan:   -change diet to large portions  -feed assist, encouragement  -dietary to follow intake and weights  -supplements PRN    (I10) Primary hypertension  Comment: SBP's in the 110-120 range  Plan:   -continue metoprolol BID  -daily vitals    (Z00.00) Encounter for Medicare annual wellness exam  Comment: overall doing well  Plan: no change in current plan      MEDICATIONS:  Continue current medications without change    No follow-ups on file.    Irina Isbell is a 75 year old, presenting for the following:  Wellness Visit            HPI       Annual Wellness Visit     Patient has been advised of split billing requirements and indicates understanding: N/A       Health Care Directive  Patient has a Health Care Directive on file  Advance care planning document is on file and is current.  In general, how would you rate your overall physical health? good  Do you have a special diet?  Regular (no restrictions)         No data to display              Do you see a dentist two times every year?  (!) NO  The patient was instructed to see the dentist every 6 months.   Have you been more tired than usual lately?  (!) YES   Discussed possible causes of fatigue.   If you drink alcohol do you typically have >3 drinks per day or >7 drinks per week? No  Do you have a current opioid prescription? No  Do you use any other controlled substances or medications that are not prescribed by a provider? None  Social History     Tobacco Use    Smoking status: Never     Smokeless tobacco: Never   Vaping Use    Vaping status: Never Used   Substance Use Topics    Alcohol use: Never    Drug use: Never       Needs assistance for the following daily activities: (!) TELEPHONE USE, (!) TRANSPORTATION, (!) SHOPPING, (!) PREPARING MEALS, (!) HOUSEWORK, (!) BATHING, (!) LAUNDRY, (!) MONEY MANAGEMENT, (!) TOILETING, (!) FEEDING, and (!) DRESSING  Which of the following safety concerns are present in your home?  none identified   Do you (or your family members) have any concerns about your safety while driving?  No  Do you have any of the following hearing concerns?: No hearing concerns  In the past 6 months, have you been bothered by leaking of urine? (!) YES   Information on urinary incontinence and treatment options given to patient.           5/17/2024   Fall Risk   Fallen 2 or more times in the past year? No            Today's PHQ-2 Score:       4/8/2022    10:44 AM   PHQ-2 ( 1999 [employer])   Q1: Little interest or pleasure in doing things 0   Q2: Feeling down, depressed or hopeless 0   PHQ-2 Score 0     ASCVD Risk   The ASCVD Risk score (Brigida REDDY, et al., 2019) failed to calculate for the following reasons:    Risk score cannot be calculated because patient has a medical history suggesting prior/existing ASCVD            Reviewed and updated as needed this visit by Provider                    No past medical history on file.  BP Readings from Last 3 Encounters:   04/09/25 118/74   03/25/25 106/61   03/24/25 103/64    Wt Readings from Last 3 Encounters:   04/09/25 60.8 kg (134 lb)   03/25/25 61.2 kg (135 lb)   03/24/25 61.3 kg (135 lb 3.2 oz)                  Current Outpatient Medications   Medication Sig Dispense Refill    acetaminophen (TYLENOL) 500 MG tablet Take 2 tablets (1,000 mg) by mouth every 8 hours as needed for mild pain.      acetaminophen (TYLENOL) 650 MG suppository Place 1 suppository (650 mg) rectally every 4 hours as needed for mild pain      Cranberry-Vitamin C  (AZO CRANBERRY URINARY TRACT PO) Take 1 tablet by mouth 2 times daily      diclofenac (VOLTAREN) 1 % topical gel Apply to left shoulder BID 50 g 1    Heparin Sodium, Porcine, (HEPARIN ANTICOAGULANT) 5000 UNIT/0.5ML injection Inject 0.5 mLs (5,000 Units) Subcutaneous every 12 hours      LORazepam (ATIVAN) 0.5 MG tablet Take 1 tablet (0.5 mg) by mouth 2 times daily as needed for anxiety. For dental visits 2 tablet 0    metoprolol tartrate (LOPRESSOR) 50 MG tablet 1 tablet (50 mg) by Per Feeding Tube route 2 times daily      pantoprazole (PROTONIX) 40 MG EC tablet Take 20 mg by mouth daily       No Known Allergies    Current providers sharing in care for this patient include:  Patient Care Team:  Chris Maria APRN CNP as PCP - General (Geriatric Medicine)  [facility]  Orlando Santos MD as Assigned Neuroscience Provider  Joel Blanchard MD as Hospitalist (Family Medicine)  Nahed Ho LSW as Lead Care Coordinator (Primary Care - CC)  Lety Garcia as Case Management Specialist  Carley Mehta [facility] as Pharmacist (Pharmacist)  Chris Maria APRN CNP as Nurse Practitioner (Geriatric Medicine)  Chris Maria APRN CNP as Assigned PCP    The following health maintenance items are reviewed in Epic and correct as of today:  Health Maintenance   Topic Date Due    COLORECTAL CANCER SCREENING  Never done    HEPATITIS C SCREENING  Never done    DTAP/TDAP/TD IMMUNIZATION (1 - Tdap) Never done    Pneumococcal Vaccine: 50+ Years (1 of 1 - PCV) Never done    ZOSTER IMMUNIZATION (1 of 2) Never done    INFLUENZA VACCINE (1) Never done    COVID-19 Vaccine (1 - 2024-25 season) Never done    PHQ-2 (once per calendar year)  01/01/2025    RSV VACCINE (1 - 1-dose 75+ series) Never done    FALL RISK ASSESSMENT  05/17/2025    BMP  12/23/2025    MEDICARE ANNUAL WELLNESS VISIT  04/09/2026    LIPID  12/25/2026    DIABETES SCREENING   12/23/2027    ADVANCE CARE PLANNING  05/24/2029    HPV IMMUNIZATION  Aged Out    MENINGITIS IMMUNIZATION  Aged Out     Appropriate preventive services were reviewed for this patient, including applicable screening as appropriate for fall prevention, nutrition, physical activity, Tobacco-use cessation, weight loss and cognition.  Checklist reviewing preventive services available has been reviewed for patient. Patient resides in a Nursing Home Care setting and review and follow up with Nursing Home staff or Health Care agent was addressed as needed.            4/9/2025   Mini Cog   Mini-Cog Not Completed (choose reason) Known dementia   Clock Draw Score 0 Abnormal   3 Item Recall 0 objects recalled   Mini Cog Total Score 0              Advance Care Planning  Patient has a Health Care Directive on file  Advance care planning document is on file and is current.       No data to display                   No data to display                   No data to display                      No data to display                   No data to display                   No data to display                     No data to display                      Today's PHQ-2 Score:       4/8/2022    10:44 AM   PHQ-2 ( 1999 Pfizer)   Q1: Little interest or pleasure in doing things 0   Q2: Feeling down, depressed or hopeless 0   PHQ-2 Score 0          No data to display              Social History     Tobacco Use    Smoking status: Never    Smokeless tobacco: Never   Vaping Use    Vaping status: Never Used   Substance Use Topics    Alcohol use: Never    Drug use: Never       ASCVD Risk   The ASCVD Risk score (Brigida REDDY, et al., 2019) failed to calculate for the following reasons:    Risk score cannot be calculated because patient has a medical history suggesting prior/existing ASCVD            Reviewed and updated as needed this visit by Provider                      Current providers sharing in care for this patient include:  Patient Care  Team:  Chris Maria APRN CNP as PCP - General (Geriatric Medicine)  [facility]  Orlando Santos MD as Assigned Neuroscience Provider  Joel Blanchard MD as Hospitalist (Family Medicine)  Nahed Ho LSW as Lead Care Coordinator (Primary Care - CC)  Lety Garcai as Case Management Specialist  Carley Mehta [facility] as Pharmacist (Pharmacist)  Chris Maria APRN CNP as Nurse Practitioner (Geriatric Medicine)  Chris Maria APRN CNP as Assigned PCP    The following health maintenance items are reviewed in Epic and correct as of today:  Health Maintenance   Topic Date Due    COLORECTAL CANCER SCREENING  Never done    HEPATITIS C SCREENING  Never done    DTAP/TDAP/TD IMMUNIZATION (1 - Tdap) Never done    Pneumococcal Vaccine: 50+ Years (1 of 1 - PCV) Never done    ZOSTER IMMUNIZATION (1 of 2) Never done    INFLUENZA VACCINE (1) Never done    COVID-19 Vaccine (1 - 2024-25 season) Never done    PHQ-2 (once per calendar year)  01/01/2025    RSV VACCINE (1 - 1-dose 75+ series) Never done    FALL RISK ASSESSMENT  05/17/2025    BMP  12/23/2025    MEDICARE ANNUAL WELLNESS VISIT  04/09/2026    LIPID  12/25/2026    DIABETES SCREENING  12/23/2027    ADVANCE CARE PLANNING  05/24/2029    HPV IMMUNIZATION  Aged Out    MENINGITIS IMMUNIZATION  Aged Out         Review of Systems  CONSTITUTIONAL: NEGATIVE for fever, chills, change in weight  INTEGUMENTARY/SKIN: NEGATIVE for worrisome rashes, moles or lesions  EYES: NEGATIVE for vision changes or irritation  ENT/MOUTH: NEGATIVE for ear, mouth and throat problems  RESP: NEGATIVE for significant cough or SOB  BREAST: NEGATIVE for masses, tenderness or discharge  CV: NEGATIVE for chest pain, palpitations or peripheral edema  GI: NEGATIVE for nausea, abdominal pain, heartburn, or change in bowel habits  : NEGATIVE for frequency, dysuria, or hematuria  MUSCULOSKELETAL: NEGATIVE  "for significant arthralgias or myalgia  NEURO: NEGATIVE for weakness, dizziness or paresthesias  ENDOCRINE: NEGATIVE for temperature intolerance, skin/hair changes  HEME: NEGATIVE for bleeding problems  PSYCHIATRIC: NEGATIVE for changes in mood or affect     Objective    Exam  /74   Pulse 70   Temp 98  F (36.7  C)   Resp 16   Wt 60.8 kg (134 lb)   SpO2 97%   BMI 20.37 kg/m     Estimated body mass index is 20.37 kg/m  as calculated from the following:    Height as of 3/24/25: 1.727 m (5' 8\").    Weight as of this encounter: 60.8 kg (134 lb).    Physical Exam  GENERAL: alert and no distress  EYES: Eyes grossly normal to inspection, PERRL and conjunctivae and sclerae normal  HENT: ear canals and TM's normal, nose and mouth without ulcers or lesions  NECK: no adenopathy, no asymmetry, masses, or scars  RESP: lungs clear to auscultation - no rales, rhonchi or wheezes  CV: regular rate and rhythm, normal S1 S2, no S3 or S4, no murmur, click or rub, no peripheral edema  ABDOMEN: soft, nontender, no hepatosplenomegaly, no masses and bowel sounds normal  MS: no gross musculoskeletal defects noted, no edema  SKIN: no suspicious lesions or rashes  NEURO: Normal strength and tone, mentation intact and speech normal  PSYCH: mentation appears normal, affect normal/bright         4/9/2025   Mini Cog   Mini-Cog Not Completed (choose reason) Known dementia   Clock Draw Score 0 Abnormal   3 Item Recall 0 objects recalled   Mini Cog Total Score 0              Signed Electronically by: KALI Ramirez CNP    "

## 2025-04-09 NOTE — PATIENT INSTRUCTIONS
Patient Education   Preventive Care Advice   This is general advice given by our system to help you stay healthy. However, your care team may have specific advice just for you. Please talk to your care team about your preventive care needs.  Nutrition  Eat 5 or more servings of fruits and vegetables each day.  Try wheat bread, brown rice and whole grain pasta (instead of white bread, rice, and pasta).  Get enough calcium and vitamin D. Check the label on foods and aim for 100% of the RDA (recommended daily allowance).  Lifestyle  Exercise at least 150 minutes each week  (30 minutes a day, 5 days a week).  Do muscle strengthening activities 2 days a week. These help control your weight and prevent disease.  No smoking.  Wear sunscreen to prevent skin cancer.  Have a dental exam and cleaning every 6 months.  Yearly exams  See your health care team every year to talk about:  Any changes in your health.  Any medicines your care team has prescribed.  Preventive care, family planning, and ways to prevent chronic diseases.  Shots (vaccines)   HPV shots (up to age 26), if you've never had them before.  Hepatitis B shots (up to age 59), if you've never had them before.  COVID-19 shot: Get this shot when it's due.  Flu shot: Get a flu shot every year.  Tetanus shot: Get a tetanus shot every 10 years.  Pneumococcal, hepatitis A, and RSV shots: Ask your care team if you need these based on your risk.  Shingles shot (for age 50 and up)  General health tests  Diabetes screening:  Starting at age 35, Get screened for diabetes at least every 3 years.  If you are younger than age 35, ask your care team if you should be screened for diabetes.  Cholesterol test: At age 39, start having a cholesterol test every 5 years, or more often if advised.  Bone density scan (DEXA): At age 50, ask your care team if you should have this scan for osteoporosis (brittle bones).  Hepatitis C: Get tested at least once in your life.  STIs (sexually  transmitted infections)  Before age 24: Ask your care team if you should be screened for STIs.  After age 24: Get screened for STIs if you're at risk. You are at risk for STIs (including HIV) if:  You are sexually active with more than one person.  You don't use condoms every time.  You or a partner was diagnosed with a sexually transmitted infection.  If you are at risk for HIV, ask about PrEP medicine to prevent HIV.  Get tested for HIV at least once in your life, whether you are at risk for HIV or not.  Cancer screening tests  Cervical cancer screening: If you have a cervix, begin getting regular cervical cancer screening tests starting at age 21.  Breast cancer scan (mammogram): If you've ever had breasts, begin having regular mammograms starting at age 40. This is a scan to check for breast cancer.  Colon cancer screening: It is important to start screening for colon cancer at age 45.  Have a colonoscopy test every 10 years (or more often if you're at risk) Or, ask your provider about stool tests like a FIT test every year or Cologuard test every 3 years.  To learn more about your testing options, visit:   .  For help making a decision, visit:   https://bit.ly/hk27378.  Prostate cancer screening test: If you have a prostate, ask your care team if a prostate cancer screening test (PSA) at age 55 is right for you.  Lung cancer screening: If you are a current or former smoker ages 50 to 80, ask your care team if ongoing lung cancer screenings are right for you.  For informational purposes only. Not to replace the advice of your health care provider. Copyright   2023 [employer]. All rights reserved. Clinically reviewed by the [facility] Transitions Program. Cuffed and Wanted [id number] - REV 01/24.

## 2025-04-09 NOTE — LETTER
4/9/2025      Sukhi Johnson  [address]  [place] [address]        Preventive Care Visit  [facility] GERIATRIC SERVICES  Chris Maria, APRN CNP, Geriatric Medicine  Apr 9, 2025      Assessment & Plan    (F03.90) Dementia without behavioral disturbance (H)  (primary encounter diagnosis)  Comment: cognitive limitations, pleasant, limited verbal  Plan:   -staff to support as indicated  -continue lorazepam PRN for dental, podiatry appts    (E44.0) Moderate protein-calorie malnutrition  Comment: BMI 20.37, thin habitus  Plan:   -change diet to large portions  -feed assist, encouragement  -dietary to follow intake and weights  -supplements PRN    (I10) Primary hypertension  Comment: SBP's in the 110-120 range  Plan:   -continue metoprolol BID  -daily vitals    (Z00.00) Encounter for Medicare annual wellness exam  Comment: overall doing well  Plan: no change in current plan      MEDICATIONS:  Continue current medications without change    No follow-ups on file.    Irina Isbell is a 75 year old, presenting for the following:  Wellness Visit            HPI       Annual Wellness Visit     Patient has been advised of split billing requirements and indicates understanding: N/A       Health Care Directive  Patient has a Health Care Directive on file  Advance care planning document is on file and is current.  In general, how would you rate your overall physical health? good  Do you have a special diet?  Regular (no restrictions)         No data to display              Do you see a dentist two times every year?  (!) NO  The patient was instructed to see the dentist every 6 months.   Have you been more tired than usual lately?  (!) YES   Discussed possible causes of fatigue.   If you drink alcohol do you typically have >3 drinks per day or >7 drinks per week? No  Do you have a current opioid prescription? No  Do you use any other controlled substances or medications that are not prescribed by  a provider? None  Social History     Tobacco Use     Smoking status: Never     Smokeless tobacco: Never   Vaping Use     Vaping status: Never Used   Substance Use Topics     Alcohol use: Never     Drug use: Never       Needs assistance for the following daily activities: (!) TELEPHONE USE, (!) TRANSPORTATION, (!) SHOPPING, (!) PREPARING MEALS, (!) HOUSEWORK, (!) BATHING, (!) LAUNDRY, (!) MONEY MANAGEMENT, (!) TOILETING, (!) FEEDING, and (!) DRESSING  Which of the following safety concerns are present in your home?  none identified   Do you (or your family members) have any concerns about your safety while driving?  No  Do you have any of the following hearing concerns?: No hearing concerns  In the past 6 months, have you been bothered by leaking of urine? (!) YES   Information on urinary incontinence and treatment options given to patient.           5/17/2024   Fall Risk   Fallen 2 or more times in the past year? No            Today's PHQ-2 Score:       4/8/2022    10:44 AM   PHQ-2 ( 1999 Pfizer)   Q1: Little interest or pleasure in doing things 0   Q2: Feeling down, depressed or hopeless 0   PHQ-2 Score 0     ASCVD Risk   The ASCVD Risk score (Brigida REDDY, et al., 2019) failed to calculate for the following reasons:    Risk score cannot be calculated because patient has a medical history suggesting prior/existing ASCVD            Reviewed and updated as needed this visit by Provider                    No past medical history on file.  BP Readings from Last 3 Encounters:   04/09/25 118/74   03/25/25 106/61   03/24/25 103/64    Wt Readings from Last 3 Encounters:   04/09/25 60.8 kg (134 lb)   03/25/25 61.2 kg (135 lb)   03/24/25 61.3 kg (135 lb 3.2 oz)                  Current Outpatient Medications   Medication Sig Dispense Refill     acetaminophen (TYLENOL) 500 MG tablet Take 2 tablets (1,000 mg) by mouth every 8 hours as needed for mild pain.       acetaminophen (TYLENOL) 650 MG suppository Place 1  suppository (650 mg) rectally every 4 hours as needed for mild pain       Cranberry-Vitamin C (AZO CRANBERRY URINARY TRACT PO) Take 1 tablet by mouth 2 times daily       diclofenac (VOLTAREN) 1 % topical gel Apply to left shoulder BID 50 g 1     Heparin Sodium, Porcine, (HEPARIN ANTICOAGULANT) 5000 UNIT/0.5ML injection Inject 0.5 mLs (5,000 Units) Subcutaneous every 12 hours       LORazepam (ATIVAN) 0.5 MG tablet Take 1 tablet (0.5 mg) by mouth 2 times daily as needed for anxiety. For dental visits 2 tablet 0     metoprolol tartrate (LOPRESSOR) 50 MG tablet 1 tablet (50 mg) by Per Feeding Tube route 2 times daily       pantoprazole (PROTONIX) 40 MG EC tablet Take 20 mg by mouth daily       No Known Allergies    Current providers sharing in care for this patient include:  Patient Care Team:  Chris Maria APRN CNP as PCP - General (Geriatric Medicine)  [facility]  Orlando Santos MD as Assigned Neuroscience Provider  Joel Blanchard MD as Hospitalist (Family Medicine)  Nahed Ho LSW as Lead Care Coordinator (Primary Care - CC)  Lety Garcia as Case Management Specialist  Carley Mehta [facility] as Pharmacist (Pharmacist)  Chris Maria APRN CNP as Nurse Practitioner (Geriatric Medicine)  Chris Maria APRN CNP as Assigned PCP    The following health maintenance items are reviewed in Epic and correct as of today:  Health Maintenance   Topic Date Due     COLORECTAL CANCER SCREENING  Never done     HEPATITIS C SCREENING  Never done     DTAP/TDAP/TD IMMUNIZATION (1 - Tdap) Never done     Pneumococcal Vaccine: 50+ Years (1 of 1 - PCV) Never done     ZOSTER IMMUNIZATION (1 of 2) Never done     INFLUENZA VACCINE (1) Never done     COVID-19 Vaccine (1 - 2024-25 season) Never done     PHQ-2 (once per calendar year)  01/01/2025     RSV VACCINE (1 - 1-dose 75+ series) Never done     FALL RISK ASSESSMENT  05/17/2025      BMP  12/23/2025     MEDICARE ANNUAL WELLNESS VISIT  04/09/2026     LIPID  12/25/2026     DIABETES SCREENING  12/23/2027     ADVANCE CARE PLANNING  05/24/2029     HPV IMMUNIZATION  Aged Out     MENINGITIS IMMUNIZATION  Aged Out     Appropriate preventive services were reviewed for this patient, including applicable screening as appropriate for fall prevention, nutrition, physical activity, Tobacco-use cessation, weight loss and cognition.  Checklist reviewing preventive services available has been reviewed for patient. Patient resides in a Nursing Home Care setting and review and follow up with Nursing Home staff or Health Care agent was addressed as needed.            4/9/2025   Mini Cog   Mini-Cog Not Completed (choose reason) Known dementia   Clock Draw Score 0 Abnormal   3 Item Recall 0 objects recalled   Mini Cog Total Score 0              Advance Care Planning  Patient has a Health Care Directive on file  Advance care planning document is on file and is current.       No data to display                   No data to display                   No data to display                      No data to display                   No data to display                   No data to display                     No data to display                      Today's PHQ-2 Score:       4/8/2022    10:44 AM   PHQ-2 ( 1999 Pfizer)   Q1: Little interest or pleasure in doing things 0   Q2: Feeling down, depressed or hopeless 0   PHQ-2 Score 0          No data to display              Social History     Tobacco Use     Smoking status: Never     Smokeless tobacco: Never   Vaping Use     Vaping status: Never Used   Substance Use Topics     Alcohol use: Never     Drug use: Never       ASCVD Risk   The ASCVD Risk score (Brigida REDDY, et al., 2019) failed to calculate for the following reasons:    Risk score cannot be calculated because patient has a medical history suggesting prior/existing ASCVD            Reviewed and updated as needed  this visit by Provider                      Current providers sharing in care for this patient include:  Patient Care Team:  Chris Maria APRN CNP as PCP - General (Geriatric Medicine)  [facility] as [facility] Facility  Orlando Santos MD as Assigned Neuroscience Provider  Joel Blanchard MD as Hospitalist (Family Medicine)  Nahed Ho LSW as Lead Care Coordinator (Primary Care - CC)  Lety Garcia as Case Management Specialist  Carley Mehta [facility] as Pharmacist (Pharmacist)  Chris Maria APRN CNP as Nurse Practitioner (Geriatric Medicine)  Chris Maria APRN CNP as Assigned PCP    The following health maintenance items are reviewed in Epic and correct as of today:  Health Maintenance   Topic Date Due     COLORECTAL CANCER SCREENING  Never done     HEPATITIS C SCREENING  Never done     DTAP/TDAP/TD IMMUNIZATION (1 - Tdap) Never done     Pneumococcal Vaccine: 50+ Years (1 of 1 - PCV) Never done     ZOSTER IMMUNIZATION (1 of 2) Never done     INFLUENZA VACCINE (1) Never done     COVID-19 Vaccine (1 - 2024-25 season) Never done     PHQ-2 (once per calendar year)  01/01/2025     RSV VACCINE (1 - 1-dose 75+ series) Never done     FALL RISK ASSESSMENT  05/17/2025     BMP  12/23/2025     MEDICARE ANNUAL WELLNESS VISIT  04/09/2026     LIPID  12/25/2026     DIABETES SCREENING  12/23/2027     ADVANCE CARE PLANNING  05/24/2029     HPV IMMUNIZATION  Aged Out     MENINGITIS IMMUNIZATION  Aged Out         Review of Systems  CONSTITUTIONAL: NEGATIVE for fever, chills, change in weight  INTEGUMENTARY/SKIN: NEGATIVE for worrisome rashes, moles or lesions  EYES: NEGATIVE for vision changes or irritation  ENT/MOUTH: NEGATIVE for ear, mouth and throat problems  RESP: NEGATIVE for significant cough or SOB  BREAST: NEGATIVE for masses, tenderness or discharge  CV: NEGATIVE for chest pain, palpitations or peripheral edema  GI: NEGATIVE for  "nausea, abdominal pain, heartburn, or change in bowel habits  : NEGATIVE for frequency, dysuria, or hematuria  MUSCULOSKELETAL: NEGATIVE for significant arthralgias or myalgia  NEURO: NEGATIVE for weakness, dizziness or paresthesias  ENDOCRINE: NEGATIVE for temperature intolerance, skin/hair changes  HEME: NEGATIVE for bleeding problems  PSYCHIATRIC: NEGATIVE for changes in mood or affect     Objective   Exam  /74   Pulse 70   Temp 98  F (36.7  C)   Resp 16   Wt 60.8 kg (134 lb)   SpO2 97%   BMI 20.37 kg/m     Estimated body mass index is 20.37 kg/m  as calculated from the following:    Height as of 3/24/25: 1.727 m (5' 8\").    Weight as of this encounter: 60.8 kg (134 lb).    Physical Exam  GENERAL: alert and no distress  EYES: Eyes grossly normal to inspection, PERRL and conjunctivae and sclerae normal  HENT: ear canals and TM's normal, nose and mouth without ulcers or lesions  NECK: no adenopathy, no asymmetry, masses, or scars  RESP: lungs clear to auscultation - no rales, rhonchi or wheezes  CV: regular rate and rhythm, normal S1 S2, no S3 or S4, no murmur, click or rub, no peripheral edema  ABDOMEN: soft, nontender, no hepatosplenomegaly, no masses and bowel sounds normal  MS: no gross musculoskeletal defects noted, no edema  SKIN: no suspicious lesions or rashes  NEURO: Normal strength and tone, mentation intact and speech normal  PSYCH: mentation appears normal, affect normal/bright         4/9/2025   Mini Cog   Mini-Cog Not Completed (choose reason) Known dementia   Clock Draw Score 0 Abnormal   3 Item Recall 0 objects recalled   Mini Cog Total Score 0              Signed Electronically by: KALI Ramirez CNP        Sincerely,        KALI Ramirez CNP    Electronically signed  "

## 2025-04-09 NOTE — TELEPHONE ENCOUNTER
Patient last seen in 2023, you advised follow up as needed. 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     Miladis (ctc on file) reports that patient is in nursing home, has good control of right arm and leg, can also move left leg but is not strong enough to stand/ambulate. She is looking for any therapies that may assist him in regaining strength and independence. He continues to work with PT and speech.     She is interested in pursing 2 therapies but needs provider approval/orders. They would be paid for out of pocket, asking about neurofeedback sessions and X39 light technology patch- goal with therapies would be to create new neuro pathways to enhance strength and coordination.     Please advise if you would order/recommend those therapies? If not, what your rationale is. She reports that overall his strength and speech would not be changed much since 2023 appt, is interested in doing therapies and following up to see if his assessment is improved.     Almas SCHROEDER RN, BSN  [facility] Neurology

## 2025-04-09 NOTE — TELEPHONE ENCOUNTER
"\"asking about neurofeedback sessions and X39 light technology patch- goal with therapies would be to create new neuro pathways to enhance strength and coordination.\"     I would defer to primary care.  I do not have any experience with these therapies to recommend them.  "

## 2025-04-10 NOTE — TELEPHONE ENCOUNTER
"RAMAN Redding informing of provider message below:       \"I would defer to primary care.  I do not have any experience with these therapies to recommend them.\"     Encouraged call back with any questions.     Almas SCHROEDER RN, BSN  [facility] Neurology    "

## 2025-04-16 VITALS
DIASTOLIC BLOOD PRESSURE: 73 MMHG | BODY MASS INDEX: 20.22 KG/M2 | TEMPERATURE: 97.9 F | SYSTOLIC BLOOD PRESSURE: 119 MMHG | RESPIRATION RATE: 18 BRPM | OXYGEN SATURATION: 97 % | WEIGHT: 133 LBS | HEART RATE: 92 BPM

## 2025-04-17 ENCOUNTER — NURSING HOME VISIT (OUTPATIENT)
Dept: GERIATRICS | Facility: CLINIC | Age: 75
End: 2025-04-17
Payer: COMMERCIAL

## 2025-04-17 DIAGNOSIS — E44.0 MODERATE PROTEIN-CALORIE MALNUTRITION: ICD-10-CM

## 2025-04-17 DIAGNOSIS — I10 PRIMARY HYPERTENSION: ICD-10-CM

## 2025-04-17 DIAGNOSIS — F03.90 DEMENTIA WITHOUT BEHAVIORAL DISTURBANCE (H): Primary | ICD-10-CM

## 2025-04-17 NOTE — LETTER
4/17/2025      Sukhi Johnson  [address]  [place] [address]        [facility] GERIATRICS    Chief Complaint   Patient presents with     RECHECK     HPI:  Sukhi Johnson is a 75 year old  (1950), who is being seen today for an episodic care visit at: [facility] () [[id number]]. Today's concern is:   1. Dementia without behavioral disturbance (H)    2. Moderate protein-calorie malnutrition    3. Primary hypertension      Patient seen for follow up, also consulted with nursing regarding plan, cognitive limitations, full ADL assist, BMI 20.22, requires feeding assist, SBP's in the 120 range, appears healthy, now POA/significant other requesting for non-traditional healing strategies for restorative cares, has stated that patient will improve, overall appears healthy, non-verbal.    Allergies, and PMH/PSH reviewed in [facility] today.  REVIEW OF SYSTEMS:  Unobtainable secondary to cognitive impairment.     Objective:   /73   Pulse 92   Temp 97.9  F (36.6  C)   Resp 18   Wt 60.3 kg (133 lb)   SpO2 97%   BMI 20.22 kg/m    GENERAL APPEARANCE:  in no distress, appears healthy  ENT:  Mouth and posterior oropharynx normal, moist mucous membranes  RESP:  lungs clear to auscultation , no respiratory distress  CV:  no edema, rate-normal  ABDOMEN:  no guarding or rebound  M/S:   Gait and station abnormal full ADL assist  SKIN:  Inspection of skin and subcutaneous tissue baseline  NEURO:   Examination of sensation by touch normal  PSYCH:  affect and mood normal    Labs done in SNF are in [facility]. Please refer to them using [employer]/Care Everywhere.    Assessment/Plan:  (F03.90) Dementia without behavioral disturbance (H)  (primary encounter diagnosis)  (E44.0) Moderate protein-calorie malnutrition  Comment: cognitive limitations, BMI 20.22  Plan:   -staff to support as possible  -meal supervision/assist  -OK to start X39 patch to trap infrared energy to promote health  -OK to start  energy neuromodulator system  -patient to supply and provide application information      (I10) Primary hypertension  Comment: SBP's in the 120 range  Plan:   -continue metoprolol BID  -daily vitals  -OK for permissive mild hypotension due to CVA/ICH risk    MED REC REQUIRED  Post Medication Reconciliation Status: medication reconcilation previously completed during another office visit        Electronically signed by: KALI Ramirez CNP          Sincerely,        KALI Ramirez CNP    Electronically signed

## 2025-04-17 NOTE — PROGRESS NOTES
[facility] GERIATRICS    Chief Complaint   Patient presents with    RECHECK     HPI:  Sukhi Johnson is a 75 year old  (1950), who is being seen today for an episodic care visit at: [facility] () [[id number]]. Today's concern is:   1. Dementia without behavioral disturbance (H)    2. Moderate protein-calorie malnutrition    3. Primary hypertension      Patient seen for follow up, also consulted with nursing regarding plan, cognitive limitations, full ADL assist, BMI 20.22, requires feeding assist, SBP's in the 120 range, appears healthy, now POA/significant other requesting for non-traditional healing strategies for restorative cares, has stated that patient will improve, overall appears healthy, non-verbal.    Allergies, and PMH/PSH reviewed in [facility] today.  REVIEW OF SYSTEMS:  Unobtainable secondary to cognitive impairment.     Objective:   /73   Pulse 92   Temp 97.9  F (36.6  C)   Resp 18   Wt 60.3 kg (133 lb)   SpO2 97%   BMI 20.22 kg/m    GENERAL APPEARANCE:  in no distress, appears healthy  ENT:  Mouth and posterior oropharynx normal, moist mucous membranes  RESP:  lungs clear to auscultation , no respiratory distress  CV:  no edema, rate-normal  ABDOMEN:  no guarding or rebound  M/S:   Gait and station abnormal full ADL assist  SKIN:  Inspection of skin and subcutaneous tissue baseline  NEURO:   Examination of sensation by touch normal  PSYCH:  affect and mood normal    Labs done in SNF are in [facility]. Please refer to them using [facility]/Care Everywhere.    Assessment/Plan:  (F03.90) Dementia without behavioral disturbance (H)  (primary encounter diagnosis)  (E44.0) Moderate protein-calorie malnutrition  Comment: cognitive limitations, BMI 20.22  Plan:   -staff to support as possible  -meal supervision/assist  -OK to start X39 patch to trap infrared energy to promote health  -OK to start energy neuromodulator system  -patient to supply and provide application  information      (I10) Primary hypertension  Comment: SBP's in the 120 range  Plan:   -continue metoprolol BID  -daily vitals  -OK for permissive mild hypotension due to CVA/ICH risk    MED REC REQUIRED  Post Medication Reconciliation Status: medication reconcilation previously completed during another office visit        Electronically signed by: KALI Ramirez CNP

## 2025-05-20 VITALS
DIASTOLIC BLOOD PRESSURE: 64 MMHG | BODY MASS INDEX: 19.77 KG/M2 | HEART RATE: 66 BPM | SYSTOLIC BLOOD PRESSURE: 127 MMHG | OXYGEN SATURATION: 98 % | RESPIRATION RATE: 20 BRPM | TEMPERATURE: 98 F | WEIGHT: 130 LBS

## 2025-05-21 ENCOUNTER — NURSING HOME VISIT (OUTPATIENT)
Dept: GERIATRICS | Facility: CLINIC | Age: 75
End: 2025-05-21
Payer: COMMERCIAL

## 2025-05-21 DIAGNOSIS — R13.10 DYSPHAGIA, UNSPECIFIED TYPE: ICD-10-CM

## 2025-05-21 DIAGNOSIS — F03.90 DEMENTIA WITHOUT BEHAVIORAL DISTURBANCE (H): Primary | ICD-10-CM

## 2025-05-21 DIAGNOSIS — Z00.00 ENCOUNTER FOR MEDICARE ANNUAL WELLNESS EXAM: ICD-10-CM

## 2025-05-21 DIAGNOSIS — I10 PRIMARY HYPERTENSION: ICD-10-CM

## 2025-05-21 DIAGNOSIS — E44.0 MODERATE PROTEIN-CALORIE MALNUTRITION: ICD-10-CM

## 2025-05-21 PROCEDURE — 99309 SBSQ NF CARE MODERATE MDM 30: CPT | Mod: 25 | Performed by: NURSE PRACTITIONER

## 2025-05-21 PROCEDURE — G0439 PPPS, SUBSEQ VISIT: HCPCS | Performed by: NURSE PRACTITIONER

## 2025-05-21 NOTE — LETTER
5/21/2025      Sukhi Johnson  [address]  [place] [address]        Preventive Care Visit  [facility] GERIATRIC SERVICES  KALI Ramirez CNP, Geriatric Medicine  May 21, 2025      Assessment & Plan    (F03.90) Dementia without behavioral disturbance (H)  (primary encounter diagnosis)  Comment: cognitive limitations, no distress  Plan:   -staff to support as possible  -no medication intervention at this juncture    (E44.0) Moderate protein-calorie malnutrition  Comment: BMI 19.77  Plan:   -med pass TID  -dietary to follow weights  -additional supplements PRN  -staff to setup/assist/encourage at meals    (I10) Primary hypertension  Comment: SBP's 120 range  Plan:   -continue metoprolol  -check vitals as scheduled    (R13.10) Dysphagia, unspecified type  Comment: completed FEES  Plan:   -dental follow up for poor dentition  -feed assist with supervision  -ST to follow status as needed          Subjective  Sukhi is a 75 year old, presenting for the following:  Wellness Visit            HPI  Patient 74yo with Hx CVA, SDH, L hemiplegia, fatigue, cognitive limitations, contractures, doing well with support in LTC environment, no distress nor pain noted, overall appears healthy.        Advance Care Planning    Document on file is a Health Care Directive or POLST.         No data to display                   No data to display                   No data to display                      No data to display                   No data to display                   No data to display                     No data to display                    Today's PHQ-2 Score:       5/21/2025     1:21 PM   PHQ-2 ( 1999 [employer])   Q1: Little interest or pleasure in doing things 0   Q2: Feeling down, depressed or hopeless 0   PHQ-2 Score 0          No data to display              Social History     Tobacco Use     Smoking status: Never     Smokeless tobacco: Never   Vaping Use     Vaping status: Never Used    Substance Use Topics     Alcohol use: Never     Drug use: Never       ASCVD Risk   The ASCVD Risk score (Brigida REDDY, et al., 2019) failed to calculate for the following reasons:    Risk score cannot be calculated because patient has a medical history suggesting prior/existing ASCVD    Fracture Risk Assessment Tool  Link to Frax Calculator  Use the information below to complete the Frax calculator  : 1950  Sex: male  Weight (kg): 59 kg (actual weight)  Height (cm): 0 cm  Previous Fragility Fracture:  No  History of parent with fractured hip:  No  Current Smoking:  No  Patient has been on glucocorticoids for more than 3 months (5mg/day or more): No  Rheumatoid Arthritis on Problem List:  No  Secondary Osteoporosis on Problem List:  No  Consumes 3 or more units of alcohol per day: No  Femoral Neck BMD (g/cm2)            Reviewed and updated as needed this visit by Provider                    No past medical history on file.  Labs reviewed in [facility]  Current Outpatient Medications   Medication Sig Dispense Refill     acetaminophen (TYLENOL) 500 MG tablet Take 2 tablets (1,000 mg) by mouth every 8 hours as needed for mild pain.       acetaminophen (TYLENOL) 650 MG suppository Place 1 suppository (650 mg) rectally every 4 hours as needed for mild pain       Cranberry-Vitamin C (AZO CRANBERRY URINARY TRACT PO) Take 1 tablet by mouth 2 times daily       diclofenac (VOLTAREN) 1 % topical gel Apply to left shoulder BID 50 g 1     Heparin Sodium, Porcine, (HEPARIN ANTICOAGULANT) 5000 UNIT/0.5ML injection Inject 0.5 mLs (5,000 Units) Subcutaneous every 12 hours       metoprolol tartrate (LOPRESSOR) 50 MG tablet 1 tablet (50 mg) by Per Feeding Tube route 2 times daily       pantoprazole (PROTONIX) 40 MG EC tablet Take 20 mg by mouth daily       No Known Allergies  Current providers sharing in care for this patient include:  Patient Care Team:  Chris Maria APRN CNP as PCP - General (Geriatric  Medicine)  [facility] as Nursing Home Facility  Joel Blanchard MD as Hospitalist (Family Medicine)  Nahed Ho LSW as Lead Care Coordinator (Primary Care - CC)  Lety Garcia as Case Management Specialist  Carley Mehta Amy [facility] as Pharmacist (Pharmacist)  Chris Maria APRN CNP as Nurse Practitioner (Geriatric Medicine)  Chris Maria APRN CNP as Assigned PCP    The following health maintenance items are reviewed in Epic and correct as of today:  Health Maintenance   Topic Date Due     COLORECTAL CANCER SCREENING  Never done     HEPATITIS C SCREENING  Never done     DTAP/TDAP/TD IMMUNIZATION (1 - Tdap) Never done     Pneumococcal Vaccine: 50+ Years (1 of 1 - PCV) Never done     ZOSTER IMMUNIZATION (1 of 2) Never done     COVID-19 Vaccine (1 - 2024-25 season) Never done     RSV VACCINE (1 - 1-dose 75+ series) Never done     INFLUENZA VACCINE (Season Ended) 09/01/2025     BMP  12/23/2025     MEDICARE ANNUAL WELLNESS VISIT  05/21/2026     FALL RISK ASSESSMENT  05/21/2026     LIPID  12/25/2026     DIABETES SCREENING  12/23/2027     ADVANCE CARE PLANNING  04/09/2030     PHQ-2 (once per calendar year)  Completed     HPV IMMUNIZATION  Aged Out     MENINGITIS IMMUNIZATION  Aged Out         Review of Systems  CONSTITUTIONAL: NEGATIVE for fever, chills, change in weight  INTEGUMENTARY/SKIN: NEGATIVE for worrisome rashes, moles or lesions  EYES: NEGATIVE for vision changes or irritation  ENT/MOUTH: NEGATIVE for ear, mouth and throat problems  RESP: NEGATIVE for significant cough or SOB  BREAST: NEGATIVE for masses, tenderness or discharge  CV: NEGATIVE for chest pain, palpitations or peripheral edema  GI: NEGATIVE for nausea, abdominal pain, heartburn, or change in bowel habits  : NEGATIVE for frequency, dysuria, or hematuria  MUSCULOSKELETAL: NEGATIVE for significant arthralgias or myalgia  NEURO: NEGATIVE for weakness, dizziness or  "paresthesias  ENDOCRINE: NEGATIVE for temperature intolerance, skin/hair changes  HEME: NEGATIVE for bleeding problems  PSYCHIATRIC: NEGATIVE for changes in mood or affect     Objective   Exam  /64   Pulse 66   Temp 98  F (36.7  C)   Resp 20   Wt 59 kg (130 lb)   SpO2 98%   BMI 19.77 kg/m     Estimated body mass index is 19.77 kg/m  as calculated from the following:    Height as of 3/24/25: 1.727 m (5' 8\").    Weight as of this encounter: 59 kg (130 lb).    Physical Exam  GENERAL: alert and no distress  EYES: Eyes grossly normal to inspection, PERRL and conjunctivae and sclerae normal  HENT: ear canals and TM's normal, nose and mouth without ulcers or lesions  NECK: no adenopathy, no asymmetry, masses, or scars  RESP: lungs clear to auscultation - no rales, rhonchi or wheezes  CV: regular rate and rhythm, normal S1 S2, no S3 or S4, no murmur, click or rub, no peripheral edema  ABDOMEN: soft, nontender, no hepatosplenomegaly, no masses and bowel sounds normal  MS: no gross musculoskeletal defects noted, no edema  SKIN: no suspicious lesions or rashes  NEURO: Normal strength and tone, mentation intact and speech normal  PSYCH: mentation appears normal, affect normal/bright        5/21/2025   Mini Cog   Mini-Cog Not Completed (choose reason) Known dementia              Signed Electronically by: KALI Ramirez CNP        Sincerely,        KALI Ramirez CNP    Electronically signed  "

## 2025-05-21 NOTE — PROGRESS NOTES
Preventive Care Visit  [facility] GERIATRIC SERVICES  Chris Maria, APRN CNP, Geriatric Medicine  May 21, 2025      Assessment & Plan     (F03.90) Dementia without behavioral disturbance (H)  (primary encounter diagnosis)  Comment: cognitive limitations, no distress  Plan:   -staff to support as possible  -no medication intervention at this juncture    (E44.0) Moderate protein-calorie malnutrition  Comment: BMI 19.77  Plan:   -med pass TID  -dietary to follow weights  -additional supplements PRN  -staff to setup/assist/encourage at meals    (I10) Primary hypertension  Comment: SBP's 120 range  Plan:   -continue metoprolol  -check vitals as scheduled    (R13.10) Dysphagia, unspecified type  Comment: completed FEES  Plan:   -dental follow up for poor dentition  -feed assist with supervision  -ST to follow status as needed          Irina Isbell is a 75 year old, presenting for the following:  Wellness Visit            HPI  Patient 74yo with Hx CVA, SDH, L hemiplegia, fatigue, cognitive limitations, contractures, doing well with support in LTC environment, no distress nor pain noted, overall appears healthy.        Advance Care Planning    Document on file is a Health Care Directive or POLST.         No data to display                   No data to display                   No data to display                      No data to display                   No data to display                   No data to display                     No data to display                    Today's PHQ-2 Score:       5/21/2025     1:21 PM   PHQ-2 ( 1999 [employer])   Q1: Little interest or pleasure in doing things 0   Q2: Feeling down, depressed or hopeless 0   PHQ-2 Score 0          No data to display              Social History     Tobacco Use    Smoking status: Never    Smokeless tobacco: Never   Vaping Use    Vaping status: Never Used   Substance Use Topics    Alcohol use: Never    Drug use: Never       ASCVD Risk    The ASCVD Risk score (Brigida REDDY, et al., 2019) failed to calculate for the following reasons:    Risk score cannot be calculated because patient has a medical history suggesting prior/existing ASCVD    Fracture Risk Assessment Tool  Link to Frax Calculator  Use the information below to complete the Frax calculator  : 1950  Sex: male  Weight (kg): 59 kg (actual weight)  Height (cm): 0 cm  Previous Fragility Fracture:  No  History of parent with fractured hip:  No  Current Smoking:  No  Patient has been on glucocorticoids for more than 3 months (5mg/day or more): No  Rheumatoid Arthritis on Problem List:  No  Secondary Osteoporosis on Problem List:  No  Consumes 3 or more units of alcohol per day: No  Femoral Neck BMD (g/cm2)            Reviewed and updated as needed this visit by Provider                    No past medical history on file.  Labs reviewed in [facility]  Current Outpatient Medications   Medication Sig Dispense Refill    acetaminophen (TYLENOL) 500 MG tablet Take 2 tablets (1,000 mg) by mouth every 8 hours as needed for mild pain.      acetaminophen (TYLENOL) 650 MG suppository Place 1 suppository (650 mg) rectally every 4 hours as needed for mild pain      Cranberry-Vitamin C (AZO CRANBERRY URINARY TRACT PO) Take 1 tablet by mouth 2 times daily      diclofenac (VOLTAREN) 1 % topical gel Apply to left shoulder BID 50 g 1    Heparin Sodium, Porcine, (HEPARIN ANTICOAGULANT) 5000 UNIT/0.5ML injection Inject 0.5 mLs (5,000 Units) Subcutaneous every 12 hours      metoprolol tartrate (LOPRESSOR) 50 MG tablet 1 tablet (50 mg) by Per Feeding Tube route 2 times daily      pantoprazole (PROTONIX) 40 MG EC tablet Take 20 mg by mouth daily       No Known Allergies  Current providers sharing in care for this patient include:  Patient Care Team:  Chris Maria APRN CNP as PCP - General (Geriatric Medicine)  [facility]  Joel Blanchard  MD as Hospitalist (Family Medicine)  Nahed Ho LSW as Lead Care Coordinator (Primary Care - CC)  Lety Garcia as Case Management Specialist  Carley Mehta Amy, RPH as Pharmacist (Pharmacist)  Chris Maria APRN CNP as Nurse Practitioner (Geriatric Medicine)  Chris Maria APRN CNP as Assigned PCP    The following health maintenance items are reviewed in Epic and correct as of today:  Health Maintenance   Topic Date Due    COLORECTAL CANCER SCREENING  Never done    HEPATITIS C SCREENING  Never done    DTAP/TDAP/TD IMMUNIZATION (1 - Tdap) Never done    Pneumococcal Vaccine: 50+ Years (1 of 1 - PCV) Never done    ZOSTER IMMUNIZATION (1 of 2) Never done    COVID-19 Vaccine (1 - 2024-25 season) Never done    RSV VACCINE (1 - 1-dose 75+ series) Never done    INFLUENZA VACCINE (Season Ended) 09/01/2025    BMP  12/23/2025    MEDICARE ANNUAL WELLNESS VISIT  05/21/2026    FALL RISK ASSESSMENT  05/21/2026    LIPID  12/25/2026    DIABETES SCREENING  12/23/2027    ADVANCE CARE PLANNING  04/09/2030    PHQ-2 (once per calendar year)  Completed    HPV IMMUNIZATION  Aged Out    MENINGITIS IMMUNIZATION  Aged Out         Review of Systems  CONSTITUTIONAL: NEGATIVE for fever, chills, change in weight  INTEGUMENTARY/SKIN: NEGATIVE for worrisome rashes, moles or lesions  EYES: NEGATIVE for vision changes or irritation  ENT/MOUTH: NEGATIVE for ear, mouth and throat problems  RESP: NEGATIVE for significant cough or SOB  BREAST: NEGATIVE for masses, tenderness or discharge  CV: NEGATIVE for chest pain, palpitations or peripheral edema  GI: NEGATIVE for nausea, abdominal pain, heartburn, or change in bowel habits  : NEGATIVE for frequency, dysuria, or hematuria  MUSCULOSKELETAL: NEGATIVE for significant arthralgias or myalgia  NEURO: NEGATIVE for weakness, dizziness or paresthesias  ENDOCRINE: NEGATIVE for temperature intolerance, skin/hair changes  HEME: NEGATIVE for bleeding  "problems  PSYCHIATRIC: NEGATIVE for changes in mood or affect     Objective    Exam  /64   Pulse 66   Temp 98  F (36.7  C)   Resp 20   Wt 59 kg (130 lb)   SpO2 98%   BMI 19.77 kg/m     Estimated body mass index is 19.77 kg/m  as calculated from the following:    Height as of 3/24/25: 1.727 m (5' 8\").    Weight as of this encounter: 59 kg (130 lb).    Physical Exam  GENERAL: alert and no distress  EYES: Eyes grossly normal to inspection, PERRL and conjunctivae and sclerae normal  HENT: ear canals and TM's normal, nose and mouth without ulcers or lesions  NECK: no adenopathy, no asymmetry, masses, or scars  RESP: lungs clear to auscultation - no rales, rhonchi or wheezes  CV: regular rate and rhythm, normal S1 S2, no S3 or S4, no murmur, click or rub, no peripheral edema  ABDOMEN: soft, nontender, no hepatosplenomegaly, no masses and bowel sounds normal  MS: no gross musculoskeletal defects noted, no edema  SKIN: no suspicious lesions or rashes  NEURO: Normal strength and tone, mentation intact and speech normal  PSYCH: mentation appears normal, affect normal/bright        5/21/2025   Mini Cog   Mini-Cog Not Completed (choose reason) Known dementia              Signed Electronically by: KALI Ramirez CNP    "

## 2025-05-21 NOTE — PATIENT INSTRUCTIONS
Patient Education   Preventive Care Advice   This is general advice given by our system to help you stay healthy. However, your care team may have specific advice just for you. Please talk to your care team about your preventive care needs.  Nutrition  Eat 5 or more servings of fruits and vegetables each day.  Try wheat bread, brown rice and whole grain pasta (instead of white bread, rice, and pasta).  Get enough calcium and vitamin D. Check the label on foods and aim for 100% of the RDA (recommended daily allowance).  Lifestyle  Exercise at least 150 minutes each week  (30 minutes a day, 5 days a week).  Do muscle strengthening activities 2 days a week. These help control your weight and prevent disease.  No smoking.  Wear sunscreen to prevent skin cancer.  Have a dental exam and cleaning every 6 months.  Yearly exams  See your health care team every year to talk about:  Any changes in your health.  Any medicines your care team has prescribed.  Preventive care, family planning, and ways to prevent chronic diseases.  Shots (vaccines)   HPV shots (up to age 26), if you've never had them before.  Hepatitis B shots (up to age 59), if you've never had them before.  COVID-19 shot: Get this shot when it's due.  Flu shot: Get a flu shot every year.  Tetanus shot: Get a tetanus shot every 10 years.  Pneumococcal, hepatitis A, and RSV shots: Ask your care team if you need these based on your risk.  Shingles shot (for age 50 and up)  General health tests  Diabetes screening:  Starting at age 35, Get screened for diabetes at least every 3 years.  If you are younger than age 35, ask your care team if you should be screened for diabetes.  Cholesterol test: At age 39, start having a cholesterol test every 5 years, or more often if advised.  Bone density scan (DEXA): At age 50, ask your care team if you should have this scan for osteoporosis (brittle bones).  Hepatitis C: Get tested at least once in your life.  STIs (sexually  transmitted infections)  Before age 24: Ask your care team if you should be screened for STIs.  After age 24: Get screened for STIs if you're at risk. You are at risk for STIs (including HIV) if:  You are sexually active with more than one person.  You don't use condoms every time.  You or a partner was diagnosed with a sexually transmitted infection.  If you are at risk for HIV, ask about PrEP medicine to prevent HIV.  Get tested for HIV at least once in your life, whether you are at risk for HIV or not.  Cancer screening tests  Cervical cancer screening: If you have a cervix, begin getting regular cervical cancer screening tests starting at age 21.  Breast cancer scan (mammogram): If you've ever had breasts, begin having regular mammograms starting at age 40. This is a scan to check for breast cancer.  Colon cancer screening: It is important to start screening for colon cancer at age 45.  Have a colonoscopy test every 10 years (or more often if you're at risk) Or, ask your provider about stool tests like a FIT test every year or Cologuard test every 3 years.  To learn more about your testing options, visit:   .  For help making a decision, visit:   https://bit.ly/gq96256.  Prostate cancer screening test: If you have a prostate, ask your care team if a prostate cancer screening test (PSA) at age 55 is right for you.  Lung cancer screening: If you are a current or former smoker ages 50 to 80, ask your care team if ongoing lung cancer screenings are right for you.  For informational purposes only. Not to replace the advice of your health care provider. Copyright   2023 [facility]. All rights reserved. Clinically reviewed by the [facility] Transitions Program. [employer] 059807 - REV 01/24.  Eating Healthy Foods: Care Instructions  With every meal, you can make healthy food choices. Try to eat a variety of fruits, vegetables, whole grains, lean proteins, and low-fat dairy products. This can help  "you get the right balance of nutrients, including vitamins and minerals. Small changes add up over time. You can start by adding one healthy food to your meals each day.    Try to make half your plate fruits and vegetables, one-fourth whole grains, and one-fourth lean proteins. Try including dairy with your meals.   Eat more fruits and vegetables. Try to have them with most meals and snacks.   Foods for healthy eating        Fruits   These can be fresh, frozen, canned, or dried.  Try to choose whole fruit rather than fruit juice.  Eat a variety of colors.        Vegetables   These can be fresh, frozen, canned, or dried.  Beans, peas, and lentils count too.        Whole grains   Choose whole-grain breads, cereals, and noodles.  Try brown rice.        Lean proteins   These can include lean meat, poultry, fish, and eggs.  You can also have tofu, beans, peas, lentils, nuts, and seeds.        Dairy   Try milk, yogurt, and cheese.  Choose low-fat or fat-free when you can.  If you need to, use lactose-free milk or fortified plant-based milk products, such as soy milk.        Water   Drink water when you're thirsty.  Limit sugar-sweetened drinks, including soda, fruit drinks, and sports drinks.  Where can you learn more?  Go to https://www.webtide.net/patiented  Enter T756 in the search box to learn more about \"Eating Healthy Foods: Care Instructions.\"  Current as of: October 7, 2024  Content Version: 14.4    5023-1293 [employer].   Care instructions adapted under license by your healthcare professional. If you have questions about a medical condition or this instruction, always ask your healthcare professional. [employer] disclaims any warranty or liability for your use of this information.    Body Mass Index: Care Instructions  Overview     Body mass index (BMI) can help you see if your weight is raising your risk for health problems. It uses a formula to compare how much you weigh with how tall " you are.  A BMI lower than 18.5 is considered underweight.  A BMI between 18.5 and 24.9 is considered healthy.  A BMI between 25 and 29.9 is considered overweight. A BMI of 30 or higher is considered obese.  If your BMI is in the normal range, it means that you have a lower risk for weight-related health problems. If your BMI is in the overweight or obese range, you may be at increased risk for weight-related health problems, such as high blood pressure, heart disease, stroke, arthritis or joint pain, and diabetes. If your BMI is in the underweight range, you may be at increased risk for health problems such as fatigue, lower protection (immunity) against illness, muscle loss, bone loss, hair loss, and hormone problems.  BMI is just one measure of your risk for weight-related health problems. You may be at higher risk for health problems if you are not active, you eat an unhealthy diet, or you drink too much alcohol or use tobacco products.  Follow-up care is a key part of your treatment and safety. Be sure to make and go to all appointments, and call your doctor if you are having problems. It's also a good idea to know your test results and keep a list of the medicines you take.  How can you care for yourself at home?  Practice healthy eating habits. This includes eating plenty of fruits, vegetables, whole grains, lean protein, and low-fat dairy.  If your doctor recommends it, get more exercise. Walking is a good choice. Bit by bit, increase the amount you walk every day. Try for at least 30 minutes on most days of the week.  Do not smoke. Smoking can increase your risk for health problems. If you need help quitting, talk to your doctor about stop-smoking programs and medicines. These can increase your chances of quitting for good.  Limit alcohol to 2 drinks a day for men and 1 drink a day for women. Too much alcohol can cause health problems.  If you have a BMI higher than 25  Your doctor may do other tests to  "check your risk for weight-related health problems. This may include measuring the distance around your waist. A waist measurement of more than 40 inches in men or 35 inches in women can increase the risk of weight-related health problems.  Talk with your doctor about steps you can take to stay healthy or improve your health. You may need to make lifestyle changes to lose weight and stay healthy, such as changing your diet and getting regular exercise.  If you have a BMI lower than 18.5  Your doctor may do other tests to check your risk for health problems.  Talk with your doctor about steps you can take to stay healthy or improve your health. You may need to make lifestyle changes to gain or maintain weight and stay healthy, such as getting more healthy foods in your diet and doing exercises to build muscle.  Where can you learn more?  Go to https://www.yourdelivery.net/patiented  Enter S176 in the search box to learn more about \"Body Mass Index: Care Instructions.\"  Current as of: April 30, 2024  Content Version: 14.4    2823-7679 [employer].   Care instructions adapted under license by your healthcare professional. If you have questions about a medical condition or this instruction, always ask your healthcare professional. [employer] disclaims any warranty or liability for your use of this information.    Preventive Care Advice   This is general advice given by our system to help you stay healthy. However, your care team may have specific advice just for you. Please talk to your care team about your preventive care needs.  Nutrition  Eat 5 or more servings of fruits and vegetables each day.  Try wheat bread, brown rice and whole grain pasta (instead of white bread, rice, and pasta).  Get enough calcium and vitamin D. Check the label on foods and aim for 100% of the RDA (recommended daily allowance).  Lifestyle  Exercise at least 150 minutes each week  (30 minutes a day, 5 days a week).  Do " muscle strengthening activities 2 days a week. These help control your weight and prevent disease.  No smoking.  Wear sunscreen to prevent skin cancer.  Have a dental exam and cleaning every 6 months.  Yearly exams  See your health care team every year to talk about:  Any changes in your health.  Any medicines your care team has prescribed.  Preventive care, family planning, and ways to prevent chronic diseases.  Shots (vaccines)   HPV shots (up to age 26), if you've never had them before.  Hepatitis B shots (up to age 59), if you've never had them before.  COVID-19 shot: Get this shot when it's due.  Flu shot: Get a flu shot every year.  Tetanus shot: Get a tetanus shot every 10 years.  Pneumococcal, hepatitis A, and RSV shots: Ask your care team if you need these based on your risk.  Shingles shot (for age 50 and up)  General health tests  Diabetes screening:  Starting at age 35, Get screened for diabetes at least every 3 years.  If you are younger than age 35, ask your care team if you should be screened for diabetes.  Cholesterol test: At age 39, start having a cholesterol test every 5 years, or more often if advised.  Bone density scan (DEXA): At age 50, ask your care team if you should have this scan for osteoporosis (brittle bones).  Hepatitis C: Get tested at least once in your life.  STIs (sexually transmitted infections)  Before age 24: Ask your care team if you should be screened for STIs.  After age 24: Get screened for STIs if you're at risk. You are at risk for STIs (including HIV) if:  You are sexually active with more than one person.  You don't use condoms every time.  You or a partner was diagnosed with a sexually transmitted infection.  If you are at risk for HIV, ask about PrEP medicine to prevent HIV.  Get tested for HIV at least once in your life, whether you are at risk for HIV or not.  Cancer screening tests  Cervical cancer screening: If you have a cervix, begin getting regular cervical  cancer screening tests starting at age 21.  Breast cancer scan (mammogram): If you've ever had breasts, begin having regular mammograms starting at age 40. This is a scan to check for breast cancer.  Colon cancer screening: It is important to start screening for colon cancer at age 45.  Have a colonoscopy test every 10 years (or more often if you're at risk) Or, ask your provider about stool tests like a FIT test every year or Cologuard test every 3 years.  To learn more about your testing options, visit:   .  For help making a decision, visit:   https://bit.ly/ta74571.  Prostate cancer screening test: If you have a prostate, ask your care team if a prostate cancer screening test (PSA) at age 55 is right for you.  Lung cancer screening: If you are a current or former smoker ages 50 to 80, ask your care team if ongoing lung cancer screenings are right for you.  For informational purposes only. Not to replace the advice of your health care provider. Copyright   2023 [employer]. All rights reserved. Clinically reviewed by the [facility] Transitions Program. [employer] 729844 - REV 01/24.

## 2025-05-27 ENCOUNTER — PATIENT OUTREACH (OUTPATIENT)
Dept: GERIATRIC MEDICINE | Facility: CLINIC | Age: 75
End: 2025-05-27
Payer: COMMERCIAL

## 2025-05-27 NOTE — LETTER
May 27, 2025    Important [employer] Information    MONE DORAN  [address]   [facility] [address]                                                                              Your Care Plan      Dear Mone,    I am your [employer] Care Coordinator and I visited you at [facility]  on 4/29/2025 to complete your [employer] Health Assessment.    [x] I reviewed your Facility Care Plan and assessment and all identified needs have goals present    [] I reviewed your Facility Care Plan and assessment and we identified these additional goal(s):                                                                                                                                                                                                                                              I will plan to follow up:  [] Once a month  [] Every 3 months   [x] Every 6 months  [] Other      Questions?  If you have any questions or want to discuss your health care needs, call me at [phone] Monday-Friday between 8am and 5pm. TTY: [phone].     Sincerely,    OLGA Simon, [facility]    E-mail: [email or web address]  Phone: [phone]      [facility]      cc: member record, Skilled Nursing Facility    [employer]  is an HMO D-SNP that contracts with both Medicare and the [place] Medical Assistance (Medicaid) program to provide benefits of both programs to enrollees. Enrollment in [employer] depends on contract renewal.  [id number]    2023 [employer]

## 2025-05-27 NOTE — PROGRESS NOTES
[facility] Care Coordination Contact    Received after visit chart from care coordinator.  Completed following tasks: Mailed [employer] Leave Behind Letter and Mailed [employer] Post Visit letter to member/rep and NH facility    Yesi Barnes  Case Management Specialist   [facility]  [phone]

## 2025-07-18 NOTE — PROGRESS NOTES
"  [facility]  Chief Complaint   Patient presents with    [facility]     [place] Medical Record Number:  [id number]  Place of Service where encounter took place:  [facility] () [[id number]]    HPI:    Sukhi Johnson  is 75 year old (1950), who is being seen today for a federally mandated E/M visit.  HPI information obtained from: facility chart records, facility staff, patient report, [facility] chart review and family/first contact wife report.     Today:   - Resident seen and examined, chart reviewed and discussed with the nursing staff.   -no change in the speech. Endorses appetite is ok and tolerating it.   --------------------------------  MEDICATIONS:  MED REC REQUIREDPost Medication Reconciliation S  Current Outpatient Medications   Medication Sig Dispense Refill    acetaminophen (TYLENOL) 500 MG tablet Take 2 tablets (1,000 mg) by mouth every 8 hours as needed for mild pain.      acetaminophen (TYLENOL) 650 MG suppository Place 1 suppository (650 mg) rectally every 4 hours as needed for mild pain      Cranberry-Vitamin C (AZO CRANBERRY URINARY TRACT PO) Take 1 tablet by mouth 2 times daily      diclofenac (VOLTAREN) 1 % topical gel Apply to left shoulder BID 50 g 1    Heparin Sodium, Porcine, (HEPARIN ANTICOAGULANT) 5000 UNIT/0.5ML injection Inject 0.5 mLs (5,000 Units) Subcutaneous every 12 hours      metoprolol tartrate (LOPRESSOR) 50 MG tablet 1 tablet (50 mg) by Per Feeding Tube route 2 times daily      pantoprazole (PROTONIX) 40 MG EC tablet Take 20 mg by mouth daily       ROS: very  limited due to dysarthria      Vitals:  /68   Pulse 66   Temp 97.3  F (36.3  C)   Resp 16   Ht 1.727 m (5' 8\")   Wt 61.4 kg (135 lb 6.4 oz)   SpO2 98%   BMI 20.59 kg/m    Body mass index is 20.59 kg/m .     Exam:    GENERAL APPEARANCE:  in no distress, sitting up in the wheelchair comfortably.  .  RESP:  lungs clear to auscultation   CV:  S1S2 audible, regular " HR, no murmur appreciated.   ABDOMEN:  soft, NT/ND, BS audible. no mass appreciated on palpation.   M/S:   Muscles atrophy right hand.   SKIN:  No rash.   NEURO : Dysphasia ms strenght: LUE 0/5, LLL 1/5, Rt hand  4/5 and RLE 4/5. Ptosis right eye.   PSYCH:  affect and mood normal    Lab/Diagnostic data: Reviewed in the chart and EHR.        ASSESSMENT/PLAN  ----------------------------  Left sided hemiplegia (H)  History of Right-sided nontraumatic intracerebral hemorrhage, unspecified cerebral location, hypertensive vs amyloid angiopathy   Dysarthria as a late effect of stroke  - Appears to be at the baseline.   - Followed by Neurology team. Appreciate help      Dysphagia, unspecified type  Protein calorie malnutrition, moderate  Hx of FT (removed in 7/2022)   -Body mass index is 20.59 kg/m .   - BMI less than 22 in frail elderly increase mortality risk. Gaining weight slowly. Dietician on the case.       Essential HTN:   -  Off enalapril and hydralazine due to soft BP, on metoprolol tartrate. Consider reducing metoprolol dose. SBP goal less than 140 mmHg (advanced age, multiple comorbidities, frailty).       Chronic fatigue syndrome 2/2 Lyme disease:  frailty  - Significant  Deficits requiring NH placement. Requiring extensive assistance from nursing. Up for meals only o/w spends the day resting in bed      Electronically signed by:  Joel Blanchard MD

## 2025-07-24 ENCOUNTER — NURSING HOME VISIT (OUTPATIENT)
Dept: GERIATRICS | Facility: CLINIC | Age: 75
End: 2025-07-24
Payer: COMMERCIAL

## 2025-07-24 VITALS
SYSTOLIC BLOOD PRESSURE: 109 MMHG | WEIGHT: 135.4 LBS | BODY MASS INDEX: 20.52 KG/M2 | DIASTOLIC BLOOD PRESSURE: 68 MMHG | HEIGHT: 68 IN | OXYGEN SATURATION: 98 % | HEART RATE: 66 BPM | RESPIRATION RATE: 16 BRPM | TEMPERATURE: 97.3 F

## 2025-07-24 DIAGNOSIS — I69.322 DYSARTHRIA AS LATE EFFECT OF STROKE: ICD-10-CM

## 2025-07-24 DIAGNOSIS — G93.32 CHRONIC FATIGUE SYNDROME: ICD-10-CM

## 2025-07-24 DIAGNOSIS — M24.542 CONTRACTURE OF HAND JOINT, LEFT: ICD-10-CM

## 2025-07-24 DIAGNOSIS — G81.94 LEFT HEMIPLEGIA (H): Primary | ICD-10-CM

## 2025-07-24 DIAGNOSIS — R13.10 DYSPHAGIA, UNSPECIFIED TYPE: ICD-10-CM

## 2025-07-24 DIAGNOSIS — E44.0 MODERATE PROTEIN-CALORIE MALNUTRITION: ICD-10-CM

## 2025-07-24 DIAGNOSIS — I10 PRIMARY HYPERTENSION: ICD-10-CM

## 2025-07-24 PROCEDURE — 99309 SBSQ NF CARE MODERATE MDM 30: CPT | Performed by: FAMILY MEDICINE

## 2025-07-24 NOTE — LETTER
" 7/24/2025      Sukhi Johnson  [address]  [place] [address]          [facility]  Chief Complaint   Patient presents with     [facility]     [place] Medical Record Number:  [id number]  Place of Service where encounter took place:  [facility] () [[id number]]    HPI:    Sukhi Johnson  is 75 year old (1950), who is being seen today for a federally mandated E/M visit.  HPI information obtained from: facility chart records, facility staff, patient report, [facility] chart review and family/first contact wife report.     Today:   - Resident seen and examined, chart reviewed and discussed with the nursing staff.   -no change in the speech. Endorses appetite is ok and tolerating it.   --------------------------------  MEDICATIONS:  MED REC REQUIREDPost Medication Reconciliation S  Current Outpatient Medications   Medication Sig Dispense Refill     acetaminophen (TYLENOL) 500 MG tablet Take 2 tablets (1,000 mg) by mouth every 8 hours as needed for mild pain.       acetaminophen (TYLENOL) 650 MG suppository Place 1 suppository (650 mg) rectally every 4 hours as needed for mild pain       Cranberry-Vitamin C (AZO CRANBERRY URINARY TRACT PO) Take 1 tablet by mouth 2 times daily       diclofenac (VOLTAREN) 1 % topical gel Apply to left shoulder BID 50 g 1     Heparin Sodium, Porcine, (HEPARIN ANTICOAGULANT) 5000 UNIT/0.5ML injection Inject 0.5 mLs (5,000 Units) Subcutaneous every 12 hours       metoprolol tartrate (LOPRESSOR) 50 MG tablet 1 tablet (50 mg) by Per Feeding Tube route 2 times daily       pantoprazole (PROTONIX) 40 MG EC tablet Take 20 mg by mouth daily       ROS: very  limited due to dysarthria      Vitals:  /68   Pulse 66   Temp 97.3  F (36.3  C)   Resp 16   Ht 1.727 m (5' 8\")   Wt 61.4 kg (135 lb 6.4 oz)   SpO2 98%   BMI 20.59 kg/m    Body mass index is 20.59 kg/m .     Exam:    GENERAL APPEARANCE:  in no distress, sitting up in the " wheelchair comfortably.  .  RESP:  lungs clear to auscultation   CV:  S1S2 audible, regular HR, no murmur appreciated.   ABDOMEN:  soft, NT/ND, BS audible. no mass appreciated on palpation.   M/S:   Muscles atrophy right hand.   SKIN:  No rash.   NEURO : Dysphasia ms strenght: LUE 0/5, LLL 1/5, Rt hand  4/5 and RLE 4/5. Ptosis right eye.   PSYCH:  affect and mood normal    Lab/Diagnostic data: Reviewed in the chart and EHR.        ASSESSMENT/PLAN  ----------------------------  Left sided hemiplegia (H)  History of Right-sided nontraumatic intracerebral hemorrhage, unspecified cerebral location, hypertensive vs amyloid angiopathy   Dysarthria as a late effect of stroke  - Appears to be at the baseline.   - Followed by Neurology team. Appreciate help      Dysphagia, unspecified type  Protein calorie malnutrition, moderate  Hx of FT (removed in 7/2022)   -Body mass index is 20.59 kg/m .   - BMI less than 22 in frail elderly increase mortality risk. Gaining weight slowly. Dietician on the case.       Essential HTN:   -  Off enalapril and hydralazine due to soft BP, on metoprolol tartrate. Consider reducing metoprolol dose. SBP goal less than 140 mmHg (advanced age, multiple comorbidities, frailty).       Chronic fatigue syndrome 2/2 Lyme disease:  frailty  - Significant  Deficits requiring NH placement. Requiring extensive assistance from nursing. Up for meals only o/w spends the day resting in bed      Electronically signed by:  Joel Blanchard MD      Sincerely,        Joel Blanchard MD    Electronically signed

## (undated) RX ORDER — FENTANYL CITRATE 50 UG/ML
INJECTION, SOLUTION INTRAMUSCULAR; INTRAVENOUS
Status: DISPENSED
Start: 2022-01-05

## (undated) RX ORDER — LIDOCAINE HYDROCHLORIDE 10 MG/ML
INJECTION, SOLUTION INFILTRATION; PERINEURAL
Status: DISPENSED
Start: 2022-01-05

## (undated) RX ORDER — LIDOCAINE HYDROCHLORIDE 20 MG/ML
JELLY TOPICAL
Status: DISPENSED
Start: 2022-01-04

## (undated) RX ORDER — LIDOCAINE HYDROCHLORIDE 20 MG/ML
JELLY TOPICAL
Status: DISPENSED
Start: 2021-12-28